# Patient Record
Sex: MALE | Race: WHITE | Employment: OTHER | URBAN - METROPOLITAN AREA
[De-identification: names, ages, dates, MRNs, and addresses within clinical notes are randomized per-mention and may not be internally consistent; named-entity substitution may affect disease eponyms.]

---

## 2017-01-30 RX ORDER — AMLODIPINE BESYLATE 5 MG/1
TABLET ORAL
Qty: 90 TAB | Refills: 3 | Status: SHIPPED | OUTPATIENT
Start: 2017-01-30 | End: 2017-04-25 | Stop reason: SDUPTHER

## 2017-03-01 ENCOUNTER — OFFICE VISIT (OUTPATIENT)
Dept: CARDIOLOGY CLINIC | Age: 82
End: 2017-03-01

## 2017-03-01 ENCOUNTER — HOSPITAL ENCOUNTER (OUTPATIENT)
Dept: GENERAL RADIOLOGY | Age: 82
Discharge: HOME | End: 2017-03-01
Payer: MEDICARE

## 2017-03-01 VITALS
RESPIRATION RATE: 18 BRPM | HEIGHT: 68 IN | BODY MASS INDEX: 26.58 KG/M2 | OXYGEN SATURATION: 97 % | WEIGHT: 175.4 LBS | HEART RATE: 69 BPM | SYSTOLIC BLOOD PRESSURE: 122 MMHG | DIASTOLIC BLOOD PRESSURE: 64 MMHG

## 2017-03-01 DIAGNOSIS — E78.2 MIXED HYPERLIPIDEMIA: Primary | ICD-10-CM

## 2017-03-01 DIAGNOSIS — E78.2 MIXED HYPERLIPIDEMIA: ICD-10-CM

## 2017-03-01 PROCEDURE — 71020 XR CHEST PA LAT: CPT
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
## 2017-03-01 NOTE — MR AVS SNAPSHOT
Visit Information Date & Time Provider Department Dept. Phone Encounter #  
 3/1/2017  2:15 PM Shanti Henao, Cor[facility] [id number] Your Appointments 3/9/2017 10:00 AM  
ECHO CARDIOGRAMS 2D with ECHO, [facility]) Appt Note: Per Donald $0CP REM 2D ECHO LIMTED ADULT W OR WO CONTR [03044 CPT(R)]  
 [address]  
[phone] [address]  
  
    
 4/4/2017 10:30 AM  
6 MONTH with [address] [facility]) Appt Note: 6mo f/u -lj 11-22  
 [address]  
[phone] [address]  
  
    
 6/1/2017  9:30 AM  
PACEMAKER with PACEMAKER, [facility] Appt Note: BSC DCPM 6mo check no remote [address]  
[phone] [address] Upcoming Health Maintenance Date Due DTaP/Tdap/Td series (1 - Tdap) 4/23/1942 ZOSTER VACCINE AGE 60> 4/23/1981 GLAUCOMA SCREENING Q2Y 4/23/1986 Pneumococcal 65+ Low/Medium Risk (1 of 2 - PCV13) 4/23/1986 MEDICARE YEARLY EXAM 4/23/1986 INFLUENZA AGE 9 TO ADULT 8/1/2016 Allergies as of 3/1/2017  Review Complete On: 3/1/2017 By: Melita Hobbs LPN Severity Noted Reaction Type Reactions Shellfish Derived  12/21/2012    Other (comments) Crab meat Current Immunizations  Never Reviewed No immunizations on file. Not reviewed this visit You Were Diagnosed With   
  
 Codes Comments Mixed hyperlipidemia    -  Primary ICD-10-CM: R07.9 ICD-9-CM: 272.2 Vitals BP  
  
  
  
  
  
 122/64 (BP 1 Location: Right arm, BP Patient Position: Sitting) Vitals History BMI and BSA Data Body Mass Index Body Surface Area  
 26.67 kg/m 2 1.95 m 2 Preferred Pharmacy Pharmacy Name Phone [facility]Y [facility][id number] [facility] [phone] Your Updated Medication List  
  
   
This list is accurate as of: 3/1/17  3:02 PM.  Always use your most recent med list. amLODIPine 5 mg tablet Commonly known as:  Gee Esteban TAKE 1 TABLET BY MOUTH EVERY DAY  
  
 ASPIR-81 81 mg tablet Generic drug:  aspirin delayed-release Take 81 mg by mouth daily. AZOPT 1 % ophthalmic suspension Generic drug:  brinzolamide Administer 1 Drop to both eyes two (2) times a day. cyanocobalamin 1,000 mcg tablet Take 1,000 mcg by mouth daily. OCUVITE 501-63-7-150 mg-unit-mg-mg Cap Generic drug:  Vit C-Vit E-Lutein-Min-OM-3 Take  by mouth daily. omeprazole 40 mg capsule Commonly known as:  PRILOSEC Take 40 mg by mouth as needed. OPTI-MULTI PO Take 1 Tab by mouth daily. red yeast rice extract 600 mg Cap Take 600 mg by mouth daily. timolol 0.5 % ophthalmic solution Commonly known as:  TIMOPTIC Administer 1 Drop to both eyes two (2) times a day. XALATAN 0.005 % ophthalmic solution Generic drug:  latanoprost  
Administer 1 Drop to both eyes nightly. We Performed the Following AMB POC EKG ROUTINE W/ 12 LEADS, INTER & REP [67308 CPT(R)] To-Do List   
 03/02/2017 ECHO:  2D ECHO LIMTED ADULT W OR WO CONTR   
  
 03/08/2017 Imaging:  XR CHEST PA LAT Introducing [facility] & HEALTH SERVICES! Laurel Zee introduces [employer] patient portal. Now you can access parts of your medical record, email your doctor's office, and request medication refills online. 1. In your internet browser, go to [email or web address] 2. Click on the First Time User? Click Here link in the Sign In box. You will see the New Member Sign Up page. 3. Enter your [employer] Access Code exactly as it appears below. You will not need to use this code after youve completed the sign-up process. If you do not sign up before the expiration date, you must request a new code. · [employer] Access Code: [facility] Expires: 5/30/2017  2:10 PM 
 
4. Enter the last four digits of your Social Security Number (xxxx) and Date of Birth (mm/dd/yyyy) as indicated and click Submit. You will be taken to the next sign-up page. 5. Create a [employer] ID. This will be your [employer] Mobile login ID and cannot be changed, so think of one that is secure and easy to remember. 6. Create a [employer] password. You can change your password at any time. 7. Enter your Password Reset Question and Answer. This can be used at a later time if you forget your password. 8. Enter your e-mail address. You will receive e-mail notification when new information is available in [address] 9. Click Sign Up. You can now view and download portions of your medical record. 10. Click the Download Summary menu link to download a portable copy of your medical information. If you have questions, please visit the Frequently Asked Questions section of the [employer] website. Remember, [employer] is NOT to be used for urgent needs. For medical emergencies, dial 911. Now available from your iPhone and Android! Please provide this summary of care documentation to your next provider. Your primary care clinician is listed as ARCELIA Dhillon. If you have any questions after today's visit, please call [phone].

## 2017-03-01 NOTE — PATIENT INSTRUCTIONS
Fatigue: Care Instructions  Your Care Instructions  Fatigue is a feeling of tiredness, exhaustion, or lack of energy. You may feel fatigue because of too much or not enough activity. It can also come from stress, lack of sleep, boredom, and poor diet. Many medical problems, such as viral infections, can cause fatigue. Emotional problems, especially depression, are often the cause of fatigue. Fatigue is most often a symptom of another problem. Treatment for fatigue depends on the cause. For example, if you have fatigue because you have a certain health problem, treating this problem also treats your fatigue. If depression or anxiety is the cause, treatment may help. Follow-up care is a key part of your treatment and safety. Be sure to make and go to all appointments, and call your doctor if you are having problems. It's also a good idea to know your test results and keep a list of the medicines you take. How can you care for yourself at home? · Get regular exercise. But don't overdo it. Go back and forth between rest and exercise. · Get plenty of rest.  · Eat a healthy diet. Do not skip meals, especially breakfast.  · Reduce your use of caffeine, tobacco, and alcohol. Caffeine is most often found in coffee, tea, cola drinks, and chocolate. · Limit medicines that can cause fatigue. This includes tranquilizers and cold and allergy medicines. When should you call for help? Watch closely for changes in your health, and be sure to contact your doctor if:  · You have new symptoms such as fever or a rash. · Your fatigue gets worse. · You have been feeling down, depressed, or hopeless. Or you may have lost interest in things that you usually enjoy. · You are not getting better as expected. Where can you learn more? Go to http://vivek-talisha.info/. Enter L664 in the search box to learn more about \"Fatigue: Care Instructions. \"  Current as of: May 27, 2016  Content Version: 11.1  © 1003-8091 Healthwise, Incorporated. Care instructions adapted under license by [employer] (which disclaims liability or warranty for this information). If you have questions about a medical condition or this instruction, always ask your healthcare professional. [place] [address] any warranty or liability for your use of this information.

## 2017-03-02 ENCOUNTER — TELEPHONE (OUTPATIENT)
Dept: CARDIOLOGY CLINIC | Age: 82
End: 2017-03-02

## 2017-03-02 NOTE — TELEPHONE ENCOUNTER
----- Message from Janina Nuñez NP sent at 3/1/2017  5:05 PM EST -----  Scheryl Show: Please call patient chest x-ray is normal.

## 2017-03-09 ENCOUNTER — CLINICAL SUPPORT (OUTPATIENT)
Dept: CARDIOLOGY CLINIC | Age: 82
End: 2017-03-09

## 2017-03-09 DIAGNOSIS — E78.2 MIXED HYPERLIPIDEMIA: ICD-10-CM

## 2017-03-09 DIAGNOSIS — I34.0 MITRAL VALVE INSUFFICIENCY, UNSPECIFIED ETIOLOGY: Primary | ICD-10-CM

## 2017-03-13 ENCOUNTER — TELEPHONE (OUTPATIENT)
Dept: CARDIOLOGY CLINIC | Age: 82
End: 2017-03-13

## 2017-03-13 NOTE — TELEPHONE ENCOUNTER
----- Message from Leydi Baez NP sent at 3/13/2017  1:14 PM EDT -----  Please call patient mitral valve unchanged from previous ECHO, follow up with Neema Watson

## 2017-03-14 NOTE — TELEPHONE ENCOUNTER
----- Message from Chata Felix NP sent at 3/13/2017  1:14 PM EDT -----  Please call patient mitral valve unchanged from previous ECHO, follow up with Joshua Kumar

## 2017-03-15 NOTE — TELEPHONE ENCOUNTER
----- Message from Eloy Salas NP sent at 3/13/2017  1:14 PM EDT -----  Please call patient mitral valve unchanged from previous ECHO, follow up with Solo Guerrero

## 2017-03-16 ENCOUNTER — TELEPHONE (OUTPATIENT)
Dept: CARDIOLOGY CLINIC | Age: 82
End: 2017-03-16

## 2017-03-16 NOTE — TELEPHONE ENCOUNTER
Verified patient with two identifiers. Spoke with patient regarding STRESS test results. Pt will follow up with Dr. Debra Spear on 4/25 will call if any pain or SOB.

## 2017-04-25 ENCOUNTER — OFFICE VISIT (OUTPATIENT)
Dept: CARDIOLOGY CLINIC | Age: 82
End: 2017-04-25

## 2017-04-25 VITALS
HEART RATE: 61 BPM | RESPIRATION RATE: 16 BRPM | HEIGHT: 68 IN | BODY MASS INDEX: 25.85 KG/M2 | DIASTOLIC BLOOD PRESSURE: 78 MMHG | SYSTOLIC BLOOD PRESSURE: 108 MMHG | WEIGHT: 170.6 LBS | OXYGEN SATURATION: 96 %

## 2017-04-25 DIAGNOSIS — Z95.0 CARDIAC PACEMAKER IN SITU: ICD-10-CM

## 2017-04-25 DIAGNOSIS — I10 ESSENTIAL HYPERTENSION, BENIGN: ICD-10-CM

## 2017-04-25 DIAGNOSIS — E78.2 MIXED HYPERLIPIDEMIA: Primary | ICD-10-CM

## 2017-04-25 DIAGNOSIS — Z95.1 POSTSURGICAL AORTOCORONARY BYPASS STATUS: ICD-10-CM

## 2017-04-25 DIAGNOSIS — I25.10 ATHEROSCLEROSIS OF NATIVE CORONARY ARTERY OF NATIVE HEART, ANGINA PRESENCE UNSPECIFIED: ICD-10-CM

## 2017-04-25 RX ORDER — AMLODIPINE BESYLATE 5 MG/1
2.5 TABLET ORAL DAILY
Qty: 90 TAB | Refills: 3
Start: 2017-04-25 | End: 2017-08-29 | Stop reason: ALTCHOICE

## 2017-04-25 NOTE — MR AVS SNAPSHOT
Visit Information Date & Time Provider Department Dept. Phone Encounter #  
 4/25/2017 10:45 AM Dayana García MD [facility] [phone] [id number] Your Appointments 6/1/2017  9:30 AM  
PACEMAKER with PACEMAKER, [facility]) Appt Note: BS DCPM 6mo check no remote [address]  
[phone] [address] Upcoming Health Maintenance Date Due DTaP/Tdap/Td series (1 - Tdap) 4/23/1942 ZOSTER VACCINE AGE 60> 4/23/1981 GLAUCOMA SCREENING Q2Y 4/23/1986 Pneumococcal 65+ Low/Medium Risk (1 of 2 - PCV13) 4/23/1986 MEDICARE YEARLY EXAM 4/23/1986 INFLUENZA AGE 9 TO ADULT 8/1/2016 Allergies as of 4/25/2017  Review Complete On: 4/25/2017 By: Shwetha Woodward Severity Noted Reaction Type Reactions Shellfish Derived  12/21/2012    Other (comments) Crab meat Current Immunizations  Never Reviewed No immunizations on file. Not reviewed this visit You Were Diagnosed With   
  
 Codes Comments Mixed hyperlipidemia    -  Primary ICD-10-CM: N77.7 ICD-9-CM: 272.2 Essential hypertension, benign     ICD-10-CM: I10 
ICD-9-CM: 401.1 Postsurgical aortocoronary bypass status     ICD-10-CM: Z95.1 ICD-9-CM: V45.81 Atherosclerosis of native coronary artery of native heart, angina presence unspecified     ICD-10-CM: I25.10 ICD-9-CM: 414.01 Cardiac pacemaker in situ     ICD-10-CM: Z95.0 ICD-9-CM: V45.01 Vitals BP Pulse Resp Height(growth percentile) Weight(growth percentile) SpO2  
 108/78 (BP 1 Location: Left arm, BP Patient Position: Sitting) 61 16 5' 8\" (1.727 m) 170 lb 9.6 oz (77.4 kg) 96% BMI Smoking Status 25.94 kg/m2 Never Smoker Vitals History BMI and BSA Data  Body Mass Index Body Surface Area  
 25.94 kg/m 2 1.93 m 2  
  
  
 Preferred Pharmacy Pharmacy Name Phone [facility]ACY #[phone] [facility] [phone] Your Updated Medication List  
  
   
This list is accurate as of: 4/25/17 11:55 AM.  Always use your most recent med list. amLODIPine 5 mg tablet Commonly known as:  Ann Fast Take 0.5 Tabs by mouth daily. Lowered 04/25/17 ASPIR-81 81 mg tablet Generic drug:  aspirin delayed-release Take 81 mg by mouth daily. AZOPT 1 % ophthalmic suspension Generic drug:  brinzolamide Administer 1 Drop to both eyes two (2) times a day. cyanocobalamin 1,000 mcg tablet Take 1,000 mcg by mouth daily. OCUVITE 398-95-8-150 mg-unit-mg-mg Cap Generic drug:  Vit C-Vit E-Lutein-Min-OM-3 Take  by mouth daily. omeprazole 40 mg capsule Commonly known as:  PRILOSEC Take 40 mg by mouth as needed. OPTI-MULTI PO Take 1 Tab by mouth daily. red yeast rice extract 600 mg Cap Take 600 mg by mouth daily. timolol 0.5 % ophthalmic solution Commonly known as:  TIMOPTIC Administer 1 Drop to both eyes two (2) times a day. XALATAN 0.005 % ophthalmic solution Generic drug:  latanoprost  
Administer 1 Drop to both eyes nightly. We Performed the Following AMB POC EKG ROUTINE W/ 12 LEADS, INTER & REP [54752 CPT(R)] Introducing [facility] SERVICES! [employer] introduces [employer] patient portal. Now you can access parts of your medical record, email your doctor's office, and request medication refills online. 1. In your internet browser, go to [email or web address] 2. Click on the First Time User? Click Here link in the Sign In box. You will see the New Member Sign Up page. 3. Enter your [employer] Access Code exactly as it appears below. You will not need to use this code after youve completed the sign-up process.  If you do not sign up before the expiration date, you must request a new code. · [employer] Access Code: [facility] Expires: 5/30/2017  3:10 PM 
 
4. Enter the last four digits of your Social Security Number (xxxx) and Date of Birth (mm/dd/yyyy) as indicated and click Submit. You will be taken to the next sign-up page. 5. Create a [employer] ID. This will be your [employer] login ID and cannot be changed, so think of one that is secure and easy to remember. 6. Create a [employer] password. You can change your password at any time. 7. Enter your Password Reset Question and Answer. This can be used at a later time if you forget your password. 8. Enter your e-mail address. You will receive e-mail notification when new information is available in [address] 9. Click Sign Up. You can now view and download portions of your medical record. 10. Click the Download Summary menu link to download a portable copy of your medical information. If you have questions, please visit the Frequently Asked Questions section of the [employer] website. Remember, [employer] is NOT to be used for urgent needs. For medical emergencies, dial 911. Now available from your iPhone and Android! Please provide this summary of care documentation to your next provider. Your primary care clinician is listed as ARCELIA Roth. If you have any questions after today's visit, please call [phone].

## 2017-04-25 NOTE — PROGRESS NOTES
NAME:  Angela Cha   :   1921   MRN:   [id number]   PCP:  Kiley Alfaro MD           Subjective: The patient is a 80y.o. year old male  who returns for a routine follow-up on ASHD, PM. Since the last visit, patient reports no change in exercise tolerance, chest pain, edema, medication intolerance, palpitations,  PND/orthopnea wheezing, sputum, syncope, dizziness or light headedness. Mr Anna Kyle reports fatigue and dyspnea with exertion. He can get up and dress, rest, then eat, rest... Past Medical History:   Diagnosis Date    Atrioventricular block, complete ([address]     CAD (coronary artery disease)     Dizziness and giddiness     GERD (gastroesophageal reflux disease)     Hypertension     Mixed hyperlipidemia     Other acute and subacute form of ischemic heart disease     Syncope and collapse        Social History   Substance Use Topics    Smoking status: Never Smoker    Smokeless tobacco: Never Used    Alcohol use 3.5 oz/week     7 Glasses of wine per week      Family History   Problem Relation Age of Onset    Stroke Father         Review of Systems  Constitutional: Negative for fever, chills, and diaphoresis. Respiratory: Negative for cough, hemoptysis, sputum production, reports shortness of breath  Cardiovascular: Negative for chest pain, palpitations, orthopnea, claudication, leg swelling and PND. Gastrointestinal: Negative for heartburn, nausea, vomiting, blood in stool and melena. Genitourinary: Negative for dysuria and flank pain. Musculoskeletal: Negative for joint pain and back pain. Skin: Negative for rash. Neurological: Negative for focal weakness, seizures, loss of consciousness, weakness and headaches. Endo/Heme/Allergies: Does not bruise/bleed easily. Psychiatric/Behavioral: Negative for memory loss. The patient does not have insomnia.         Objective:       Vitals:    17 1031 17 1040   BP: 110/80 108/78   Pulse: 61    Resp: 16    SpO2: 96%    Weight: 170 lb 9.6 oz (77.4 kg)    Height: 5' 8\" (1.727 m)     Body mass index is 25.94 kg/(m^2). General PE    Gen: NAD     Mental Status - Alert. General Appearance - Not in acute distress. Neck - no JVD     Chest and Lung Exam     Inspection: Accessory muscles - No use of accessory muscles in breathing. Auscultation:   Breath sounds: - Normal.     Cardiovascular   Inspection: Jugular vein - Bilateral - Inspection Normal.   Palpation/Percussion:   Apical Impulse: - Normal.   Auscultation: Rhythm - Regular. Heart Sounds - S1 WNL and S2 WNL. No S3 or S4. Murmurs & Other Heart Sounds: Auscultation of the heart reveals - No Murmurs. Peripheral Vascular   Upper Extremity: Inspection - Bilateral - No Cyanotic nailbeds or Digital clubbing. Lower Extremity:   Palpation: Edema - Bilateral - No edema. Abdomen: Soft, non-tender, bowel sounds are active. Neuro: A&O times 3, CN and motor grossly WNL      Data Review:     EKG -  V paced. Echo - 3/17  Left ventricle: Systolic function was normal. Ejection fraction was  estimated in the range of 60 % to 65 %. There were no regional wall motion  abnormalities. Wall thickness was moderately increased. Left atrium: The atrium was moderately dilated. Mitral valve: There was moderate regurgitation. Recommend AMBERLY for better  evaluation if clinically indicated. Tricuspid valve: There was mild regurgitation. There was mild pulmonary  hypertension. Allergies reviewed  Allergies   Allergen Reactions    Shellfish Derived Other (comments)     Crab meat       Medications reviewed  Current Outpatient Prescriptions   Medication Sig    amLODIPine (NORVASC) 5 mg tablet Take 0.5 Tabs by mouth daily. Lowered 04/25/17    Vit C-Vit E-Lutein-Min-OM-3 (OCUVITE) 105-41-2-150 mg-unit-mg-mg cap Take  by mouth daily.  cyanocobalamin 1,000 mcg tablet Take 1,000 mcg by mouth daily.  MULTIVITAMIN WITH MINERALS (OPTI-MULTI PO) Take 1 Tab by mouth daily.  brinzolamide (AZOPT) 1 % ophthalmic suspension Administer 1 Drop to both eyes two (2) times a day.  omeprazole (PRILOSEC) 40 mg capsule Take 40 mg by mouth as needed.  timolol (TIMOPTIC) 0.5 % ophthalmic solution Administer 1 Drop to both eyes two (2) times a day.  aspirin delayed-release (ASPIR-81) 81 mg tablet Take 81 mg by mouth daily.  latanoprost (XALATAN) 0.005 % ophthalmic solution Administer 1 Drop to both eyes nightly.  red yeast rice extract 600 mg cap Take 600 mg by mouth daily. No current facility-administered medications for this visit. Assessment:       ICD-10-CM ICD-9-CM    1. Mixed hyperlipidemia E78.2 272.2 AMB POC EKG ROUTINE W/ 12 LEADS, INTER & REP   2. Essential hypertension, benign I10 401.1    3. 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 Atherosclerosis of native coronary artery of native heart, angina presence unspecified I25.10 414.01    5. Cardiac pacemaker in situ Z95.0 V45.01         Orders Placed This Encounter    AMB POC EKG ROUTINE W/ 12 LEADS, INTER & REP     Order Specific Question:   Reason for Exam:     Answer:   Routine    amLODIPine (NORVASC) 5 mg tablet     Sig: Take 0.5 Tabs by mouth daily. 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 Recent echo with normal EF mod MR. Stress test 10/16 without ischemia. Will lower his CCB for low bp and if no improvement consider repeat stress/cardiac cath with his CAD hx. Brendon Thomas, [facility] [address] [facility] [address] [facility]    4/26/2017         Agree with note as outlined by  NP.  I confirm findings in history and physical exam. No additional findings noted. Agree with plan as outlined above.      Elma Valles MD

## 2017-04-25 NOTE — PROGRESS NOTES
Chief Complaint   Patient presents with    Cholesterol Problem     6 mo f/u    Hypertension     \"    Shortness of Breath     on exertion

## 2017-06-01 ENCOUNTER — CLINICAL SUPPORT (OUTPATIENT)
Dept: CARDIOLOGY CLINIC | Age: 82
End: 2017-06-01

## 2017-06-01 DIAGNOSIS — Z95.0 CARDIAC PACEMAKER IN SITU: Primary | ICD-10-CM

## 2017-06-01 DIAGNOSIS — I49.5 SINOATRIAL NODE DYSFUNCTION (HCC): ICD-10-CM

## 2017-08-29 ENCOUNTER — OFFICE VISIT (OUTPATIENT)
Dept: CARDIOLOGY CLINIC | Age: 82
End: 2017-08-29

## 2017-08-29 VITALS
DIASTOLIC BLOOD PRESSURE: 80 MMHG | HEIGHT: 68 IN | WEIGHT: 164 LBS | BODY MASS INDEX: 24.86 KG/M2 | SYSTOLIC BLOOD PRESSURE: 146 MMHG | HEART RATE: 65 BPM | RESPIRATION RATE: 20 BRPM | OXYGEN SATURATION: 97 %

## 2017-08-29 DIAGNOSIS — R19.5 MUCUS IN STOOL: ICD-10-CM

## 2017-08-29 DIAGNOSIS — R10.30 LOWER ABDOMINAL PAIN: ICD-10-CM

## 2017-08-29 DIAGNOSIS — I25.10 ATHEROSCLEROSIS OF NATIVE CORONARY ARTERY OF NATIVE HEART, ANGINA PRESENCE UNSPECIFIED: Primary | ICD-10-CM

## 2017-08-29 DIAGNOSIS — I10 ESSENTIAL HYPERTENSION, BENIGN: ICD-10-CM

## 2017-08-29 DIAGNOSIS — R63.4 WEIGHT LOSS, UNINTENTIONAL: ICD-10-CM

## 2017-08-29 DIAGNOSIS — Z95.1 POSTSURGICAL AORTOCORONARY BYPASS STATUS: ICD-10-CM

## 2017-08-29 DIAGNOSIS — R19.4 BOWEL HABIT CHANGES: ICD-10-CM

## 2017-08-29 RX ORDER — LISINOPRIL 5 MG/1
5 TABLET ORAL DAILY
Qty: 30 TAB | Refills: 6 | Status: SHIPPED | OUTPATIENT
Start: 2017-08-29 | End: 2017-09-28 | Stop reason: SDUPTHER
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
## 2017-08-31 ENCOUNTER — HOSPITAL ENCOUNTER (OUTPATIENT)
Dept: CT IMAGING | Age: 82
Discharge: HOME | End: 2017-08-31
Attending: NURSE PRACTITIONER
Payer: MEDICARE

## 2017-08-31 ENCOUNTER — HOSPITAL ENCOUNTER (EMERGENCY)
Age: 82
Discharge: HOME | End: 2017-08-31
Attending: EMERGENCY MEDICINE
Payer: MEDICARE

## 2017-08-31 VITALS
BODY MASS INDEX: 25.13 KG/M2 | WEIGHT: 165.79 LBS | HEART RATE: 65 BPM | TEMPERATURE: 97.9 F | SYSTOLIC BLOOD PRESSURE: 167 MMHG | DIASTOLIC BLOOD PRESSURE: 75 MMHG | HEIGHT: 68 IN | RESPIRATION RATE: 16 BRPM | OXYGEN SATURATION: 98 %

## 2017-08-31 DIAGNOSIS — R19.4 BOWEL HABIT CHANGES: ICD-10-CM

## 2017-08-31 DIAGNOSIS — R19.5 MUCUS IN STOOL: ICD-10-CM

## 2017-08-31 DIAGNOSIS — R63.4 WEIGHT LOSS, UNINTENTIONAL: ICD-10-CM

## 2017-08-31 DIAGNOSIS — K57.32 DIVERTICULITIS OF LARGE INTESTINE WITHOUT PERFORATION OR ABSCESS WITHOUT BLEEDING: Primary | ICD-10-CM

## 2017-08-31 DIAGNOSIS — R10.30 LOWER ABDOMINAL PAIN: ICD-10-CM

## 2017-08-31 LAB
ALBUMIN SERPL-MCNC: 3.4 G/DL (ref 3.5–5)
ALBUMIN/GLOB SERPL: 0.9 {RATIO} (ref 1.1–2.2)
ALP SERPL-CCNC: 127 U/L (ref 45–117)
ALT SERPL-CCNC: 25 U/L (ref 12–78)
ANION GAP SERPL CALC-SCNC: 7 MMOL/L (ref 5–15)
AST SERPL-CCNC: 33 U/L (ref 15–37)
BASOPHILS # BLD: 0 K/UL (ref 0–0.1)
BASOPHILS NFR BLD: 0 % (ref 0–1)
BILIRUB SERPL-MCNC: 0.4 MG/DL (ref 0.2–1)
BUN SERPL-MCNC: 32 MG/DL (ref 6–20)
BUN/CREAT SERPL: 16 (ref 12–20)
CALCIUM SERPL-MCNC: 8.6 MG/DL (ref 8.5–10.1)
CHLORIDE SERPL-SCNC: 110 MMOL/L (ref 97–108)
CO2 SERPL-SCNC: 24 MMOL/L (ref 21–32)
CREAT BLD-MCNC: 2 MG/DL (ref 0.6–1.3)
CREAT SERPL-MCNC: 1.96 MG/DL (ref 0.7–1.3)
EOSINOPHIL # BLD: 0.4 K/UL (ref 0–0.4)
EOSINOPHIL NFR BLD: 6 % (ref 0–7)
ERYTHROCYTE [DISTWIDTH] IN BLOOD BY AUTOMATED COUNT: 13.3 % (ref 11.5–14.5)
GLOBULIN SER CALC-MCNC: 4 G/DL (ref 2–4)
GLUCOSE SERPL-MCNC: 115 MG/DL (ref 65–100)
HCT VFR BLD AUTO: 41.7 % (ref 36.6–50.3)
HGB BLD-MCNC: 14.3 G/DL (ref 12.1–17)
LACTATE SERPL-SCNC: 0.8 MMOL/L (ref 0.4–2)
LYMPHOCYTES # BLD: 2.1 K/UL (ref 0.8–3.5)
LYMPHOCYTES NFR BLD: 26 % (ref 12–49)
MCH RBC QN AUTO: 33.1 PG (ref 26–34)
MCHC RBC AUTO-ENTMCNC: 34.3 G/DL (ref 30–36.5)
MCV RBC AUTO: 96.5 FL (ref 80–99)
MONOCYTES # BLD: 0.8 K/UL (ref 0–1)
MONOCYTES NFR BLD: 10 % (ref 5–13)
NEUTS SEG # BLD: 4.7 K/UL (ref 1.8–8)
NEUTS SEG NFR BLD: 58 % (ref 32–75)
PLATELET # BLD AUTO: 213 K/UL (ref 150–400)
POTASSIUM SERPL-SCNC: 4.3 MMOL/L (ref 3.5–5.1)
PROT SERPL-MCNC: 7.4 G/DL (ref 6.4–8.2)
RBC # BLD AUTO: 4.32 M/UL (ref 4.1–5.7)
SODIUM SERPL-SCNC: 141 MMOL/L (ref 136–145)
WBC # BLD AUTO: 8 K/UL (ref 4.1–11.1)

## 2017-08-31 PROCEDURE — 74011000255 HC RX REV CODE- 255: Performed by: NURSE PRACTITIONER

## 2017-08-31 PROCEDURE — 74011250636 HC RX REV CODE- 250/636: Performed by: EMERGENCY MEDICINE

## 2017-08-31 PROCEDURE — 80053 COMPREHEN METABOLIC PANEL: CPT | Performed by: PHYSICIAN ASSISTANT

## 2017-08-31 PROCEDURE — 74176 CT ABD & PELVIS W/O CONTRAST: CPT

## 2017-08-31 PROCEDURE — 96360 HYDRATION IV INFUSION INIT: CPT

## 2017-08-31 PROCEDURE — 36415 COLL VENOUS BLD VENIPUNCTURE: CPT | Performed by: PHYSICIAN ASSISTANT

## 2017-08-31 PROCEDURE — 99285 EMERGENCY DEPT VISIT HI MDM: CPT

## 2017-08-31 PROCEDURE — 74011250637 HC RX REV CODE- 250/637: Performed by: PHYSICIAN ASSISTANT

## 2017-08-31 PROCEDURE — 83605 ASSAY OF LACTIC ACID: CPT | Performed by: PHYSICIAN ASSISTANT

## 2017-08-31 PROCEDURE — 85025 COMPLETE CBC W/AUTO DIFF WBC: CPT | Performed by: PHYSICIAN ASSISTANT

## 2017-08-31 PROCEDURE — 82565 ASSAY OF CREATININE: CPT

## 2017-08-31 RX ORDER — METRONIDAZOLE 250 MG/1
500 TABLET ORAL
Status: COMPLETED | OUTPATIENT
Start: 2017-08-31 | End: 2017-08-31

## 2017-08-31 RX ORDER — METRONIDAZOLE 500 MG/1
500 TABLET ORAL 2 TIMES DAILY
Qty: 14 TAB | Refills: 0 | Status: SHIPPED | OUTPATIENT
Start: 2017-08-31 | End: 2017-09-07

## 2017-08-31 RX ORDER — CIPROFLOXACIN 500 MG/1
500 TABLET ORAL 2 TIMES DAILY
Qty: 14 TAB | Refills: 0 | Status: SHIPPED | OUTPATIENT
Start: 2017-08-31 | End: 2017-09-05 | Stop reason: ALTCHOICE

## 2017-08-31 RX ORDER — SODIUM CHLORIDE 0.9 % (FLUSH) 0.9 %
10 SYRINGE (ML) INJECTION
Status: DISCONTINUED | OUTPATIENT
Start: 2017-08-31 | End: 2017-08-31

## 2017-08-31 RX ORDER — BARIUM SULFATE 20 MG/ML
900 SUSPENSION ORAL
Status: COMPLETED | OUTPATIENT
Start: 2017-08-31 | End: 2017-08-31

## 2017-08-31 RX ORDER — CIPROFLOXACIN 500 MG/1
500 TABLET ORAL
Status: COMPLETED | OUTPATIENT
Start: 2017-08-31 | End: 2017-08-31

## 2017-08-31 RX ORDER — SODIUM CHLORIDE 9 MG/ML
50 INJECTION, SOLUTION INTRAVENOUS
Status: DISCONTINUED | OUTPATIENT
Start: 2017-08-31 | End: 2017-08-31

## 2017-08-31 RX ADMIN — SODIUM CHLORIDE 1000 ML: 900 INJECTION, SOLUTION INTRAVENOUS at 18:22

## 2017-08-31 RX ADMIN — BARIUM SULFATE 900 ML: 21 SUSPENSION ORAL at 13:05

## 2017-08-31 RX ADMIN — METRONIDAZOLE 500 MG: 250 TABLET ORAL at 20:32

## 2017-08-31 RX ADMIN — CIPROFLOXACIN HYDROCHLORIDE 500 MG: 500 TABLET, FILM COATED ORAL at 20:32

## 2017-08-31 NOTE — ED TRIAGE NOTES
Patient arrives ambulatory to ED with daughter with complaint of diarrhea 10 times after CT scan this afternoon (Dr. Maritza Garcia), who informed him Jany Christine has a bowel infection and needs IV antibiotic\"; pt denies N/V. Patient's daughter stated that patient was unable to get the \"full CT due to his kidney function. \" Patient's daughter stated that this started after taking the barium. Patient has had chronic diarrhea for awhile now per patient.
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
## 2017-08-31 NOTE — DISCHARGE INSTRUCTIONS
Diverticulitis: Care Instructions  Your Care Instructions    Diverticulitis occurs when pouches form in the wall of the colon and become inflamed or infected. It can be very painful. Doctors aren't sure what causes diverticulitis. There is no proof that foods such as nuts, seeds, or berries cause it or make it worse. A low-fiber diet may cause the colon to work harder to push stool forward. Pouches may form because of this extra work. It may be hard to think about healthy eating while you're in pain. But as you recover, you might think about how you can use healthy eating for overall better health. Healthy eating may help you avoid future attacks. Follow-up care is a key part of your treatment and safety. Be sure to make and go to all appointments, and call your doctor if you are having problems. It's also a good idea to know your test results and keep a list of the medicines you take. How can you care for yourself at home? · Drink plenty of fluids, enough so that your urine is light yellow or clear like water. If you have kidney, heart, or liver disease and have to limit fluids, talk with your doctor before you increase the amount of fluids you drink. · Stick to liquids or a bland diet (plain rice, bananas, dry toast or crackers, applesauce) until you are feeling better. Then you can return to regular foods and gradually increase the amount of fiber in your diet. · Use a heating pad set on low on your belly to relieve mild cramps and pain. · Get extra rest until you are feeling better. · Be safe with medicines. Read and follow all instructions on the label. ¨ If the doctor gave you a prescription medicine for pain, take it as prescribed. ¨ If you are not taking a prescription pain medicine, ask your doctor if you can take an over-the-counter medicine. · If your doctor prescribed antibiotics, take them as directed. Do not stop taking them just because you feel better.  You need to take the full course of antibiotics. To prevent future attacks of diverticulitis  · Avoid constipation:  ¨ Include fruits, vegetables, beans, and whole grains in your diet each day. These foods are high in fiber. ¨ Drink plenty of fluids, enough so that your urine is light yellow or clear like water. If you have kidney, heart, or liver disease and have to limit fluids, talk with your doctor before you increase the amount of fluids you drink. ¨ Get some exercise every day. Build up slowly to 30 to 60 minutes a day on 5 or more days of the week. ¨ Take a fiber supplement, such as Citrucel or Metamucil, every day if needed. Read and follow all instructions on the label. ¨ Schedule time each day for a bowel movement. Having a daily routine may help. Take your time and do not strain when having a bowel movement. When should you call for help? Call 911 anytime you think you may need emergency care. For example, call if:  · You passed out (lost consciousness). · You vomit blood or what looks like coffee grounds. · You pass maroon or very bloody stools. Call your doctor now or seek immediate medical care if:  · You have severe pain or swelling in your belly. · You have a new or higher fever. · You cannot keep down fluids or medicines. · You have new pain that gets worse when you move or cough. Watch closely for changes in your health, and be sure to contact your doctor if:  · The symptoms you had when you first started feeling sick come back. · You do not get better as expected. Where can you learn more? Go to http://vivek-talisha.info/. Enter H901 in the search box to learn more about \"Diverticulitis: Care Instructions. \"  Current as of: August 9, 2016  Content Version: 11.3  © 4252-3204 [employer]. Care instructions adapted under license by [employer] (which disclaims liability or warranty for this information).  If you have questions about a medical condition or this instruction, always ask your healthcare professional. [place] [address] any warranty or liability for your use of this information. [place]: Anatomy Sketch    Current as of: January 5, 2017  Content Version: 11.3  © 9275-0406 [employer]. Care instructions adapted under license by [employer] (which disclaims liability or warranty for this information). If you have questions about a medical condition or this instruction, always ask your healthcare professional. [place] [address] any warranty or liability for your use of this information. Learning About Diverticulosis and Diverticulitis  What are diverticulosis and diverticulitis? In diverticulosis and diverticulitis, pouches called diverticula form in the wall of the large intestine, or colon. · In diverticulosis, the pouches do not cause any pain or other symptoms. · In diverticulitis, the pouches get inflamed or infected and cause symptoms. Doctors aren't sure what causes these pouches in the colon. But they think that a low-fiber diet may play a role. Without fiber to add bulk to the stool, the colon has to work harder than normal to push the stool forward. The pressure from this may cause pouches to form in weak spots along the colon. Some people with diverticulosis get diverticulitis. But experts don't know why this happens. What are the symptoms? · In diverticulosis, most people don't have symptoms. But pouches sometimes bleed. · In diverticulitis, symptoms may last from a few hours to a week or more. They include:  ¨ Belly pain. This is usually in the lower left side. It is sometimes worse when you move. This is the most common symptom. ¨ Fever and chills. ¨ Bloating and gas. ¨ Diarrhea or constipation. ¨ Nausea and sometimes vomiting. ¨ Not feeling like eating. How can you prevent these problems?   You may be able to lower your chance of getting diverticulitis. You can do this by taking steps to prevent constipation. · Eat fruits, vegetables, beans, and whole grains every day. These foods are high in fiber. · Drink plenty of fluids (enough so that your urine is light yellow or clear like water). If you have kidney, heart, or liver disease and have to limit fluids, talk with your doctor before you increase the amount of fluids you drink. · Get at least 30 minutes of exercise on most days of the week. Walking is a good choice. You also may want to do other activities, such as running, swimming, cycling, or playing tennis or team sports. · Take a fiber supplement, such as Citrucel or Metamucil, every day if needed. Read and follow all instructions on the label. · Schedule time each day for a bowel movement. Having a daily routine may help. Take your time and do not strain when having a bowel movement. Some people avoid nuts, seeds, berries, and popcorn. They believe that these foods might get trapped in the diverticula and cause pain. But there is no proof that these foods cause diverticulitis or make it worse. How are these problems treated? · The best way to treat diverticulosis is to avoid constipation. (See the tips above.)  · Treatment for diverticulitis includes antibiotics and often a change in your diet. You may need only liquids at first. Your doctor may suggest pain medicines for pain or belly cramps. In some cases, surgery may be needed. Follow-up care is a key part of your treatment and safety. Be sure to make and go to all appointments, and call your doctor if you are having problems. It's also a good idea to know your test results and keep a list of the medicines you take. Where can you learn more? Go to http://vivek-talisha.info/. Enter M405 in the search box to learn more about \"Learning About Diverticulosis and Diverticulitis. \"  Current as of: August 9, 2016  Content Version: 11.3  © 1046-1125 [employer]. Care instructions adapted under license by [employer] (which disclaims liability or warranty for this information). If you have questions about a medical condition or this instruction, always ask your healthcare professional. [address] any warranty or liability for your use of this information.

## 2017-08-31 NOTE — PROGRESS NOTES
4:24 PM  Spoke with patient re: Ct results showing acute sig. Diverticulitis. Continues to experience abdominal pain, states \"19\" bowel movements today, low appetite, poor energy level. Discussed infective process, increased creatine / dehydration and risk of sepsis / bowel injury after he initially wanted to follow up next week with PCP. He agrees to be seen at [facility] ER for admission. Called and spoke with ER attending Dr Hussein Houston.

## 2017-09-01 NOTE — ED NOTES
Dr. Madan Perez and RITA Zapata at bedside to provide discharge paperwork. Vital signs stable. Pt in no apparent distress at this time. Mental status at baseline. Ambulatory to waiting room with steady gate, discharge paperwork in hand. Accompanied by daughter.

## 2017-09-05 ENCOUNTER — OFFICE VISIT (OUTPATIENT)
Dept: INTERNAL MEDICINE CLINIC | Age: 82
End: 2017-09-05

## 2017-09-05 VITALS
HEIGHT: 68 IN | BODY MASS INDEX: 25.01 KG/M2 | DIASTOLIC BLOOD PRESSURE: 92 MMHG | WEIGHT: 165 LBS | SYSTOLIC BLOOD PRESSURE: 150 MMHG

## 2017-09-05 DIAGNOSIS — K57.32 SIGMOID DIVERTICULITIS: Primary | ICD-10-CM

## 2017-09-05 DIAGNOSIS — I10 ESSENTIAL HYPERTENSION: Primary | ICD-10-CM

## 2017-09-05 RX ORDER — AMLODIPINE BESYLATE 5 MG/1
TABLET ORAL
Refills: 3 | COMMUNITY
Start: 2017-07-27 | End: 2018-06-03
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
## 2017-09-05 NOTE — MR AVS SNAPSHOT
Visit Information Date & Time Provider Department Dept. Phone Encounter #  
 9/5/2017 11:30 AM ARCELIA Ladd MD [place] [address] [phone] [id number] Your Appointments 12/12/2017 10:30 AM  
PACEMAKER with PACEMAKER, [facility] [address] [place] [address]) Appt Note: annual with device check; r/s from 12/5,[address]  
[phone] [address] [place] [address]  
  
    
 12/12/2017 10:30 AM  
ANNUAL with Krzysztof Mcclelland MD  
[facility] [address] [place] [address]) Appt Note: r/s from 12/5,[address]  
[phone] [address] [place] [address] Upcoming Health Maintenance Date Due DTaP/Tdap/Td series (1 - Tdap) 4/23/1942 ZOSTER VACCINE AGE 60> 2/23/1981 GLAUCOMA SCREENING Q2Y 4/23/1986 Pneumococcal 65+ Low/Medium Risk (1 of 2 - PCV13) 4/23/1986 MEDICARE YEARLY EXAM 4/23/1986 INFLUENZA AGE 9 TO ADULT 8/1/2017 Allergies as of 9/5/2017  Review Complete On: 9/5/2017 By: Shira Hughes MD  
  
 Severity Noted Reaction Type Reactions Shellfish Derived  12/21/2012    Other (comments) Crab meat Current Immunizations  Never Reviewed No immunizations on file. Not reviewed this visit Vitals Smoking Status Never Smoker Preferred Pharmacy Pharmacy Name Phone [id number] [facility] [phone] Your Updated Medication List  
  
   
This list is accurate as of: 9/5/17 12:39 PM.  Always use your most recent med list. amLODIPine 5 mg tablet Commonly known as:  Laure Elizabeth TAKE 1 TABLET BY MOUTH EVERY DAY  
  
 ASPIR-81 81 mg tablet Generic drug:  aspirin delayed-release Take 81 mg by mouth daily. AZOPT 1 % ophthalmic suspension Generic drug:  brinzolamide Administer 1 Drop to both eyes two (2) times a day. lisinopril 5 mg tablet Commonly known as:  [place] Rafy Take 1 Tab by mouth daily. Stop amlodipine  
  
 metroNIDAZOLE 500 mg tablet Commonly known as:  FLAGYL Take 1 Tab by mouth two (2) times a day for 7 days. OCUVITE 766-83-9-150 mg-unit-mg-mg Cap Generic drug:  Vit C-Vit E-Lutein-Min-OM-3 Take  by mouth daily. omeprazole 40 mg capsule Commonly known as:  PRILOSEC Take 40 mg by mouth as needed. OPTI-MULTI PO Take 1 Tab by mouth daily. timolol 0.5 % ophthalmic solution Commonly known as:  TIMOPTIC Administer 1 Drop to both eyes two (2) times a day. XALATAN 0.005 % ophthalmic solution Generic drug:  latanoprost  
Administer 1 Drop to both eyes nightly. Introducing [facility] & HEALTH SERVICES! [employer] introduces [employer] patient portal. Now you can access parts of your medical record, email your doctor's office, and request medication refills online. 1. In your internet browser, go to [email or web address]. [email or web address]/"[email or web address] 2. Click on the First Time User? Click Here link in the Sign In box. You will see the New Member Sign Up page. 3. Enter your Pie[employer] Access Code exactly as it appears below. You will not need to use this code after youve completed the sign-up process. If you do not sign up before the expiration date, you must request a new code. · Pie[employer] Access Code: [email or web address] Expires: 11/28/2017  2:59 PM 
 
4. Enter the last four digits of your Social Security Number (xxxx) and Date of Birth (mm/dd/yyyy) as indicated and click Submit. You will be taken to the next sign-up page. 5. Create a ShopTextt ID. This will be your Pie[employer] login ID and cannot be changed, so think of one that is secure and easy to remember. 6. Create a Shop[employer]t password. You can change your password at any time. 7. Enter your Password Reset Question and Answer. This can be used at a later time if you forget your password. 8. Enter your e-mail address. You will receive e-mail notification when new information is available in [address] 9. Click Sign Up. You can now view and download portions of your medical record. 10. Click the Download Summary menu link to download a portable copy of your medical information. If you have questions, please visit the Frequently Asked Questions section of the [employer] website. Remember, [employer] is NOT to be used for urgent needs. For medical emergencies, dial 911. Now available from your iPhone and Android! Please provide this summary of care documentation to your next provider. Your primary care clinician is listed as ARCELIA Alvarado. If you have any questions after today's visit, please call [phone].

## 2017-09-05 NOTE — PROGRESS NOTES
Deonna Bharat Salamanca was seen today at a different time listed and so this encounter is a duplicate.     Jackson Multani MD

## 2017-09-05 NOTE — PROGRESS NOTES
Gale Villa is a 80 y.o. male presenting for Abdominal Pain (RM 1 - had CT scan - been on soft diet)  . 1. Have you been to the ER, urgent care clinic since your last visit? Hospitalized since your last visit? Yes When: last week Where: [address] [facility] [facility] Reason for visit: ABD CT    2. Have you seen or consulted any other health care providers outside of the [facility] since your last visit? Include any pap smears or colon screening.  No

## 2017-09-07 ENCOUNTER — OFFICE VISIT (OUTPATIENT)
Dept: INTERNAL MEDICINE CLINIC | Age: 82
End: 2017-09-07

## 2017-09-07 VITALS
OXYGEN SATURATION: 99 % | HEIGHT: 68 IN | BODY MASS INDEX: 25.01 KG/M2 | HEART RATE: 64 BPM | TEMPERATURE: 97.8 F | WEIGHT: 165 LBS | DIASTOLIC BLOOD PRESSURE: 80 MMHG | SYSTOLIC BLOOD PRESSURE: 138 MMHG | RESPIRATION RATE: 18 BRPM

## 2017-09-07 DIAGNOSIS — E11.9 TYPE 2 DIABETES MELLITUS WITHOUT COMPLICATION, WITHOUT LONG-TERM CURRENT USE OF INSULIN (HCC): ICD-10-CM

## 2017-09-07 DIAGNOSIS — I25.10 ATHEROSCLEROSIS OF NATIVE CORONARY ARTERY OF NATIVE HEART, ANGINA PRESENCE UNSPECIFIED: ICD-10-CM

## 2017-09-07 DIAGNOSIS — N18.30 CKD (CHRONIC KIDNEY DISEASE) STAGE 3, GFR 30-59 ML/MIN (HCC): ICD-10-CM

## 2017-09-07 DIAGNOSIS — K21.9 GASTROESOPHAGEAL REFLUX DISEASE WITHOUT ESOPHAGITIS: ICD-10-CM

## 2017-09-07 DIAGNOSIS — I10 ESSENTIAL HYPERTENSION, BENIGN: Primary | ICD-10-CM

## 2017-09-07 DIAGNOSIS — K57.32 DIVERTICULITIS OF LARGE INTESTINE WITHOUT PERFORATION OR ABSCESS WITHOUT BLEEDING: ICD-10-CM

## 2017-09-07 LAB
ALBUMIN SERPL-MCNC: 4.1 G/DL (ref 3.9–5.4)
ALKALINE PHOS POC: 91 U/L (ref 38–126)
ALT SERPL-CCNC: 29 U/L (ref 9–52)
AST SERPL-CCNC: 35 U/L (ref 14–36)
BACTERIA UA POCT, BACTPOCT: ABNORMAL
BILIRUB UR QL STRIP: NEGATIVE
BUN BLD-MCNC: 26 MG/DL (ref 9–20)
CALCIUM BLD-MCNC: 10 MG/DL (ref 8.4–10.2)
CASTS UA POCT: ABNORMAL
CHLORIDE BLD-SCNC: 111 MMOL/L (ref 98–107)
CHOLEST SERPL-MCNC: 161 MG/DL (ref 0–200)
CLUE CELLS, CLUEPOCT: NEGATIVE
CO2 POC: 20 MMOL/L (ref 22–32)
CREAT BLD-MCNC: 1.9 MG/DL (ref 0.8–1.5)
CRYSTALS UA POCT, CRYSPOCT: NEGATIVE
EGFR (POC): 29.1
EPITHELIAL CELLS POCT, EPITHPOCT: ABNORMAL
GLUCOSE POC: 114 MG/DL (ref 75–110)
GLUCOSE UR-MCNC: NEGATIVE MG/DL
GRAN# POC: 5.7 K/UL (ref 2–7.8)
GRAN% POC: 74.4 % (ref 37–92)
HBA1C MFR BLD HPLC: 7.4 % (ref 4.5–5.7)
HCT VFR BLD CALC: 47.1 % (ref 37–51)
HDLC SERPL-MCNC: 41 MG/DL (ref 35–130)
HGB BLD-MCNC: 15.6 G/DL (ref 12–18)
KETONES P FAST UR STRIP-MCNC: NEGATIVE MG/DL
LDL CHOLESTEROL POC: 101.8 MG/DL (ref 0–130)
LY# POC: 1.5 K/UL (ref 0.6–4.1)
LY% POC: 20.9 % (ref 10–58.5)
MCH RBC QN: 33.2 PG (ref 26–32)
MCHC RBC-ENTMCNC: 33 G/DL (ref 30–36)
MCV RBC: 100 FL (ref 80–97)
MICROALBUMIN UR TEST STR-MCNC: 50 MG/L (ref 0–20)
MID #, POC: 0.3 K/UL (ref 0–1.8)
MID% POC: 4.7 % (ref 0.1–24)
MUCUS UA POCT, MUCPOCT: ABNORMAL
PH UR STRIP: 5 [PH] (ref 5–7)
PLATELET # BLD: 242 K/UL (ref 140–440)
POTASSIUM SERPL-SCNC: 5.6 MMOL/L (ref 3.6–5)
PROT SERPL-MCNC: 6.9 G/DL (ref 6.3–8.2)
PROTEIN,URINE POC: ABNORMAL MG/DL
RBC # BLD: 4.69 M/UL (ref 4.2–6.3)
RBC UA POCT, RBCPOCT: ABNORMAL
SODIUM SERPL-SCNC: 148 MMOL/L (ref 137–145)
SP GR UR STRIP: 1.02 (ref 1.01–1.02)
TCHOL/HDL RATIO (POC): 3.9 (ref 0–4)
TOTAL BILIRUBIN POC: 0.4 MG/DL (ref 0.2–1.3)
TRICH UA POCT, TRICHPOC: NEGATIVE
TRIGL SERPL-MCNC: 91 MG/DL (ref 0–200)
UA UROBILINOGEN AMB POC: NORMAL (ref 0.2–1)
URINALYSIS CLARITY POC: CLEAR
URINALYSIS COLOR POC: ABNORMAL
URINE BLOOD POC: NEGATIVE
URINE LEUKOCYTES POC: ABNORMAL
URINE NITRITES POC: NEGATIVE
VLDLC SERPL CALC-MCNC: 18.2 MG/DL
WBC # BLD: 7.5 K/UL (ref 4.1–10.9)
WBC UA POCT, WBCPOCT: ABNORMAL
YEAST UA POCT, YEASTPOC: NEGATIVE

## 2017-09-07 RX ORDER — CIPROFLOXACIN 500 MG/1
1 TABLET ORAL 2 TIMES DAILY
Refills: 0 | COMMUNITY
Start: 2017-08-31 | End: 2017-12-12 | Stop reason: ALTCHOICE

## 2017-09-07 NOTE — PROGRESS NOTES
Presents for 6 mo follow up for hypertension,     1. Have you been to the ER, urgent care clinic since your last visit? Hospitalized since your last visit? No    2. Have you seen or consulted any other health care providers outside of the [address] [place] [address] since your last visit? Include any pap smears or colon screening.  No

## 2017-09-07 NOTE — MR AVS SNAPSHOT
Visit Information Date & Time Provider Department Dept. Phone Encounter #  
 9/7/2017  9:40 AM ARCELIA Day MD [address] [place] [address] [phone] [id number] Follow-up Instructions Return in about 6 months (around 3/7/2018) for follow up. Your Appointments 12/12/2017 10:30 AM  
PACEMAKER with PACEMAKER, [facility]) Appt Note: annual with device check; r/s from 12/5,[address]  
[phone] [address] [place] [address]  
  
    
 12/12/2017 10:30 AM  
ANNUAL with Sohail Li MD  
[facility]) Appt Note: r/s from 12/5,[address]  
[phone] [address] [place] [address] Upcoming Health Maintenance Date Due DTaP/Tdap/Td series (1 - Tdap) 4/23/1942 ZOSTER VACCINE AGE 60> 2/23/1981 GLAUCOMA SCREENING Q2Y 4/23/1986 Pneumococcal 65+ Low/Medium Risk (1 of 2 - PCV13) 4/23/1986 MEDICARE YEARLY EXAM 4/23/1986 INFLUENZA AGE 9 TO ADULT 8/1/2017 Allergies as of 9/7/2017  Review Complete On: 9/7/2017 By: Shaniqua Griffin MD  
  
 Severity Noted Reaction Type Reactions Shellfish Derived  12/21/2012    Other (comments) Crab meat Current Immunizations  Never Reviewed No immunizations on file. Not reviewed this visit You Were Diagnosed With   
  
 Codes Comments Essential hypertension, benign    -  Primary ICD-10-CM: I10 
ICD-9-CM: 401.1 Type 2 diabetes mellitus without complication, without long-term current use of insulin (HCC)     ICD-10-CM: E11.9 ICD-9-CM: 250.00 Atherosclerosis of native coronary artery of native heart, angina presence unspecified     ICD-10-CM: I25.10 ICD-9-CM: 414.01   
 CKD (chronic kidney disease) stage 3, GFR 30-59 ml/min     ICD-10-CM: N18.3 ICD-9-CM: 220. 3 Gastroesophageal reflux disease without esophagitis     ICD-10-CM: K21.9 ICD-9-CM: 530.81 Diverticulitis of large intestine without perforation or abscess without bleeding     ICD-10-CM: K57.32 
ICD-9-CM: 562.11 Vitals BP Pulse Temp Resp Height(growth percentile) Weight(growth percentile) 138/80 (BP 1 Location: Right arm, BP Patient Position: Sitting) 64 97.8 °F (36.6 °C) 18 5' 8\" (1.727 m) 165 lb (74.8 kg) SpO2 BMI Smoking Status 99% 25.09 kg/m2 Never Smoker BMI and BSA Data Body Mass Index Body Surface Area 25.09 kg/m 2 1.89 m 2 Preferred Pharmacy Pharmacy Name Phone [id number] [facility] [phone] Your Updated Medication List  
  
   
This list is accurate as of: 9/7/17 10:45 AM.  Always use your most recent med list. amLODIPine 5 mg tablet Commonly known as:  Remonia Grates TAKE 1 TABLET BY MOUTH EVERY DAY  
  
 ASPIR-81 81 mg tablet Generic drug:  aspirin delayed-release Take 81 mg by mouth daily. AZOPT 1 % ophthalmic suspension Generic drug:  brinzolamide Administer 1 Drop to both eyes two (2) times a day. ciprofloxacin HCl 500 mg tablet Commonly known as:  CIPRO Take 1 Tab by mouth two (2) times a day. lisinopril 5 mg tablet Commonly known as:  Donald Littler Take 1 Tab by mouth daily. Stop amlodipine  
  
 metroNIDAZOLE 500 mg tablet Commonly known as:  FLAGYL Take 1 Tab by mouth two (2) times a day for 7 days. OCUVITE 632-73-6-150 mg-unit-mg-mg Cap Generic drug:  Vit C-Vit E-Lutein-Min-OM-3 Take  by mouth daily. omeprazole 40 mg capsule Commonly known as:  PRILOSEC Take 40 mg by mouth as needed. timolol 0.5 % ophthalmic solution Commonly known as:  TIMOPTIC Administer 1 Drop to both eyes two (2) times a day. XALATAN 0.005 % ophthalmic solution Generic drug:  latanoprost  
Administer 1 Drop to both eyes nightly. We Performed the Following AMB POC COMPLETE CBC,AUTOMATED ENTER [id number] CPT(R)] AMB POC COMPREHENSIVE METABOLIC PANEL [82114 CPT(R)] AMB POC HEMOGLOBIN A1C [55162 CPT(R)] AMB POC LIPID PROFILE [16382 CPT(R)] AMB POC URINALYSIS DIP STICK AUTO W/ MICRO  [41661 CPT(R)] AMB POC URINE, MICROALBUMIN, SEMIQUANT (1 RESULT) [53849 CPT(R)] Follow-up Instructions Return in about 6 months (around 3/7/2018) for follow up. Introducing \[facility]\"" & HEALTH SERVICES! [employer] introduces [employer] patient portal. Now you can access parts of your medical record, email your doctor's office, and request medication refills online. 1. In your internet browser, go to [email or web address] 2. Click on the First Time User? Click Here link in the Sign In box. You will see the New Member Sign Up page. 3. Enter your [employer] Access Code exactly as it appears below. You will not need to use this code after youve completed the sign-up process. If you do not sign up before the expiration date, you must request a new code. · [employer] Access Code: [email or web address] Expires: 11/28/2017  2:59 PM 
 
4. Enter the last four digits of your Social Security Number (xxxx) and Date of Birth (mm/dd/yyyy) as indicated and click Submit. You will be taken to the next sign-up page. 5. Create a [email or web address] ID. This will be your [employer] login ID and cannot be changed, so think of one that is secure and easy to remember. 6. Create a [employer] password. You can change your password at any time. 7. Enter your Password Reset Question and Answer. This can be used at a later time if you forget your password. 8. Enter your e-mail address. You will receive e-mail notification when new information is available in [address] 9. Click Sign Up. You can now view and download portions of your medical record. 10. Click the Download Summary menu link to download a portable copy of your medical information. If you have questions, please visit the Frequently Asked Questions section of the [employer] website. Remember, [employer] is NOT to be used for urgent needs. For medical emergencies, dial 911. Now available from your iPhone and Android! Please provide this summary of care documentation to your next provider. Your primary care clinician is listed as ARCELIA Odell. If you have any questions after today's visit, please call [phone].
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
## 2017-09-28 RX ORDER — LISINOPRIL 5 MG/1
5 TABLET ORAL DAILY
Qty: 90 TAB | Refills: 3 | Status: SHIPPED | OUTPATIENT
Start: 2017-09-28 | End: 2018-06-03

## 2017-12-06 PROBLEM — D51.0 PERNICIOUS ANEMIA: Status: ACTIVE | Noted: 2017-12-06

## 2017-12-06 PROBLEM — R31.9 HEMATURIA: Status: ACTIVE | Noted: 2017-12-06

## 2017-12-06 PROBLEM — B37.0 THRUSH OF MOUTH AND ESOPHAGUS (HCC): Status: ACTIVE | Noted: 2017-12-06

## 2017-12-06 PROBLEM — E11.9 DIABETES MELLITUS (HCC): Status: ACTIVE | Noted: 2017-12-06

## 2017-12-06 PROBLEM — N18.4 CHRONIC KIDNEY DISEASE, STAGE IV (SEVERE) (HCC): Status: ACTIVE | Noted: 2017-12-06

## 2017-12-06 PROBLEM — Z95.0 PACEMAKER: Status: ACTIVE | Noted: 2017-12-06

## 2017-12-06 PROBLEM — G62.9 NEUROPATHY: Status: ACTIVE | Noted: 2017-12-06

## 2017-12-06 PROBLEM — B37.81 THRUSH OF MOUTH AND ESOPHAGUS (HCC): Status: ACTIVE | Noted: 2017-12-06

## 2017-12-06 PROBLEM — R53.83 FATIGUE: Status: ACTIVE | Noted: 2017-12-06

## 2017-12-06 PROBLEM — R10.9 ABDOMINAL PAIN: Status: ACTIVE | Noted: 2017-12-06

## 2017-12-06 PROBLEM — B37.0 THRUSH: Status: ACTIVE | Noted: 2017-12-06

## 2017-12-06 PROBLEM — E78.5 HYPERLIPIDEMIA: Status: ACTIVE | Noted: 2017-12-06

## 2017-12-06 PROBLEM — H40.9 GLAUCOMA: Status: ACTIVE | Noted: 2017-12-06

## 2017-12-06 PROBLEM — I25.10 ARTERIOSCLEROTIC CORONARY ARTERY DISEASE: Status: ACTIVE | Noted: 2017-12-06

## 2017-12-06 RX ORDER — LANOLIN ALCOHOL/MO/W.PET/CERES
1000 CREAM (GRAM) TOPICAL DAILY
COMMUNITY
End: 2018-04-06 | Stop reason: ALTCHOICE

## 2017-12-08 ENCOUNTER — OFFICE VISIT (OUTPATIENT)
Dept: INTERNAL MEDICINE CLINIC | Age: 82
End: 2017-12-08

## 2017-12-08 VITALS
HEIGHT: 68 IN | WEIGHT: 164.4 LBS | BODY MASS INDEX: 24.92 KG/M2 | TEMPERATURE: 98.4 F | OXYGEN SATURATION: 97 % | DIASTOLIC BLOOD PRESSURE: 76 MMHG | RESPIRATION RATE: 20 BRPM | HEART RATE: 60 BPM | SYSTOLIC BLOOD PRESSURE: 167 MMHG

## 2017-12-08 DIAGNOSIS — N18.4 CHRONIC KIDNEY DISEASE, STAGE IV (SEVERE) (HCC): ICD-10-CM

## 2017-12-08 DIAGNOSIS — D50.9 IRON DEFICIENCY ANEMIA, UNSPECIFIED IRON DEFICIENCY ANEMIA TYPE: ICD-10-CM

## 2017-12-08 DIAGNOSIS — I10 ESSENTIAL HYPERTENSION, BENIGN: ICD-10-CM

## 2017-12-08 DIAGNOSIS — R53.83 FATIGUE, UNSPECIFIED TYPE: ICD-10-CM

## 2017-12-08 DIAGNOSIS — D51.0 PERNICIOUS ANEMIA: ICD-10-CM

## 2017-12-08 DIAGNOSIS — E11.9 TYPE 2 DIABETES MELLITUS WITHOUT COMPLICATION, WITHOUT LONG-TERM CURRENT USE OF INSULIN (HCC): Primary | ICD-10-CM

## 2017-12-08 LAB
ALBUMIN SERPL-MCNC: 4.4 G/DL (ref 3.9–5.4)
ALKALINE PHOS POC: 79 U/L (ref 38–126)
ALT SERPL-CCNC: 18 U/L (ref 9–52)
AST SERPL-CCNC: 30 U/L (ref 14–36)
BUN BLD-MCNC: 24 MG/DL (ref 9–20)
CALCIUM BLD-MCNC: 9.8 MG/DL (ref 8.4–10.2)
CHLORIDE BLD-SCNC: 100 MMOL/L (ref 98–107)
CO2 POC: 27 MMOL/L (ref 22–32)
CREAT BLD-MCNC: 1.2 MG/DL (ref 0.8–1.5)
EGFR (POC): 38.7
GLUCOSE POC: 93 MG/DL (ref 75–110)
GRAN# POC: 4.8 K/UL (ref 2–7.8)
GRAN% POC: 70.3 % (ref 37–92)
HBA1C MFR BLD HPLC: 6 % (ref 4.5–5.7)
HCT VFR BLD CALC: 36.5 % (ref 37–51)
HGB BLD-MCNC: 12.3 G/DL (ref 12–18)
IRON POC: 58 UG/DL (ref 49–181)
IRON SATURATION POC: 21 % (ref 15–55)
LY# POC: 1.5 K/UL (ref 0.6–4.1)
LY% POC: 23.3 % (ref 10–58.5)
MCH RBC QN: 31.9 PG (ref 26–32)
MCHC RBC-ENTMCNC: 33.7 G/DL (ref 30–36)
MCV RBC: 95 FL (ref 80–97)
MID #, POC: 0.4 K/UL (ref 0–1.8)
MID% POC: 6.4 % (ref 0.1–24)
PLATELET # BLD: 183 K/UL (ref 140–440)
POTASSIUM SERPL-SCNC: 5.1 MMOL/L (ref 3.6–5)
PROT SERPL-MCNC: 8 G/DL (ref 6.3–8.2)
RBC # BLD: 3.84 M/UL (ref 4.2–6.3)
SODIUM SERPL-SCNC: 140 MMOL/L (ref 137–145)
TIBC POC: 273 UG/DL (ref 260–462)
TOTAL BILIRUBIN POC: 1 MG/DL (ref 0.2–1.3)
TSH BLD-ACNC: 1.91 UIU/ML (ref 0.4–4.2)
WBC # BLD: 6.7 K/UL (ref 4.1–10.9)

## 2017-12-08 NOTE — MR AVS SNAPSHOT
Visit Information Date & Time Provider Department Dept. Phone Encounter #  
 12/8/2017  2:20 PM ARCELIA Bland MD [facility] [id number] Your Appointments 12/12/2017 10:30 AM  
PACEMAKER with PACEMAKER, [facility] [address] [place] [address]) Appt Note: annual with device check; r/s from 12/5,jaa  
 [address]  
[phone] [address]  
  
    
 12/12/2017 10:30 AM  
ANNUAL with Gilberto Nix MD  
[facility] [address] [place] [address]) Appt Note: r/s from 12/5,jaa  
 [address]  
[phone] [address]  
  
    
 3/21/2018  9:30 AM  
Follow Up with ARCELIA Bland MD  
[facility] ([facility]) Appt Note: [address] [phone] [address] [facility] [phone] Upcoming Health Maintenance Date Due  
 FOOT EXAM Q1 4/23/1931 EYE EXAM RETINAL OR DILATED Q1 4/23/1931 DTaP/Tdap/Td series (1 - Tdap) 4/23/1942 ZOSTER VACCINE AGE 60> 2/23/1981 GLAUCOMA SCREENING Q2Y 4/23/1986 Pneumococcal 65+ Low/Medium Risk (1 of 2 - PCV13) 4/23/1986 MEDICARE YEARLY EXAM 4/23/1986 Influenza Age 5 to Adult 8/1/2017 HEMOGLOBIN A1C Q6M 3/7/2018 MICROALBUMIN Q1 9/7/2018 LIPID PANEL Q1 9/7/2018 Allergies as of 12/8/2017  Review Complete On: 12/8/2017 By: Lucrecia Pineda MD  
  
 Severity Noted Reaction Type Reactions Shellfish Derived  12/21/2012    Other (comments) Crab meat Current Immunizations  Never Reviewed No immunizations on file. Not reviewed this visit You Were Diagnosed With   
  
 Codes Comments  Type 2 diabetes mellitus without complication, without long-term current use of insulin ([facility]    -  Primary ICD-10-CM: E11.9 ICD-9-CM: 250.00 Chronic kidney disease, stage IV (severe) (HCC)     ICD-10-CM: N18.4 ICD-9-CM: 585.4 Essential hypertension, benign     ICD-10-CM: I10 
ICD-9-CM: 401.1 Fatigue, unspecified type     ICD-10-CM: R53.83 ICD-9-CM: 780.79 Iron deficiency anemia, unspecified iron deficiency anemia type     ICD-10-CM: D50.9 ICD-9-CM: 280.9 Pernicious anemia     ICD-10-CM: D51.0 ICD-9-CM: 281.0 Vitals BP Pulse Temp Resp Height(growth percentile) Weight(growth percentile) 167/76 (BP 1 Location: Right arm, BP Patient Position: Sitting) 60 98.4 °F (36.9 °C) (Oral) 20 5' 8\" (1.727 m) 164 lb 6.4 oz (74.6 kg) SpO2 BMI Smoking Status 97% 25 kg/m2 Never Smoker BMI and BSA Data Body Mass Index Body Surface Area  
 25 kg/m 2 1.89 m 2 Preferred Pharmacy Pharmacy Name Phone [id number] [facility] [phone] Your Updated Medication List  
  
   
This list is accurate as of: 12/8/17  3:53 PM.  Always use your most recent med list. amLODIPine 5 mg tablet Commonly known as:  [place] TAKE 1 TABLET BY MOUTH EVERY DAY  
  
 ASPIR-81 81 mg tablet Generic drug:  aspirin delayed-release Take 81 mg by mouth daily. AZOPT 1 % ophthalmic suspension Generic drug:  brinzolamide Administer 1 Drop to both eyes two (2) times a day. ciprofloxacin HCl 500 mg tablet Commonly known as:  CIPRO Take 1 Tab by mouth two (2) times a day. lisinopril 5 mg tablet Commonly known as:  Miranda Lights Take 1 Tab by mouth daily. OCUVITE [id number] mg-unit-mg-mg Cap Generic drug:  Vit C-Vit E-Lutein-Min-OM-3 Take  by mouth daily. omeprazole 40 mg capsule Commonly known as:  PRILOSEC Take 40 mg by mouth as needed. timolol 0.5 % ophthalmic solution Commonly known as:  TIMOPTIC  
 Administer 1 Drop to both eyes two (2) times a day. VITAMIN B-12 1,000 mcg tablet Generic drug:  cyanocobalamin Take 1,000 mcg by mouth daily. XALATAN 0.005 % ophthalmic solution Generic drug:  latanoprost  
Administer 1 Drop to both eyes nightly. We Performed the Following AMB POC COMPLETE CBC,AUTOMATED ENTER C3547403 CPT(R)] AMB POC COMPREHENSIVE METABOLIC PANEL [68575 CPT(R)] AMB POC HEMOGLOBIN A1C [41684 CPT(R)] AMB POC IRON BINDING CAPACITY (FE/TIBC) [[id number] Custom] AMB POC TSH [81101 CPT(R)] VITAMIN B12 B3625898 CPT(R)] Introducing [facility]! Boom Torres introduces [employer] patient portal. Now you can access parts of your medical record, email your doctor's office, and request medication refills online. 1. In your internet browser, go to [email or web address] 2. Click on the First Time User? Click Here link in the Sign In box. You will see the New Member Sign Up page. 3. Enter your [employer] Access Code exactly as it appears below. You will not need to use this code after youve completed the sign-up process. If you do not sign up before the expiration date, you must request a new code. · [employer] Access Code: [email or web address] Expires: 3/8/2018  2:08 PM 
 
4. Enter the last four digits of your Social Security Number (xxxx) and Date of Birth (mm/dd/yyyy) as indicated and click Submit. You will be taken to the next sign-up page. 5. Create a [employer]iat ID. This will be your [employer] login ID and cannot be changed, so think of one that is secure and easy to remember. 6. Create a [employer] password. You can change your password at any time. 7. Enter your Password Reset Question and Answer. This can be used at a later time if you forget your password. 8. Enter your e-mail address. You will receive e-mail notification when new information is available in [address] 9. Click Sign Up. You can now view and download portions of your medical record. 10. Click the Download Summary menu link to download a portable copy of your medical information. If you have questions, please visit the Frequently Asked Questions section of the [employer] website. Remember, [employer] is NOT to be used for urgent needs. For medical emergencies, dial 911. Now available from your iPhone and Android! Please provide this summary of care documentation to your next provider. Your primary care clinician is listed as ARCELIA Murphy. If you have any questions after today's visit, please call [phone].

## 2017-12-09 LAB — VIT B12 SERPL-MCNC: 843 PG/ML (ref 232–1245)

## 2017-12-12 ENCOUNTER — OFFICE VISIT (OUTPATIENT)
Dept: CARDIOLOGY CLINIC | Age: 82
End: 2017-12-12

## 2017-12-12 ENCOUNTER — CLINICAL SUPPORT (OUTPATIENT)
Dept: CARDIOLOGY CLINIC | Age: 82
End: 2017-12-12

## 2017-12-12 VITALS
OXYGEN SATURATION: 98 % | RESPIRATION RATE: 16 BRPM | BODY MASS INDEX: 24.92 KG/M2 | HEART RATE: 60 BPM | SYSTOLIC BLOOD PRESSURE: 160 MMHG | DIASTOLIC BLOOD PRESSURE: 86 MMHG | HEIGHT: 68 IN | WEIGHT: 164.4 LBS

## 2017-12-12 DIAGNOSIS — I44.2 CHB (COMPLETE HEART BLOCK) (HCC): ICD-10-CM

## 2017-12-12 DIAGNOSIS — Z95.0 PACEMAKER: Primary | ICD-10-CM

## 2017-12-12 DIAGNOSIS — I10 ESSENTIAL HYPERTENSION, BENIGN: Primary | ICD-10-CM

## 2017-12-12 DIAGNOSIS — I49.5 SSS (SICK SINUS SYNDROME) (HCC): ICD-10-CM

## 2017-12-12 DIAGNOSIS — I49.5 SINOATRIAL NODE DYSFUNCTION (HCC): ICD-10-CM

## 2017-12-12 NOTE — PROGRESS NOTES
1. Have you been to the ER, urgent care clinic since your last visit? Hospitalized since your last visit? No    2. Have you seen or consulted any other health care providers outside of the [address] [place] [address] since your last visit? Include any pap smears or colon screening. No    Chief Complaint   Patient presents with    Hypertension     annual f/u    Pacemaker Check     \"    Fatigue     Pt reports appetite is poor in the past year.
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
## 2017-12-26 ENCOUNTER — TELEPHONE (OUTPATIENT)
Dept: CARDIOLOGY CLINIC | Age: 82
End: 2017-12-26

## 2017-12-27 ENCOUNTER — DOCUMENTATION ONLY (OUTPATIENT)
Dept: INTERNAL MEDICINE CLINIC | Age: 82
End: 2017-12-27

## 2018-01-23 ENCOUNTER — OFFICE VISIT (OUTPATIENT)
Dept: INTERNAL MEDICINE CLINIC | Age: 83
End: 2018-01-23

## 2018-01-23 VITALS
WEIGHT: 160.8 LBS | OXYGEN SATURATION: 95 % | RESPIRATION RATE: 18 BRPM | HEART RATE: 76 BPM | SYSTOLIC BLOOD PRESSURE: 138 MMHG | TEMPERATURE: 98 F | HEIGHT: 68 IN | BODY MASS INDEX: 24.37 KG/M2 | DIASTOLIC BLOOD PRESSURE: 78 MMHG

## 2018-01-23 DIAGNOSIS — K59.1 FUNCTIONAL DIARRHEA: Primary | ICD-10-CM

## 2018-01-23 PROBLEM — E11.21 TYPE 2 DIABETES MELLITUS WITH NEPHROPATHY (HCC): Status: ACTIVE | Noted: 2018-01-23

## 2018-01-23 NOTE — MR AVS SNAPSHOT
[address] [facility] [address] [id number] [phone] Patient: Rodrigo Srivastava 
MRN: [id number] Visit Information Date & Time Provider Department Dept. Phone Encounter #  
 1/23/2018 10:40 AM ARCELIA Becker MD [address] [facility] [phone] [id number] Follow-up Instructions Return for as scheduled. Your Appointments 3/13/2018  9:45 AM  
PACEMAKER with PACEMAKER, [facility]) Appt Note: battery check bsc pm  
 [address]  
[phone] [address]  
  
    
 3/21/2018  9:30 AM  
Follow Up with MD Jayla Rolon [facility] ([facility]) Appt Note: [address] [id number] [address] [facility] [phone] Upcoming Health Maintenance Date Due  
 FOOT EXAM Q1 4/23/1931 EYE EXAM RETINAL OR DILATED Q1 4/23/1931 DTaP/Tdap/Td series (1 - Tdap) 4/23/1942 ZOSTER VACCINE AGE 60> 2/23/1981 GLAUCOMA SCREENING Q2Y 4/23/1986 Pneumococcal 65+ Low/Medium Risk (1 of 2 - PCV13) 4/23/1986 MEDICARE YEARLY EXAM 4/23/1986 Influenza Age 5 to Adult 8/1/2017 HEMOGLOBIN A1C Q6M 6/8/2018 MICROALBUMIN Q1 9/7/2018 LIPID PANEL Q1 9/7/2018 Allergies as of 1/23/2018  Review Complete On: 1/23/2018 By: Maritza White MD  
  
 Severity Noted Reaction Type Reactions Shellfish Derived  12/21/2012    Other (comments) Crab meat Current Immunizations  Never Reviewed No immunizations on file. Not reviewed this visit You Were Diagnosed With   
  
 Codes Comments Functional diarrhea    -  Primary ICD-10-CM: K59.1 ICD-9-CM: 564.5 Vitals BP Pulse Temp Resp Height(growth percentile) Weight(growth percentile) 138/78 (BP 1 Location: Left arm, BP Patient Position: Sitting) 76 98 °F (36.7 °C) (Oral) 18 5' 8\" (1.727 m) 160 lb 12.8 oz (72.9 kg) SpO2 BMI Smoking Status 95% 24.45 kg/m2 Never Smoker Vitals History BMI and BSA Data Body Mass Index Body Surface Area  
 24.45 kg/m 2 1.87 m 2 Preferred Pharmacy Pharmacy Name Phone [facility] #[id number] [phone] Your Updated Medication List  
  
   
This list is accurate as of: 1/23/18 12:32 PM.  Always use your most recent med list. amLODIPine 5 mg tablet Commonly known as:  Leanna Felix TAKE 1 TABLET BY MOUTH EVERY DAY  
  
 ASPIR-81 81 mg tablet Generic drug:  aspirin delayed-release Take 81 mg by mouth daily. AZOPT 1 % ophthalmic suspension Generic drug:  brinzolamide Administer 1 Drop to both eyes two (2) times a day. lisinopril 5 mg tablet Commonly known as:  Nory Clementsman Take 1 Tab by mouth daily. OCUVITE [id number] mg-unit-mg-mg Cap Generic drug:  Vit C-Vit E-Lutein-Min-OM-3 Take  by mouth daily. omeprazole 40 mg capsule Commonly known as:  PRILOSEC Take 40 mg by mouth as needed. timolol 0.5 % ophthalmic solution Commonly known as:  TIMOPTIC Administer 1 Drop to both eyes two (2) times a day. VITAMIN B-12 1,000 mcg tablet Generic drug:  cyanocobalamin Take 1,000 mcg by mouth daily. XALATAN 0.005 % ophthalmic solution Generic drug:  latanoprost  
Administer 1 Drop to both eyes nightly. Follow-up Instructions Return for as scheduled. Introducing [address]! [facility] introduces freshbag patient portal. Now you can access parts of your medical record, email your doctor's office, and request medication refills online. 1. In your internet browser, go to [email or web address] Me [email or web address]. [employer]/Get Me Listed 2. Click on the First Time User? Click Here link in the Sign In box. You will see the New Member Sign Up page. 3. Enter your Weft Access Code exactly as it appears below. You will not need to use this code after youve completed the sign-up process. If you do not sign up before the expiration date, you must request a new code. · Weft Access Code: [email or web address] Expires: 3/8/2018  2:08 PM 
 
4. Enter the last four digits of your Social Security Number (xxxx) and Date of Birth (mm/dd/yyyy) as indicated and click Submit. You will be taken to the next sign-up page. 5. Create a Weft ID. This will be your Weft login ID and cannot be changed, so think of one that is secure and easy to remember. 6. Create a Weft password. You can change your password at any time. 7. Enter your Password Reset Question and Answer. This can be used at a later time if you forget your password. 8. Enter your e-mail address. You will receive e-mail notification when new information is available in [address] 9. Click Sign Up. You can now view and download portions of your medical record. 10. Click the Download Summary menu link to download a portable copy of your medical information. If you have questions, please visit the Frequently Asked Questions section of the Weft website. Remember, Weft is NOT to be used for urgent needs. For medical emergencies, dial 911. Now available from your iPhone and Android! Please provide this summary of care documentation to your next provider. Your primary care clinician is listed as ARCELIA Hancock. If you have any questions after today's visit, please call [phone].

## 2018-01-23 NOTE — PROGRESS NOTES
Chief Complaint   Patient presents with    Diarrhea     Room 5     1. Have you been to the ER, urgent care clinic since your last visit? Hospitalized since your last visit? No    2. Have you seen or consulted any other health care providers outside of the [address] [place] [address] since your last visit? Include any pap smears or colon screening.  No
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
## 2018-03-13 ENCOUNTER — CLINICAL SUPPORT (OUTPATIENT)
Dept: CARDIOLOGY CLINIC | Age: 83
End: 2018-03-13

## 2018-03-13 DIAGNOSIS — I49.5 SINOATRIAL NODE DYSFUNCTION (HCC): ICD-10-CM

## 2018-03-13 DIAGNOSIS — Z95.0 PACEMAKER: Primary | ICD-10-CM

## 2018-03-23 ENCOUNTER — OFFICE VISIT (OUTPATIENT)
Dept: INTERNAL MEDICINE CLINIC | Age: 83
End: 2018-03-23

## 2018-03-23 VITALS
TEMPERATURE: 97.5 F | HEART RATE: 63 BPM | WEIGHT: 164 LBS | OXYGEN SATURATION: 98 % | DIASTOLIC BLOOD PRESSURE: 57 MMHG | SYSTOLIC BLOOD PRESSURE: 140 MMHG | HEIGHT: 68 IN | BODY MASS INDEX: 24.86 KG/M2 | RESPIRATION RATE: 20 BRPM

## 2018-03-23 DIAGNOSIS — E11.21 TYPE 2 DIABETES MELLITUS WITH NEPHROPATHY (HCC): Primary | ICD-10-CM

## 2018-03-23 DIAGNOSIS — N18.4 CHRONIC KIDNEY DISEASE, STAGE IV (SEVERE) (HCC): ICD-10-CM

## 2018-03-23 DIAGNOSIS — E78.49 OTHER HYPERLIPIDEMIA: ICD-10-CM

## 2018-03-23 DIAGNOSIS — I25.10 ATHEROSCLEROSIS OF NATIVE CORONARY ARTERY OF NATIVE HEART, ANGINA PRESENCE UNSPECIFIED: ICD-10-CM
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
## 2018-03-23 NOTE — PROGRESS NOTES
Veena Lobo  Identified pt with two pt identifiers(name and ). Chief Complaint   Patient presents with    Hypertension     6 month follow up    Breathing Problem     for over a year    Other     weakness, clean mucus, neuropathy       1. Have you been to the ER, urgent care clinic since your last visit? Hospitalized since your last visit? NO    2. Have you seen or consulted any other health care providers outside of the [address] [place] [address] since your last visit? Include any pap smears or colon screening. NO      Dr Bridgett Cooley notified of reason for visit, vitals and flowsheets obtained on patients.      Patient received paperwork for advance directive during previous visit but has not completed at this time     Reviewed record In preparation for visit, huddled with provider and have obtained necessary documentation      Health Maintenance Due   Topic    FOOT EXAM Q1     EYE EXAM RETINAL OR DILATED Q1     DTaP/Tdap/Td series (1 - Tdap)    ZOSTER VACCINE AGE 60>     GLAUCOMA SCREENING Q2Y     Pneumococcal 65+ Low/Medium Risk (1 of 2 - PCV13)    Influenza Age 5 to Adult     MEDICARE YEARLY EXAM        Wt Readings from Last 3 Encounters:   18 164 lb (74.4 kg)   18 160 lb 12.8 oz (72.9 kg)   17 164 lb 6.4 oz (74.6 kg)     Temp Readings from Last 3 Encounters:   18 97.5 °F (36.4 °C) (Oral)   18 98 °F (36.7 °C) (Oral)   17 98.4 °F (36.9 °C) (Oral)     BP Readings from Last 3 Encounters:   18 140/57   18 138/78   17 160/86     Pulse Readings from Last 3 Encounters:   18 63   18 76   17 60     Vitals:    18 1357   BP: 140/57   Pulse: 63   Resp: 20   Temp: 97.5 °F (36.4 °C)   TempSrc: Oral   SpO2: 98%   Weight: 164 lb (74.4 kg)   Height: 5' 8\" (1.727 m)   PainSc:   0 - No pain         Learning Assessment:  :     Learning Assessment 2017   PRIMARY LEARNER Patient Patient   HIGHEST LEVEL OF EDUCATION - PRIMARY LEARNER - GRADUATED HIGH SCHOOL OR GED   BARRIERS PRIMARY LEARNER - HEARING   CO-LEARNER CAREGIVER - Yes   [address] HIGHEST LEVEL OF EDUCATION - GRADUATED HIGH SCHOOL OR GED   BARRIERS CO-LEARNER - HEARING   PRIMARY LANGUAGE ENGLISH ENGLISH   PRIMARY LANGUAGE CO-LEARNER - ENGLISH    NEED - No   LEARNER PREFERENCE PRIMARY READING OTHER (COMMENT)   LEARNER PREFERENCE CO-LEARNER - OTHER (COMMENT)   LEARNING SPECIAL TOPICS - DNR papers   ANSWERED BY self patient   RELATIONSHIP SELF SELF       Depression Screening:  :     PHQ over the last two weeks 12/8/2017   Little interest or pleasure in doing things Not at all   Feeling down, depressed or hopeless Not at all   Total Score PHQ 2 0       Fall Risk Assessment:  :     Fall Risk Assessment, last 12 mths 12/8/2017   Able to walk? Yes   Fall in past 12 months? No       Abuse Screening:  :     Abuse Screening Questionnaire 9/7/2017   Do you ever feel afraid of your partner? N   Are you in a relationship with someone who physically or mentally threatens you? N   Is it safe for you to go home? Y       ADL Screening:  :     No flowsheet data found. Patient is accompanied by  I have received verbal consent from Emy Aburto to discuss any/all medical information while they are present in the room. Medication reconciliation up to date and corrected with patient at this time.

## 2018-03-23 NOTE — MR AVS SNAPSHOT
[address] [address] [phone] Patient: Rose Mary Sandra 
MRN: [id number]:6[birth year][id number] Visit Information Date & Time Provider Department Dept. Phone Encounter #  
 3/23/2018  1:50 PM ARCELIA Bee MD [facility] [address] [facility] [phone] [id number] Your Appointments 5/24/2018  9:45 AM  
PROCEDURE with PACEMAKER, [facility] [address]) Appt Note: bsc battery check [address]  
[phone] [address] Upcoming Health Maintenance Date Due  
 FOOT EXAM Q1 4/23/1931 EYE EXAM RETINAL OR DILATED Q1 4/23/1931 DTaP/Tdap/Td series (1 - Tdap) 4/23/1942 ZOSTER VACCINE AGE 60> 2/23/1981 GLAUCOMA SCREENING Q2Y 4/23/1986 Pneumococcal 65+ Low/Medium Risk (1 of 2 - PCV13) 4/23/1986 Influenza Age 5 to Adult 8/1/2017 MEDICARE YEARLY EXAM 3/14/2018 HEMOGLOBIN A1C Q6M 6/8/2018 MICROALBUMIN Q1 9/7/2018 LIPID PANEL Q1 9/7/2018 Allergies as of 3/23/2018  Review Complete On: 3/23/2018 By: Deepak Jalloh LPN Severity Noted Reaction Type Reactions Shellfish Derived  12/21/2012    Other (comments) Crab meat Current Immunizations  Never Reviewed No immunizations on file. Not reviewed this visit Vitals BP Pulse Temp Resp Height(growth percentile) Weight(growth percentile) 140/57 (BP 1 Location: Left arm, BP Patient Position: Sitting) 63 97.5 °F (36.4 °C) (Oral) 20 5' 8\" (1.727 m) 164 lb (74.4 kg) SpO2 BMI Smoking Status 98% 24.94 kg/m2 Never Smoker BMI and BSA Data Body Mass Index Body Surface Area 24.94 kg/m 2 1.89 m 2 Preferred Pharmacy Pharmacy Name Phone [facility]/PHARMACY #[id number] [facility] [phone] Your Updated Medication List  
  
   
This list is accurate as of 3/23/18  3:07 PM.  Always use your most recent med list. amLODIPine 5 mg tablet Commonly known as:  Pau Prow TAKE 1 TABLET BY MOUTH EVERY DAY  
  
 ASPIR-81 81 mg tablet Generic drug:  aspirin delayed-release Take 81 mg by mouth daily. AZOPT 1 % ophthalmic suspension Generic drug:  brinzolamide Administer 1 Drop to both eyes two (2) times a day. lisinopril 5 mg tablet Commonly known as:  Talisha Cage Take 1 Tab by mouth daily. OCUVITE 316-52-1-150 mg-unit-mg-mg Cap Generic drug:  Vit C-Vit E-Lutein-Min-OM-3 Take  by mouth daily. omeprazole 40 mg capsule Commonly known as:  PRILOSEC Take 40 mg by mouth as needed. timolol 0.5 % ophthalmic solution Commonly known as:  TIMOPTIC Administer 1 Drop to both eyes two (2) times a day. VITAMIN B-12 1,000 mcg tablet Generic drug:  cyanocobalamin Take 1,000 mcg by mouth daily. XALATAN 0.005 % ophthalmic solution Generic drug:  latanoprost  
Administer 1 Drop to both eyes nightly. Introducing [facility]! [facility] introduces [employer]ky patient portal. Now you can access parts of your medical record, email your doctor's office, and request medication refills online. 1. In your internet browser, go to [email or web address] 2. Click on the First Time User? Click Here link in the Sign In box. You will see the New Member Sign Up page. 3. Enter your Jotky Access Code exactly as it appears below. You will not need to use this code after youve completed the sign-up process. If you do not sign up before the expiration date, you must request a new code. · Jotky Access Code: [email or web address] Expires: 6/11/2018 10:08 AM 
 
4. Enter the last four digits of your Social Security Number (xxxx) and Date of Birth (mm/dd/yyyy) as indicated and click Submit.  You will be taken to the next sign-up page. 5. Create a [employer] ID. This will be your [employer] login ID and cannot be changed, so think of one that is secure and easy to remember. 6. Create a [employer] password. You can change your password at any time. 7. Enter your Password Reset Question and Answer. This can be used at a later time if you forget your password. 8. Enter your e-mail address. You will receive e-mail notification when new information is available in [address] 9. Click Sign Up. You can now view and download portions of your medical record. 10. Click the Download Summary menu link to download a portable copy of your medical information. If you have questions, please visit the Frequently Asked Questions section of the [employer] website. Remember, [employer] is NOT to be used for urgent needs. For medical emergencies, dial 911. Now available from your iPhone and Android! Please provide this summary of care documentation to your next provider. Your primary care clinician is listed as ARCELIA Haas. If you have any questions after today's visit, please call [phone].

## 2018-04-06 ENCOUNTER — OFFICE VISIT (OUTPATIENT)
Dept: INTERNAL MEDICINE CLINIC | Age: 83
End: 2018-04-06

## 2018-04-06 VITALS
TEMPERATURE: 98.3 F | SYSTOLIC BLOOD PRESSURE: 138 MMHG | BODY MASS INDEX: 24.77 KG/M2 | DIASTOLIC BLOOD PRESSURE: 82 MMHG | RESPIRATION RATE: 20 BRPM | OXYGEN SATURATION: 97 % | HEART RATE: 63 BPM | WEIGHT: 163.4 LBS | HEIGHT: 68 IN

## 2018-04-06 DIAGNOSIS — J01.00 ACUTE NON-RECURRENT MAXILLARY SINUSITIS: Primary | ICD-10-CM

## 2018-04-06 RX ORDER — AZITHROMYCIN 250 MG/1
TABLET, FILM COATED ORAL
Qty: 6 TAB | Refills: 0 | Status: SHIPPED | OUTPATIENT
Start: 2018-04-06 | End: 2018-04-11

## 2018-04-06 NOTE — PATIENT INSTRUCTIONS
Sinusitis: Care Instructions  Your Care Instructions    Sinusitis is an infection of the lining of the sinus cavities in your head. Sinusitis often follows a cold. It causes pain and pressure in your head and face. In most cases, sinusitis gets better on its own in 1 to 2 weeks. But some mild symptoms may last for several weeks. Sometimes antibiotics are needed. Follow-up care is a key part of your treatment and safety. Be sure to make and go to all appointments, and call your doctor if you are having problems. It's also a good idea to know your test results and keep a list of the medicines you take. How can you care for yourself at home? · Take an over-the-counter pain medicine, such as acetaminophen (Tylenol), ibuprofen (Advil, Motrin), or naproxen (Aleve). Read and follow all instructions on the label. · If the doctor prescribed antibiotics, take them as directed. Do not stop taking them just because you feel better. You need to take the full course of antibiotics. · Be careful when taking over-the-counter cold or flu medicines and Tylenol at the same time. Many of these medicines have acetaminophen, which is Tylenol. Read the labels to make sure that you are not taking more than the recommended dose. Too much acetaminophen (Tylenol) can be harmful. · Breathe warm, moist air from a steamy shower, a hot bath, or a sink filled with hot water. Avoid cold, dry air. Using a humidifier in your home may help. Follow the directions for cleaning the machine. · Use saline (saltwater) nasal washes to help keep your nasal passages open and wash out mucus and bacteria. You can buy saline nose drops at a grocery store or drugstore. Or you can make your own at home by adding 1 teaspoon of salt and 1 teaspoon of baking soda to 2 cups of distilled water. If you make your own, fill a bulb syringe with the solution, insert the tip into your nostril, and squeeze gently. Jayant Olivasaac your nose.   · Put a hot, wet towel or a warm gel pack on your face 3 or 4 times a day for 5 to 10 minutes each time. · Try a decongestant nasal spray like oxymetazoline (Afrin). Do not use it for more than 3 days in a row. Using it for more than 3 days can make your congestion worse. When should you call for help? Call your doctor now or seek immediate medical care if:  ? · You have new or worse swelling or redness in your face or around your eyes. ? · You have a new or higher fever. ? Watch closely for changes in your health, and be sure to contact your doctor if:  ? · You have new or worse facial pain. ? · The mucus from your nose becomes thicker (like pus) or has new blood in it. ? · You are not getting better as expected. Where can you learn more? Go to http://vivek-talisha.info/. Enter Y437 in the search box to learn more about \"Sinusitis: Care Instructions. \"  Current as of: May 12, 2017  Content Version: 11.4  © 0407-8537 [employer]. Care instructions adapted under license by [employer] (which disclaims liability or warranty for this information). If you have questions about a medical condition or this instruction, always ask your healthcare professional. [place] [address] any warranty or liability for your use of this information. Sinusitis: Care Instructions  Your Care Instructions    Sinusitis is an infection of the lining of the sinus cavities in your head. Sinusitis often follows a cold. It causes pain and pressure in your head and face. In most cases, sinusitis gets better on its own in 1 to 2 weeks. But some mild symptoms may last for several weeks. Sometimes antibiotics are needed. Follow-up care is a key part of your treatment and safety. Be sure to make and go to all appointments, and call your doctor if you are having problems. It's also a good idea to know your test results and keep a list of the medicines you take.   How can you care for yourself at home?  · Take an over-the-counter pain medicine, such as acetaminophen (Tylenol), ibuprofen (Advil, Motrin), or naproxen (Aleve). Read and follow all instructions on the label. · If the doctor prescribed antibiotics, take them as directed. Do not stop taking them just because you feel better. You need to take the full course of antibiotics. · Be careful when taking over-the-counter cold or flu medicines and Tylenol at the same time. Many of these medicines have acetaminophen, which is Tylenol. Read the labels to make sure that you are not taking more than the recommended dose. Too much acetaminophen (Tylenol) can be harmful. · Breathe warm, moist air from a steamy shower, a hot bath, or a sink filled with hot water. Avoid cold, dry air. Using a humidifier in your home may help. Follow the directions for cleaning the machine. · Use saline (saltwater) nasal washes to help keep your nasal passages open and wash out mucus and bacteria. You can buy saline nose drops at a grocery store or drugstore. Or you can make your own at home by adding 1 teaspoon of salt and 1 teaspoon of baking soda to 2 cups of distilled water. If you make your own, fill a bulb syringe with the solution, insert the tip into your nostril, and squeeze gently. Doron Courts your nose. · Put a hot, wet towel or a warm gel pack on your face 3 or 4 times a day for 5 to 10 minutes each time. · Try a decongestant nasal spray like oxymetazoline (Afrin). Do not use it for more than 3 days in a row. Using it for more than 3 days can make your congestion worse. When should you call for help? Call your doctor now or seek immediate medical care if:  ? · You have new or worse swelling or redness in your face or around your eyes. ? · You have a new or higher fever. ? Watch closely for changes in your health, and be sure to contact your doctor if:  ? · You have new or worse facial pain.    ? · The mucus from your nose becomes thicker (like pus) or has new blood in it.   ? · You are not getting better as expected. Where can you learn more? Go to http://vivek-talisha.info/. Enter B902 in the search box to learn more about \"Sinusitis: Care Instructions. \"  Current as of: May 12, 2017  Content Version: 11.4  © 3479-3778 [employer]. Care instructions adapted under license by [employer] (which disclaims liability or warranty for this information). If you have questions about a medical condition or this instruction, always ask your healthcare professional. [address] any warranty or liability for your use of this information.

## 2018-04-06 NOTE — PROGRESS NOTES
Subjective: Karey Baron is a 80 y.o. male      Chief Complaint   Patient presents with    Cold Symptoms     Congestion, cough, sore throat, no fever        History of present illness: He presents today complaining a lot of sinus congestion is been present for several days and some postnasal drainage and now has a sore throat. He has a cough but does not really any sputum up except early in the morning. He is noted no fevers or chills and no shortness of breath. There are no other complaints.     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    Mixed hyperlipidemia E78.2    Coronary atherosclerosis of native coronary artery I25.10    Cardiac pacemaker in situ Z95.0    Sinoatrial node dysfunction (HCC) I49.5    Type 2 diabetes mellitus without complication, without long-term current use of insulin (HCC) E11.9    CKD (chronic kidney disease) stage 3, GFR 30-59 ml/min N18.3    GERD (gastroesophageal reflux disease) K21.9    Fatigue R53.83    Chronic kidney disease, stage IV (severe) ([facility]) N18.4    Arteriosclerotic coronary artery disease I25.10    Diabetes mellitus [facility] E11.9    Hyperlipidemia E78.5    Thrush of mouth and esophagus (HCC) B37.81, B37.0    Glaucoma H40.9    Neuropathy G62.9    Pernicious anemia D51.0    Thrush B37.0    Hematuria R31.9    Abdominal pain R10.9    Pacemaker Z95.0    Type 2 diabetes mellitus with nephropathy (HCC) E11.21    Acute non-recurrent maxillary sinusitis J01.00      Past Medical History:   Diagnosis Date    Abdominal pain 12/6/2017    Arteriosclerotic coronary artery disease 12/6/2017    Atrioventricular block, complete (HCC)     CAD (coronary artery disease)     Chronic kidney disease, stage IV (severe) ([facility]) 12/6/2017    CKD (chronic kidney disease) stage 3, GFR 30-59 ml/min 9/7/2017    Diabetes mellitus ([facility] 12/6/2017    Dizziness and giddiness     Fatigue 12/6/2017    GERD (gastroesophageal reflux disease)     GERD (gastroesophageal reflux disease) 9/7/2017    Glaucoma 12/6/2017    Hematuria 12/6/2017    Hyperlipidemia 12/6/2017    Hypertension     Mixed hyperlipidemia     Neuropathy 12/6/2017    Other acute and subacute form of ischemic heart disease     Pernicious anemia 12/6/2017    Sinoatrial node dysfunction (HCC)     Syncope and collapse     Thrush 12/6/2017    Thrush of mouth and esophagus ([facility] 12/6/2017    Type 2 diabetes mellitus without complication, without long-term current use of insulin ([facility] 9/7/2017      Allergies   Allergen Reactions    Shellfish Derived Other (comments)     Crab meat      Family History   Problem Relation Age of Onset    Stroke Father       Social History     Social History    Marital status: UNKNOWN     Spouse name: N/A    Number of children: N/A    Years of education: N/A     Occupational History    Not on file. Social History Main Topics    Smoking status: Never Smoker    Smokeless tobacco: Never Used    Alcohol use 0.6 oz/week     1 Glasses of wine per week    Drug use: No    Sexual activity: Not Currently     Other Topics Concern    Not on file     Social History Narrative     Prior to Admission medications    Medication Sig Start Date End Date Taking? Authorizing Provider   azithromycin (ZITHROMAX) 250 mg tablet 2 tablets today and then 1 daily for the next 4 days 4/6/18 4/11/18 Yes Rosalind Irving MD   lisinopril (PRINIVIL, ZESTRIL) 5 mg tablet Take 1 Tab by mouth daily. 9/28/17  Yes Merlyn Goltz, NP   amLODIPine (NORVASC) 5 mg tablet TAKE 1 TABLET BY MOUTH EVERY DAY 7/27/17  Yes Historical Provider   Vit C-Vit E-Lutein-Min-OM-3 (OCUVITE) 827-17-3-534 mg-unit-mg-mg cap Take  by mouth daily. Yes Historical Provider   brinzolamide (AZOPT) 1 % ophthalmic suspension Administer 1 Drop to both eyes two (2) times a day.    Yes Historical Provider   omeprazole (PRILOSEC) 40 mg capsule Take 40 mg by mouth as needed. Yes Historical Provider   timolol (TIMOPTIC) 0.5 % ophthalmic solution Administer 1 Drop to both eyes two (2) times a day. Yes Historical Provider   aspirin delayed-release (ASPIR-81) 81 mg tablet Take 81 mg by mouth daily. Yes Historical Provider   latanoprost (XALATAN) 0.005 % ophthalmic solution Administer 1 Drop to both eyes nightly. Yes Historical Provider        Review of Systems              Constitutional:  He denies fever, weight loss, sweats or fatigue. EYES: No blurred or double vision,               ENT: Positive for sore throat along with head and nasal congestion, no headache or dizziness. No difficulty with               swallowing, taste, speech or smell. Respiratory: Positive for cough without wheezing or shortness of breath. No sputum production. Cardiac:  Denies chest pain, palpitations, unexplained indigestion, syncope, edema, PND or orthopnea. GI:  No changes in bowel movements, no abdominal pain, no bloating, anorexia, nausea, vomiting or heartburn. :  No frequency or dysuria. Denies incontinence or sexual dysfunction. Extremities:  No joint pain, stiffness or swelling  Back:.no pain or soreness  Skin:  No recent rashes or mole changes. Neurological:  No numbness, tingling, burning paresthesias or loss of motor strength. No syncope, dizziness, frequent headaches or memory loss. Hematologic:  No easy bruising  Lymphatic: No lymph node enlargement    Objective:     Vitals:    04/06/18 0942   BP: 138/82   Pulse: 63   Resp: 20   Temp: 98.3 °F (36.8 °C)   TempSrc: Oral   SpO2: 97%   Weight: 163 lb 6.4 oz (74.1 kg)   Height: 5' 8\" (1.727 m)   PainSc:   4   PainLoc: Throat       Body mass index is 24.84 kg/(m^2). Physical Examination:              General Appearance:  Well-developed, well-nourished, no acute distress. HEENT:      Ears:  The TMs and ear canals were clear.   Eyes:  The pupillary responses were normal. Extraocular muscle function intact. Lids and conjunctiva not injected. Funduscopic exam revealed sharp disc margins. Nares: Moderately inflamed and edematous  Pharynx:  Clear with teeth in good repair. No masses were noted. Neck:  Supple without thyromegaly or adenopathy. No JVD noted. No carotid                bruits. Lungs:  Clear to auscultation and percussion. Cardiac:  Regular rate and rhythm without murmur. PMI not displaced. No gallop, rub or click. Abdominal: Soft, non-tender, no hepata-spleenomegally or masses  Extremities:  No clubbing, cyanosis or edema. Skin:  No rash or unusual mole changes noted. Lymph Nodes:  None felt in the cervical, supraclavicular, axillary or inguinal region. Neurological: . DTRs 2+ and symmetric. Station and gait normal.   Hematologic:   No purpura or petechiae        Assessment/Plan:         1. Acute non-recurrent maxillary sinusitis        Impressions/Plan:  Impression acute sinusitis with URI I will treat her with Zithromax and recheck with  pain if not resolved or otherwise as per his previous schedule. Orders Placed This Encounter    azithromycin (ZITHROMAX) 250 mg tablet       Follow-up Disposition:  Return if symptoms worsen or fail to improve. No results found for any visits on 04/06/18. Tucker Nguyen MD    The patient was given after the visit summary the patient verbalized an understanding of the plans and problems as explained.

## 2018-04-06 NOTE — MR AVS SNAPSHOT
[facility] [address] [address] [phone] Patient: Aye Cervantes 
MRN: [id number]:[id number] Visit Information Date & Time Provider Department Dept. Phone Encounter #  
 4/6/2018  9:20 AM Adrianna Mcneal MD [address] [facility] [phone] [id number] Your Appointments 5/24/2018  9:45 AM  
PROCEDURE with PACEMAKER, [facility] [address] [facility]) Appt Note: bsc battery check [address]  
[phone] [address]  
  
    
 9/27/2018 10:30 AM  
Follow Up with ACRELIA Casanova MD  
[address] ([address] [facility]) Appt Note: [address] [address] [address] [facility] [address] Upcoming Health Maintenance Date Due  
 FOOT EXAM Q1 4[birth year]1931 EYE EXAM RETINAL OR DILATED Q1 4/23/1931 DTaP/Tdap/Td series (1 - Tdap) 4/23/1942 ZOSTER VACCINE AGE 60> 2/23/1981 GLAUCOMA SCREENING Q2Y 4/23/1986 Pneumococcal 65+ Low/Medium Risk (1 of 2 - PCV13) 4/23/1986 Influenza Age 5 to Adult 8/1/2017 MEDICARE YEARLY EXAM 3/14/2018 HEMOGLOBIN A1C Q6M 6/8/2018 MICROALBUMIN Q1 9/7/2018 LIPID PANEL Q1 9/7/2018 Allergies as of 4/6/2018  Review Complete On: 4/6/2018 By: Adrianna Mcneal MD  
  
 Severity Noted Reaction Type Reactions Shellfish Derived  12/21/2012    Other (comments) Crab meat Current Immunizations  Never Reviewed No immunizations on file. Not reviewed this visit You Were Diagnosed With   
  
 Codes Comments Acute non-recurrent maxillary sinusitis    -  Primary ICD-10-CM: J01.00 ICD-9-CM: 461.0 Vitals BP Pulse Temp Resp Height(growth percentile) Weight(growth percentile) 138/82 (BP 1 Location: Left arm, BP Patient Position: Sitting) 63 98.3 °F (36.8 °C) (Oral) 20 5' 8\" (1.727 m) 163 lb 6.4 oz (74.1 kg) SpO2 BMI Smoking Status 97% 24.84 kg/m2 Never Smoker Vitals History BMI and BSA Data Body Mass Index Body Surface Area  
 24.84 kg/m 2 1.89 m 2 Preferred Pharmacy Pharmacy Name Phone [facility] #[phone] [facility] [phone] Your Updated Medication List  
  
   
This list is accurate as of 4/6/18 10:27 AM.  Always use your most recent med list. amLODIPine 5 mg tablet Commonly known as:  Erenest Cuba TAKE 1 TABLET BY MOUTH EVERY DAY  
  
 ASPIR-81 81 mg tablet Generic drug:  aspirin delayed-release Take 81 mg by mouth daily. AZOPT 1 % ophthalmic suspension Generic drug:  brinzolamide Administer 1 Drop to both eyes two (2) times a day. lisinopril 5 mg tablet Commonly known as:  Isaiah Young Take 1 Tab by mouth daily. OCUVITE 629-25-0-150 mg-unit-mg-mg Cap Generic drug:  Vit C-Vit E-Lutein-Min-OM-3 Take  by mouth daily. omeprazole 40 mg capsule Commonly known as:  PRILOSEC Take 40 mg by mouth as needed. timolol 0.5 % ophthalmic solution Commonly known as:  TIMOPTIC Administer 1 Drop to both eyes two (2) times a day. XALATAN 0.005 % ophthalmic solution Generic drug:  latanoprost  
Administer 1 Drop to both eyes nightly. Introducing [facility]! Fernandez Loera introduces [employer] patient portal. Now you can access parts of your medical record, email your doctor's office, and request medication refills online. 1. In your internet browser, go to [email or web address] 2. Click on the First Time User? Click Here link in the Sign In box. You will see the New Member Sign Up page. 3. Enter your [employer] Access Code exactly as it appears below.  You will not need to use this code after youve completed the sign-up process. If you do not sign up before the expiration date, you must request a new code. · [employer]itionOne Access Code: [email or web address] Expires: 6/11/2018 10:08 AM 
 
4. Enter the last four digits of your Social Security Number (xxxx) and Date of Birth (mm/dd/yyyy) as indicated and click Submit. You will be taken to the next sign-up page. 5. Create a IgnitionOne ID. This will be your IgnitionOne login ID and cannot be changed, so think of one that is secure and easy to remember. 6. Create a [employer]One password. You can change your password at any time. 7. Enter your Password Reset Question and Answer. This can be used at a later time if you forget your password. 8. Enter your e-mail address. You will receive e-mail notification when new information is available in [address] 9. Click Sign Up. You can now view and download portions of your medical record. 10. Click the Download Summary menu link to download a portable copy of your medical information. If you have questions, please visit the Frequently Asked Questions section of the [employer]One website. Remember, [employer]One is NOT to be used for urgent needs. For medical emergencies, dial 911. Now available from your iPhone and Android! Please provide this summary of care documentation to your next provider. Your primary care clinician is listed as ARCELIA Tomas. If you have any questions after today's visit, please call [phone].

## 2018-04-06 NOTE — PROGRESS NOTES
1. Have you been to the ER, urgent care clinic since your last visit? Hospitalized since your last visit? No    2. Have you seen or consulted any other health care providers outside of the [facility] since your last visit? Include any pap smears or colon screening.  No       Chief Complaint   Patient presents with    Cold Symptoms     Congestion, cough, sore throat, no fever

## 2018-04-09 ENCOUNTER — OFFICE VISIT (OUTPATIENT)
Dept: URGENT CARE | Age: 83
End: 2018-04-09

## 2018-04-09 VITALS
DIASTOLIC BLOOD PRESSURE: 67 MMHG | TEMPERATURE: 98.2 F | HEIGHT: 68 IN | WEIGHT: 163 LBS | HEART RATE: 62 BPM | SYSTOLIC BLOOD PRESSURE: 158 MMHG | RESPIRATION RATE: 22 BRPM | OXYGEN SATURATION: 96 % | BODY MASS INDEX: 24.71 KG/M2

## 2018-04-09 DIAGNOSIS — R05.9 COUGH: Primary | ICD-10-CM

## 2018-04-09 DIAGNOSIS — J01.90 ACUTE SINUSITIS, RECURRENCE NOT SPECIFIED, UNSPECIFIED LOCATION: ICD-10-CM

## 2018-04-09 DIAGNOSIS — R09.82 POST-NASAL DRIP: ICD-10-CM

## 2018-04-09 LAB
ANION GAP BLD CALC-SCNC: 17 MMOL/L
BUN BLD-MCNC: 29 MG/DL
CHLORIDE BLD-SCNC: 106 MMOL/L
CO2 BLD-SCNC: 23 MMOL/L
CREAT BLD-MCNC: 2 MG/DL (ref 0.6–1.3)
GLUCOSE BLD STRIP.AUTO-MCNC: 115 MG/DL
HCT VFR BLD CALC: 37 %
HGB BLD-MCNC: ABNORMAL G/DL
IONIZED CALCIUM ISTA,ICAI: 1.25
POTASSIUM BLD-SCNC: 4.6 MMOL/L
S PYO AG THROAT QL: NEGATIVE
SODIUM BLD-SCNC: 140 MMOL/L
VALID INTERNAL CONTROL?: YES

## 2018-04-09 RX ORDER — AMOXICILLIN AND CLAVULANATE POTASSIUM 500; 125 MG/1; MG/1
1 TABLET, FILM COATED ORAL EVERY 12 HOURS
Qty: 14 TAB | Refills: 0 | Status: SHIPPED | OUTPATIENT
Start: 2018-04-09 | End: 2018-04-16

## 2018-04-09 RX ORDER — GUAIFENESIN 600 MG/1
600 TABLET, EXTENDED RELEASE ORAL 2 TIMES DAILY
Qty: 14 TAB | Refills: 0 | Status: SHIPPED | OUTPATIENT
Start: 2018-04-09 | End: 2018-04-16

## 2018-04-09 RX ORDER — DEXTROMETHORPHAN POLISTIREX 30 MG/5ML
60 SUSPENSION ORAL 2 TIMES DAILY
COMMUNITY
End: 2018-06-03

## 2018-04-09 NOTE — MR AVS SNAPSHOT
[address] [facility] [address] 
[phone] Patient: Carlton Duarte 
MRN: [id number] ANR:2/30[birth year]71 Visit Information Date & Time Provider Department Dept. Phone Encounter #  
 4/9/2018  2:00 PM [address] [facility] [phone] [id number] Your Appointments 4/11/2018  3:10 PM  
ACUTE CARE with MD Jayla Stern 26 ([facility]) Appt Note: FU cough [place] [address] [address] [address]  
  
    
 5/24/2018  9:45 AM  
PROCEDURE with PACEMAKER, [facility]) Appt Note: bsc battery check [address]  
[phone] [address]  
  
    
 9/27/2018 10:30 AM  
Follow Up with MD Jayla Stern 26 ([address]) Appt Note: [address] [address] [address] [facility] [address] Upcoming Health Maintenance Date Due  
 FOOT EXAM Q1 4/23/1931 EYE EXAM RETINAL OR DILATED Q1 4/23/1931 DTaP/Tdap/Td series (1 - Tdap) 4/23/1942 ZOSTER VACCINE AGE 60> 2/23/1981 GLAUCOMA SCREENING Q2Y 4/23/1986 Pneumococcal 65+ Low/Medium Risk (1 of 2 - PCV13) 4/23/1986 Influenza Age 5 to Adult 8/1/2017 MEDICARE YEARLY EXAM 3/14/2018 HEMOGLOBIN A1C Q6M 6/8/2018 MICROALBUMIN Q1 9/7/2018 LIPID PANEL Q1 9/7/2018 Allergies as of 4/9/2018  Review Complete On: 4/9/2018 By: Harry Walter RN Severity Noted Reaction Type Reactions Shellfish Derived  12/21/2012    Other (comments) Crab meat Current Immunizations  Never Reviewed No immunizations on file. Not reviewed this visit You Were Diagnosed With   
  
 Codes Comments Cough    -  Primary ICD-10-CM: I98 ICD-9-CM: 786.2 Acute sinusitis, recurrence not specified, unspecified location     ICD-10-CM: J01.90 ICD-9-CM: 461.9 Post-nasal drip     ICD-10-CM: R09.82 ICD-9-CM: 784.91 Vitals BP Pulse Temp Resp Height(growth percentile) Weight(growth percentile) 158/67 62 98.2 °F (36.8 °C) 22 5' 8\" (1.727 m) 163 lb (73.9 kg) SpO2 BMI Smoking Status 96% 24.78 kg/m2 Never Smoker BMI and BSA Data Body Mass Index Body Surface Area 24.78 kg/m 2 1.88 m 2 Preferred Pharmacy Pharmacy Name Phone [id number] [phone] Your Updated Medication List  
  
   
This list is accurate as of 4/9/18  3:47 PM.  Always use your most recent med list. amLODIPine 5 mg tablet Commonly known as:  Prema Shield TAKE 1 TABLET BY MOUTH EVERY DAY  
  
 amoxicillin-clavulanate 500-125 mg per tablet Commonly known as:  AUGMENTIN Take 1 Tab by mouth every twelve (12) hours for 7 days. ASPIR-81 81 mg tablet Generic drug:  aspirin delayed-release Take 81 mg by mouth daily. azithromycin 250 mg tablet Commonly known as:  Pavithra Croak 2 tablets today and then 1 daily for the next 4 days AZOPT 1 % ophthalmic suspension Generic drug:  brinzolamide Administer 1 Drop to both eyes two (2) times a day. Delsym 30 mg/5 mL liquid Generic drug:  dextromethorphan Take 60 mg by mouth two (2) times a day. guaiFENesin  mg ER tablet Commonly known as:  [place] [address] Take 1 Tab by mouth two (2) times a day for 7 days. lisinopril 5 mg tablet Commonly known as:  Teresa Bills Take 1 Tab by mouth daily. OCUVITE 050-99-8-150 mg-unit-mg-mg Cap Generic drug:  Vit C-Vit E-Lutein-Min-OM-3 Take  by mouth daily. omeprazole 40 mg capsule Commonly known as:  PRILOSEC Take 40 mg by mouth as needed. timolol 0.5 % ophthalmic solution Commonly known as:  TIMOPTIC Administer 1 Drop to both eyes two (2) times a day. XALATAN 0.005 % ophthalmic solution Generic drug:  latanoprost  
Administer 1 Drop to both eyes nightly. Prescriptions Sent to Pharmacy Refills  
 guaiFENesin ER (MUCINEX) 600 mg ER tablet 0 Sig: Take 1 Tab by mouth two (2) times a day for 7 days. Class: Normal  
 Pharmacy: [address] Ph #: [phone] Route: Oral  
 amoxicillin-clavulanate (AUGMENTIN) 500-125 mg per tablet 0 Sig: Take 1 Tab by mouth every twelve (12) hours for 7 days. Class: Normal  
 Pharmacy: [address] Ph #: [phone] Route: Oral  
  
We Performed the Following AMB POC ISTAT CHEM 8+ [YOS994 Custom] AMB POC RAPID STREP A [47762 CPT(R)] CULTURE, STREP THROAT [id number] CPT(R)] To-Do List   
 04/09/2018 Imaging:  XR CHEST PA LAT Patient Instructions Finish azithromycin Start new Rx Augmentin as ordered. Discontinue over the counter cold/cough medications Start guaifenesin (this keeps mucus thin) : I have sent to your pharmacy. You need to see your PCP This week for re-evaluation (within 2-4 days) ED immediately for new or worsening. Creatinine was 2.0 showing decreased function (it has been at this level before) but you need to notify your Primary Care. Sinusitis: Care Instructions Your Care Instructions Sinusitis is an infection of the lining of the sinus cavities in your head. Sinusitis often follows a cold. It causes pain and pressure in your head and face. In most cases, sinusitis gets better on its own in 1 to 2 weeks. But some mild symptoms may last for several weeks. Sometimes antibiotics are needed. Follow-up care is a key part of your treatment and safety. Be sure to make and go to all appointments, and call your doctor if you are having problems. It's also a good idea to know your test results and keep a list of the medicines you take. How can you care for yourself at home? · Take an over-the-counter pain medicine, such as acetaminophen (Tylenol), ibuprofen (Advil, Motrin), or naproxen (Aleve). Read and follow all instructions on the label. · If the doctor prescribed antibiotics, take them as directed. Do not stop taking them just because you feel better. You need to take the full course of antibiotics. · Be careful when taking over-the-counter cold or flu medicines and Tylenol at the same time. Many of these medicines have acetaminophen, which is Tylenol. Read the labels to make sure that you are not taking more than the recommended dose. Too much acetaminophen (Tylenol) can be harmful. · Breathe warm, moist air from a steamy shower, a hot bath, or a sink filled with hot water. Avoid cold, dry air. Using a humidifier in your home may help. Follow the directions for cleaning the machine. · Use saline (saltwater) nasal washes to help keep your nasal passages open and wash out mucus and bacteria. You can buy saline nose drops at a grocery store or drugstore. Or you can make your own at home by adding 1 teaspoon of salt and 1 teaspoon of baking soda to 2 cups of distilled water. If you make your own, fill a bulb syringe with the solution, insert the tip into your nostril, and squeeze gently. Franceen Melissa your nose. · Put a hot, wet towel or a warm gel pack on your face 3 or 4 times a day for 5 to 10 minutes each time. · Try a decongestant nasal spray like oxymetazoline (Afrin). Do not use it for more than 3 days in a row. Using it for more than 3 days can make your congestion worse. When should you call for help? Call your doctor now or seek immediate medical care if: ? · You have new or worse swelling or redness in your face or around your eyes. ? · You have a new or higher fever. ? Watch closely for changes in your health, and be sure to contact your doctor if: 
? · You have new or worse facial pain. ? · The mucus from your nose becomes thicker (like pus) or has new blood in it. ? · You are not getting better as expected. Where can you learn more? Go to http://vivek-talisha.info/. Enter O696 in the search box to learn more about \"Sinusitis: Care Instructions. \" Current as of: May 12, 2017 Content Version: 11.4 © 5918-5657 [employer]. Care instructions adapted under license by [employer] (which disclaims liability or warranty for this information). If you have questions about a medical condition or this instruction, always ask your healthcare professional. [place] [address] any warranty or liability for your use of this information. Cough: Care Instructions Your Care Instructions A cough is your body's response to something that bothers your throat or airways. Many things can cause a cough. You might cough because of a cold or the flu, bronchitis, or asthma. Smoking, postnasal drip, allergies, and stomach acid that backs up into your throat also can cause coughs. A cough is a symptom, not a disease. Most coughs stop when the cause, such as a cold, goes away. You can take a few steps at home to cough less and feel better. Follow-up care is a key part of your treatment and safety. Be sure to make and go to all appointments, and call your doctor if you are having problems. It's also a good idea to know your test results and keep a list of the medicines you take. How can you care for yourself at home? · Drink lots of water and other fluids. This helps thin the mucus and soothes a dry or sore throat. Honey or lemon juice in hot water or tea may ease a dry cough. · Take cough medicine as directed by your doctor. · Prop up your head on pillows to help you breathe and ease a dry cough. · Try cough drops to soothe a dry or sore throat. Cough drops don't stop a cough. Medicine-flavored cough drops are no better than candy-flavored drops or hard candy. · Do not smoke. Avoid secondhand smoke. If you need help quitting, talk to your doctor about stop-smoking programs and medicines. These can increase your chances of quitting for good. When should you call for help? Call 911 anytime you think you may need emergency care. For example, call if: 
? · You have severe trouble breathing. ?Call your doctor now or seek immediate medical care if: 
? · You cough up blood. ? · You have new or worse trouble breathing. ? · You have a new or higher fever. ? · You have a new rash. ? Watch closely for changes in your health, and be sure to contact your doctor if: 
? · You cough more deeply or more often, especially if you notice more mucus or a change in the color of your mucus. ? · You have new symptoms, such as a sore throat, an earache, or sinus pain. ? · You do not get better as expected. Where can you learn more? Go to http://vivek-talisha.info/. Enter D279 in the search box to learn more about \"Cough: Care Instructions. \" Current as of: May 12, 2017 Content Version: 11.4 © 8772-7739 [employer]. Care instructions adapted under license by ibabybox (which disclaims liability or warranty for this information). If you have questions about a medical condition or this instruction, always ask your healthcare professional. [address] any warranty or liability for your use of this information. Introducing [facility]! Judith Gomez introduces Xcedex patient portal. Now you can access parts of your medical record, email your doctor's office, and request medication refills online. 1. In your internet browser, go to [email or web address] 2. Click on the First Time User? Click Here link in the Sign In box. You will see the New Member Sign Up page. 3. Enter your PrepChamps Access Code exactly as it appears below. You will not need to use this code after youve completed the sign-up process. If you do not sign up before the expiration date, you must request a new code. · PrepChamps Access Code: [email or web address] Expires: 6/11/2018 10:08 AM 
 
4. Enter the last four digits of your Social Security Number (xxxx) and Date of Birth (mm/dd/yyyy) as indicated and click Submit. You will be taken to the next sign-up page. 5. Create a PrepChamps ID. This will be your PrepChamps login ID and cannot be changed, so think of one that is secure and easy to remember. 6. Create a PrepChamps password. You can change your password at any time. 7. Enter your Password Reset Question and Answer. This can be used at a later time if you forget your password. 8. Enter your e-mail address. You will receive e-mail notification when new information is available in [address] 9. Click Sign Up. You can now view and download portions of your medical record. 10. Click the Download Summary menu link to download a portable copy of your medical information. If you have questions, please visit the Frequently Asked Questions section of the PrepChamps website. Remember, PrepChamps is NOT to be used for urgent needs. For medical emergencies, dial 911. Now available from your iPhone and Android! Please provide this summary of care documentation to your next provider. Your primary care clinician is listed as ARCELIA Sam. If you have any questions after today's visit, please call [phone].

## 2018-04-09 NOTE — PATIENT INSTRUCTIONS
Finish azithromycin  Start new Rx Augmentin as ordered. Discontinue over the counter cold/cough medications  Start guaifenesin (this keeps mucus thin) : I have sent to your pharmacy. You need to see your PCP This week for re-evaluation (within 2-4 days)  ED immediately for new or worsening. Creatinine was 2.0 showing decreased function (it has been at this level before) but you need to notify your Primary Care. Sinusitis: Care Instructions  Your Care Instructions    Sinusitis is an infection of the lining of the sinus cavities in your head. Sinusitis often follows a cold. It causes pain and pressure in your head and face. In most cases, sinusitis gets better on its own in 1 to 2 weeks. But some mild symptoms may last for several weeks. Sometimes antibiotics are needed. Follow-up care is a key part of your treatment and safety. Be sure to make and go to all appointments, and call your doctor if you are having problems. It's also a good idea to know your test results and keep a list of the medicines you take. How can you care for yourself at home? · Take an over-the-counter pain medicine, such as acetaminophen (Tylenol), ibuprofen (Advil, Motrin), or naproxen (Aleve). Read and follow all instructions on the label. · If the doctor prescribed antibiotics, take them as directed. Do not stop taking them just because you feel better. You need to take the full course of antibiotics. · Be careful when taking over-the-counter cold or flu medicines and Tylenol at the same time. Many of these medicines have acetaminophen, which is Tylenol. Read the labels to make sure that you are not taking more than the recommended dose. Too much acetaminophen (Tylenol) can be harmful. · Breathe warm, moist air from a steamy shower, a hot bath, or a sink filled with hot water. Avoid cold, dry air. Using a humidifier in your home may help. Follow the directions for cleaning the machine.   · Use saline (saltwater) nasal washes to help keep your nasal passages open and wash out mucus and bacteria. You can buy saline nose drops at a grocery store or drugstore. Or you can make your own at home by adding 1 teaspoon of salt and 1 teaspoon of baking soda to 2 cups of distilled water. If you make your own, fill a bulb syringe with the solution, insert the tip into your nostril, and squeeze gently. Warner Restrepo your nose. · Put a hot, wet towel or a warm gel pack on your face 3 or 4 times a day for 5 to 10 minutes each time. · Try a decongestant nasal spray like oxymetazoline (Afrin). Do not use it for more than 3 days in a row. Using it for more than 3 days can make your congestion worse. When should you call for help? Call your doctor now or seek immediate medical care if:  ? · You have new or worse swelling or redness in your face or around your eyes. ? · You have a new or higher fever. ? Watch closely for changes in your health, and be sure to contact your doctor if:  ? · You have new or worse facial pain. ? · The mucus from your nose becomes thicker (like pus) or has new blood in it. ? · You are not getting better as expected. Where can you learn more? Go to http://vivek-talisha.info/. Enter I150 in the search box to learn more about \"Sinusitis: Care Instructions. \"  Current as of: May 12, 2017  Content Version: 11.4  © 3914-3227 Gazemetrix. Care instructions adapted under license by Tier 3 (which disclaims liability or warranty for this information). If you have questions about a medical condition or this instruction, always ask your healthcare professional. [place] [address] any warranty or liability for your use of this information. Cough: Care Instructions  Your Care Instructions    A cough is your body's response to something that bothers your throat or airways. Many things can cause a cough. You might cough because of a cold or the flu, bronchitis, or asthma. Smoking, postnasal drip, allergies, and stomach acid that backs up into your throat also can cause coughs. A cough is a symptom, not a disease. Most coughs stop when the cause, such as a cold, goes away. You can take a few steps at home to cough less and feel better. Follow-up care is a key part of your treatment and safety. Be sure to make and go to all appointments, and call your doctor if you are having problems. It's also a good idea to know your test results and keep a list of the medicines you take. How can you care for yourself at home? · Drink lots of water and other fluids. This helps thin the mucus and soothes a dry or sore throat. Honey or lemon juice in hot water or tea may ease a dry cough. · Take cough medicine as directed by your doctor. · Prop up your head on pillows to help you breathe and ease a dry cough. · Try cough drops to soothe a dry or sore throat. Cough drops don't stop a cough. Medicine-flavored cough drops are no better than candy-flavored drops or hard candy. · Do not smoke. Avoid secondhand smoke. If you need help quitting, talk to your doctor about stop-smoking programs and medicines. These can increase your chances of quitting for good. When should you call for help? Call 911 anytime you think you may need emergency care. For example, call if:  ? · You have severe trouble breathing. ?Call your doctor now or seek immediate medical care if:  ? · You cough up blood. ? · You have new or worse trouble breathing. ? · You have a new or higher fever. ? · You have a new rash. ? Watch closely for changes in your health, and be sure to contact your doctor if:  ? · You cough more deeply or more often, especially if you notice more mucus or a change in the color of your mucus. ? · You have new symptoms, such as a sore throat, an earache, or sinus pain. ? · You do not get better as expected. Where can you learn more?   Go to http://vivek-talisha.info/. Enter D279 in the search box to learn more about \"Cough: Care Instructions. \"  Current as of: May 12, 2017  Content Version: 11.4  © 3945-9127 [employer]wise[employer]. Care instructions adapted under license by MetaPack (which disclaims liability or warranty for this information). If you have questions about a medical condition or this instruction, always ask your healthcare professional. [place] [address] any warranty or liability for your use of this information.
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
## 2018-04-11 ENCOUNTER — OFFICE VISIT (OUTPATIENT)
Dept: INTERNAL MEDICINE CLINIC | Age: 83
End: 2018-04-11

## 2018-04-11 VITALS
BODY MASS INDEX: 24.28 KG/M2 | OXYGEN SATURATION: 96 % | DIASTOLIC BLOOD PRESSURE: 64 MMHG | SYSTOLIC BLOOD PRESSURE: 158 MMHG | HEART RATE: 61 BPM | TEMPERATURE: 98.5 F | RESPIRATION RATE: 20 BRPM | WEIGHT: 160.2 LBS | HEIGHT: 68 IN

## 2018-04-11 DIAGNOSIS — J06.9 UPPER RESPIRATORY TRACT INFECTION, UNSPECIFIED TYPE: Primary | ICD-10-CM

## 2018-04-11 NOTE — PROGRESS NOTES
Kusum Weinstein is a 80 y.o. male  Chief Complaint   Patient presents with    Cough     (room 5)  chest congestion, sore throat, runny nose  since 3/30/18    Chronic Kidney Disease     \"kidney function is lower than normal according to the [facility] urgent care center\"     Visit Vitals    /64 (BP 1 Location: Left arm, BP Patient Position: Sitting)    Pulse 61    Temp 98.5 °F (36.9 °C) (Oral)    Resp 20    Ht 5' 8\" (1.727 m)    Wt 160 lb 3.2 oz (72.7 kg)    SpO2 96%    BMI 24.36 kg/m2     1. Have you been to the ER, urgent care clinic since your last visit? Hospitalized since your last visit? Yes  [facility]    2. Have you seen or consulted any other health care providers outside of the [address] [place] [address] since your last visit? Include any pap smears or colon screening.  no
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
## 2018-04-11 NOTE — MR AVS SNAPSHOT
[facility] 
 
 
 [address] [address] [phone] Patient: Jagjit Abel 
MRN: [id number] [facility]:[birth year] Visit Information Date & Time Provider Department Dept. Phone Encounter #  
 4/11/2018  3:10 PM ARCELIA Villareal MD [facility] [id number] Your Appointments 4/25/2018 10:40 AM  
FOLLOW UP 10 with MD ROXANE Marino [facility] ([facility]) Appt Note: 2wks [address] [address] [address] [address]  
  
    
 5/24/2018  9:45 AM  
PROCEDURE with PACEMAKER, [facility]) Appt Note: [facility] [address] [facility]  
[phone] [address] [facility]  
  
    
 9/27/2018 10:30 AM  
Follow Up with ACRELIA Villareal MD  
[place] [address] ([facility]) Appt Note: [address] [phone] [address] [address] Upcoming Health Maintenance Date Due  
 FOOT EXAM Q1 4/23/1931 EYE EXAM RETINAL OR DILATED Q1 4/23/1931 DTaP/Tdap/Td series (1 - Tdap) 4/23/1942 ZOSTER VACCINE AGE 60> 2/23/1981 GLAUCOMA SCREENING Q2Y 4/23/1986 Pneumococcal 65+ Low/Medium Risk (1 of 2 - PCV13) 4/23/1986 Influenza Age 5 to Adult 8/1/2017 MEDICARE YEARLY EXAM 3/14/2018 HEMOGLOBIN A1C Q6M 6/8/2018 MICROALBUMIN Q1 9/7/2018 LIPID PANEL Q1 9/7/2018 Allergies as of 4/11/2018  Review Complete On: 4/11/2018 By: Cici Silva LPN Severity Noted Reaction Type Reactions Shellfish Derived  12/21/2012    Other (comments) Crab meat Current Immunizations  Never Reviewed No immunizations on file. Not reviewed this visit Vitals BP Pulse Temp Resp Height(growth percentile) Weight(growth percentile) 158/64 (BP 1 Location: Left arm, BP Patient Position: Sitting) 61 98.5 °F (36.9 °C) (Oral) 20 5' 8\" (1.727 m) 160 lb 3.2 oz (72.7 kg) SpO2 BMI Smoking Status 96% 24.36 kg/m2 Never Smoker BMI and BSA Data Body Mass Index Body Surface Area  
 24.36 kg/m 2 1.87 m 2 Preferred Pharmacy Pharmacy Name Phone [id number] [facility] [phone] Your Updated Medication List  
  
   
This list is accurate as of 4/11/18  4:44 PM.  Always use your most recent med list. amLODIPine 5 mg tablet Commonly known as:  [place] TAKE 1 TABLET BY MOUTH EVERY DAY  
  
 amoxicillin-clavulanate 500-125 mg per tablet Commonly known as:  AUGMENTIN Take 1 Tab by mouth every twelve (12) hours for 7 days. ASPIR-81 81 mg tablet Generic drug:  aspirin delayed-release Take 81 mg by mouth daily. azithromycin 250 mg tablet Commonly known as:  Sadie Ream 2 tablets today and then 1 daily for the next 4 days AZOPT 1 % ophthalmic suspension Generic drug:  brinzolamide Administer 1 Drop to both eyes two (2) times a day. Delsym 30 mg/5 mL liquid Generic drug:  dextromethorphan Take 60 mg by mouth two (2) times a day. guaiFENesin  mg ER tablet Commonly known as:  Emile & Emile Take 1 Tab by mouth two (2) times a day for 7 days. lisinopril 5 mg tablet Commonly known as:  Velora Deion Take 1 Tab by mouth daily. OCUVITE 796-29-1-150 mg-unit-mg-mg Cap Generic drug:  Vit C-Vit E-Lutein-Min-OM-3 Take  by mouth daily. omeprazole 40 mg capsule Commonly known as:  PRILOSEC Take 40 mg by mouth as needed. timolol 0.5 % ophthalmic solution Commonly known as:  TIMOPTIC Administer 1 Drop to both eyes two (2) times a day. XALATAN 0.005 % ophthalmic solution Generic drug:  latanoprost  
Administer 1 Drop to both eyes nightly. Introducing [facility] [place] [facility]! [facility] introduces [employer] patient portal. Now you can access parts of your medical record, email your doctor's office, and request medication refills online. 1. In your internet browser, go to [email or web address] 2. Click on the First Time User? Click Here link in the Sign In box. You will see the New Member Sign Up page. 3. Enter your [employer] Access Code exactly as it appears below. You will not need to use this code after youve completed the sign-up process. If you do not sign up before the expiration date, you must request a new code. · [employer] Access Code: [email or web address] Expires: 6/11/2018 10:08 AM 
 
4. Enter the last four digits of your Social Security Number (xxxx) and Date of Birth (mm/dd/yyyy) as indicated and click Submit. You will be taken to the next sign-up page. 5. Create a [employer] ID. This will be your [employer] login ID and cannot be changed, so think of one that is secure and easy to remember. 6. Create a [employer] password. You can change your password at any time. 7. Enter your Password Reset Question and Answer. This can be used at a later time if you forget your password. 8. Enter your e-mail address. You will receive e-mail notification when new information is available in [address] 9. Click Sign Up. You can now view and download portions of your medical record. 10. Click the Download Summary menu link to download a portable copy of your medical information. If you have questions, please visit the Frequently Asked Questions section of the [employer] website. Remember, [employer] is NOT to be used for urgent needs. For medical emergencies, dial 911. Now available from your iPhone and Android! Please provide this summary of care documentation to your next provider. Your primary care clinician is listed as ARCELIA Yanez. If you have any questions after today's visit, please call [phone].

## 2018-04-12 LAB — S PYO THROAT QL CULT: NEGATIVE

## 2018-04-25 ENCOUNTER — OFFICE VISIT (OUTPATIENT)
Dept: INTERNAL MEDICINE CLINIC | Age: 83
End: 2018-04-25

## 2018-04-25 VITALS
BODY MASS INDEX: 24.25 KG/M2 | RESPIRATION RATE: 20 BRPM | HEIGHT: 68 IN | HEART RATE: 58 BPM | SYSTOLIC BLOOD PRESSURE: 118 MMHG | OXYGEN SATURATION: 99 % | TEMPERATURE: 97.6 F | DIASTOLIC BLOOD PRESSURE: 68 MMHG | WEIGHT: 160 LBS

## 2018-04-25 DIAGNOSIS — R53.83 FATIGUE, UNSPECIFIED TYPE: ICD-10-CM

## 2018-04-25 DIAGNOSIS — N18.4 CHRONIC KIDNEY DISEASE, STAGE IV (SEVERE) (HCC): ICD-10-CM

## 2018-04-25 DIAGNOSIS — Z95.0 PACEMAKER: ICD-10-CM

## 2018-04-25 DIAGNOSIS — E11.21 TYPE 2 DIABETES MELLITUS WITH NEPHROPATHY (HCC): Primary | ICD-10-CM

## 2018-04-25 DIAGNOSIS — I10 ESSENTIAL HYPERTENSION, BENIGN: ICD-10-CM

## 2018-04-25 NOTE — PROGRESS NOTES
Arvind Latham is a 80 y.o. male  Chief Complaint   Patient presents with    Medication Evaluation     (room 7)  follow up     Visit Vitals    /82 (BP 1 Location: Right arm, BP Patient Position: Sitting)    Pulse (!) 51    Temp 97.6 °F (36.4 °C) (Oral)    Resp 20    Ht 5' 8\" (1.727 m)    Wt 160 lb (72.6 kg)    SpO2 99%    BMI 24.33 kg/m2     1. Have you been to the ER, urgent care clinic since your last visit? Hospitalized since your last visit?  no    2. Have you seen or consulted any other health care providers outside of the [address] [place] [address] since your last visit? Include any pap smears or colon screening.  no

## 2018-04-25 NOTE — MR AVS SNAPSHOT
[address] [facility] [address] [id number] [phone] Patient: Cheli Bañuelos 
MRN: [id number] IND:4/68/[id number] Visit Information Date & Time Provider Department Dept. Phone Encounter #  
 4/25/2018 10:40 AM ARCELIA Daley MD [address] [facility] [phone] [id number] Follow-up Instructions Return in about 6 months (around 10/25/2018) for follow up. Your Appointments 5/24/2018  9:45 AM  
PROCEDURE with PACEMAKER, [facility] [address]) Appt Note: [facility] battery check [id number] [facility]  
[phone] [facility]  
  
    
 9/27/2018 10:30 AM  
Follow Up with ARCELIA Daley MD  
[facility] ([address] [facility]) Appt Note: [address] [id number] [address] [facility] [id number] Upcoming Health Maintenance Date Due  
 FOOT EXAM Q1 4/23/1931 EYE EXAM RETINAL OR DILATED Q1 4/23/1931 DTaP/Tdap/Td series (1 - Tdap) 4/23/1942 ZOSTER VACCINE AGE 60> 2/23/1981 GLAUCOMA SCREENING Q2Y 4/23/1986 Pneumococcal 65+ Low/Medium Risk (1 of 2 - PCV13) 4/23/1986 Influenza Age 5 to Adult 8/1/2017 MEDICARE YEARLY EXAM 3/14/2018 HEMOGLOBIN A1C Q6M 6/8/2018 MICROALBUMIN Q1 9/7/2018 LIPID PANEL Q1 9/7/2018 Allergies as of 4/25/2018  Review Complete On: 4/25/2018 By: Enrique Berman LPN Severity Noted Reaction Type Reactions Shellfish Derived  12/21/2012    Other (comments) Crab meat Current Immunizations  Never Reviewed No immunizations on file. Not reviewed this visit You Were Diagnosed With   
  
 Codes Comments  Type 2 diabetes mellitus with nephropathy ([facility]    -  Primary ICD-10-CM: E11.21 
ICD-9-CM: 250.40, 583.81   
 Chronic kidney disease, stage IV (severe) (HCC)     ICD-10-CM: N18.4 ICD-9-CM: 585.4 Pacemaker     ICD-10-CM: Z95.0 ICD-9-CM: V45.01 Essential hypertension, benign     ICD-10-CM: I10 
ICD-9-CM: 401.1 Fatigue, unspecified type     ICD-10-CM: R53.83 ICD-9-CM: 780.79 Vitals BP Pulse Temp Resp Height(growth percentile) Weight(growth percentile)  
 118/68 (BP 1 Location: Right arm, BP Patient Position: Sitting) (!) 58 97.6 °F (36.4 °C) (Oral) 20 5' 8\" (1.727 m) 160 lb (72.6 kg) SpO2 BMI Smoking Status 99% 24.33 kg/m2 Never Smoker Vitals History BMI and BSA Data Body Mass Index Body Surface Area  
 24.33 kg/m 2 1.87 m 2 Preferred Pharmacy Pharmacy Name Phone [id number] [facility] [phone] Your Updated Medication List  
  
   
This list is accurate as of 4/25/18 11:34 AM.  Always use your most recent med list. amLODIPine 5 mg tablet Commonly known as:  Shama Matar TAKE 1 TABLET BY MOUTH EVERY DAY  
  
 ASPIR-81 81 mg tablet Generic drug:  aspirin delayed-release Take 81 mg by mouth daily. AZOPT 1 % ophthalmic suspension Generic drug:  brinzolamide Administer 1 Drop to both eyes two (2) times a day. Delsym 30 mg/5 mL liquid Generic drug:  dextromethorphan Take 60 mg by mouth two (2) times a day. lisinopril 5 mg tablet Commonly known as:  Rc Boehringer Take 1 Tab by mouth daily. OCUVITE 687-53-1-150 mg-unit-mg-mg Cap Generic drug:  Vit C-Vit E-Lutein-Min-OM-3 Take  by mouth daily. omeprazole 40 mg capsule Commonly known as:  PRILOSEC Take 40 mg by mouth as needed. timolol 0.5 % ophthalmic solution Commonly known as:  TIMOPTIC Administer 1 Drop to both eyes two (2) times a day. XALATAN 0.005 % ophthalmic solution Generic drug:  latanoprost  
Administer 1 Drop to both eyes nightly. We Performed the Following AMB POC COMPLETE CBC,AUTOMATED ENTER [id number] CPT(R)] AMB POC COMPREHENSIVE METABOLIC PANEL [07463 CPT(R)] AMB POC HEMOGLOBIN A1C [60115 CPT(R)] Follow-up Instructions Return in about 6 months (around 10/25/2018) for follow up. Introducing [facility]! [facility] introduces [facility] patient portal. Now you can access parts of your medical record, email your doctor's office, and request medication refills online. 1. In your internet browser, go to [email or web address] 2. Click on the First Time User? Click Here link in the Sign In box. You will see the New Member Sign Up page. 3. Enter your [facility] Access Code exactly as it appears below. You will not need to use this code after youve completed the sign-up process. If you do not sign up before the expiration date, you must request a new code. · [facility] Access Code: [email or web address] Expires: 6/11/2018 10:08 AM 
 
4. Enter the last four digits of your Social Security Number (xxxx) and Date of Birth (mm/dd/yyyy) as indicated and click Submit. You will be taken to the next sign-up page. 5. Create a [facility] ID. This will be your [facility] login ID and cannot be changed, so think of one that is secure and easy to remember. 6. Create a [facility] password. You can change your password at any time. 7. Enter your Password Reset Question and Answer. This can be used at a later time if you forget your password. 8. Enter your e-mail address. You will receive e-mail notification when new information is available in [address] 9. Click Sign Up. You can now view and download portions of your medical record. 10. Click the Download Summary menu link to download a portable copy of your medical information. If you have questions, please visit the Frequently Asked Questions section of the [facility] website.  Remember, [facility] is NOT to be used for urgent needs. For medical emergencies, dial 911. Now available from your iPhone and Android! Please provide this summary of care documentation to your next provider. Your primary care clinician is listed as ARCELIA Gomez. If you have any questions after today's visit, please call [phone].
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
## 2018-05-01 LAB
ALBUMIN SERPL-MCNC: 4 G/DL (ref 3.9–5.4)
ALKALINE PHOS POC: 73 U/L (ref 38–126)
ALT SERPL-CCNC: 38 U/L (ref 9–52)
AST SERPL-CCNC: 60 U/L (ref 14–36)
BUN BLD-MCNC: 38 MG/DL (ref 9–20)
CALCIUM BLD-MCNC: 9.4 MG/DL (ref 8.4–10.2)
CHLORIDE BLD-SCNC: 111 MMOL/L (ref 98–107)
CO2 POC: 24 MMOL/L (ref 22–32)
CREAT BLD-MCNC: 1.8 MG/DL (ref 0.8–1.5)
EGFR (POC): 30.9
GLUCOSE POC: 86 MG/DL (ref 75–110)
GRAN# POC: 3.9 K/UL (ref 2–7.8)
GRAN% POC: 57.4 % (ref 37–92)
HBA1C MFR BLD HPLC: 6.2 % (ref 4.5–5.7)
HCT VFR BLD CALC: 34.8 % (ref 37–51)
HGB BLD-MCNC: 12 G/DL (ref 12–18)
LY# POC: 2.4 K/UL (ref 0.6–4.1)
LY% POC: 36.5 % (ref 10–58.5)
MCH RBC QN: 36.7 PG (ref 26–32)
MCHC RBC-ENTMCNC: 34.6 G/DL (ref 30–36)
MCV RBC: 106 FL (ref 80–97)
MID #, POC: 0.4 K/UL (ref 0–1.8)
MID% POC: 6.1 % (ref 0.1–24)
PLATELET # BLD: 317 K/UL (ref 140–440)
POTASSIUM SERPL-SCNC: 5.8 MMOL/L (ref 3.6–5)
PROT SERPL-MCNC: 7.2 G/DL (ref 6.3–8.2)
RBC # BLD: 3.28 M/UL (ref 4.2–6.3)
SODIUM SERPL-SCNC: 146 MMOL/L (ref 137–145)
TOTAL BILIRUBIN POC: 0.8 MG/DL (ref 0.2–1.3)
WBC # BLD: 6.7 K/UL (ref 4.1–10.9)

## 2018-05-08 ENCOUNTER — DOCUMENTATION ONLY (OUTPATIENT)
Dept: INTERNAL MEDICINE CLINIC | Age: 83
End: 2018-05-08

## 2018-05-24 ENCOUNTER — OFFICE VISIT (OUTPATIENT)
Dept: CARDIOLOGY CLINIC | Age: 83
End: 2018-05-24

## 2018-05-24 DIAGNOSIS — I49.5 SINOATRIAL NODE DYSFUNCTION (HCC): ICD-10-CM

## 2018-05-24 DIAGNOSIS — Z95.0 PACEMAKER: Primary | ICD-10-CM

## 2018-05-24 NOTE — PROGRESS NOTES
See device report - [facility] DCPM battery check, no charge, next full check and battery check in July 2018.

## 2018-05-29 ENCOUNTER — OFFICE VISIT (OUTPATIENT)
Dept: INTERNAL MEDICINE CLINIC | Age: 83
End: 2018-05-29

## 2018-05-29 VITALS
OXYGEN SATURATION: 98 % | DIASTOLIC BLOOD PRESSURE: 80 MMHG | RESPIRATION RATE: 16 BRPM | TEMPERATURE: 97.9 F | HEIGHT: 68 IN | SYSTOLIC BLOOD PRESSURE: 140 MMHG | BODY MASS INDEX: 24.95 KG/M2 | WEIGHT: 164.6 LBS | HEART RATE: 64 BPM

## 2018-05-29 DIAGNOSIS — I25.10 ATHEROSCLEROSIS OF NATIVE CORONARY ARTERY OF NATIVE HEART, ANGINA PRESENCE UNSPECIFIED: ICD-10-CM

## 2018-05-29 DIAGNOSIS — N18.4 CHRONIC KIDNEY DISEASE, STAGE IV (SEVERE) (HCC): ICD-10-CM

## 2018-05-29 DIAGNOSIS — E11.21 TYPE 2 DIABETES MELLITUS WITH NEPHROPATHY (HCC): Primary | ICD-10-CM

## 2018-05-29 DIAGNOSIS — D51.0 PERNICIOUS ANEMIA: ICD-10-CM

## 2018-05-29 DIAGNOSIS — Z95.0 CARDIAC PACEMAKER IN SITU: ICD-10-CM

## 2018-05-29 LAB
BUN BLD-MCNC: 31 MG/DL (ref 9–20)
CALCIUM BLD-MCNC: 9.3 MG/DL (ref 8.4–10.2)
CHLORIDE BLD-SCNC: 112 MMOL/L (ref 98–107)
CO2 POC: 24 MMOL/L (ref 22–32)
CREAT BLD-MCNC: 2 MG/DL (ref 0.8–1.5)
EGFR (POC): 27.2
GLUCOSE POC: 111 MG/DL (ref 75–110)
GRAN# POC: 4.3 K/UL (ref 2–7.8)
GRAN% POC: 59.5 % (ref 37–92)
HCT VFR BLD CALC: 35.2 % (ref 37–51)
HGB BLD-MCNC: 12 G/DL (ref 12–18)
LY# POC: 2.2 K/UL (ref 0.6–4.1)
LY% POC: 32.4 % (ref 10–58.5)
MCH RBC QN: 37.9 PG (ref 26–32)
MCHC RBC-ENTMCNC: 34.2 G/DL (ref 30–36)
MCV RBC: 111 FL (ref 80–97)
MID #, POC: 0.5 K/UL (ref 0–1.8)
MID% POC: 8.1 % (ref 0.1–24)
PLATELET # BLD: 242 K/UL (ref 140–440)
POTASSIUM SERPL-SCNC: 5.3 MMOL/L (ref 3.6–5)
RBC # BLD: 3.18 M/UL (ref 4.2–6.3)
SODIUM SERPL-SCNC: 147 MMOL/L (ref 137–145)
WBC # BLD: 7 K/UL (ref 4.1–10.9)

## 2018-05-29 NOTE — PROGRESS NOTES
Chief Complaint   Patient presents with    Hypertension     room 1     1. Have you been to the ER, urgent care clinic since your last visit? Hospitalized since your last visit? NO  2. Have you seen or consulted any other health care providers outside of the [address] [place] [address] since your last visit? Include any pap smears or colon screening. NO      PT IS HERE FOR BP F/U. PT RECENTLY STOPPED BP MEDS, AMLODIPINE AND LISINOPRIL. PT STATES HE HAD AN \"IRREGULAR HEARTBEAT\" A FEW DAYS AGO.  PT HAD PACEMAKER CHECK LAST WEEK, RESULTS ARE STILL PENDING PER PT.
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
## 2018-05-30 LAB — VIT B12 SERPL-MCNC: 839 PG/ML (ref 232–1245)

## 2018-06-03 ENCOUNTER — APPOINTMENT (OUTPATIENT)
Dept: GENERAL RADIOLOGY | Age: 83
DRG: 247 | End: 2018-06-03
Attending: PHYSICIAN ASSISTANT
Payer: MEDICARE

## 2018-06-03 ENCOUNTER — HOSPITAL ENCOUNTER (INPATIENT)
Age: 83
LOS: 2 days | Discharge: HOME HEALTH | DRG: 247 | End: 2018-06-05
Attending: EMERGENCY MEDICINE | Admitting: INTERNAL MEDICINE
Payer: MEDICARE

## 2018-06-03 DIAGNOSIS — I25.110 CORONARY ARTERY DISEASE INVOLVING NATIVE CORONARY ARTERY OF NATIVE HEART WITH UNSTABLE ANGINA PECTORIS (HCC): ICD-10-CM

## 2018-06-03 DIAGNOSIS — R53.1 WEAKNESS: ICD-10-CM

## 2018-06-03 DIAGNOSIS — Z98.890 S/P CARDIAC CATH: ICD-10-CM

## 2018-06-03 DIAGNOSIS — R09.02 HYPOXIA: ICD-10-CM

## 2018-06-03 DIAGNOSIS — Z95.0 CARDIAC PACEMAKER IN SITU: ICD-10-CM

## 2018-06-03 DIAGNOSIS — R06.09 DOE (DYSPNEA ON EXERTION): Primary | ICD-10-CM

## 2018-06-03 DIAGNOSIS — K21.9 GASTROESOPHAGEAL REFLUX DISEASE WITHOUT ESOPHAGITIS: ICD-10-CM

## 2018-06-03 DIAGNOSIS — R53.83 FATIGUE, UNSPECIFIED TYPE: ICD-10-CM

## 2018-06-03 DIAGNOSIS — I10 ESSENTIAL HYPERTENSION, BENIGN: ICD-10-CM

## 2018-06-03 DIAGNOSIS — R07.9 CHEST PAIN, UNSPECIFIED TYPE: ICD-10-CM

## 2018-06-03 DIAGNOSIS — N18.4 CHRONIC KIDNEY DISEASE, STAGE IV (SEVERE) (HCC): ICD-10-CM

## 2018-06-03 LAB
ALBUMIN SERPL-MCNC: 3.8 G/DL (ref 3.5–5)
ALBUMIN/GLOB SERPL: 1.1 {RATIO} (ref 1.1–2.2)
ALP SERPL-CCNC: 108 U/L (ref 45–117)
ALT SERPL-CCNC: 49 U/L (ref 12–78)
ANION GAP SERPL CALC-SCNC: 8 MMOL/L (ref 5–15)
APPEARANCE UR: CLEAR
AST SERPL-CCNC: 49 U/L (ref 15–37)
BACTERIA URNS QL MICRO: NEGATIVE /HPF
BASOPHILS # BLD: 0.1 K/UL (ref 0–0.1)
BASOPHILS NFR BLD: 1 % (ref 0–1)
BILIRUB SERPL-MCNC: 0.5 MG/DL (ref 0.2–1)
BILIRUB UR QL: NEGATIVE
BNP SERPL-MCNC: 2713 PG/ML (ref 0–450)
BUN SERPL-MCNC: 32 MG/DL (ref 6–20)
BUN/CREAT SERPL: 16 (ref 12–20)
CALCIUM SERPL-MCNC: 9.4 MG/DL (ref 8.5–10.1)
CHLORIDE SERPL-SCNC: 112 MMOL/L (ref 97–108)
CK MB CFR SERPL CALC: 3.8 % (ref 0–2.5)
CK MB SERPL-MCNC: 2.3 NG/ML (ref 5–25)
CK SERPL-CCNC: 61 U/L (ref 39–308)
CO2 SERPL-SCNC: 24 MMOL/L (ref 21–32)
COLOR UR: ABNORMAL
CREAT SERPL-MCNC: 2.06 MG/DL (ref 0.7–1.3)
DIFFERENTIAL METHOD BLD: ABNORMAL
EOSINOPHIL # BLD: 0.5 K/UL (ref 0–0.4)
EOSINOPHIL NFR BLD: 7 % (ref 0–7)
EPITH CASTS URNS QL MICRO: ABNORMAL /LPF
ERYTHROCYTE [DISTWIDTH] IN BLOOD BY AUTOMATED COUNT: 16 % (ref 11.5–14.5)
GLOBULIN SER CALC-MCNC: 3.5 G/DL (ref 2–4)
GLUCOSE SERPL-MCNC: 87 MG/DL (ref 65–100)
GLUCOSE UR STRIP.AUTO-MCNC: NEGATIVE MG/DL
HCT VFR BLD AUTO: 36.4 % (ref 36.6–50.3)
HGB BLD-MCNC: 12 G/DL (ref 12.1–17)
HGB UR QL STRIP: NEGATIVE
HYALINE CASTS URNS QL MICRO: ABNORMAL /LPF (ref 0–5)
IMM GRANULOCYTES # BLD: 0 K/UL (ref 0–0.04)
IMM GRANULOCYTES NFR BLD AUTO: 0 % (ref 0–0.5)
INR PPP: 1.1 (ref 0.9–1.1)
KETONES UR QL STRIP.AUTO: NEGATIVE MG/DL
LEUKOCYTE ESTERASE UR QL STRIP.AUTO: NEGATIVE
LYMPHOCYTES # BLD: 2.6 K/UL (ref 0.8–3.5)
LYMPHOCYTES NFR BLD: 34 % (ref 12–49)
MCH RBC QN AUTO: 37.7 PG (ref 26–34)
MCHC RBC AUTO-ENTMCNC: 33 G/DL (ref 30–36.5)
MCV RBC AUTO: 114.5 FL (ref 80–99)
MONOCYTES # BLD: 0.8 K/UL (ref 0–1)
MONOCYTES NFR BLD: 10 % (ref 5–13)
NEUTS SEG # BLD: 3.5 K/UL (ref 1.8–8)
NEUTS SEG NFR BLD: 48 % (ref 32–75)
NITRITE UR QL STRIP.AUTO: NEGATIVE
NRBC # BLD: 0 K/UL (ref 0–0.01)
NRBC BLD-RTO: 0 PER 100 WBC
PH UR STRIP: 7 [PH] (ref 5–8)
PLATELET # BLD AUTO: 259 K/UL (ref 150–400)
PMV BLD AUTO: 11.5 FL (ref 8.9–12.9)
POTASSIUM SERPL-SCNC: 4.5 MMOL/L (ref 3.5–5.1)
PROT SERPL-MCNC: 7.3 G/DL (ref 6.4–8.2)
PROT UR STRIP-MCNC: 100 MG/DL
PROTHROMBIN TIME: 10.6 SEC (ref 9–11.1)
RBC # BLD AUTO: 3.18 M/UL (ref 4.1–5.7)
RBC #/AREA URNS HPF: ABNORMAL /HPF (ref 0–5)
RBC MORPH BLD: ABNORMAL
RBC MORPH BLD: ABNORMAL
SODIUM SERPL-SCNC: 144 MMOL/L (ref 136–145)
SP GR UR REFRACTOMETRY: 1.02 (ref 1–1.03)
TROPONIN I SERPL-MCNC: <0.04 NG/ML
UA: UC IF INDICATED,UAUC: ABNORMAL
UROBILINOGEN UR QL STRIP.AUTO: 0.2 EU/DL (ref 0.2–1)
WBC # BLD AUTO: 7.5 K/UL (ref 4.1–11.1)
WBC URNS QL MICRO: ABNORMAL /HPF (ref 0–4)

## 2018-06-03 PROCEDURE — 85025 COMPLETE CBC W/AUTO DIFF WBC: CPT | Performed by: PHYSICIAN ASSISTANT

## 2018-06-03 PROCEDURE — 99284 EMERGENCY DEPT VISIT MOD MDM: CPT

## 2018-06-03 PROCEDURE — 65660000000 HC RM CCU STEPDOWN

## 2018-06-03 PROCEDURE — 84484 ASSAY OF TROPONIN QUANT: CPT | Performed by: PHYSICIAN ASSISTANT

## 2018-06-03 PROCEDURE — 74011250637 HC RX REV CODE- 250/637: Performed by: INTERNAL MEDICINE

## 2018-06-03 PROCEDURE — 80053 COMPREHEN METABOLIC PANEL: CPT | Performed by: PHYSICIAN ASSISTANT

## 2018-06-03 PROCEDURE — 81001 URINALYSIS AUTO W/SCOPE: CPT | Performed by: EMERGENCY MEDICINE

## 2018-06-03 PROCEDURE — 71045 X-RAY EXAM CHEST 1 VIEW: CPT

## 2018-06-03 PROCEDURE — 93005 ELECTROCARDIOGRAM TRACING: CPT

## 2018-06-03 PROCEDURE — 82550 ASSAY OF CK (CPK): CPT | Performed by: PHYSICIAN ASSISTANT

## 2018-06-03 PROCEDURE — 83880 ASSAY OF NATRIURETIC PEPTIDE: CPT | Performed by: EMERGENCY MEDICINE

## 2018-06-03 PROCEDURE — 36415 COLL VENOUS BLD VENIPUNCTURE: CPT | Performed by: PHYSICIAN ASSISTANT

## 2018-06-03 PROCEDURE — 85610 PROTHROMBIN TIME: CPT | Performed by: EMERGENCY MEDICINE

## 2018-06-03 RX ORDER — BRINZOLAMIDE 10 MG/ML
1 SUSPENSION/ DROPS OPHTHALMIC 3 TIMES DAILY
Status: ON HOLD | COMMUNITY
End: 2019-01-18 | Stop reason: SDUPTHER

## 2018-06-03 RX ORDER — LATANOPROST 50 UG/ML
1 SOLUTION/ DROPS OPHTHALMIC
COMMUNITY

## 2018-06-03 RX ORDER — TIMOLOL MALEATE 5 MG/ML
1 SOLUTION/ DROPS OPHTHALMIC 2 TIMES DAILY
COMMUNITY
End: 2018-11-15

## 2018-06-03 RX ORDER — OMEPRAZOLE 40 MG/1
40 CAPSULE, DELAYED RELEASE ORAL DAILY
Status: ON HOLD | COMMUNITY
End: 2018-07-06

## 2018-06-03 RX ORDER — GUAIFENESIN 100 MG/5ML
81 LIQUID (ML) ORAL DAILY
COMMUNITY
End: 2019-01-18

## 2018-06-03 RX ORDER — ASPIRIN 325 MG
325 TABLET ORAL ONCE
Status: DISPENSED | OUTPATIENT
Start: 2018-06-03 | End: 2018-06-04

## 2018-06-03 RX ADMIN — NITROGLYCERIN 0.5 INCH: 20 OINTMENT TOPICAL at 23:50

## 2018-06-03 NOTE — IP AVS SNAPSHOT
[address] 
[phone] Patient: Bren Paulson 
MRN: [id number] A check reginaldo indicates which time of day the medication should be taken. My Medications START taking these medications Instructions Each Dose to Equal  
 Morning Noon Evening Bedtime  
 carvedilol 3.125 mg tablet Commonly known as:  Alvaro Wharton Your last dose was: Your next dose is: Take 1 Tab by mouth two (2) times daily (with meals). 3.125 mg  
    
   
   
   
  
 pravastatin 20 mg tablet Commonly known as:  PRAVACHOL Your last dose was: Your next dose is: Take 1 Tab by mouth nightly. 20 mg  
    
   
   
   
  
 ticagrelor 90 mg tablet Commonly known as:  Trinh-Jacqueline [employer] Your last dose was: Your next dose is: Take 1 Tab by mouth every twelve (12) hours every twelve (12) hours. 90 mg CONTINUE taking these medications Instructions Each Dose to Equal  
 Morning Noon Evening Bedtime  
 aspirin 81 mg chewable tablet Your last dose was: Your next dose is: Take 81 mg by mouth daily. 81 mg  
    
   
   
   
  
 AZOPT 1 % ophthalmic suspension Generic drug:  brinzolamide Your last dose was: Your next dose is:    
   
   
 Administer 1 Drop to both eyes three (3) times daily. 1 Drop  
    
   
   
   
  
 latanoprost 0.005 % ophthalmic solution Commonly known as:  Bonsukhjinder Billow Your last dose was: Your next dose is:    
   
   
 Administer 1 Drop to both eyes nightly. 1 Drop PriLOSEC 40 mg capsule Generic drug:  omeprazole Your last dose was: Your next dose is: Take 40 mg by mouth daily. 40 mg  
    
   
   
   
  
 timolol 0.5 % ophthalmic solution Commonly known as:  TIMOPTIC Your last dose was: Your next dose is:    
   
   
 1 Drop two (2) times a day. 1 Drop Where to Get Your Medications These medications were sent to [facility]/pharmacy #[id number]- [address], [facility] [address], [address] [place] [address] Hours:  24-hours Phone:  [phone]  
  carvedilol 3.125 mg tablet  
 pravastatin 20 mg tablet  
 ticagrelor 90 mg tablet

## 2018-06-03 NOTE — IP AVS SNAPSHOT
[address] [facility] 
[phone] Patient: Delta Body 
MRN: [id number] About your hospitalization You were admitted on:  Constance 3, 2018 You last received care in the:  [facility] 2 [facility] CARDIO You were discharged on:  June 5, 2018 Why you were hospitalized Your primary diagnosis was:  Not on File Your diagnoses also included:  Chest Pain, Essential Hypertension, Benign, Cad (Coronary Artery Disease), Sinoatrial Node Dysfunction (Hcc), Gerd (Gastroesophageal Reflux Disease), S/P Cardiac Cath, Ckd (Chronic Kidney Disease) Stage 3, Gfr 30-59 Ml/Min, Diabetes Mellitus (Hcc), Mixed Hyperlipidemia Follow-up Information Follow up With Details Comments Contact Info Judy Tobias MD Go on 6/8/2018 Hospital follow-up scheduled at 11:20am (If you have questions or need to reschedule please call [facility] MEDICAL [address] 
[phone] Astrid Overton MD On 6/15/2018 at 4:00PM [address] 
[phone] Your Scheduled Appointments Friday June 08, 2018 11:20 AM EDT Office Visit with Judy Tobias MD  
[place] [address] ([facility]) [facility] [address] [address] [phone] Friday Constance 15, 2018  4:00 PM EDT  
ESTABLISHED PATIENT with Astrid Overton MD  
[facility] [address] [place] [address]) [address]  
[phone] Tuesday July 24, 2018  9:00 AM EDT PROCEDURE with PACEMAKER, [facility] [address] [place] [address]) [address]  
[phone] Discharge Orders None A check reginaldo indicates which time of day the medication should be taken. My Medications START taking these medications Instructions Each Dose to Equal  
 Morning Noon Evening Bedtime  
 carvedilol 3.125 mg tablet Commonly known as:  Francenia Elders Your last dose was: Your next dose is: Take 1 Tab by mouth two (2) times daily (with meals). 3.125 mg  
    
   
   
   
  
 pravastatin 20 mg tablet Commonly known as:  PRAVACHOL Your last dose was: Your next dose is: Take 1 Tab by mouth nightly. 20 mg  
    
   
   
   
  
 ticagrelor 90 mg tablet Commonly known as:  Trinh-Jacqueline [employer] Your last dose was: Your next dose is: Take 1 Tab by mouth every twelve (12) hours every twelve (12) hours. 90 mg CONTINUE taking these medications Instructions Each Dose to Equal  
 Morning Noon Evening Bedtime  
 aspirin 81 mg chewable tablet Your last dose was: Your next dose is: Take 81 mg by mouth daily. 81 mg  
    
   
   
   
  
 AZOPT 1 % ophthalmic suspension Generic drug:  brinzolamide Your last dose was: Your next dose is:    
   
   
 Administer 1 Drop to both eyes three (3) times daily. 1 Drop  
    
   
   
   
  
 latanoprost 0.005 % ophthalmic solution Commonly known as:  Cheryl Segoviae Your last dose was: Your next dose is:    
   
   
 Administer 1 Drop to both eyes nightly. 1 Drop PriLOSEC 40 mg capsule Generic drug:  omeprazole Your last dose was: Your next dose is: Take 40 mg by mouth daily. 40 mg  
    
   
   
   
  
 timolol 0.5 % ophthalmic solution Commonly known as:  TIMOPTIC Your last dose was: Your next dose is:    
   
   
 1 Drop two (2) times a day. 1 Drop Where to Get Your Medications These medications were sent to [facility]/pharmacy #[id number]- [id number], [place] - [address] [facility] [address]  [address] [place] [address],[address] Hours:  24-hours Phone:  [phone]  
  carvedilol 3.125 mg tablet  
 pravastatin 20 mg tablet  
 ticagrelor 90 mg tablet Discharge Instructions Azam Bhatt 
[address] [place], [place]. [address] 
[phone] Patient Discharge Instructions Jennifer Alford / [id number] : 1921 Admitted 6/3/2018 Discharged: 18 Take Home Medications · It is important that you take the medication exactly as they are prescribed. · Keep your medication in the bottles provided by the pharmacist and keep a list of the medication names, dosages, and times to be taken in your wallet. · Do not take other medications without consulting your doctor. What to do at [facility] Recommended diet: Cardiac Diet Recommended activity: Activity as tolerated, Follow-up with Dr. Holbrook Begun in 3 days. Call [phone] for your appointment. Information obtained by : 
I understand that if any problems occur once I am at home I am to contact my physician. I understand and acknowledge receipt of the instructions indicated above. Physician's or R.N.'s Signature                                                                  Date/Time Patient or Representative Signature                                                          Date/Time [email or web address] Announcement We are excited to announce that we are making your provider's discharge notes available to you in [email or web address].   You will see these notes when they are completed and signed by the physician that discharged you from your recent hospital stay. If you have any questions or concerns about any information you see in [employer], please call the Health Information Department where you were seen or reach out to your Primary Care Provider for more information about your plan of care. Introducing [facility]! Tea Pooja introduces [employer] patient portal. Now you can access parts of your medical record, email your doctor's office, and request medication refills online. 1. In your internet browser, go to [email or web address] 2. Click on the First Time User? Click Here link in the Sign In box. You will see the New Member Sign Up page. 3. Enter your [employer] Access Code exactly as it appears below. You will not need to use this code after youve completed the sign-up process. If you do not sign up before the expiration date, you must request a new code. · [employer] Access Code: [email or web address] Expires: 6/11/2018 10:08 AM 
 
4. Enter the last four digits of your Social Security Number (xxxx) and Date of Birth (mm/dd/yyyy) as indicated and click Submit. You will be taken to the next sign-up page. 5. Create a [employer] ID. This will be your [employer] login ID and cannot be changed, so think of one that is secure and easy to remember. 6. Create a [employer] password. You can change your password at any time. 7. Enter your Password Reset Question and Answer. This can be used at a later time if you forget your password. 8. Enter your e-mail address. You will receive e-mail notification when new information is available in [address] 9. Click Sign Up. You can now view and download portions of your medical record. 10. Click the Download Summary menu link to download a portable copy of your medical information.  
 
If you have questions, please visit the Frequently Asked Questions section of the [employer]. Remember, MyChart is NOT to be used for urgent needs. For medical emergencies, dial 911. Now available from your iPhone and Android! Introducing Jose R Rae As a [facility] patient, I wanted to make you aware of our electronic visit tool called Jose R Rae. [employer] allows you to connect within minutes with a medical provider 24 hours a day, seven days a week via a mobile device or tablet or logging into a secure website from your computer. You can access Jose R Rae from anywhere in the [place]. A virtual visit might be right for you when you have a simple condition and feel like you just dont want to get out of bed, or cant get away from work for an appointment, when your regular [facility] provider is not available (evenings, weekends or holidays), or when youre out of town and need minor care. Electronic visits cost only $49 and if the [employer] provider determines a prescription is needed to treat your condition, one can be electronically transmitted to a nearby pharmacy*. Please take a moment to enroll today if you have not already done so. The enrollment process is free and takes just a few minutes. To enroll, please download the [employer] karly to your tablet or phone, or visit [email or web address] to enroll on your computer. And, as an [address] [place] [address] patient with a [employer] account, the results of your visits will be scanned into your electronic medical record and your primary care provider will be able to view the scanned results. We urge you to continue to see your regular [facility] provider for your ongoing medical care. And while your primary care provider may not be the one available when you seek a Jose R Rae virtual visit, the peace of mind you get from getting a real diagnosis real time can be priceless. For more information on Jose R Rae, view our Frequently Asked Questions (FAQs) at [email or web address]. Sincerely, 
 
Yolanda Crespo MD 
Chief Medical Officer Jean Stewart *:  certain medications cannot be prescribed via Jose [employer] Rae Providers Seen During Your Hospitalization Provider Specialty Primary office phone Mortimer Gerlach, MD Emergency Medicine [phone] Hossein Cardona MD Internal Medicine [phone] Your Primary Care Physician (PCP) Primary Care Physician Office Phone Office Fax Melva Castrogustavo  You are allergic to the following Allergen Reactions Latex, Natural Rubber Other (comments) Shellfish Derived Other (comments) Crab meat Recent Documentation Weight BMI Smoking Status 78.2 kg 26.21 kg/m2 Never Smoker Emergency Contacts Name Discharge Info Relation Home Work Mobile Queenie-Kristyn Michaels DISCHARGE CAREGIVER [3] Child [2] [phone] XeniaLiu  Other Relative [6]   [phone] Latisha Jerome  Spouse [3] [phone] Patient Belongings The following personal items are in your possession at time of discharge: 
  Dental Appliances: None  Visual Aid: Glasses, With patient   Hearing Aids/Status: At bedside, Bilateral, Functioning, With patient  Home Medications: None   Jewelry: Watch  Clothing: Belt, Footwear, Pants, Shirt, Undergarments, Socks, At bedside Please provide this summary of care documentation to your next provider. Signatures-by signing, you are acknowledging that this After Visit Summary has been reviewed with you and you have received a copy. Patient Signature:  ____________________________________________________________ Date:  ____________________________________________________________  
  
John Golden  Provider Signature: ____________________________________________________________ Date:  ____________________________________________________________

## 2018-06-04 ENCOUNTER — APPOINTMENT (OUTPATIENT)
Dept: NUCLEAR MEDICINE | Age: 83
DRG: 247 | End: 2018-06-04
Attending: EMERGENCY MEDICINE
Payer: MEDICARE

## 2018-06-04 PROBLEM — Z98.890 S/P CARDIAC CATH: Status: ACTIVE | Noted: 2018-06-04

## 2018-06-04 LAB
ALBUMIN SERPL-MCNC: 3.2 G/DL (ref 3.5–5)
ALBUMIN/GLOB SERPL: 1.1 {RATIO} (ref 1.1–2.2)
ALP SERPL-CCNC: 91 U/L (ref 45–117)
ALT SERPL-CCNC: 41 U/L (ref 12–78)
ANION GAP SERPL CALC-SCNC: 11 MMOL/L (ref 5–15)
AST SERPL-CCNC: 44 U/L (ref 15–37)
ATRIAL RATE: 61 BPM
ATRIAL RATE: 64 BPM
BASOPHILS # BLD: 0 K/UL (ref 0–0.1)
BASOPHILS NFR BLD: 0 % (ref 0–1)
BILIRUB SERPL-MCNC: 0.5 MG/DL (ref 0.2–1)
BUN SERPL-MCNC: 32 MG/DL (ref 6–20)
BUN/CREAT SERPL: 17 (ref 12–20)
CALCIUM SERPL-MCNC: 8.7 MG/DL (ref 8.5–10.1)
CALCULATED P AXIS, ECG09: -120 DEGREES
CALCULATED P AXIS, ECG09: 55 DEGREES
CALCULATED R AXIS, ECG10: -72 DEGREES
CALCULATED R AXIS, ECG10: -75 DEGREES
CALCULATED T AXIS, ECG11: 97 DEGREES
CALCULATED T AXIS, ECG11: 99 DEGREES
CHLORIDE SERPL-SCNC: 114 MMOL/L (ref 97–108)
CO2 SERPL-SCNC: 17 MMOL/L (ref 21–32)
CREAT SERPL-MCNC: 1.89 MG/DL (ref 0.7–1.3)
DIAGNOSIS, 93000: NORMAL
DIAGNOSIS, 93000: NORMAL
DIFFERENTIAL METHOD BLD: ABNORMAL
EOSINOPHIL # BLD: 0.4 K/UL (ref 0–0.4)
EOSINOPHIL NFR BLD: 5 % (ref 0–7)
ERYTHROCYTE [DISTWIDTH] IN BLOOD BY AUTOMATED COUNT: 15.7 % (ref 11.5–14.5)
GLOBULIN SER CALC-MCNC: 3 G/DL (ref 2–4)
GLUCOSE SERPL-MCNC: 132 MG/DL (ref 65–100)
HCT VFR BLD AUTO: 32.1 % (ref 36.6–50.3)
HGB BLD-MCNC: 10.9 G/DL (ref 12.1–17)
IMM GRANULOCYTES # BLD: 0 K/UL (ref 0–0.04)
IMM GRANULOCYTES NFR BLD AUTO: 0 % (ref 0–0.5)
LYMPHOCYTES # BLD: 1.5 K/UL (ref 0.8–3.5)
LYMPHOCYTES NFR BLD: 19 % (ref 12–49)
MCH RBC QN AUTO: 37.8 PG (ref 26–34)
MCHC RBC AUTO-ENTMCNC: 34 G/DL (ref 30–36.5)
MCV RBC AUTO: 111.5 FL (ref 80–99)
MONOCYTES # BLD: 0.6 K/UL (ref 0–1)
MONOCYTES NFR BLD: 7 % (ref 5–13)
NEUTS SEG # BLD: 5.4 K/UL (ref 1.8–8)
NEUTS SEG NFR BLD: 69 % (ref 32–75)
NRBC # BLD: 0 K/UL (ref 0–0.01)
NRBC BLD-RTO: 0 PER 100 WBC
P-R INTERVAL, ECG05: 178 MS
P-R INTERVAL, ECG05: 240 MS
PLATELET # BLD AUTO: 185 K/UL (ref 150–400)
PMV BLD AUTO: 11.5 FL (ref 8.9–12.9)
POTASSIUM SERPL-SCNC: 4.6 MMOL/L (ref 3.5–5.1)
PROT SERPL-MCNC: 6.2 G/DL (ref 6.4–8.2)
Q-T INTERVAL, ECG07: 476 MS
Q-T INTERVAL, ECG07: 506 MS
QRS DURATION, ECG06: 196 MS
QRS DURATION, ECG06: 196 MS
QTC CALCULATION (BEZET), ECG08: 476 MS
QTC CALCULATION (BEZET), ECG08: 522 MS
RBC # BLD AUTO: 2.88 M/UL (ref 4.1–5.7)
RBC MORPH BLD: ABNORMAL
SODIUM SERPL-SCNC: 142 MMOL/L (ref 136–145)
TROPONIN I SERPL-MCNC: 0.04 NG/ML
VENTRICULAR RATE, ECG03: 60 BPM
VENTRICULAR RATE, ECG03: 64 BPM
WBC # BLD AUTO: 7.9 K/UL (ref 4.1–11.1)

## 2018-06-04 PROCEDURE — 77030029065 HC DRSG HEMO QCLOT ZMED -B

## 2018-06-04 PROCEDURE — 77030019697 HC SYR ANGI INFL MRTM -B

## 2018-06-04 PROCEDURE — 74011250636 HC RX REV CODE- 250/636: Performed by: INTERNAL MEDICINE

## 2018-06-04 PROCEDURE — 74011250636 HC RX REV CODE- 250/636

## 2018-06-04 PROCEDURE — 77030028837 HC SYR ANGI PWR INJ COEU -A

## 2018-06-04 PROCEDURE — 4A023N7 MEASUREMENT OF CARDIAC SAMPLING AND PRESSURE, LEFT HEART, PERCUTANEOUS APPROACH: ICD-10-PCS | Performed by: INTERNAL MEDICINE

## 2018-06-04 PROCEDURE — C1894 INTRO/SHEATH, NON-LASER: HCPCS

## 2018-06-04 PROCEDURE — 99153 MOD SED SAME PHYS/QHP EA: CPT

## 2018-06-04 PROCEDURE — 74011250637 HC RX REV CODE- 250/637: Performed by: INTERNAL MEDICINE

## 2018-06-04 PROCEDURE — 74011636320 HC RX REV CODE- 636/320

## 2018-06-04 PROCEDURE — 65660000000 HC RM CCU STEPDOWN

## 2018-06-04 PROCEDURE — 77030012468 HC VLV BLEEDBK CNTRL ABBT -B

## 2018-06-04 PROCEDURE — C1769 GUIDE WIRE: HCPCS

## 2018-06-04 PROCEDURE — 93005 ELECTROCARDIOGRAM TRACING: CPT

## 2018-06-04 PROCEDURE — C1887 CATHETER, GUIDING: HCPCS

## 2018-06-04 PROCEDURE — B2111ZZ FLUOROSCOPY OF MULTIPLE CORONARY ARTERIES USING LOW OSMOLAR CONTRAST: ICD-10-PCS | Performed by: INTERNAL MEDICINE

## 2018-06-04 PROCEDURE — 80053 COMPREHEN METABOLIC PANEL: CPT | Performed by: INTERNAL MEDICINE

## 2018-06-04 PROCEDURE — 74011000250 HC RX REV CODE- 250: Performed by: INTERNAL MEDICINE

## 2018-06-04 PROCEDURE — 74011000258 HC RX REV CODE- 258: Performed by: INTERNAL MEDICINE

## 2018-06-04 PROCEDURE — 027136Z DILATION OF CORONARY ARTERY, TWO ARTERIES WITH THREE DRUG-ELUTING INTRALUMINAL DEVICES, PERCUTANEOUS APPROACH: ICD-10-PCS | Performed by: INTERNAL MEDICINE

## 2018-06-04 PROCEDURE — 36415 COLL VENOUS BLD VENIPUNCTURE: CPT | Performed by: INTERNAL MEDICINE

## 2018-06-04 PROCEDURE — 77030016704 HC CATH ANGI DX PRF1 MRTM -B

## 2018-06-04 PROCEDURE — 74011000250 HC RX REV CODE- 250

## 2018-06-04 PROCEDURE — 74011250637 HC RX REV CODE- 250/637

## 2018-06-04 PROCEDURE — 85025 COMPLETE CBC W/AUTO DIFF WBC: CPT | Performed by: INTERNAL MEDICINE

## 2018-06-04 PROCEDURE — C1760 CLOSURE DEV, VASC: HCPCS

## 2018-06-04 PROCEDURE — 3E083PZ INTRODUCTION OF PLATELET INHIBITOR INTO HEART, PERCUTANEOUS APPROACH: ICD-10-PCS | Performed by: INTERNAL MEDICINE

## 2018-06-04 PROCEDURE — 74011636320 HC RX REV CODE- 636/320: Performed by: INTERNAL MEDICINE

## 2018-06-04 PROCEDURE — 74011250636 HC RX REV CODE- 250/636: Performed by: NURSE PRACTITIONER

## 2018-06-04 PROCEDURE — B2181ZZ FLUOROSCOPY OF LEFT INTERNAL MAMMARY BYPASS GRAFT USING LOW OSMOLAR CONTRAST: ICD-10-PCS | Performed by: INTERNAL MEDICINE

## 2018-06-04 PROCEDURE — B2121ZZ FLUOROSCOPY OF SINGLE CORONARY ARTERY BYPASS GRAFT USING LOW OSMOLAR CONTRAST: ICD-10-PCS | Performed by: INTERNAL MEDICINE

## 2018-06-04 PROCEDURE — A9540 TC99M MAA: HCPCS

## 2018-06-04 PROCEDURE — B2151ZZ FLUOROSCOPY OF LEFT HEART USING LOW OSMOLAR CONTRAST: ICD-10-PCS | Performed by: INTERNAL MEDICINE

## 2018-06-04 PROCEDURE — 84484 ASSAY OF TROPONIN QUANT: CPT | Performed by: INTERNAL MEDICINE

## 2018-06-04 PROCEDURE — C1725 CATH, TRANSLUMIN NON-LASER: HCPCS

## 2018-06-04 PROCEDURE — C1874 STENT, COATED/COV W/DEL SYS: HCPCS

## 2018-06-04 RX ORDER — HYDRALAZINE HYDROCHLORIDE 20 MG/ML
5 INJECTION INTRAMUSCULAR; INTRAVENOUS
Status: DISCONTINUED | OUTPATIENT
Start: 2018-06-04 | End: 2018-06-05 | Stop reason: HOSPADM

## 2018-06-04 RX ORDER — BIVALIRUDIN 250 MG/5ML
INJECTION, POWDER, LYOPHILIZED, FOR SOLUTION INTRAVENOUS
Status: DISCONTINUED
Start: 2018-06-04 | End: 2018-06-05 | Stop reason: HOSPADM

## 2018-06-04 RX ORDER — SODIUM CHLORIDE 900 MG/100ML
INJECTION INTRAVENOUS
Status: DISCONTINUED
Start: 2018-06-04 | End: 2018-06-05 | Stop reason: HOSPADM

## 2018-06-04 RX ORDER — CARVEDILOL 3.12 MG/1
3.12 TABLET ORAL 2 TIMES DAILY WITH MEALS
Status: DISCONTINUED | OUTPATIENT
Start: 2018-06-04 | End: 2018-06-05 | Stop reason: HOSPADM

## 2018-06-04 RX ORDER — PANTOPRAZOLE SODIUM 40 MG/1
40 TABLET, DELAYED RELEASE ORAL DAILY
Status: DISCONTINUED | OUTPATIENT
Start: 2018-06-04 | End: 2018-06-05 | Stop reason: HOSPADM

## 2018-06-04 RX ORDER — DIPHENHYDRAMINE HYDROCHLORIDE 50 MG/ML
INJECTION, SOLUTION INTRAMUSCULAR; INTRAVENOUS
Status: COMPLETED
Start: 2018-06-04 | End: 2018-06-04

## 2018-06-04 RX ORDER — LATANOPROST 50 UG/ML
1 SOLUTION/ DROPS OPHTHALMIC
Status: DISCONTINUED | OUTPATIENT
Start: 2018-06-04 | End: 2018-06-05 | Stop reason: HOSPADM

## 2018-06-04 RX ORDER — FENTANYL CITRATE 50 UG/ML
25-50 INJECTION, SOLUTION INTRAMUSCULAR; INTRAVENOUS
Status: DISCONTINUED | OUTPATIENT
Start: 2018-06-04 | End: 2018-06-04 | Stop reason: HOSPADM

## 2018-06-04 RX ORDER — ACETAMINOPHEN 325 MG/1
650 TABLET ORAL
Status: DISCONTINUED | OUTPATIENT
Start: 2018-06-04 | End: 2018-06-05 | Stop reason: HOSPADM

## 2018-06-04 RX ORDER — HEPARIN SODIUM 200 [USP'U]/100ML
500 INJECTION, SOLUTION INTRAVENOUS ONCE
Status: COMPLETED | OUTPATIENT
Start: 2018-06-04 | End: 2018-06-04

## 2018-06-04 RX ORDER — LIDOCAINE HYDROCHLORIDE 10 MG/ML
0-20 INJECTION INFILTRATION; PERINEURAL
Status: DISCONTINUED | OUTPATIENT
Start: 2018-06-04 | End: 2018-06-04 | Stop reason: HOSPADM

## 2018-06-04 RX ORDER — DORZOLAMIDE HCL 20 MG/ML
1 SOLUTION/ DROPS OPHTHALMIC 3 TIMES DAILY
Status: DISCONTINUED | OUTPATIENT
Start: 2018-06-04 | End: 2018-06-05 | Stop reason: HOSPADM

## 2018-06-04 RX ORDER — MIDAZOLAM HYDROCHLORIDE 1 MG/ML
.5-2 INJECTION, SOLUTION INTRAMUSCULAR; INTRAVENOUS
Status: DISCONTINUED | OUTPATIENT
Start: 2018-06-04 | End: 2018-06-04 | Stop reason: HOSPADM

## 2018-06-04 RX ORDER — SODIUM CHLORIDE 0.9 % (FLUSH) 0.9 %
5-10 SYRINGE (ML) INJECTION AS NEEDED
Status: DISCONTINUED | OUTPATIENT
Start: 2018-06-04 | End: 2018-06-05 | Stop reason: HOSPADM

## 2018-06-04 RX ORDER — HYDROCORTISONE SODIUM SUCCINATE 100 MG/2ML
INJECTION, POWDER, FOR SOLUTION INTRAMUSCULAR; INTRAVENOUS
Status: COMPLETED
Start: 2018-06-04 | End: 2018-06-04

## 2018-06-04 RX ORDER — SODIUM CHLORIDE 9 MG/ML
75 INJECTION, SOLUTION INTRAVENOUS CONTINUOUS
Status: DISCONTINUED | OUTPATIENT
Start: 2018-06-04 | End: 2018-06-04

## 2018-06-04 RX ORDER — NITROGLYCERIN 400 UG/1
2 SPRAY ORAL AS NEEDED
Status: DISCONTINUED | OUTPATIENT
Start: 2018-06-04 | End: 2018-06-04 | Stop reason: HOSPADM

## 2018-06-04 RX ORDER — SODIUM CHLORIDE 9 MG/ML
50 INJECTION, SOLUTION INTRAVENOUS CONTINUOUS
Status: DISCONTINUED | OUTPATIENT
Start: 2018-06-04 | End: 2018-06-05 | Stop reason: HOSPADM

## 2018-06-04 RX ORDER — GUAIFENESIN 100 MG/5ML
81 LIQUID (ML) ORAL DAILY
Status: DISCONTINUED | OUTPATIENT
Start: 2018-06-04 | End: 2018-06-04

## 2018-06-04 RX ORDER — LIDOCAINE HYDROCHLORIDE 10 MG/ML
INJECTION INFILTRATION; PERINEURAL
Status: COMPLETED
Start: 2018-06-04 | End: 2018-06-04

## 2018-06-04 RX ORDER — ONDANSETRON 2 MG/ML
4 INJECTION INTRAMUSCULAR; INTRAVENOUS
Status: DISCONTINUED | OUTPATIENT
Start: 2018-06-04 | End: 2018-06-05 | Stop reason: HOSPADM

## 2018-06-04 RX ORDER — SODIUM CHLORIDE 9 MG/ML
150 INJECTION, SOLUTION INTRAVENOUS CONTINUOUS
Status: DISCONTINUED | OUTPATIENT
Start: 2018-06-04 | End: 2018-06-04

## 2018-06-04 RX ORDER — LIDOCAINE HYDROCHLORIDE 20 MG/ML
INJECTION, SOLUTION INFILTRATION; PERINEURAL
Status: DISCONTINUED
Start: 2018-06-04 | End: 2018-06-05 | Stop reason: HOSPADM

## 2018-06-04 RX ORDER — NITROGLYCERIN 400 UG/1
SPRAY ORAL
Status: COMPLETED
Start: 2018-06-04 | End: 2018-06-04

## 2018-06-04 RX ORDER — HEPARIN SODIUM 5000 [USP'U]/ML
5000 INJECTION, SOLUTION INTRAVENOUS; SUBCUTANEOUS EVERY 8 HOURS
Status: DISCONTINUED | OUTPATIENT
Start: 2018-06-04 | End: 2018-06-05

## 2018-06-04 RX ORDER — HYDROCORTISONE SODIUM SUCCINATE 100 MG/2ML
100 INJECTION, POWDER, FOR SOLUTION INTRAMUSCULAR; INTRAVENOUS ONCE
Status: COMPLETED | OUTPATIENT
Start: 2018-06-04 | End: 2018-06-04

## 2018-06-04 RX ORDER — MIDAZOLAM HYDROCHLORIDE 1 MG/ML
INJECTION, SOLUTION INTRAMUSCULAR; INTRAVENOUS
Status: COMPLETED
Start: 2018-06-04 | End: 2018-06-04

## 2018-06-04 RX ORDER — FENTANYL CITRATE 50 UG/ML
INJECTION, SOLUTION INTRAMUSCULAR; INTRAVENOUS
Status: COMPLETED
Start: 2018-06-04 | End: 2018-06-04

## 2018-06-04 RX ORDER — DIPHENHYDRAMINE HYDROCHLORIDE 50 MG/ML
50 INJECTION, SOLUTION INTRAMUSCULAR; INTRAVENOUS ONCE
Status: COMPLETED | OUTPATIENT
Start: 2018-06-04 | End: 2018-06-04

## 2018-06-04 RX ORDER — TIMOLOL MALEATE 5 MG/ML
1 SOLUTION/ DROPS OPHTHALMIC 2 TIMES DAILY
Status: DISCONTINUED | OUTPATIENT
Start: 2018-06-04 | End: 2018-06-05 | Stop reason: HOSPADM

## 2018-06-04 RX ORDER — HEPARIN SODIUM 200 [USP'U]/100ML
INJECTION, SOLUTION INTRAVENOUS
Status: COMPLETED
Start: 2018-06-04 | End: 2018-06-04

## 2018-06-04 RX ORDER — ASPIRIN 81 MG/1
81 TABLET ORAL DAILY
Status: DISCONTINUED | OUTPATIENT
Start: 2018-06-04 | End: 2018-06-05 | Stop reason: HOSPADM

## 2018-06-04 RX ORDER — SODIUM CHLORIDE 0.9 % (FLUSH) 0.9 %
5-10 SYRINGE (ML) INJECTION EVERY 8 HOURS
Status: DISCONTINUED | OUTPATIENT
Start: 2018-06-04 | End: 2018-06-05 | Stop reason: HOSPADM

## 2018-06-04 RX ADMIN — IOPAMIDOL 90 ML: 755 INJECTION, SOLUTION INTRAVENOUS at 12:36

## 2018-06-04 RX ADMIN — DIPHENHYDRAMINE HYDROCHLORIDE 50 MG: 50 INJECTION, SOLUTION INTRAMUSCULAR; INTRAVENOUS at 11:53

## 2018-06-04 RX ADMIN — SODIUM CHLORIDE 75 ML/HR: 900 INJECTION, SOLUTION INTRAVENOUS at 10:17

## 2018-06-04 RX ADMIN — IOPAMIDOL 90 ML: 755 INJECTION, SOLUTION INTRAVENOUS at 13:12

## 2018-06-04 RX ADMIN — DORZOLAMIDE HYDROCHLORIDE 1 DROP: 20 SOLUTION/ DROPS OPHTHALMIC at 22:10

## 2018-06-04 RX ADMIN — FENTANYL CITRATE 25 MCG: 50 INJECTION, SOLUTION INTRAMUSCULAR; INTRAVENOUS at 12:42

## 2018-06-04 RX ADMIN — HEPARIN SODIUM 5000 UNITS: 5000 INJECTION, SOLUTION INTRAVENOUS; SUBCUTANEOUS at 01:46

## 2018-06-04 RX ADMIN — Medication 10 ML: at 01:00

## 2018-06-04 RX ADMIN — HEPARIN SODIUM 1000 UNITS: 200 INJECTION, SOLUTION INTRAVENOUS at 11:55

## 2018-06-04 RX ADMIN — LIDOCAINE HYDROCHLORIDE 18 ML: 10 INJECTION, SOLUTION INFILTRATION; PERINEURAL at 11:56

## 2018-06-04 RX ADMIN — HYDROCORTISONE SODIUM SUCCINATE 100 MG: 100 INJECTION, POWDER, FOR SOLUTION INTRAMUSCULAR; INTRAVENOUS at 11:53

## 2018-06-04 RX ADMIN — ACETAMINOPHEN 650 MG: 325 TABLET ORAL at 09:43

## 2018-06-04 RX ADMIN — DORZOLAMIDE HYDROCHLORIDE 1 DROP: 20 SOLUTION/ DROPS OPHTHALMIC at 14:01

## 2018-06-04 RX ADMIN — PANTOPRAZOLE SODIUM 40 MG: 40 TABLET, DELAYED RELEASE ORAL at 09:43

## 2018-06-04 RX ADMIN — IOPAMIDOL 18 ML: 755 INJECTION, SOLUTION INTRAVENOUS at 12:23

## 2018-06-04 RX ADMIN — HEPARIN SODIUM 5000 UNITS: 5000 INJECTION, SOLUTION INTRAVENOUS; SUBCUTANEOUS at 18:07

## 2018-06-04 RX ADMIN — MIDAZOLAM HYDROCHLORIDE 1 MG: 1 INJECTION, SOLUTION INTRAMUSCULAR; INTRAVENOUS at 11:42

## 2018-06-04 RX ADMIN — TICAGRELOR 180 MG: 90 TABLET ORAL at 13:14

## 2018-06-04 RX ADMIN — LATANOPROST 1 DROP: 50 SOLUTION OPHTHALMIC at 22:13

## 2018-06-04 RX ADMIN — NITROGLYCERIN 0.5 INCH: 20 OINTMENT TOPICAL at 00:30

## 2018-06-04 RX ADMIN — Medication 10 ML: at 22:11

## 2018-06-04 RX ADMIN — NITROGLYCERIN 0.5 INCH: 20 OINTMENT TOPICAL at 18:08

## 2018-06-04 RX ADMIN — FENTANYL CITRATE 25 MCG: 50 INJECTION, SOLUTION INTRAMUSCULAR; INTRAVENOUS at 12:17

## 2018-06-04 RX ADMIN — LIDOCAINE HYDROCHLORIDE 18 ML: 10 INJECTION INFILTRATION; PERINEURAL at 11:56

## 2018-06-04 RX ADMIN — Medication 10 ML: at 05:56

## 2018-06-04 RX ADMIN — CARVEDILOL 3.12 MG: 3.12 TABLET, FILM COATED ORAL at 18:07

## 2018-06-04 RX ADMIN — TIMOLOL MALEATE 1 DROP: 5 SOLUTION/ DROPS OPHTHALMIC at 19:44

## 2018-06-04 RX ADMIN — NITROGLYCERIN 2 SPRAY: 400 SPRAY ORAL at 12:22

## 2018-06-04 RX ADMIN — BIVALIRUDIN 1.75 MG/KG/HR: 250 INJECTION, POWDER, LYOPHILIZED, FOR SOLUTION INTRAVENOUS at 12:39

## 2018-06-04 RX ADMIN — ASPIRIN 81 MG: 81 TABLET, COATED ORAL at 09:43

## 2018-06-04 RX ADMIN — HYDRALAZINE HYDROCHLORIDE 5 MG: 20 INJECTION INTRAMUSCULAR; INTRAVENOUS at 01:39

## 2018-06-04 RX ADMIN — NITROGLYCERIN 0.5 INCH: 20 OINTMENT TOPICAL at 05:51

## 2018-06-04 RX ADMIN — TIMOLOL MALEATE 1 DROP: 5 SOLUTION/ DROPS OPHTHALMIC at 14:01

## 2018-06-04 RX ADMIN — CARVEDILOL 3.12 MG: 3.12 TABLET, FILM COATED ORAL at 09:43

## 2018-06-04 NOTE — PROGRESS NOTES
Reason for Admission:   Chest pain               RRAT Score:    37              Resources/supports as identified by patient/family:  Daughter, son in-law and [address] facing patient (as identified by patient/family and CM): Finances/Medication cost?   none               Transportation?  none              Support system or lack thereof? Living arrangements? Lives with wife, who at times is a little confused,  in a one story cottage at [facility] with no steps to enter the home. Self-care/ADLs/Cognition? Patient is independent with ADL/Iadl and drives short distances          Current Advanced Directive/Advance Care Plan:                            Plan for utilizing home health:   TBD                      Likelihood of readmission: Moderate                 Transition of Care Plan:  Patients daughter helped to verify pt info. Patient has been to [facility] in the past, but patient did not have a good experience and would rather not return in the future. If patient needs home PT &/or OT[facility]. PCP- Dr Olena Rouse at [facility] and [address] [place] [address] Management Interventions  PCP Verified by CM: Yes (Dr Shiloh Leslie)  Mode of Transport at Discharge: Other (see comment) (Daughter)  Transition of Care Consult (CM Consult): Discharge Planning  Discharge Durable Medical Equipment: No (no DME use)  Physical Therapy Consult: No  Occupational Therapy Consult: No  Speech Therapy Consult: No  Current Support Network: Lives with Spouse (Lives in a one story home with no steps to enter the home.   Lives in a cottage at [facility])  Confirm Follow Up Transport: Self (Family is available as needed)  Plan discussed with Pt/Family/Caregiver: Yes (Daughter helping to verify info)  Discharge Location  Discharge Placement: Home      Joanna Ley  Ext [phone]

## 2018-06-04 NOTE — PROGRESS NOTES
TRANSFER - OUT REPORT:    Verbal report given to Naresh Dillon RN(name) on Dearelsi WelchOsage  being transferred to IVCU(unit) for routine post - op       Report consisted of patients Situation, Background, Assessment and   Recommendations(SBAR). Information from the following report(s) SBAR, Procedure Summary, MAR and Recent Results was reviewed with the receiving nurse. Lines:   Peripheral IV 06/03/18 Right Antecubital (Active)   Site Assessment Clean, dry, & intact 6/4/2018  1:39 AM   Phlebitis Assessment 0 6/4/2018  1:39 AM   Infiltration Assessment 0 6/4/2018  1:39 AM   Dressing Status Clean, dry, & intact 6/4/2018  1:39 AM   Dressing Type Transparent 6/4/2018  1:39 AM   Hub Color/Line Status Pink;Flushed;Patent 6/4/2018  1:39 AM   Action Taken Blood drawn 6/3/2018  8:57 PM        Opportunity for questions and clarification was provided.       Patient transported with:   Registered Nurse  Tech

## 2018-06-04 NOTE — PROGRESS NOTES
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 Both lesions stented successfully. Continue IV hydration. Home tomorrow if renal function stable.
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
## 2018-06-04 NOTE — CONSULTS
[address] [facility] [address] [facility]     Date of  Admission: 6/3/2018  8:38 PM     Admission type:Emergency    Consult for: [place]  Consult by: hospitalist      Subjective: Kusum Weinstein is a 80 y.o. male with PMH HTN, CAD s/p stents and CABG, AVB s/p pacemaker, CKD, DM, HLD who was admitted for Chest pain. Per Notes,  presented to the hospital for exertional chest pain and SOB for a few days. Mr. Orlando Clayton states before bed the pain became much worse and he almost called the ambulance. The pain is in his upper chest and has some radiation to his arms and neck. He denies dizziness, n/v, leg swelling, palpitations. He endorses weakness that has been present for months. Mr. Orlando Clayton follows with Dr. Deanna Yepez and Dr. Robel Hernandez for cardiology/EP. Last ECHO 03/17 with EF 60-65%; NWMA; wall thickness moderately increased; LA dilated; mod MR; mild TR; mild PHTN. Last pacemaker check in office last month, but report not yet uploaded. Note by nurse indicates that patient is due for another check in July for his devices battery life. Stress 10/16 negative. Last cath/stent at OSH ~2010. Cardiac risk factors: dyslipidemia, diabetes mellitus, sedentary life style, male gender, hypertension.       Patient Active Problem List    Diagnosis Date Noted    Chest pain 06/03/2018    Acute non-recurrent maxillary sinusitis 04/06/2018    Type 2 diabetes mellitus with nephropathy ([address] 01/23/2018    Fatigue 12/06/2017    Chronic kidney disease, stage IV (severe) ([address] 12/06/2017    CAD (coronary artery disease) 12/06/2017    Diabetes mellitus ([address] 12/06/2017    Hyperlipidemia 12/06/2017    Thrush of mouth and esophagus ([address] 12/06/2017    Glaucoma 12/06/2017    Neuropathy 12/06/2017    Pernicious anemia 12/06/2017    Thrush 12/06/2017    Hematuria 12/06/2017    Abdominal pain 12/06/2017    Pacemaker 12/06/2017    Type 2 diabetes mellitus without complication, without long-term current use of insulin ([address] 09/07/2017    CKD (chronic kidney disease) stage 3, GFR 30-59 ml/min 09/07/2017    GERD (gastroesophageal reflux disease) 09/07/2017    Sinoatrial node dysfunction ([address] 04/23/2012    Essential hypertension, benign 04/16/2012    Postsurgical aortocoronary bypass status 04/16/2012    Mixed hyperlipidemia 04/16/2012    Coronary atherosclerosis of native coronary artery 04/16/2012    Cardiac pacemaker in situ 04/16/2012      ARCELIA May MD  Past Medical History:   Diagnosis Date    Abdominal pain 12/6/2017    Arteriosclerotic coronary artery disease 12/6/2017    Atrioventricular block, complete (HCC)     CAD (coronary artery disease)     Chronic kidney disease, stage IV (severe) ([address] 12/6/2017    CKD (chronic kidney disease) stage 3, GFR 30-59 ml/min 9/7/2017    Diabetes mellitus ([address] 12/6/2017    Dizziness and giddiness     Fatigue 12/6/2017    GERD (gastroesophageal reflux disease)     GERD (gastroesophageal reflux disease) 9/7/2017    Glaucoma 12/6/2017    Hematuria 12/6/2017    Hyperlipidemia 12/6/2017    Hypertension     Mixed hyperlipidemia     Neuropathy 12/6/2017    Other acute and subacute form of ischemic heart disease     Pernicious anemia 12/6/2017    Sinoatrial node dysfunction (HCC)     Syncope and collapse     Thrush 12/6/2017    Thrush of mouth and esophagus ([address] 12/6/2017    Type 2 diabetes mellitus without complication, without long-term current use of insulin ([address] 9/7/2017      Social History     Social History    Marital status: UNKNOWN     Spouse name: N/A    Number of children: N/A    Years of education: N/A     Social History Main Topics    Smoking status: Never Smoker    Smokeless tobacco: Never Used    Alcohol use 0.6 oz/week     1 Glasses of wine per week    Drug use: No    Sexual activity: Not Currently     Other Topics Concern    Not on file Social History Narrative     Allergies   Allergen Reactions    Shellfish Derived Other (comments)     Crab meat      Family History   Problem Relation Age of Onset    Stroke Father       Current Facility-Administered Medications   Medication Dose Route Frequency    hydrALAZINE (APRESOLINE) 20 mg/mL injection 5 mg  5 mg IntraVENous Q6H PRN    dorzolamide (TRUSOPT) 2 % ophthalmic solution 1 Drop  1 Drop Both Eyes TID    latanoprost (XALATAN) 0.005 % ophthalmic solution 1 Drop  1 Drop Both Eyes QHS    pantoprazole (PROTONIX) tablet 40 mg  40 mg Oral DAILY    timolol (TIMOPTIC) 0.5 % ophthalmic solution 1 Drop  1 Drop Both Eyes BID    sodium chloride (NS) flush 5-10 mL  5-10 mL IntraVENous Q8H    sodium chloride (NS) flush 5-10 mL  5-10 mL IntraVENous PRN    acetaminophen (TYLENOL) tablet 650 mg  650 mg Oral Q6H PRN    ondansetron (ZOFRAN) injection 4 mg  4 mg IntraVENous Q4H PRN    heparin (porcine) injection 5,000 Units  5,000 Units SubCUTAneous Q8H    carvedilol (COREG) tablet 3.125 mg  3.125 mg Oral BID WITH MEALS    aspirin delayed-release tablet 81 mg  81 mg Oral DAILY    0.9% sodium chloride infusion  75 mL/hr IntraVENous CONTINUOUS    nitroglycerin (NITROBID) 2 % ointment 0.5 Inch  0.5 Inch Topical Q6H    aspirin (ASPIRIN) tablet 325 mg  325 mg Oral ONCE        Review of Symptoms:   Constitutional: weakness  Eyes: negative   Ears, nose, mouth, throat, and face: negative  Respiratory: HAMILTON  Cardiovascular: exertional chest pain  Gastrointestinal: negative  Genitourinary:negative   Musculoskeletal:negative   Neurological: negative   Endocrine: negative          Objective:      Visit Vitals    /60 (BP 1 Location: Left arm, BP Patient Position: At rest)    Pulse 62    Temp 97.8 °F (36.6 °C)    Resp 17    Wt 78.2 kg (172 lb 6.4 oz)    SpO2 98%    BMI 26.21 kg/m2       Physical:   General: pleasant, elderly  male resting in bed in NAD  Heart: RRR, 3/6 systolic murmur, no carotid bruits   Lungs: clear   Abdomen: Soft, +BS, NTND   Extremities: LE noble +DP/PT, no edema   Neurologic: Grossly normal    Data Review:   Recent Labs      06/04/18 0441 06/03/18 2105   WBC  7.9  7.5   HGB  10.9*  12.0*   HCT  32.1*  36.4*   PLT  185  259     Recent Labs      06/04/18 0441 06/03/18 2105   NA  142  144   K  4.6  4.5   CL  114*  112*   CO2  17*  24   GLU  132*  87   BUN  32*  32*   CREA  1.89*  2.06*   CA  8.7  9.4   ALB  3.2*  3.8   TBILI  0.5  0.5   SGOT  44*  49*   ALT  41  49   INR   --   1.1       Recent Labs      06/04/18 0441 06/03/18 2105   TROIQ  0.04  <0.04   CPK   --   61   CKMB   --   2.3       No intake or output data in the 24 hours ending 06/04/18 1000     Cardiographics    Telemetry: v paced  ECG: AV paced  Echocardiogram: last as above  CXRAY: no acute process  VQ scan:  Ordered        Assessment:       Active Problems:    Essential hypertension, benign (4/16/2012)      Sinoatrial node dysfunction (HCC) (4/23/2012)      GERD (gastroesophageal reflux disease) (9/7/2017)      CAD (coronary artery disease) (12/6/2017)      Chest pain (6/3/2018)         Plan:     Arvind Latham is a 80 y.o. male who presented to the hospital with exertional chest pain and SOB. His pain became worse right before bedtime. Has prior CAD history with stents and CABG. Troponin negative. Pro-BNP 2713. CXR clear. Some decreased oxygen saturations on arrival.    · Patient with progressive exertional chest pain with significant prior CAD history. High likelihood that this is angina related to CAD. Plan for cardiac cath later today to further evaluate. Dr. Kyle Lang and I discussed the risks/benefits/alternatives of cardiac catheterization +/- PCI with the patient. Risks include (but are not limited to) bleeding, infection, cva/mi/tamponade/death. The patient understands and wishes to proceed.   Postpone cath if VQ scan shows high likelihood of PE  · IV hydration prior to cath for patient's CKD  · Patient has a history of HTN, but he also has a history of orthostatic hypotension and dizziness. Would allow a higher supine BP due to this. · Continue ASA. Assume not on BB at home due to orthostatic hypotension. Unclear why not on statin at home. BB already started here - watch closely. Check lipids. Thank you for consulting [facility]    Thalia Cardenas NP DNP, RN, AGACNP-[facility]    6/4/2018         Patient seen, examined by me personally. Plan discussed as detailed. Agree with note as outlined by  NP. I confirm findings in history and physical exam. No additional findings noted. Agree with plan as outlined above. Patient presents with progressive angina with rest symptoms suggestive of [place]. Has known CAD. EKG shows paced rhythm. Recommend cath/PCI. Will await V/Q results. Will hydrate for renal insufficiency. Discussed procedure/risks/alternatives with patient.     Chapin Reina MD
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
## 2018-06-04 NOTE — PROGRESS NOTES
Bedside report received from [facility] and care assumed. Report given with SBAR , recent labs,and MAR .     0730 Bedside shift change report given to [facility]  (oncoming nurse) by me (offgoing nurse). Report given with SBAR, MAR and Recent Results.

## 2018-06-04 NOTE — PROGRESS NOTES
Cardiopulmonary Care Interdisciplinary rounds were held today to discuss patient's plan of care and outcomes. The following members were present: NP/Physician, Pharmacy, Nursing and Case Management.     Expected Length of Stay:  1d 21h    Plan of Care: Continue current treatment plan

## 2018-06-04 NOTE — PROGRESS NOTES
PROGRESS NOTE    NAME:  Reza Clements   :   1921   MRN:   [id number]     Date/Time:  2018 7:32 AM  Subjective:   History:  Pt. Admitted with chest pains and shortness of breath with minimal exertion. Currently denies shortness of breath or CP at rest. No PND/orthopnea/edema. Medications reviewed:  Current Facility-Administered Medications   Medication Dose Route Frequency    hydrALAZINE (APRESOLINE) 20 mg/mL injection 5 mg  5 mg IntraVENous Q6H PRN    aspirin chewable tablet 81 mg  81 mg Oral DAILY    dorzolamide (TRUSOPT) 2 % ophthalmic solution 1 Drop  1 Drop Both Eyes TID    latanoprost (XALATAN) 0.005 % ophthalmic solution 1 Drop  1 Drop Both Eyes QHS    pantoprazole (PROTONIX) tablet 40 mg  40 mg Oral DAILY    timolol (TIMOPTIC) 0.5 % ophthalmic solution 1 Drop  1 Drop Both Eyes BID    sodium chloride (NS) flush 5-10 mL  5-10 mL IntraVENous Q8H    sodium chloride (NS) flush 5-10 mL  5-10 mL IntraVENous PRN    acetaminophen (TYLENOL) tablet 650 mg  650 mg Oral Q6H PRN    ondansetron (ZOFRAN) injection 4 mg  4 mg IntraVENous Q4H PRN    heparin (porcine) injection 5,000 Units  5,000 Units SubCUTAneous Q8H    nitroglycerin (NITROBID) 2 % ointment 0.5 Inch  0.5 Inch Topical Q6H    aspirin (ASPIRIN) tablet 325 mg  325 mg Oral ONCE        Objective:   Vitals:  Visit Vitals    /50 (BP 1 Location: Left arm, BP Patient Position: At rest;Lying right side)    Pulse 63    Temp 97.5 °F (36.4 °C)    Resp 17    Wt 172 lb 6.4 oz (78.2 kg)    SpO2 98%    BMI 26.21 kg/m2      O2 Device: Room air Temp (24hrs), Av.7 °F (36.5 °C), Min:97.2 °F (36.2 °C), Max:98 °F (36.7 °C)      Last 24hr Input/Output:  No intake or output data in the 24 hours ending 18 0732     PHYSICAL EXAM:  General:     Alert, cooperative, no distress, appears stated age. Head:    Normocephalic, without obvious abnormality, atraumatic. Eyes:    Conjunctivae/corneas clear.   PERRLA  Nose:   Nares normal. No drainage or sinus tenderness. Throat:     Lips, mucosa, and tongue normal.  No Thrush  Neck:   Supple, symmetrical,  no adenopathy, thyroid: non tender     no carotid bruit and no JVD. Back:     Symmetric,  No CVA tenderness. Lungs:    Clear to auscultation bilaterally. No Wheezing or Rhonchi. No rales. Heart:    Regular rate and rhythm,  no murmur, rub or gallop. Abdomen:    Soft, non-tender. Not distended. Bowel sounds normal. No masses  Extremities:  Extremities normal, atraumatic, No cyanosis. No edema. No clubbing  Lymph nodes:  Cervical, supraclavicular normal.  Neurologic:  Normal strength, Alert and oriented X 3.    Skin:                No rash      Lab Data Reviewed:    Recent Results (from the past 24 hour(s))   URINALYSIS W/ REFLEX CULTURE    Collection Time: 06/03/18  8:22 PM   Result Value Ref Range    Color YELLOW/STRAW      Appearance CLEAR CLEAR      Specific gravity 1.016 1.003 - 1.030      pH (UA) 7.0 5.0 - 8.0      Protein 100 (A) NEG mg/dL    Glucose NEGATIVE  NEG mg/dL    Ketone NEGATIVE  NEG mg/dL    Bilirubin NEGATIVE  NEG      Blood NEGATIVE  NEG      Urobilinogen 0.2 0.2 - 1.0 EU/dL    Nitrites NEGATIVE  NEG      Leukocyte Esterase NEGATIVE  NEG      WBC 0-4 0 - 4 /hpf    RBC 0-5 0 - 5 /hpf    Epithelial cells FEW FEW /lpf    Bacteria NEGATIVE  NEG /hpf    UA:UC IF INDICATED CULTURE NOT INDICATED BY UA RESULT CNI      Hyaline cast 0-2 0 - 5 /lpf   EKG, 12 LEAD, INITIAL    Collection Time: 06/03/18  8:47 PM   Result Value Ref Range    Ventricular Rate 60 BPM    Atrial Rate 61 BPM    P-R Interval 178 ms    QRS Duration 196 ms    Q-T Interval 476 ms    QTC Calculation (Bezet) 476 ms    Calculated P Axis -120 degrees    Calculated R Axis -75 degrees    Calculated T Axis 99 degrees    Diagnosis       AV dual-paced rhythm  Abnormal ECG  When compared with ECG of 12-FEB-2012 13:15,  Previous ECG has undetermined rhythm, needs review     CBC WITH AUTOMATED DIFF    Collection Time: 06/03/18 9:05 PM   Result Value Ref Range    WBC 7.5 4.1 - 11.1 K/uL    RBC 3.18 (L) 4.10 - 5.70 M/uL    HGB 12.0 (L) 12.1 - 17.0 g/dL    HCT 36.4 (L) 36.6 - 50.3 %    .5 (H) 80.0 - 99.0 FL    MCH 37.7 (H) 26.0 - 34.0 PG    MCHC 33.0 30.0 - 36.5 g/dL    RDW 16.0 (H) 11.5 - 14.5 %    PLATELET 910 860 - 101 K/uL    MPV 11.5 8.9 - 12.9 FL    NRBC 0.0 0  WBC    ABSOLUTE NRBC 0.00 0.00 - 0.01 K/uL    NEUTROPHILS 48 32 - 75 %    LYMPHOCYTES 34 12 - 49 %    MONOCYTES 10 5 - 13 %    EOSINOPHILS 7 0 - 7 %    BASOPHILS 1 0 - 1 %    IMMATURE GRANULOCYTES 0 0.0 - 0.5 %    ABS. NEUTROPHILS 3.5 1.8 - 8.0 K/UL    ABS. LYMPHOCYTES 2.6 0.8 - 3.5 K/UL    ABS. MONOCYTES 0.8 0.0 - 1.0 K/UL    ABS. EOSINOPHILS 0.5 (H) 0.0 - 0.4 K/UL    ABS. BASOPHILS 0.1 0.0 - 0.1 K/UL    ABS. IMM. GRANS. 0.0 0.00 - 0.04 K/UL    DF SMEAR SCANNED      RBC COMMENTS MACROCYTOSIS  2+        RBC COMMENTS ANISOCYTOSIS  1+       METABOLIC PANEL, COMPREHENSIVE    Collection Time: 06/03/18  9:05 PM   Result Value Ref Range    Sodium 144 136 - 145 mmol/L    Potassium 4.5 3.5 - 5.1 mmol/L    Chloride 112 (H) 97 - 108 mmol/L    CO2 24 21 - 32 mmol/L    Anion gap 8 5 - 15 mmol/L    Glucose 87 65 - 100 mg/dL    BUN 32 (H) 6 - 20 MG/DL    Creatinine 2.06 (H) 0.70 - 1.30 MG/DL    BUN/Creatinine ratio 16 12 - 20      GFR est AA 36 (L) >60 ml/min/1.73m2    GFR est non-AA 30 (L) >60 ml/min/1.73m2    Calcium 9.4 8.5 - 10.1 MG/DL    Bilirubin, total 0.5 0.2 - 1.0 MG/DL    ALT (SGPT) 49 12 - 78 U/L    AST (SGOT) 49 (H) 15 - 37 U/L    Alk.  phosphatase 108 45 - 117 U/L    Protein, total 7.3 6.4 - 8.2 g/dL    Albumin 3.8 3.5 - 5.0 g/dL    Globulin 3.5 2.0 - 4.0 g/dL    A-G Ratio 1.1 1.1 - 2.2     TROPONIN I    Collection Time: 06/03/18  9:05 PM   Result Value Ref Range    Troponin-I, Qt. <0.04 <0.05 ng/mL   CK W/ CKMB & INDEX    Collection Time: 06/03/18  9:05 PM   Result Value Ref Range    CK 61 39 - 308 U/L    CK - MB 2.3 <3.6 NG/ML    CK-MB Index 3.8 (H) 0 - 2.5 PROTHROMBIN TIME + INR    Collection Time: 06/03/18  9:05 PM   Result Value Ref Range    INR 1.1 0.9 - 1.1      Prothrombin time 10.6 9.0 - 11.1 sec   NT-PRO BNP    Collection Time: 06/03/18  9:05 PM   Result Value Ref Range    NT pro-BNP 2713 (H) 0 - 450 PG/ML   TROPONIN I    Collection Time: 06/04/18  4:41 AM   Result Value Ref Range    Troponin-I, Qt. 0.04 <5.41 ng/mL   METABOLIC PANEL, COMPREHENSIVE    Collection Time: 06/04/18  4:41 AM   Result Value Ref Range    Sodium 142 136 - 145 mmol/L    Potassium 4.6 3.5 - 5.1 mmol/L    Chloride 114 (H) 97 - 108 mmol/L    CO2 17 (L) 21 - 32 mmol/L    Anion gap 11 5 - 15 mmol/L    Glucose 132 (H) 65 - 100 mg/dL    BUN 32 (H) 6 - 20 MG/DL    Creatinine 1.89 (H) 0.70 - 1.30 MG/DL    BUN/Creatinine ratio 17 12 - 20      GFR est AA 40 (L) >60 ml/min/1.73m2    GFR est non-AA 33 (L) >60 ml/min/1.73m2    Calcium 8.7 8.5 - 10.1 MG/DL    Bilirubin, total 0.5 0.2 - 1.0 MG/DL    ALT (SGPT) 41 12 - 78 U/L    AST (SGOT) 44 (H) 15 - 37 U/L    Alk. phosphatase 91 45 - 117 U/L    Protein, total 6.2 (L) 6.4 - 8.2 g/dL    Albumin 3.2 (L) 3.5 - 5.0 g/dL    Globulin 3.0 2.0 - 4.0 g/dL    A-G Ratio 1.1 1.1 - 2.2     CBC WITH AUTOMATED DIFF    Collection Time: 06/04/18  4:41 AM   Result Value Ref Range    WBC 7.9 4.1 - 11.1 K/uL    RBC 2.88 (L) 4.10 - 5.70 M/uL    HGB 10.9 (L) 12.1 - 17.0 g/dL    HCT 32.1 (L) 36.6 - 50.3 %    .5 (H) 80.0 - 99.0 FL    MCH 37.8 (H) 26.0 - 34.0 PG    MCHC 34.0 30.0 - 36.5 g/dL    RDW 15.7 (H) 11.5 - 14.5 %    PLATELET 915 339 - 532 K/uL    MPV 11.5 8.9 - 12.9 FL    NRBC 0.0 0  WBC    ABSOLUTE NRBC 0.00 0.00 - 0.01 K/uL    NEUTROPHILS 69 32 - 75 %    LYMPHOCYTES 19 12 - 49 %    MONOCYTES 7 5 - 13 %    EOSINOPHILS 5 0 - 7 %    BASOPHILS 0 0 - 1 %    IMMATURE GRANULOCYTES 0 0.0 - 0.5 %    ABS. NEUTROPHILS 5.4 1.8 - 8.0 K/UL    ABS. LYMPHOCYTES 1.5 0.8 - 3.5 K/UL    ABS. MONOCYTES 0.6 0.0 - 1.0 K/UL    ABS. EOSINOPHILS 0.4 0.0 - 0.4 K/UL    ABS. BASOPHILS 0.0 0.0 - 0.1 K/UL    ABS. IMM. GRANS. 0.0 0.00 - 0.04 K/UL    DF SMEAR SCANNED      RBC COMMENTS MACROCYTOSIS  2+             Assessment/Plan:     Active Problems:    Essential hypertension, benign (4/16/2012)      Sinoatrial node dysfunction (HCC) (4/23/2012)      GERD (gastroesophageal reflux disease) (9/7/2017)      CAD (coronary artery disease) (12/6/2017)      Chest pain (6/3/2018)       ___________________________________________________  PLAN:    1.  V/Q pending, sats dropped with exertion, CXR clear, ? PFTs  2.  CP in patient with known CAD, add low dose beta blocker and asa, on NTP, cardiology consult, troponin negative, CXR clear, BNP elevated but in setting of renal insuff.,  ECHO 3/17 with normal EF and mod MR  3. Renal insufficiency, ACEi held in setting of elevated crt and K, improved now  4.  Mild anemia after admission, ?dilutional effect, will follow, recent B12 normal, may be associated with renal disease            ___________________________________________________    Attending Physician: Ambar Brooke MD

## 2018-06-05 VITALS
SYSTOLIC BLOOD PRESSURE: 146 MMHG | RESPIRATION RATE: 16 BRPM | BODY MASS INDEX: 26.21 KG/M2 | TEMPERATURE: 97.6 F | WEIGHT: 172.4 LBS | HEART RATE: 65 BPM | OXYGEN SATURATION: 97 % | DIASTOLIC BLOOD PRESSURE: 94 MMHG

## 2018-06-05 LAB
ANION GAP SERPL CALC-SCNC: 11 MMOL/L (ref 5–15)
BUN SERPL-MCNC: 36 MG/DL (ref 6–20)
BUN/CREAT SERPL: 18 (ref 12–20)
CALCIUM SERPL-MCNC: 8.1 MG/DL (ref 8.5–10.1)
CHLORIDE SERPL-SCNC: 111 MMOL/L (ref 97–108)
CHOLEST SERPL-MCNC: 175 MG/DL
CO2 SERPL-SCNC: 21 MMOL/L (ref 21–32)
CREAT SERPL-MCNC: 2.04 MG/DL (ref 0.7–1.3)
ERYTHROCYTE [DISTWIDTH] IN BLOOD BY AUTOMATED COUNT: 15.9 % (ref 11.5–14.5)
GLUCOSE SERPL-MCNC: 92 MG/DL (ref 65–100)
HCT VFR BLD AUTO: 34.8 % (ref 36.6–50.3)
HDLC SERPL-MCNC: 39 MG/DL
HDLC SERPL: 4.5 {RATIO} (ref 0–5)
HGB BLD-MCNC: 11.7 G/DL (ref 12.1–17)
LDLC SERPL CALC-MCNC: 121 MG/DL (ref 0–100)
LIPID PROFILE,FLP: ABNORMAL
MCH RBC QN AUTO: 38.4 PG (ref 26–34)
MCHC RBC AUTO-ENTMCNC: 33.6 G/DL (ref 30–36.5)
MCV RBC AUTO: 114.1 FL (ref 80–99)
NRBC # BLD: 0 K/UL (ref 0–0.01)
NRBC BLD-RTO: 0 PER 100 WBC
PLATELET # BLD AUTO: 229 K/UL (ref 150–400)
PMV BLD AUTO: 11.3 FL (ref 8.9–12.9)
POTASSIUM SERPL-SCNC: 4.1 MMOL/L (ref 3.5–5.1)
RBC # BLD AUTO: 3.05 M/UL (ref 4.1–5.7)
SODIUM SERPL-SCNC: 143 MMOL/L (ref 136–145)
TRIGL SERPL-MCNC: 75 MG/DL (ref ?–150)
VLDLC SERPL CALC-MCNC: 15 MG/DL
WBC # BLD AUTO: 7.4 K/UL (ref 4.1–11.1)

## 2018-06-05 PROCEDURE — G8980 MOBILITY D/C STATUS: HCPCS

## 2018-06-05 PROCEDURE — 85027 COMPLETE CBC AUTOMATED: CPT | Performed by: NURSE PRACTITIONER

## 2018-06-05 PROCEDURE — 74011250637 HC RX REV CODE- 250/637: Performed by: NURSE PRACTITIONER

## 2018-06-05 PROCEDURE — G8978 MOBILITY CURRENT STATUS: HCPCS

## 2018-06-05 PROCEDURE — G8979 MOBILITY GOAL STATUS: HCPCS

## 2018-06-05 PROCEDURE — 74011250637 HC RX REV CODE- 250/637: Performed by: INTERNAL MEDICINE

## 2018-06-05 PROCEDURE — 74011250637 HC RX REV CODE- 250/637

## 2018-06-05 PROCEDURE — 97116 GAIT TRAINING THERAPY: CPT

## 2018-06-05 PROCEDURE — 97161 PT EVAL LOW COMPLEX 20 MIN: CPT

## 2018-06-05 PROCEDURE — 80048 BASIC METABOLIC PNL TOTAL CA: CPT | Performed by: INTERNAL MEDICINE

## 2018-06-05 PROCEDURE — 36415 COLL VENOUS BLD VENIPUNCTURE: CPT | Performed by: INTERNAL MEDICINE

## 2018-06-05 PROCEDURE — 74011250636 HC RX REV CODE- 250/636: Performed by: INTERNAL MEDICINE

## 2018-06-05 PROCEDURE — 80061 LIPID PANEL: CPT | Performed by: INTERNAL MEDICINE

## 2018-06-05 RX ORDER — CARVEDILOL 3.12 MG/1
3.12 TABLET ORAL 2 TIMES DAILY WITH MEALS
Qty: 60 TAB | Refills: 3 | Status: SHIPPED | OUTPATIENT
Start: 2018-06-05 | End: 2018-09-22 | Stop reason: SDUPTHER

## 2018-06-05 RX ORDER — PRAVASTATIN SODIUM 20 MG/1
20 TABLET ORAL
Qty: 30 TAB | Refills: 3 | Status: SHIPPED | OUTPATIENT
Start: 2018-06-05 | End: 2018-08-16 | Stop reason: SDUPTHER

## 2018-06-05 RX ADMIN — PANTOPRAZOLE SODIUM 40 MG: 40 TABLET, DELAYED RELEASE ORAL at 08:44

## 2018-06-05 RX ADMIN — TICAGRELOR 90 MG: 90 TABLET ORAL at 00:37

## 2018-06-05 RX ADMIN — ASPIRIN 81 MG: 81 TABLET, COATED ORAL at 08:43

## 2018-06-05 RX ADMIN — CARVEDILOL 3.12 MG: 3.12 TABLET, FILM COATED ORAL at 08:44

## 2018-06-05 RX ADMIN — HEPARIN SODIUM 5000 UNITS: 5000 INJECTION, SOLUTION INTRAVENOUS; SUBCUTANEOUS at 00:36

## 2018-06-05 RX ADMIN — NITROGLYCERIN 0.5 INCH: 20 OINTMENT TOPICAL at 06:00

## 2018-06-05 RX ADMIN — TICAGRELOR 90 MG: 90 TABLET ORAL at 13:54

## 2018-06-05 RX ADMIN — Medication 10 ML: at 06:24

## 2018-06-05 RX ADMIN — NITROGLYCERIN 0.5 INCH: 20 OINTMENT TOPICAL at 00:37

## 2018-06-05 RX ADMIN — NITROGLYCERIN 0.5 INCH: 20 OINTMENT TOPICAL at 13:54

## 2018-06-05 RX ADMIN — TIMOLOL MALEATE 1 DROP: 5 SOLUTION/ DROPS OPHTHALMIC at 08:46

## 2018-06-05 RX ADMIN — DORZOLAMIDE HYDROCHLORIDE 1 DROP: 20 SOLUTION/ DROPS OPHTHALMIC at 08:46

## 2018-06-05 NOTE — PROGRESS NOTES
[address] [address]  [phone]      Cardiology Progress Note      6/5/2018 9:39 AM    Admit Date: 6/3/2018    Admit Diagnosis:   Chest pain    Subjective: Maryam Ugalde has no c/o CP, SOB s/p PCI/ERICA SVG to RCA and LAD. BP elevated yesterday, started on Coreg. Amlodipine and Lisinopril discontinued within last month due to c/o fatigue. Hx of CKD but no labs drawn since last August.  CR on admission 2.0. Post cath, stable.       Visit Vitals    /62    Pulse 65    Temp 98 °F (36.7 °C)    Resp 16    Wt 78.2 kg (172 lb 6.4 oz)    SpO2 97%    BMI 26.21 kg/m2       Current Facility-Administered Medications   Medication Dose Route Frequency    hydrALAZINE (APRESOLINE) 20 mg/mL injection 5 mg  5 mg IntraVENous Q6H PRN    dorzolamide (TRUSOPT) 2 % ophthalmic solution 1 Drop  1 Drop Both Eyes TID    latanoprost (XALATAN) 0.005 % ophthalmic solution 1 Drop  1 Drop Both Eyes QHS    pantoprazole (PROTONIX) tablet 40 mg  40 mg Oral DAILY    timolol (TIMOPTIC) 0.5 % ophthalmic solution 1 Drop  1 Drop Both Eyes BID    sodium chloride (NS) flush 5-10 mL  5-10 mL IntraVENous Q8H    sodium chloride (NS) flush 5-10 mL  5-10 mL IntraVENous PRN    acetaminophen (TYLENOL) tablet 650 mg  650 mg Oral Q6H PRN    ondansetron (ZOFRAN) injection 4 mg  4 mg IntraVENous Q4H PRN    carvedilol (COREG) tablet 3.125 mg  3.125 mg Oral BID WITH MEALS    aspirin delayed-release tablet 81 mg  81 mg Oral DAILY    lidocaine (XYLOCAINE) 20 mg/mL (2 %) injection        bivalirudin (ANGIOMAX) 250 mg injection        0.9% sodium chloride (MBP/ADV) infusion        nitroglycerin 1 mg/10mL injection        0.9% sodium chloride infusion  50 mL/hr IntraVENous CONTINUOUS    ticagrelor (BRILINTA) tablet 90 mg  90 mg Oral Q12H    bivalirudin (ANGIOMAX) 250 mg injection        0.9% sodium chloride (MBP/ADV) infusion        ADDaptor        nitroglycerin (NITROBID) 2 % ointment 0.5 Inch 0.5 Inch Topical Q6H       Objective:      Physical Exam:  General Appearance: elderly  male in no acute distress  Chest:   Clear  Cardiovascular:  paced no murmur.   Abdomen:   Soft, non-tender, bowel sounds are active.   Extremities: right groin site D/I, no hematoma; +DP/PT  Skin:  Warm and dry.     Data Review:   Recent Labs      06/05/18   0408  06/04/18   0441  06/03/18   2105   WBC  7.4  7.9  7.5   HGB  11.7*  10.9*  12.0*   HCT  34.8*  32.1*  36.4*   PLT  229  185  259     Recent Labs      06/05/18   0408  06/04/18   0441  06/03/18   2105   NA  143  142  144   K  4.1  4.6  4.5   CL  111*  114*  112*   CO2  21  17*  24   GLU  92  132*  87   BUN  36*  32*  32*   CREA  2.04*  1.89*  2.06*   CA  8.1*  8.7  9.4   ALB   --   3.2*  3.8   TBILI   --   0.5  0.5   SGOT   --   44*  49*   ALT   --   41  49   INR   --    --   1.1       Recent Labs      06/04/18   0441  06/03/18   2105   TROIQ  0.04  <0.04   CPK   --   61   CKMB   --   2.3         Intake/Output Summary (Last 24 hours) at 06/05/18 0939  Last data filed at 06/05/18 0931   Gross per 24 hour   Intake           997.08 ml   Output              900 ml   Net            97.08 ml        Telemetry: AV paced    Assessment:     Active Problems:    Essential hypertension, benign (4/16/2012)      Mixed hyperlipidemia (4/16/2012)      Sinoatrial node dysfunction (HCC) (4/23/2012)      CKD (chronic kidney disease) stage 3, GFR 30-59 ml/min (9/7/2017)      GERD (gastroesophageal reflux disease) (9/7/2017)      CAD (coronary artery disease) (12/6/2017)      Diabetes mellitus ([facility] [address] (12/6/2017)      Chest pain (6/3/2018)      S/P cardiac cath (6/4/2018)      Overview: 6/4/18:  PCI to LAD and RCA        Plan:     Unstable angina with hx of CAD/CABG:  S/p PCI/ERICA x 2.    Started on Brilinta 90mg BID x 1 year, Coreg 3.125mg BID, continue with ASA 81mg daily, start pravastatin    HTN:  Uncontrolled, on no antihtn meds PTA  ACEI and amlodipine were discontinued within last month  Starting Coreg 3.125mg BID for HTN and CAD  Would not recommend restarting ACE with CKD    CKD:  Stable    Ok for discharge to home with f/u Dr. Vi Willis 1-2 weeks. Jeremías Maldon[facility]  Cardiology      [facility]    6/5/2018         Patient seen, examined by me personally. Plan discussed as detailed. Agree with note as outlined by  NP. I confirm findings in history and physical exam. No additional findings noted. Agree with plan as outlined above. Stable post PCI. Slight increase in creatinine as expected. Hold ACEI. F/u renal function as out patient. Can go home from cardiac stand point.     [address] Myesha Santizo MD

## 2018-06-05 NOTE — CARDIO/PULMONARY
Cardiopulmonary Rehab Nursing Entry:    Chart reviewed and pt visited for post-procedure cardiac teaching. Pt 81 yo s/p PCI with in-stent restonosis, PMHx of CAD, HTN, GERD, SA node dysfunction, DM, never smoker. Cardiac Rehab: CAD education folder to bedside of Mariana Courser      Educated using teach back method. Reviewed purpose of cath and potential CAD diagnosis for understanding. Discussed risk factors for CAD to include: family history, hypercholesterolemia, hypertension, diabetes, stress, and smoking. Encouraged consideration of lifestyle changes. Offered Cardiac Rehab Program for support in making lifestyle changes. Pt politely declined. He resides at [facility] and participates in their exercise programs that are supervised by staff. Mariana Courser verbalized understanding with questions answered. Pt cleared for d/c today, reminded of s/sx to monitor at cath site and when to contact MD, explained temporarily restricted activities. Pt without additional concerns.

## 2018-06-05 NOTE — PROGRESS NOTES
Patient is being discharged home with home PT provided by [facility]. CM notified Hellenholly Yue, with [facility] that patient is being discharged home with home PT. Dayron Yue informed CM that she will set the patient up with PT at the facility. Patients daughter will transport pt home. CM will fax Dayron Turner an order for PT. PCP does not have a NN to contact at this time. Care Management Interventions  PCP Verified by CM: Yes (Dr Shiloh Leslie)  Mode of Transport at Discharge: Other (see comment) (Daughter)  Transition of Care Consult (CM Consult): [address]: No  Reason Outside [place]: Patient already serviced by other home care/hospice agency ([facility] therapy department)  Discharge Durable Medical Equipment: No (no DME use)  Physical Therapy Consult: No  Occupational Therapy Consult: No  Speech Therapy Consult: No  Current Support Network: Lives with Spouse (Lives in a one story home with no steps to enter the home.   Lives in a cottage at [facility])  Confirm Follow Up Transport: Self (Family is available as needed)  Plan discussed with Pt/Family/Caregiver: Yes (CM spoke with patients daughter via telephone)  Discharge Location  Discharge Placement: Home with home health      [facility]

## 2018-06-05 NOTE — PROGRESS NOTES
PROGRESS NOTE    NAME:  Christian Dangelo   :   1921   MRN:   [id number]     Date/Time:  2018 7:32 AM  Subjective:   History:  Pt. Admitted with chest pains and shortness of breath with minimal exertion. Currently denies shortness of breath or CP at rest. No PND/orthopnea/edema. S/p cath with PCI and stents X 2 yesterday.       Medications reviewed:  Current Facility-Administered Medications   Medication Dose Route Frequency    hydrALAZINE (APRESOLINE) 20 mg/mL injection 5 mg  5 mg IntraVENous Q6H PRN    dorzolamide (TRUSOPT) 2 % ophthalmic solution 1 Drop  1 Drop Both Eyes TID    latanoprost (XALATAN) 0.005 % ophthalmic solution 1 Drop  1 Drop Both Eyes QHS    pantoprazole (PROTONIX) tablet 40 mg  40 mg Oral DAILY    timolol (TIMOPTIC) 0.5 % ophthalmic solution 1 Drop  1 Drop Both Eyes BID    sodium chloride (NS) flush 5-10 mL  5-10 mL IntraVENous Q8H    sodium chloride (NS) flush 5-10 mL  5-10 mL IntraVENous PRN    acetaminophen (TYLENOL) tablet 650 mg  650 mg Oral Q6H PRN    ondansetron (ZOFRAN) injection 4 mg  4 mg IntraVENous Q4H PRN    heparin (porcine) injection 5,000 Units  5,000 Units SubCUTAneous Q8H    carvedilol (COREG) tablet 3.125 mg  3.125 mg Oral BID WITH MEALS    aspirin delayed-release tablet 81 mg  81 mg Oral DAILY    lidocaine (XYLOCAINE) 20 mg/mL (2 %) injection        bivalirudin (ANGIOMAX) 250 mg injection        0.9% sodium chloride (MBP/ADV) infusion        nitroglycerin 1 mg/10mL injection        0.9% sodium chloride infusion  50 mL/hr IntraVENous CONTINUOUS    ticagrelor (BRILINTA) tablet 90 mg  90 mg Oral Q12H    bivalirudin (ANGIOMAX) 250 mg injection        0.9% sodium chloride (MBP/ADV) infusion        ADDaptor        nitroglycerin (NITROBID) 2 % ointment 0.5 Inch  0.5 Inch Topical Q6H        Objective:   Vitals:  Visit Vitals    /51    Pulse 70    Temp 97.5 °F (36.4 °C)    Resp 16    Wt 172 lb 6.4 oz (78.2 kg)    SpO2 97%    BMI 26.21 kg/m2 O2 Device: Room air Temp (24hrs), Av.5 °F (36.4 °C), Min:97.3 °F (36.3 °C), Max:97.8 °F (36.6 °C)      Last 24hr Input/Output:    Intake/Output Summary (Last 24 hours) at 18 0759  Last data filed at 18 0553   Gross per 24 hour   Intake           757.08 ml   Output              900 ml   Net          -142.92 ml        PHYSICAL EXAM:  General:     Alert, cooperative, no distress, appears stated age. Head:    Normocephalic, without obvious abnormality, atraumatic. Eyes:    Conjunctivae/corneas clear. PERRLA  Nose:   Nares normal. No drainage or sinus tenderness. Throat:     Lips, mucosa, and tongue normal.  No Thrush  Neck:   Supple, symmetrical,  no adenopathy, thyroid: non tender     no carotid bruit and no JVD. Back:     Symmetric,  No CVA tenderness. Lungs:    Clear to auscultation bilaterally. No Wheezing or Rhonchi. No rales. Heart:    Regular rate and rhythm,  no murmur, rub or gallop. Abdomen:    Soft, non-tender. Not distended. Bowel sounds normal. No masses  Extremities:  Extremities normal, atraumatic, No cyanosis. No edema. No clubbing  Lymph nodes:  Cervical, supraclavicular normal.  Neurologic:  Normal strength, Alert and oriented X 3. Skin:                No rash      Lab Data Reviewed:    Recent Results (from the past 24 hour(s))   EKG, 12 LEAD, INITIAL    Collection Time: 18  1:31 PM   Result Value Ref Range    Ventricular Rate 64 BPM    Atrial Rate 64 BPM    P-R Interval 240 ms    QRS Duration 196 ms    Q-T Interval 506 ms    QTC Calculation (Bezet) 522 ms    Calculated P Axis 55 degrees    Calculated R Axis -72 degrees    Calculated T Axis 97 degrees    Diagnosis       Electronic ventricular pacemaker  Underlying atrial fibrillation  No obvious pacer malfxn   When compared with ECG of 2018 20:47,  Vent.  rate has increased BY   4 BPM  Confirmed by Trenton Urias ([id number]) on 2018 3:12:20 PM     METABOLIC PANEL, BASIC    Collection Time: 18  4:08 AM   Result Value Ref Range    Sodium 143 136 - 145 mmol/L    Potassium 4.1 3.5 - 5.1 mmol/L    Chloride 111 (H) 97 - 108 mmol/L    CO2 21 21 - 32 mmol/L    Anion gap 11 5 - 15 mmol/L    Glucose 92 65 - 100 mg/dL    BUN 36 (H) 6 - 20 MG/DL    Creatinine 2.04 (H) 0.70 - 1.30 MG/DL    BUN/Creatinine ratio 18 12 - 20      GFR est AA 37 (L) >60 ml/min/1.73m2    GFR est non-AA 30 (L) >60 ml/min/1.73m2    Calcium 8.1 (L) 8.5 - 10.1 MG/DL   CBC W/O DIFF    Collection Time: 06/05/18  4:08 AM   Result Value Ref Range    WBC 7.4 4.1 - 11.1 K/uL    RBC 3.05 (L) 4.10 - 5.70 M/uL    HGB 11.7 (L) 12.1 - 17.0 g/dL    HCT 34.8 (L) 36.6 - 50.3 %    .1 (H) 80.0 - 99.0 FL    MCH 38.4 (H) 26.0 - 34.0 PG    MCHC 33.6 30.0 - 36.5 g/dL    RDW 15.9 (H) 11.5 - 14.5 %    PLATELET 807 063 - 933 K/uL    MPV 11.3 8.9 - 12.9 FL    NRBC 0.0 0  WBC    ABSOLUTE NRBC 0.00 0.00 - 0.01 K/uL   LIPID PANEL    Collection Time: 06/05/18  4:08 AM   Result Value Ref Range    LIPID PROFILE          Cholesterol, total 175 <200 MG/DL    Triglyceride 75 <150 MG/DL    HDL Cholesterol 39 MG/DL    LDL, calculated 121 (H) 0 - 100 MG/DL    VLDL, calculated 15 MG/DL    CHOL/HDL Ratio 4.5 0 - 5.0           Assessment/Plan:     Active Problems:    Essential hypertension, benign (4/16/2012)      Sinoatrial node dysfunction (HCC) (4/23/2012)      GERD (gastroesophageal reflux disease) (9/7/2017)      CAD (coronary artery disease) (12/6/2017)      Chest pain (6/3/2018)      S/P cardiac cath (6/4/2018)      Overview: 6/4/18:  PCI to LAD and RCA       ___________________________________________________  PLAN:    1.  S/p cath with PCI/stents, on Brilinta, continue beta blocker and asa  2. crt bumped up to 2.04, post cath, can follow up in office  3. With renal insufficiency hold ACEi  4.  Home today if ok with cardiology, on ASA, Brilinta, low dose beta blocker and statin            ___________________________________________________    Attending Physician: ARCELIA Rose Cruz MD

## 2018-06-05 NOTE — DISCHARGE INSTRUCTIONS
[facility]chel [phone]  [phone]      Patient Discharge Instructions    Jennifer Alford / [id number] : 1921    Admitted 6/3/2018 Discharged: 18     Take Home Medications            · It is important that you take the medication exactly as they are prescribed. · Keep your medication in the bottles provided by the pharmacist and keep a list of the medication names, dosages, and times to be taken in your wallet. · Do not take other medications without consulting your doctor. What to do at Home    Recommended diet: Cardiac Diet    Recommended activity: Activity as tolerated,       Follow-up with Dr. Holbrook Begun in 3 days. Call [phone] for your appointment. Information obtained by :  I understand that if any problems occur once I am at home I am to contact my physician. I understand and acknowledge receipt of the instructions indicated above.                                                                                                                                            Physician's or R.N.'s Signature                                                                  Date/Time                                                                                                                                              Patient or Representative Signature                                                          Date/Time

## 2018-06-05 NOTE — DISCHARGE SUMMARY
Physician Discharge Summary     Patient ID:  Manuel Cail  [id number]  56 y.o.  4/23/1921    Admit date: 6/3/2018  Discharge date and time:  6/5/18 7:50 AM    Discharge Diagnoses:   Problem        CAD (coronary artery disease)       Chest pain       Essential hypertension, benign       GERD (gastroesophageal reflux disease)       S/P cardiac cath       Sinoatrial node dysfunction [facility])        Hospital Course: Mr. Mi Hdz was admitted with increasing chest pain and profound weakness. Patient also noted increased shortness of breath with minimal exertion. His weakness had been present for several months however the chest pain developed within the past several days. He was admitted and initial serial troponins were negative. His EKG demonstrated a paced rhythm. Patient had a negative VQ scan. He previously been in the office with orthostatic hypotension and his antihypertensives have been held. He was evaluated by cardiology and it was felt prudent that he be taken to the catheterization lab where he was found to have occlusion of a saphenous vein graft to the RCA and in-stent restenosis of an LAD lesion. These were successfully stented and the patient continued IV hydration overnight. His creatinine bumped up slightly 1.89-2.04, his creatinine on admission was 2.06 and his creatinine in the office was 2.0. As his renal function appears to be stable he will be discharged on carvedilol 3.125 mg twice daily, 81 mg enteric-coated aspirin daily, Brilinta 90 mg twice daily, and pravastatin 40 mg once daily. He will have follow-up arranged with cardiology and will follow-up in the office for monitoring of his response to medical therapy.     PCP: Demi Hein MD  Consults: Cardiology    Significant Diagnostic Studies: VQ scan, cardiac catheterization with percutaneous intervention and stent ×2    [unfilled]    [unfilled]      Discharge Exam:  Visit Vitals    /51    Pulse 70    Temp 97.5 °F (36.4 °C)    Resp 16    Wt 172 lb 6.4 oz (78.2 kg)    SpO2 97%    BMI 26.21 kg/m2     General:  Alert, cooperative, no distress, appears stated age. Head:  Normocephalic, without obvious abnormality, atraumatic. Eyes:  Conjunctivae/corneas clear. PERRL, EOMs intact. Fundi benign   Ears:  Normal TMs and external ear canals both ears. Nose: Nares normal. Septum midline. Mucosa normal. No drainage or sinus tenderness. Throat: Lips, mucosa, and tongue normal. Teeth and gums normal.   Neck: Supple, symmetrical, trachea midline, no adenopathy, thyroid: no enlargement/tenderness/nodules, no carotid bruit and no JVD. Back:   Symmetric, no curvature. ROM normal. No CVA tenderness. Lungs:   Clear to auscultation bilaterally. Chest wall:  No tenderness or deformity. Heart:  Regular rate and rhythm, S1, S2 normal, 1/6 left sternal border murmur, no click, rub or gallop. Abdomen:   Soft, non-tender. Bowel sounds normal. No masses,  No organomegaly. Genitalia:     Rectal:     Extremities: Extremities normal, atraumatic, no cyanosis or edema. Pulses: 2+ and symmetric all extremities. Skin: Skin color, texture, turgor normal. No rashes or lesions   Lymph nodes: Cervical, supraclavicular, and axillary nodes normal.   Neurologic: CNII-XII intact. Normal strength, sensation and reflexes throughout. Disposition: home    Patient Instructions:   Current Discharge Medication List      START taking these medications    Details   carvedilol (COREG) 3.125 mg tablet Take 1 Tab by mouth two (2) times daily (with meals). Qty: 60 Tab, Refills: 3      ticagrelor (BRILINTA) 90 mg tablet Take 1 Tab by mouth every twelve (12) hours every twelve (12) hours. Qty: 60 Tab, Refills: 6      pravastatin (PRAVACHOL) 20 mg tablet Take 1 Tab by mouth nightly. Qty: 30 Tab, Refills: 3         CONTINUE these medications which have NOT CHANGED    Details   omeprazole (PRILOSEC) 40 mg capsule Take 40 mg by mouth daily.       timolol (TIMOPTIC) 0.5 % ophthalmic solution 1 Drop two (2) times a day. brinzolamide (AZOPT) 1 % ophthalmic suspension Administer 1 Drop to both eyes three (3) times daily. aspirin 81 mg chewable tablet Take 81 mg by mouth daily. latanoprost (XALATAN) 0.005 % ophthalmic solution Administer 1 Drop to both eyes nightly.                Signed:  Flavio Branch MD  6/5/2018  7:50 AM
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
## 2018-06-08 ENCOUNTER — OFFICE VISIT (OUTPATIENT)
Dept: INTERNAL MEDICINE CLINIC | Age: 83
End: 2018-06-08

## 2018-06-08 VITALS
OXYGEN SATURATION: 97 % | WEIGHT: 164.4 LBS | DIASTOLIC BLOOD PRESSURE: 78 MMHG | HEART RATE: 60 BPM | SYSTOLIC BLOOD PRESSURE: 132 MMHG | RESPIRATION RATE: 16 BRPM | HEIGHT: 68 IN | BODY MASS INDEX: 24.92 KG/M2 | TEMPERATURE: 98.5 F

## 2018-06-08 DIAGNOSIS — I10 ESSENTIAL HYPERTENSION, BENIGN: ICD-10-CM

## 2018-06-08 DIAGNOSIS — Z98.890 S/P CARDIAC CATH: ICD-10-CM

## 2018-06-08 DIAGNOSIS — I25.10 ATHEROSCLEROSIS OF NATIVE CORONARY ARTERY OF NATIVE HEART, ANGINA PRESENCE UNSPECIFIED: ICD-10-CM

## 2018-06-08 DIAGNOSIS — E11.9 TYPE 2 DIABETES MELLITUS WITHOUT COMPLICATION, WITHOUT LONG-TERM CURRENT USE OF INSULIN (HCC): ICD-10-CM

## 2018-06-08 NOTE — PROGRESS NOTES
Chief Complaint   Patient presents with   [facility] Follow Up     room 3     1. Have you been to the ER, urgent care clinic since your last visit? Hospitalized since your last visit? YES, ED [facility] 6/2018    2. Have you seen or consulted any other health care providers outside of the [address] [place] [address] since your last visit? Include any pap smears or colon screening. NO      PT IS HERE FOR HOSP F/U. PT HAD 2 STENTS PLACED IN HEART.

## 2018-06-08 NOTE — MR AVS SNAPSHOT
[address] [id number] [phone] Patient: Lul Stringer 
MRN: [id number] QMA:6/84/8833 Visit Information Date & Time Provider Department Dept. Phone Encounter #  
 6/8/2018  9:30 AM ARCELIA Garrido MD [facility] [phone] [id number] Your Appointments 6/15/2018  4:00 PM  
ESTABLISHED PATIENT with [facility] Appt Note: hospital f/u per [facility] [address]  
[phone] [address]  
  
    
 7/24/2018  9:00 AM  
PROCEDURE with PACEMAKER, [facility] Appt Note: [facility] battery check and full check [address]  
[phone] [address]  
  
    
 9/27/2018 10:30 AM  
Follow Up with ARCELIA Garrido MD  
[address] ([facility]) Appt Note: [address] [phone] [address] [facility] [id number] Upcoming Health Maintenance Date Due  
 FOOT EXAM Q1 4/23/1931 EYE EXAM RETINAL OR DILATED Q1 4/23/1931 DTaP/Tdap/Td series (1 - Tdap) 4/23/1942 ZOSTER VACCINE AGE 60> 2/23/1981 GLAUCOMA SCREENING Q2Y 4/23/1986 Pneumococcal 65+ Low/Medium Risk (1 of 2 - PCV13) 4/23/1986 MEDICARE YEARLY EXAM 3/14/2018 Influenza Age 5 to Adult 8/1/2018 MICROALBUMIN Q1 9/7/2018 HEMOGLOBIN A1C Q6M 10/25/2018 LIPID PANEL Q1 6/5/2019 Allergies as of 6/8/2018  Review Complete On: 6/8/2018 By: Domenic Luevano MD  
  
 Severity Noted Reaction Type Reactions Latex, Natural Rubber  06/04/2018    Other (comments) Shellfish Derived  12/21/2012    Other (comments) Crab meat Current Immunizations  Never Reviewed No immunizations on file. Not reviewed this visit You Were Diagnosed With   
  
 Codes Comments Type 2 diabetes mellitus without complication, without long-term current use of insulin (HCC)    -  Primary ICD-10-CM: E11.9 ICD-9-CM: 250.00 Essential hypertension, benign     ICD-10-CM: I10 
ICD-9-CM: 401.1 Atherosclerosis of native coronary artery of native heart, angina presence unspecified     ICD-10-CM: I25.10 ICD-9-CM: 414.01   
 S/P cardiac cath     ICD-10-CM: Z98.890 ICD-9-CM: V45.89 Vitals BP Pulse Temp Resp Height(growth percentile) Weight(growth percentile) 150/78 (BP 1 Location: Left arm, BP Patient Position: Sitting) 60 98.5 °F (36.9 °C) (Oral) 16 5' 8\" (1.727 m) 164 lb 6.4 oz (74.6 kg) SpO2 BMI Smoking Status 97% 25 kg/m2 Never Smoker Vitals History BMI and BSA Data Body Mass Index Body Surface Area  
 25 kg/m 2 1.89 m 2 Preferred Pharmacy Pharmacy Name Phone [id number] [phone] [facility] [phone] Your Updated Medication List  
  
   
This list is accurate as of 6/8/18 10:13 AM.  Always use your most recent med list.  
  
  
  
  
 aspirin 81 mg chewable tablet Take 81 mg by mouth daily. AZOPT 1 % ophthalmic suspension Generic drug:  brinzolamide Administer 1 Drop to both eyes three (3) times daily. carvedilol 3.125 mg tablet Commonly known as:  Jane Sky Take 1 Tab by mouth two (2) times daily (with meals). latanoprost 0.005 % ophthalmic solution Commonly known as:  Kenrick Goodpastrajinder Administer 1 Drop to both eyes nightly. pravastatin 20 mg tablet Commonly known as:  PRAVACHOL Take 1 Tab by mouth nightly. PriLOSEC 40 mg capsule Generic drug:  omeprazole Take 40 mg by mouth daily. ticagrelor 90 mg tablet Commonly known as:  [facility] [employer] [employer] Take 1 Tab by mouth every twelve (12) hours every twelve (12) hours. timolol 0.5 % ophthalmic solution Commonly known as:  TIMOPTIC  
1 Drop two (2) times a day. Introducing [facility] SERVICES! [employer] introduces [employer] patient portal. Now you can access parts of your medical record, email your doctor's office, and request medication refills online. 1. In your internet browser, go to [email or web address] 2. Click on the First Time User? Click Here link in the Sign In box. You will see the New Member Sign Up page. 3. Enter your [employer] Access Code exactly as it appears below. You will not need to use this code after youve completed the sign-up process. If you do not sign up before the expiration date, you must request a new code. · [employer] Access Code: [email or web address] Expires: 6/11/2018 10:08 AM 
 
4. Enter the last four digits of your Social Security Number (xxxx) and Date of Birth (mm/dd/yyyy) as indicated and click Submit. You will be taken to the next sign-up page. 5. Create a St[employer]Pool ID. This will be your [employer] login ID and cannot be changed, so think of one that is secure and easy to remember. 6. Create a [employer] password. You can change your password at any time. 7. Enter your Password Reset Question and Answer. This can be used at a later time if you forget your password. 8. Enter your e-mail address. You will receive e-mail notification when new information is available in [address] 9. Click Sign Up. You can now view and download portions of your medical record. 10. Click the Download Summary menu link to download a portable copy of your medical information. If you have questions, please visit the Frequently Asked Questions section of the [employer] website. Remember, [employer] is NOT to be used for urgent needs. For medical emergencies, dial 911. Now available from your iPhone and Android! Please provide this summary of care documentation to your next provider. Your primary care clinician is listed as ARCELIA Lorenz. If you have any questions after today's visit, please call [phone].
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
## 2018-06-15 ENCOUNTER — OFFICE VISIT (OUTPATIENT)
Dept: CARDIOLOGY CLINIC | Age: 83
End: 2018-06-15

## 2018-06-15 VITALS
OXYGEN SATURATION: 97 % | BODY MASS INDEX: 25.29 KG/M2 | HEIGHT: 68 IN | HEART RATE: 54 BPM | WEIGHT: 166.9 LBS | DIASTOLIC BLOOD PRESSURE: 82 MMHG | RESPIRATION RATE: 16 BRPM | SYSTOLIC BLOOD PRESSURE: 140 MMHG

## 2018-06-15 DIAGNOSIS — Z95.1 POSTSURGICAL AORTOCORONARY BYPASS STATUS: ICD-10-CM

## 2018-06-15 DIAGNOSIS — E78.5 HYPERLIPIDEMIA, UNSPECIFIED HYPERLIPIDEMIA TYPE: ICD-10-CM

## 2018-06-15 DIAGNOSIS — I25.10 ATHEROSCLEROSIS OF NATIVE CORONARY ARTERY OF NATIVE HEART WITHOUT ANGINA PECTORIS: ICD-10-CM

## 2018-06-15 DIAGNOSIS — I25.10 ASHD (ARTERIOSCLEROTIC HEART DISEASE): Primary | ICD-10-CM

## 2018-06-15 DIAGNOSIS — I10 ESSENTIAL HYPERTENSION, BENIGN: ICD-10-CM

## 2018-06-15 DIAGNOSIS — N18.30 CKD (CHRONIC KIDNEY DISEASE) STAGE 3, GFR 30-59 ML/MIN (HCC): ICD-10-CM

## 2018-06-15 DIAGNOSIS — E11.9 TYPE 2 DIABETES MELLITUS WITHOUT COMPLICATION, WITHOUT LONG-TERM CURRENT USE OF INSULIN (HCC): ICD-10-CM

## 2018-06-15 NOTE — MR AVS SNAPSHOT
Rashard Farrell [address] [facility] 
[phone] Patient: Sylvia Mccoy 
MRN: [id number] UOF:[id number] Visit Information Date & Time Provider Department Dept. Phone Encounter #  
 6/15/2018  4:00 PM [address] Santizo, [address] [facility] [phone] [id number] Your Appointments 7/24/2018  9:00 AM  
PROCEDURE with PACEMAKER, [facility] [address] [facility]) Appt Note: [facility] DCPM battery check and full check [address]  
[phone] [address]  
  
    
 9/7/2018 10:40 AM  
FOLLOW UP 10 with ARCELIA Ladd MD  
[facility] ([address]) Appt Note: [address] [phone] [address] [facility] [id number] 9/18/2018 11:15 AM  
ESTABLISHED PATIENT with Mannie Santizo MD  
[facility] [address] [facility]) Appt Note: P/Dr ALLRED schdl 3 month fu bg  
 [address]  
[phone] [address]  
  
    
 9/27/2018 10:30 AM  
Follow Up with ARCELIA Ladd MD  
[place] [address] ([address] [facility]) Appt Note: [address] [address] [address] [facility] [address] Upcoming Health Maintenance Date Due  
 FOOT EXAM Q1 4/23/1931 EYE EXAM RETINAL OR DILATED Q1 4/23/1931 DTaP/Tdap/Td series (1 - Tdap) 4/23/1942 ZOSTER VACCINE AGE 60> 2/23/1981 GLAUCOMA SCREENING Q2Y 4/23/1986 Pneumococcal 65+ Low/Medium Risk (1 of 2 - PCV13) 4/23/1986 MEDICARE YEARLY EXAM 3/14/2018 Influenza Age 5 to Adult 8/1/2018 MICROALBUMIN Q1 9/7/2018 HEMOGLOBIN A1C Q6M 10/25/2018 LIPID PANEL Q1 6/5/2019 Allergies as of 6/15/2018  Review Complete On: 6/15/2018 By: Moise Brennan NP Severity Noted Reaction Type Reactions Latex, Natural Rubber  06/04/2018    Other (comments) Shellfish Derived  12/21/2012    Other (comments) Crab meat Current Immunizations  Never Reviewed No immunizations on file. Not reviewed this visit You Were Diagnosed With   
  
 Codes Comments ASHD (arteriosclerotic heart disease)    -  Primary ICD-10-CM: I25.10 ICD-9-CM: 414.00 Hyperlipidemia, unspecified hyperlipidemia type     ICD-10-CM: E78.5 ICD-9-CM: 272.4 Essential hypertension, benign     ICD-10-CM: I10 
ICD-9-CM: 401.1 Atherosclerosis of native coronary artery of native heart without angina pectoris     ICD-10-CM: I25.10 ICD-9-CM: 414.01 Postsurgical aortocoronary bypass status     ICD-10-CM: Z95.1 ICD-9-CM: V45.81 Type 2 diabetes mellitus without complication, without long-term current use of insulin (HCC)     ICD-10-CM: E11.9 ICD-9-CM: 250.00 CKD (chronic kidney disease) stage 3, GFR 30-59 ml/min     ICD-10-CM: N18.3 ICD-9-CM: 572. 3 Vitals BP Pulse Resp Height(growth percentile) Weight(growth percentile) SpO2  
 140/82 (BP 1 Location: Right arm, BP Patient Position: Sitting) (!) 54 16 5' 8\" (1.727 m) 166 lb 14.4 oz (75.7 kg) 97% BMI Smoking Status 25.38 kg/m2 Never Smoker Vitals History BMI and BSA Data Body Mass Index Body Surface Area  
 25.38 kg/m 2 1.91 m 2 Preferred Pharmacy Pharmacy Name Phone [id number] [facility] [phone] Your Updated Medication List  
  
   
This list is accurate as of 6/15/18  4:44 PM.  Always use your most recent med list.  
  
  
  
  
 aspirin 81 mg chewable tablet Take 81 mg by mouth daily. AZOPT 1 % ophthalmic suspension Generic drug:  brinzolamide Administer 1 Drop to both eyes three (3) times daily. carvedilol 3.125 mg tablet Commonly known as:  Maybelle Pallas Take 1 Tab by mouth two (2) times daily (with meals). latanoprost 0.005 % ophthalmic solution Commonly known as:  Aamir Collar Administer 1 Drop to both eyes nightly. pravastatin 20 mg tablet Commonly known as:  PRAVACHOL Take 1 Tab by mouth nightly. PriLOSEC 40 mg capsule Generic drug:  omeprazole Take 40 mg by mouth daily. ticagrelor 90 mg tablet Commonly known as:  [employer] Take 1 Tab by mouth every twelve (12) hours every twelve (12) hours. timolol 0.5 % ophthalmic solution Commonly known as:  TIMOPTIC  
1 Drop two (2) times a day. We Performed the Following AMB POC EKG ROUTINE W/ 12 LEADS, INTER & REP [06581 CPT(R)] Introducing [facility]! Beny Ricardo introduces Telesocial patient portal. Now you can access parts of your medical record, email your doctor's office, and request medication refills online. 1. In your internet browser, go to [email or web address] 2. Click on the First Time User? Click Here link in the Sign In box. You will see the New Member Sign Up page. 3. Enter your Telesocial Access Code exactly as it appears below. You will not need to use this code after youve completed the sign-up process. If you do not sign up before the expiration date, you must request a new code. · Telesocial Access Code: [email or web address] Expires: 9/13/2018  4:44 PM 
 
4. Enter the last four digits of your Social Security Number (xxxx) and Date of Birth (mm/dd/yyyy) as indicated and click Submit. You will be taken to the next sign-up page. 5. Create a Adaptive TCRt ID. This will be your Telesocial login ID and cannot be changed, so think of one that is secure and easy to remember. 6. Create a Telesocial password. You can change your password at any time. 7. Enter your Password Reset Question and Answer.  This can be used at a later time if you forget your password. 8. Enter your e-mail address. You will receive e-mail notification when new information is available in [address] 9. Click Sign Up. You can now view and download portions of your medical record. 10. Click the Download Summary menu link to download a portable copy of your medical information. If you have questions, please visit the Frequently Asked Questions section of the Tigermed website. Remember, Tigermed is NOT to be used for urgent needs. For medical emergencies, dial 911. Now available from your iPhone and Android! Please provide this summary of care documentation to your next provider. Your primary care clinician is listed as ARCELIA Sam. If you have any questions after today's visit, please call [phone].

## 2018-06-15 NOTE — PROGRESS NOTES
Chief Complaint   Patient presents with   [facility] Follow Up     sob with minimal exertion      Have you seen or consulted any other health care providers outside of the [address] [place] [address] since your last visit? Include any pap smears or colon screening.  No
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
## 2018-06-18 NOTE — TELEPHONE ENCOUNTER
Requested Prescriptions     Pending Prescriptions Disp Refills    ticagrelor (BRILINTA) 90 mg tablet 180 Tab 3     Sig: Take 1 Tab by mouth every twelve (12) hours every twelve (12) hours.        Last Refill: 06/05/18  Next Appointment:09/27/18

## 2018-07-03 ENCOUNTER — OFFICE VISIT (OUTPATIENT)
Dept: INTERNAL MEDICINE CLINIC | Age: 83
End: 2018-07-03

## 2018-07-03 ENCOUNTER — HOSPITAL ENCOUNTER (INPATIENT)
Age: 83
LOS: 3 days | Discharge: NURSING, REHAB OR LONG-TERM CARE | DRG: 378 | End: 2018-07-06
Attending: INTERNAL MEDICINE | Admitting: INTERNAL MEDICINE
Payer: MEDICARE

## 2018-07-03 VITALS
WEIGHT: 164.4 LBS | HEART RATE: 60 BPM | BODY MASS INDEX: 24.92 KG/M2 | HEIGHT: 68 IN | OXYGEN SATURATION: 98 % | DIASTOLIC BLOOD PRESSURE: 70 MMHG | SYSTOLIC BLOOD PRESSURE: 110 MMHG

## 2018-07-03 DIAGNOSIS — K21.9 GASTROESOPHAGEAL REFLUX DISEASE WITHOUT ESOPHAGITIS: ICD-10-CM

## 2018-07-03 DIAGNOSIS — K62.5 RECTAL BLEEDING: Primary | ICD-10-CM

## 2018-07-03 DIAGNOSIS — N18.30 CKD (CHRONIC KIDNEY DISEASE) STAGE 3, GFR 30-59 ML/MIN (HCC): ICD-10-CM

## 2018-07-03 DIAGNOSIS — E11.21 TYPE 2 DIABETES MELLITUS WITH NEPHROPATHY (HCC): ICD-10-CM

## 2018-07-03 DIAGNOSIS — D50.0 ANEMIA, BLOOD LOSS: ICD-10-CM

## 2018-07-03 DIAGNOSIS — K29.00 ACUTE GASTRITIS WITHOUT HEMORRHAGE, UNSPECIFIED GASTRITIS TYPE: ICD-10-CM

## 2018-07-03 DIAGNOSIS — K92.2 GASTROINTESTINAL HEMORRHAGE, UNSPECIFIED GASTROINTESTINAL HEMORRHAGE TYPE: ICD-10-CM

## 2018-07-03 DIAGNOSIS — I25.10 ATHEROSCLEROSIS OF NATIVE CORONARY ARTERY OF NATIVE HEART WITHOUT ANGINA PECTORIS: ICD-10-CM

## 2018-07-03 DIAGNOSIS — K25.0 ACUTE GASTRIC ULCER WITH HEMORRHAGE: ICD-10-CM

## 2018-07-03 DIAGNOSIS — N18.4 CHRONIC KIDNEY DISEASE, STAGE IV (SEVERE) (HCC): ICD-10-CM

## 2018-07-03 DIAGNOSIS — I25.10 ASCVD (ARTERIOSCLEROTIC CARDIOVASCULAR DISEASE): ICD-10-CM

## 2018-07-03 LAB
ALBUMIN SERPL-MCNC: 3.4 G/DL (ref 3.5–5)
ALBUMIN/GLOB SERPL: 1.1 {RATIO} (ref 1.1–2.2)
ALP SERPL-CCNC: 77 U/L (ref 45–117)
ALT SERPL-CCNC: 22 U/L (ref 12–78)
ANION GAP SERPL CALC-SCNC: 11 MMOL/L (ref 5–15)
APPEARANCE UR: CLEAR
AST SERPL-CCNC: 46 U/L (ref 15–37)
BACTERIA URNS QL MICRO: NEGATIVE /HPF
BASOPHILS # BLD: 0.1 K/UL (ref 0–0.1)
BASOPHILS NFR BLD: 1 % (ref 0–1)
BILIRUB SERPL-MCNC: 0.8 MG/DL (ref 0.2–1)
BILIRUB UR QL: NEGATIVE
BUN SERPL-MCNC: 40 MG/DL (ref 6–20)
BUN/CREAT SERPL: 18 (ref 12–20)
CALCIUM SERPL-MCNC: 8.9 MG/DL (ref 8.5–10.1)
CHLORIDE SERPL-SCNC: 115 MMOL/L (ref 97–108)
CO2 SERPL-SCNC: 19 MMOL/L (ref 21–32)
COLOR UR: ABNORMAL
CREAT SERPL-MCNC: 2.2 MG/DL (ref 0.7–1.3)
DIFFERENTIAL METHOD BLD: ABNORMAL
EOSINOPHIL # BLD: 0.7 K/UL (ref 0–0.4)
EOSINOPHIL NFR BLD: 10 % (ref 0–7)
EPITH CASTS URNS QL MICRO: ABNORMAL /LPF
ERYTHROCYTE [DISTWIDTH] IN BLOOD BY AUTOMATED COUNT: 15.9 % (ref 11.5–14.5)
GLOBULIN SER CALC-MCNC: 3.2 G/DL (ref 2–4)
GLUCOSE SERPL-MCNC: 104 MG/DL (ref 65–100)
GLUCOSE UR STRIP.AUTO-MCNC: NEGATIVE MG/DL
GRAN# POC: 4.9 K/UL (ref 2–7.8)
GRAN% POC: 68.2 % (ref 37–92)
HCT VFR BLD AUTO: 27.3 % (ref 36.6–50.3)
HCT VFR BLD CALC: 24.5 % (ref 37–51)
HGB BLD-MCNC: 8.3 G/DL (ref 12–18)
HGB BLD-MCNC: 8.8 G/DL (ref 12.1–17)
HGB UR QL STRIP: NEGATIVE
HYALINE CASTS URNS QL MICRO: ABNORMAL /LPF (ref 0–5)
IMM GRANULOCYTES # BLD: 0 K/UL (ref 0–0.04)
IMM GRANULOCYTES NFR BLD AUTO: 0 % (ref 0–0.5)
KETONES UR QL STRIP.AUTO: NEGATIVE MG/DL
LEUKOCYTE ESTERASE UR QL STRIP.AUTO: NEGATIVE
LY# POC: 1.7 K/UL (ref 0.6–4.1)
LY% POC: 25.2 % (ref 10–58.5)
LYMPHOCYTES # BLD: 1.7 K/UL (ref 0.8–3.5)
LYMPHOCYTES NFR BLD: 23 % (ref 12–49)
MCH RBC QN AUTO: 38.4 PG (ref 26–34)
MCH RBC QN: 38 PG (ref 26–32)
MCHC RBC AUTO-ENTMCNC: 32.2 G/DL (ref 30–36.5)
MCHC RBC-ENTMCNC: 34.1 G/DL (ref 30–36)
MCV RBC AUTO: 119.2 FL (ref 80–99)
MCV RBC: 112 FL (ref 80–97)
MID #, POC: 0.4 K/UL (ref 0–1.8)
MID% POC: 6.6 % (ref 0.1–24)
MONOCYTES # BLD: 0.7 K/UL (ref 0–1)
MONOCYTES NFR BLD: 9 % (ref 5–13)
NEUTS SEG # BLD: 4.1 K/UL (ref 1.8–8)
NEUTS SEG NFR BLD: 57 % (ref 32–75)
NITRITE UR QL STRIP.AUTO: NEGATIVE
NRBC # BLD: 0 K/UL (ref 0–0.01)
NRBC BLD-RTO: 0 PER 100 WBC
PH UR STRIP: 6 [PH] (ref 5–8)
PLATELET # BLD AUTO: 209 K/UL (ref 150–400)
PLATELET # BLD: 240 K/UL (ref 140–440)
POTASSIUM SERPL-SCNC: 5.4 MMOL/L (ref 3.5–5.1)
PROT SERPL-MCNC: 6.6 G/DL (ref 6.4–8.2)
PROT UR STRIP-MCNC: ABNORMAL MG/DL
RBC # BLD AUTO: 2.29 M/UL (ref 4.1–5.7)
RBC # BLD: 2.19 M/UL (ref 4.2–6.3)
RBC #/AREA URNS HPF: ABNORMAL /HPF (ref 0–5)
RBC MORPH BLD: ABNORMAL
RBC MORPH BLD: ABNORMAL
SODIUM SERPL-SCNC: 145 MMOL/L (ref 136–145)
SP GR UR REFRACTOMETRY: 1.02 (ref 1–1.03)
UROBILINOGEN UR QL STRIP.AUTO: 0.2 EU/DL (ref 0.2–1)
WBC # BLD AUTO: 7.3 K/UL (ref 4.1–11.1)
WBC # BLD: 7 K/UL (ref 4.1–10.9)
WBC URNS QL MICRO: ABNORMAL /HPF (ref 0–4)

## 2018-07-03 PROCEDURE — 86900 BLOOD TYPING SEROLOGIC ABO: CPT | Performed by: INTERNAL MEDICINE

## 2018-07-03 PROCEDURE — 81001 URINALYSIS AUTO W/SCOPE: CPT | Performed by: INTERNAL MEDICINE

## 2018-07-03 PROCEDURE — 80053 COMPREHEN METABOLIC PANEL: CPT | Performed by: INTERNAL MEDICINE

## 2018-07-03 PROCEDURE — 77030032490 HC SLV COMPR SCD KNE COVD -B

## 2018-07-03 PROCEDURE — 74011000250 HC RX REV CODE- 250: Performed by: INTERNAL MEDICINE

## 2018-07-03 PROCEDURE — 93005 ELECTROCARDIOGRAM TRACING: CPT

## 2018-07-03 PROCEDURE — 86920 COMPATIBILITY TEST SPIN: CPT | Performed by: INTERNAL MEDICINE

## 2018-07-03 PROCEDURE — C9113 INJ PANTOPRAZOLE SODIUM, VIA: HCPCS | Performed by: INTERNAL MEDICINE

## 2018-07-03 PROCEDURE — 74011250637 HC RX REV CODE- 250/637: Performed by: INTERNAL MEDICINE

## 2018-07-03 PROCEDURE — 86921 COMPATIBILITY TEST INCUBATE: CPT | Performed by: INTERNAL MEDICINE

## 2018-07-03 PROCEDURE — 65660000000 HC RM CCU STEPDOWN

## 2018-07-03 PROCEDURE — 86922 COMPATIBILITY TEST ANTIGLOB: CPT | Performed by: INTERNAL MEDICINE

## 2018-07-03 PROCEDURE — 86870 RBC ANTIBODY IDENTIFICATION: CPT | Performed by: INTERNAL MEDICINE

## 2018-07-03 PROCEDURE — 86905 BLOOD TYPING RBC ANTIGENS: CPT | Performed by: INTERNAL MEDICINE

## 2018-07-03 PROCEDURE — 36415 COLL VENOUS BLD VENIPUNCTURE: CPT | Performed by: INTERNAL MEDICINE

## 2018-07-03 PROCEDURE — 85025 COMPLETE CBC W/AUTO DIFF WBC: CPT | Performed by: INTERNAL MEDICINE

## 2018-07-03 PROCEDURE — 74011250636 HC RX REV CODE- 250/636: Performed by: INTERNAL MEDICINE

## 2018-07-03 RX ORDER — ACETAMINOPHEN 325 MG/1
650 TABLET ORAL
Status: DISCONTINUED | OUTPATIENT
Start: 2018-07-03 | End: 2018-07-06 | Stop reason: HOSPADM

## 2018-07-03 RX ORDER — SODIUM CHLORIDE 0.9 % (FLUSH) 0.9 %
5-10 SYRINGE (ML) INJECTION AS NEEDED
Status: DISCONTINUED | OUTPATIENT
Start: 2018-07-03 | End: 2018-07-05 | Stop reason: SDUPTHER

## 2018-07-03 RX ORDER — ONDANSETRON 2 MG/ML
4 INJECTION INTRAMUSCULAR; INTRAVENOUS
Status: DISCONTINUED | OUTPATIENT
Start: 2018-07-03 | End: 2018-07-06 | Stop reason: HOSPADM

## 2018-07-03 RX ORDER — DORZOLAMIDE HCL 20 MG/ML
1 SOLUTION/ DROPS OPHTHALMIC 3 TIMES DAILY
Status: DISCONTINUED | OUTPATIENT
Start: 2018-07-03 | End: 2018-07-06 | Stop reason: HOSPADM

## 2018-07-03 RX ORDER — TIMOLOL MALEATE 5 MG/ML
1 SOLUTION/ DROPS OPHTHALMIC 2 TIMES DAILY
Status: DISCONTINUED | OUTPATIENT
Start: 2018-07-03 | End: 2018-07-06 | Stop reason: HOSPADM

## 2018-07-03 RX ORDER — LATANOPROST 50 UG/ML
1 SOLUTION/ DROPS OPHTHALMIC
Status: DISCONTINUED | OUTPATIENT
Start: 2018-07-03 | End: 2018-07-06 | Stop reason: HOSPADM

## 2018-07-03 RX ORDER — CHOLECALCIFEROL (VITAMIN D3) 125 MCG
3000 CAPSULE ORAL
Status: DISCONTINUED | OUTPATIENT
Start: 2018-07-03 | End: 2018-07-06 | Stop reason: HOSPADM

## 2018-07-03 RX ORDER — CHOLECALCIFEROL (VITAMIN D3) 125 MCG
CAPSULE ORAL
COMMUNITY
End: 2018-07-09

## 2018-07-03 RX ORDER — CARVEDILOL 3.12 MG/1
3.12 TABLET ORAL 2 TIMES DAILY WITH MEALS
Status: DISCONTINUED | OUTPATIENT
Start: 2018-07-03 | End: 2018-07-06 | Stop reason: HOSPADM

## 2018-07-03 RX ORDER — SODIUM CHLORIDE 9 MG/ML
250 INJECTION, SOLUTION INTRAVENOUS AS NEEDED
Status: DISCONTINUED | OUTPATIENT
Start: 2018-07-03 | End: 2018-07-06 | Stop reason: HOSPADM

## 2018-07-03 RX ORDER — PRAVASTATIN SODIUM 10 MG/1
20 TABLET ORAL
Status: DISCONTINUED | OUTPATIENT
Start: 2018-07-03 | End: 2018-07-06 | Stop reason: HOSPADM

## 2018-07-03 RX ORDER — SODIUM CHLORIDE 0.9 % (FLUSH) 0.9 %
5-10 SYRINGE (ML) INJECTION EVERY 8 HOURS
Status: DISCONTINUED | OUTPATIENT
Start: 2018-07-03 | End: 2018-07-05 | Stop reason: SDUPTHER

## 2018-07-03 RX ADMIN — CARVEDILOL 3.12 MG: 3.12 TABLET, FILM COATED ORAL at 17:49

## 2018-07-03 RX ADMIN — Medication 10 ML: at 17:00

## 2018-07-03 RX ADMIN — LACTASE TAB 3000 UNIT 3000 UNITS: 3000 TAB at 18:13

## 2018-07-03 RX ADMIN — DORZOLAMIDE HYDROCHLORIDE 1 DROP: 20 SOLUTION/ DROPS OPHTHALMIC at 21:39

## 2018-07-03 RX ADMIN — DORZOLAMIDE HYDROCHLORIDE 1 DROP: 20 SOLUTION/ DROPS OPHTHALMIC at 17:49

## 2018-07-03 RX ADMIN — TIMOLOL MALEATE 1 DROP: 5 SOLUTION/ DROPS OPHTHALMIC at 17:49

## 2018-07-03 RX ADMIN — SODIUM CHLORIDE 40 MG: 9 INJECTION INTRAMUSCULAR; INTRAVENOUS; SUBCUTANEOUS at 21:41

## 2018-07-03 RX ADMIN — PRAVASTATIN SODIUM 20 MG: 10 TABLET ORAL at 21:40

## 2018-07-03 RX ADMIN — Medication 5 ML: at 21:42

## 2018-07-03 RX ADMIN — LATANOPROST 1 DROP: 50 SOLUTION OPHTHALMIC at 21:48

## 2018-07-03 NOTE — PROGRESS NOTES
15:55- Pt arrived on unit    15:59- Spoke to Dr. Kevin Alvarez- notified MD that pt had arrived to hospital. Received order for GI consult. MD to put in all additional orders shortly. 16:03- GI consult called to Dr. Courtney Medellin office. Spoke to [facility]. 16:47- Cardiology consult called to Dr. Yazan Longo office. Spoke to [employer] stated she would give consult to Dr. Daniel Rouse. 17:20- Spoke to Dr. Kevin Alvarez. Received order for clear liquid diet. 16:54- Labs sent    17:45- Notified by lab that pink top had hemolyzed and they needed additional tube. 17:58- Lab redrawn and sent    18:24- Notified by Cecily Christianson in the lab that K had resulted at 5.4 and specimen looked hemolyzed. Dr. Kevin Alvarez on unit. Notified MD. Asked MD if he wanted me to redraw and notified MD that pt had been stuck multiple times today.  MD stated to redraw labs at 4 am.

## 2018-07-03 NOTE — PROGRESS NOTES
Bedside and Verbal shift change report given to Cristiane Davey  (oncoming nurse) by Ashly Loza (offgoing nurse). Report included the following information Kardex, MAR and Recent Results.
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
## 2018-07-03 NOTE — PROGRESS NOTES
Problem: Falls - Risk of  Goal: *Absence of Falls  Document Peg Fall Risk and appropriate interventions in the flowsheet.    Outcome: Progressing Towards Goal  Fall Risk Interventions:  Mobility Interventions: Bed/chair exit alarm, Patient to call before getting OOB         Medication Interventions: Patient to call before getting OOB, Bed/chair exit alarm
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
## 2018-07-03 NOTE — PROGRESS NOTES
Emmet Course NN notified of patient's readmission to [id number] [facility] today/ Anne Santiago of Care Management

## 2018-07-03 NOTE — PROGRESS NOTES
[facility] presents with   Chief Complaint   Patient presents with    Stool Color Change   Patient of Dr Ayo Stevenson here with complaint of stool color change noted yesterday. 1. Have you been to the ER, urgent care clinic since your last visit? Hospitalized since your last visit? No    2. Have you seen or consulted any other health care providers outside of the [address] [place] [address] since your last visit? Include any pap smears or colon screening.  No

## 2018-07-03 NOTE — PROGRESS NOTES
Bedside shift change report given to [address] [place] [address] (oncoming nurse) by [place] (offgoing nurse). Report included the following information SBAR, Kardex, Intake/Output, MAR, Recent Results and Cardiac Rhythm . Pt admitted today. IV started, EKG obtained, consents for blood and EGD obtained, GI and cardiology consults. Pt resting quietly at this time. Bed alarm set.

## 2018-07-03 NOTE — MR AVS SNAPSHOT
[address] [facility] [address] [id number] [phone] Patient: Katelyn Castellanos 
MRN: [id number] CPC:[id number]/37/0953 Visit Information Date & Time Provider Department Dept. Phone Encounter #  
 7/3/2018  1:30 PM JOVANNY Singer  [phone] [id number] Your Appointments 7/24/2018  9:00 AM  
PROCEDURE with PACEMAKER, [facility] [address]) Appt Note: [facility] DCPM battery check and full check [id number] [facility]  
[phone] [address] [facility]  
  
    
 9/7/2018 10:40 AM  
FOLLOW UP 10 with ARCELIA Joiner MD  
[facility] ([address]) Appt Note: [address] [phone] [address] [facility] [address] [address] 9/18/2018 11:15 AM  
ESTABLISHED PATIENT with Opal Hansen MD  
[facility] [address]) Appt Note: P/Dr CHALINO muñoz 3 month fu bg  
 [id number] [facility]  
[phone] [address] [facility]  
  
    
 9/27/2018 10:30 AM  
Follow Up with MD Jayla Christina 26 ([address]) Appt Note: [address] [phone] [address] [facility] [address] [address] Upcoming Health Maintenance Date Due  
 FOOT EXAM Q1 4/23/1931 EYE EXAM RETINAL OR DILATED Q1 4/23/1931 DTaP/Tdap/Td series (1 - Tdap) 4/23/1942 ZOSTER VACCINE AGE 60> 2/23/1981 GLAUCOMA SCREENING Q2Y 4/23/1986 Pneumococcal 65+ Low/Medium Risk (1 of 2 - PCV13) 4/23/1986 MEDICARE YEARLY EXAM 3/14/2018 Influenza Age 5 to Adult 8/1/2018 MICROALBUMIN Q1 9/7/2018 HEMOGLOBIN A1C Q6M 10/25/2018 LIPID PANEL Q1 6/5/2019 Allergies as of 7/3/2018  Review Complete On: 7/3/2018 By: Robel Pineda NP Severity Noted Reaction Type Reactions Latex, Natural Rubber  06/04/2018    Other (comments) Shellfish Derived  12/21/2012    Other (comments) Crab meat Current Immunizations  Never Reviewed No immunizations on file. Not reviewed this visit You Were Diagnosed With   
  
 Codes Comments Rectal bleeding    -  Primary ICD-10-CM: K62.5 ICD-9-CM: 569.3 Gastrointestinal hemorrhage, unspecified gastrointestinal hemorrhage type     ICD-10-CM: K92.2 ICD-9-CM: 578.9 Vitals BP Pulse Height(growth percentile) Weight(growth percentile) SpO2 BMI  
 110/70 60 5' 8\" (1.727 m) 164 lb 6.4 oz (74.6 kg) 98% 25 kg/m2 Smoking Status Never Smoker Vitals History BMI and BSA Data Body Mass Index Body Surface Area  
 25 kg/m 2 1.89 m 2 Preferred Pharmacy Pharmacy Name Phone Hiro Pradhan, [facility] [phone] Your Updated Medication List  
  
   
This list is accurate as of 7/3/18  3:33 PM.  Always use your most recent med list.  
  
  
  
  
 ADULT MULTIVITAMIN (W-LUTEIN) PO Take  by mouth. aspirin 81 mg chewable tablet Take 81 mg by mouth daily. AZOPT 1 % ophthalmic suspension Generic drug:  brinzolamide Administer 1 Drop to both eyes three (3) times daily. carvedilol 3.125 mg tablet Commonly known as:  Jinx Re Take 1 Tab by mouth two (2) times daily (with meals). lactase 3,000 unit tablet Commonly known as:  Reuel Ni Take  by mouth three (3) times daily (with meals). latanoprost 0.005 % ophthalmic solution Commonly known as:  Martha Radford Administer 1 Drop to both eyes nightly. pravastatin 20 mg tablet Commonly known as:  PRAVACHOL Take 1 Tab by mouth nightly. PriLOSEC 40 mg capsule Generic drug:  omeprazole Take 40 mg by mouth daily. ticagrelor 90 mg tablet Commonly known as:  [employer] Take 1 Tab by mouth every twelve (12) hours every twelve (12) hours. timolol 0.5 % ophthalmic solution Commonly known as:  TIMOPTIC  
1 Drop two (2) times a day. We Performed the Following AMB POC COMPLETE CBC,AUTOMATED ENTER [id number] CPT(R)] Patient Instructions Gastrointestinal Bleeding: Care Instructions Your Care Instructions The digestive or gastrointestinal tract goes from the mouth to the anus. It is often called the GI tract. Bleeding can happen anywhere in the GI tract. It may be caused by an ulcer, an infection, or cancer. It may also be caused by medicines such as aspirin or ibuprofen. Light bleeding may not cause any symptoms at first. But if you continue to bleed for a while, you may feel very weak or tired. Sudden, heavy bleeding means you need to see a doctor right away. This kind of bleeding can be very dangerous. But it can usually be cured or controlled. The doctor may do some tests to find the cause of your bleeding. Follow-up care is a key part of your treatment and safety. Be sure to make and go to all appointments, and call your doctor if you are having problems. It's also a good idea to know your test results and keep a list of the medicines you take. How can you care for yourself at home? · Be safe with medicines. Take your medicines exactly as prescribed. Call your doctor if you think you are having a problem with your medicine. You will get more details on the specific medicines your doctor prescribes. · Do not take aspirin or other anti-inflammatory medicines, such as naproxen (Aleve) or ibuprofen (Advil, Motrin), without talking to your doctor first. Ask your doctor if it is okay to use acetaminophen (Tylenol). · Do not drink alcohol. · The bleeding may make you lose iron. So it's important to eat foods that have a lot of iron. These include red meat, shellfish, poultry, and eggs. They also include beans, raisins, whole-grain breads, and leafy green vegetables. If you want help planning meals, you can make an appointment with a dietitian. When should you call for help? Call 911 anytime you think you may need emergency care. For example, call if: 
? · You have sudden, severe belly pain. ? · You vomit blood or what looks like coffee grounds. ? · You passed out (lost consciousness). ? · Your stools are maroon or very bloody. ?Call your doctor now or seek immediate medical care if: 
? · You are dizzy or lightheaded, or you feel like you may faint. ? · Your stools are black and look like tar, or they have streaks of blood. ? · You have belly pain. ? · You vomit or have nausea. ? · You have trouble swallowing, or it hurts when you swallow. ? Watch closely for changes in your health, and be sure to contact your doctor if: 
? · You do not get better as expected. Where can you learn more? Go to http://vivek-talisha.info/. Enter G188 in the search box to learn more about \"Gastrointestinal Bleeding: Care Instructions. \" Current as of: March 20, 2017 Content Version: 11.4 © 4582-0422 [employer]. Care instructions adapted under license by [employer] (which disclaims liability or warranty for this information). If you have questions about a medical condition or this instruction, always ask your healthcare professional. [place] [address] any warranty or liability for your use of this information. Introducing [facility]! Remi Grant introduces Zingaya patient portal. Now you can access parts of your medical record, email your doctor's office, and request medication refills online. 1. In your internet browser, go to [email or web address]. [email or web address] 2. Click on the First Time User? Click Here link in the Sign In box. You will see the New Member Sign Up page. 3. Enter your Yotpo Access Code exactly as it appears below. You will not need to use this code after youve completed the sign-up process. If you do not sign up before the expiration date, you must request a new code. · Yotpo Access Code: [email or web address] Expires: 9/13/2018  4:44 PM 
 
4. Enter the last four digits of your Social Security Number (xxxx) and Date of Birth (mm/dd/yyyy) as indicated and click Submit. You will be taken to the next sign-up page. 5. Create a Yotpo ID. This will be your Yotpo login ID and cannot be changed, so think of one that is secure and easy to remember. 6. Create a Yotpo password. You can change your password at any time. 7. Enter your Password Reset Question and Answer. This can be used at a later time if you forget your password. 8. Enter your e-mail address. You will receive e-mail notification when new information is available in [address] 9. Click Sign Up. You can now view and download portions of your medical record. 10. Click the Download Summary menu link to download a portable copy of your medical information. If you have questions, please visit the Frequently Asked Questions section of the Yotpo website. Remember, Yotpo is NOT to be used for urgent needs. For medical emergencies, dial 911. Now available from your iPhone and Android! Please provide this summary of care documentation to your next provider. Your primary care clinician is listed as ARCELIA Mackey. If you have any questions after today's visit, please call [phone].
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
## 2018-07-03 NOTE — PATIENT INSTRUCTIONS
Gastrointestinal Bleeding: Care Instructions  Your Care Instructions    The digestive or gastrointestinal tract goes from the mouth to the anus. It is often called the GI tract. Bleeding can happen anywhere in the GI tract. It may be caused by an ulcer, an infection, or cancer. It may also be caused by medicines such as aspirin or ibuprofen. Light bleeding may not cause any symptoms at first. But if you continue to bleed for a while, you may feel very weak or tired. Sudden, heavy bleeding means you need to see a doctor right away. This kind of bleeding can be very dangerous. But it can usually be cured or controlled. The doctor may do some tests to find the cause of your bleeding. Follow-up care is a key part of your treatment and safety. Be sure to make and go to all appointments, and call your doctor if you are having problems. It's also a good idea to know your test results and keep a list of the medicines you take. How can you care for yourself at home? · Be safe with medicines. Take your medicines exactly as prescribed. Call your doctor if you think you are having a problem with your medicine. You will get more details on the specific medicines your doctor prescribes. · Do not take aspirin or other anti-inflammatory medicines, such as naproxen (Aleve) or ibuprofen (Advil, Motrin), without talking to your doctor first. Ask your doctor if it is okay to use acetaminophen (Tylenol). · Do not drink alcohol. · The bleeding may make you lose iron. So it's important to eat foods that have a lot of iron. These include red meat, shellfish, poultry, and eggs. They also include beans, raisins, whole-grain breads, and leafy green vegetables. If you want help planning meals, you can make an appointment with a dietitian. When should you call for help? Call 911 anytime you think you may need emergency care. For example, call if:  ? · You have sudden, severe belly pain.    ? · You vomit blood or what looks like coffee grounds. ? · You passed out (lost consciousness). ? · Your stools are maroon or very bloody. ?Call your doctor now or seek immediate medical care if:  ? · You are dizzy or lightheaded, or you feel like you may faint. ? · Your stools are black and look like tar, or they have streaks of blood. ? · You have belly pain. ? · You vomit or have nausea. ? · You have trouble swallowing, or it hurts when you swallow. ? Watch closely for changes in your health, and be sure to contact your doctor if:  ? · You do not get better as expected. Where can you learn more? Go to http://vivek-talisha.info/. Enter L788 in the search box to learn more about \"Gastrointestinal Bleeding: Care Instructions. \"  Current as of: March 20, 2017  Content Version: 11.4  © 3609-7398 [employer]. Care instructions adapted under license by [employer] (which disclaims liability or warranty for this information). If you have questions about a medical condition or this instruction, always ask your healthcare professional. [place] [address] any warranty or liability for your use of this information.

## 2018-07-03 NOTE — IP AVS SNAPSHOT
[address] [facility] 
[phone] Patient: Kacy Ramey 
MRN: [id number] About your hospitalization You were admitted on:  July 3, 2018 You last received care in the:  [address] You were discharged on:  July 6, 2018 Why you were hospitalized Your primary diagnosis was:  Gi Bleed Your diagnoses also included:  Anemia, Blood Loss, Ascvd (Arteriosclerotic Cardiovascular Disease), Type 2 Diabetes Mellitus With Nephropathy (Hcc), Chronic Kidney Disease, Stage Iv (Severe) (Hcc), Acute Gastric Ulcer With Hemorrhage, Acute Gastritis Without Hemorrhage Follow-up Information Follow up With Details Comments Contact Info MD Henry Wiseman [address] [facility] 
[phone] Marty Quick MD In 5 days  Henry Lara [facility] 
[phone] Your Scheduled Appointments Tuesday July 24, 2018  9:00 AM EDT PROCEDURE with PACEMAKER, [facility]) [address] [facility]  
[phone] Discharge Orders None A check reginaldo indicates which time of day the medication should be taken. My Medications CHANGE how you take these medications Instructions Each Dose to Equal  
 Morning Noon Evening Bedtime  
 omeprazole 40 mg capsule Commonly known as:  PriLOSEC What changed:  when to take this Your last dose was: Your next dose is: Take 1 Cap by mouth two (2) times a day. 40 mg CONTINUE taking these medications Instructions Each Dose to Equal  
 Morning Noon Evening Bedtime ADULT MULTIVITAMIN (W-LUTEIN) PO Your last dose was: Your next dose is: Take  by mouth. aspirin 81 mg chewable tablet Your last dose was: Your next dose is: Take 81 mg by mouth daily. 81 mg  
    
   
   
   
  
 AZOPT 1 % ophthalmic suspension Generic drug:  brinzolamide Your last dose was: Your next dose is:    
   
   
 Administer 1 Drop to both eyes three (3) times daily. 1 Drop  
    
   
   
   
  
 carvedilol 3.125 mg tablet Commonly known as:  Amna Balzarine Your last dose was: Your next dose is: Take 1 Tab by mouth two (2) times daily (with meals). 3.125 mg  
    
   
   
   
  
 lactase 3,000 unit tablet Commonly known as:  Marlyse Likes Your last dose was: Your next dose is: Take  by mouth three (3) times daily (with meals). latanoprost 0.005 % ophthalmic solution Commonly known as:  Lynnette Hodge Your last dose was: Your next dose is:    
   
   
 Administer 1 Drop to both eyes nightly. 1 Drop  
    
   
   
   
  
 pravastatin 20 mg tablet Commonly known as:  PRAVACHOL Your last dose was: Your next dose is: Take 1 Tab by mouth nightly. 20 mg  
    
   
   
   
  
 ticagrelor 90 mg tablet Commonly known as:  [employer] Your last dose was: Your next dose is: Take 1 Tab by mouth every twelve (12) hours every twelve (12) hours. 90 mg  
    
   
   
   
  
 timolol 0.5 % ophthalmic solution Commonly known as:  TIMOPTIC Your last dose was: Your next dose is:    
   
   
 1 Drop two (2) times a day. 1 Drop Where to Get Your Medications These medications were sent to [address], [address] [place] [address] Phone:  [phone]  
  omeprazole 40 mg capsule Discharge Instructions Azam Bhatt 
 [address] [facility]. [address] 
[phone] Patient Discharge Instructions Debi Grullon / [id number] : 1921 Admitted 7/3/2018 Discharged: 2018 Principal Problem: 
  GI bleed (7/3/2018) Active Problems: 
  Chronic kidney disease, stage IV (severe) ([facility] (2017) ASCVD (arteriosclerotic cardiovascular disease) (2017) Type 2 diabetes mellitus with nephropathy ([facility] [address] [facility] (2018) Anemia, blood loss (7/3/2018) Acute gastric ulcer with hemorrhage (2018) Acute gastritis without hemorrhage (2018) Allergies Allergen Reactions  Latex, Natural Rubber Other (comments)  Shellfish Derived Other (comments) Crab meat · It is important that you take the medication exactly as they are prescribed. · Do not take other medications without consulting your doctor. What to do at Next Level of Care Disposition:  [address] Recommended diet: Usual 
 
Recommended activity: Usual with assistance, Admit to health care unit [facility] Information obtained by : 
I understand that if any problems occur once I am at home I am to contact my physician. I understand and acknowledge receipt of the instructions indicated above. Physician's or R.N.'s Signature                                                                  Date/Time Patient or Representative Signature                                                          Date/Time Introducing [facility]!    
 [facility] introduces [employer] patient portal. Now you can access parts of your medical record, email your doctor's office, and request medication refills online. 1. In your internet browser, go to [email or web address] 2. Click on the First Time User? Click Here link in the Sign In box. You will see the New Member Sign Up page. 3. Enter your [employer] Access Code exactly as it appears below. You will not need to use this code after youve completed the sign-up process. If you do not sign up before the expiration date, you must request a new code. · [employer] Access Code: [email or web address] Expires: 9/13/2018  4:44 PM 
 
4. Enter the last four digits of your Social Security Number (xxxx) and Date of Birth (mm/dd/yyyy) as indicated and click Submit. You will be taken to the next sign-up page. 5. Create a [employer]das[employer] ID. This will be your [employer] login ID and cannot be changed, so think of one that is secure and easy to remember. 6. Create a [employer] password. You can change your password at any time. 7. Enter your Password Reset Question and Answer. This can be used at a later time if you forget your password. 8. Enter your e-mail address. You will receive e-mail notification when new information is available in [address] 9. Click Sign Up. You can now view and download portions of your medical record. 10. Click the Download Summary menu link to download a portable copy of your medical information. If you have questions, please visit the Frequently Asked Questions section of the [employer] website. Remember, [employer] is NOT to be used for urgent needs. For medical emergencies, dial 911. Now available from your iPhone and Android! Introducing Jose R Rae As a [facility] patient, I wanted to make you aware of our electronic visit tool called Jose R Rae. [facility] 24/7 allows you to connect within minutes with a medical provider 24 hours a day, seven days a week via a mobile device or tablet or logging into a secure website from your computer.   You can access [facility] 24/7 from anywhere in the [place]. A virtual visit might be right for you when you have a simple condition and feel like you just dont want to get out of bed, or cant get away from work for an appointment, when your regular [facility] provider is not available (evenings, weekends or holidays), or when youre out of town and need minor care. Electronic visits cost only $49 and if the [facility] 24/7 provider determines a prescription is needed to treat your condition, one can be electronically transmitted to a nearby pharmacy*. Please take a moment to enroll today if you have not already done so. The enrollment process is free and takes just a few minutes. To enroll, please download the [facility] 24/7 karly to your tablet or phone, or visit [email or web address] to enroll on your computer. And, as an [address] [place] [address] patient with a [employer] account, the results of your visits will be scanned into your electronic medical record and your primary care provider will be able to view the scanned results. We urge you to continue to see your regular Francesca Snow provider for your ongoing medical care. And while your primary care provider may not be the one available when you seek a Jose R Rae virtual visit, the peace of mind you get from getting a real diagnosis real time can be priceless. For more information on Jose R Rae, view our Frequently Asked Questions (FAQs) at [email or web address]. Sincerely, 
 
Jesús Dougherty MD 
Chief Medical Officer [facility] Hilda Stewart *:  certain medications cannot be prescribed via Jose R Nabsyscrescencio Providers Seen During Your Hospitalization Provider Specialty Primary office phone Anum Hall MD Internal Medicine [phone] Your Primary Care Physician (PCP) Primary Care Physician Office Phone Office Fax Aristeo Guerrero  You are allergic to the following Allergen Reactions Latex, Natural Rubber Other (comments) Shellfish Derived Other (comments) Crab meat Recent Documentation Smoking Status Never Smoker Emergency Contacts Name Discharge Info Relation Home Work Mobile Vial-Kristyn Michaels DISCHARGE CAREGIVER [3] Child [2] [phone] Liu Michaels DISCHARGE CAREGIVER [3] Other Relative [6] [phone] Latisha Jerome DISCHARGE CAREGIVER [3] Spouse [3] [phone] Patient Belongings The following personal items are in your possession at time of discharge: 
  Dental Appliances: None  Visual Aid: Glasses      Home Medications: None   Jewelry: None  Clothing: Shirt, Pants    Other Valuables: Eyeglasses Please provide this summary of care documentation to your next provider. Signatures-by signing, you are acknowledging that this After Visit Summary has been reviewed with you and you have received a copy. Patient Signature:  ____________________________________________________________ Date:  ____________________________________________________________  
  
Smith Cruz Provider Signature:  ____________________________________________________________ Date:  ____________________________________________________________

## 2018-07-04 LAB
ANION GAP SERPL CALC-SCNC: 9 MMOL/L (ref 5–15)
ATRIAL RATE: 60 BPM
BUN SERPL-MCNC: 38 MG/DL (ref 6–20)
BUN/CREAT SERPL: 17 (ref 12–20)
CALCIUM SERPL-MCNC: 8.5 MG/DL (ref 8.5–10.1)
CALCULATED R AXIS, ECG10: -75 DEGREES
CALCULATED T AXIS, ECG11: 104 DEGREES
CHLORIDE SERPL-SCNC: 116 MMOL/L (ref 97–108)
CO2 SERPL-SCNC: 20 MMOL/L (ref 21–32)
CREAT SERPL-MCNC: 2.18 MG/DL (ref 0.7–1.3)
DIAGNOSIS, 93000: NORMAL
ERYTHROCYTE [DISTWIDTH] IN BLOOD BY AUTOMATED COUNT: 18.5 % (ref 11.5–14.5)
GLUCOSE SERPL-MCNC: 112 MG/DL (ref 65–100)
HCT VFR BLD AUTO: 30.5 % (ref 36.6–50.3)
HCT VFR BLD AUTO: 31.2 % (ref 36.6–50.3)
HGB BLD-MCNC: 10 G/DL (ref 12.1–17)
HGB BLD-MCNC: 10 G/DL (ref 12.1–17)
MCH RBC QN AUTO: 36.8 PG (ref 26–34)
MCHC RBC AUTO-ENTMCNC: 32.1 G/DL (ref 30–36.5)
MCV RBC AUTO: 114.7 FL (ref 80–99)
NRBC # BLD: 0 K/UL (ref 0–0.01)
NRBC BLD-RTO: 0 PER 100 WBC
PLATELET # BLD AUTO: 211 K/UL (ref 150–400)
PMV BLD AUTO: 11.2 FL (ref 8.9–12.9)
POTASSIUM SERPL-SCNC: 4.6 MMOL/L (ref 3.5–5.1)
Q-T INTERVAL, ECG07: 468 MS
QRS DURATION, ECG06: 166 MS
QTC CALCULATION (BEZET), ECG08: 468 MS
RBC # BLD AUTO: 2.72 M/UL (ref 4.1–5.7)
SODIUM SERPL-SCNC: 145 MMOL/L (ref 136–145)
VENTRICULAR RATE, ECG03: 60 BPM
WBC # BLD AUTO: 6.9 K/UL (ref 4.1–11.1)

## 2018-07-04 PROCEDURE — P9016 RBC LEUKOCYTES REDUCED: HCPCS | Performed by: INTERNAL MEDICINE

## 2018-07-04 PROCEDURE — 85027 COMPLETE CBC AUTOMATED: CPT | Performed by: INTERNAL MEDICINE

## 2018-07-04 PROCEDURE — 36430 TRANSFUSION BLD/BLD COMPNT: CPT

## 2018-07-04 PROCEDURE — 30233N1 TRANSFUSION OF NONAUTOLOGOUS RED BLOOD CELLS INTO PERIPHERAL VEIN, PERCUTANEOUS APPROACH: ICD-10-PCS | Performed by: INTERNAL MEDICINE

## 2018-07-04 PROCEDURE — 74011250637 HC RX REV CODE- 250/637: Performed by: INTERNAL MEDICINE

## 2018-07-04 PROCEDURE — C9113 INJ PANTOPRAZOLE SODIUM, VIA: HCPCS | Performed by: INTERNAL MEDICINE

## 2018-07-04 PROCEDURE — 85018 HEMOGLOBIN: CPT | Performed by: INTERNAL MEDICINE

## 2018-07-04 PROCEDURE — 36415 COLL VENOUS BLD VENIPUNCTURE: CPT | Performed by: INTERNAL MEDICINE

## 2018-07-04 PROCEDURE — 65660000000 HC RM CCU STEPDOWN

## 2018-07-04 PROCEDURE — 74011000250 HC RX REV CODE- 250: Performed by: INTERNAL MEDICINE

## 2018-07-04 PROCEDURE — 80048 BASIC METABOLIC PNL TOTAL CA: CPT | Performed by: INTERNAL MEDICINE

## 2018-07-04 PROCEDURE — 74011250636 HC RX REV CODE- 250/636: Performed by: INTERNAL MEDICINE

## 2018-07-04 RX ADMIN — SODIUM CHLORIDE 40 MG: 9 INJECTION INTRAMUSCULAR; INTRAVENOUS; SUBCUTANEOUS at 09:02

## 2018-07-04 RX ADMIN — LATANOPROST 1 DROP: 50 SOLUTION OPHTHALMIC at 21:10

## 2018-07-04 RX ADMIN — DORZOLAMIDE HYDROCHLORIDE 1 DROP: 20 SOLUTION/ DROPS OPHTHALMIC at 21:07

## 2018-07-04 RX ADMIN — Medication 5 ML: at 05:11

## 2018-07-04 RX ADMIN — SODIUM CHLORIDE 40 MG: 9 INJECTION INTRAMUSCULAR; INTRAVENOUS; SUBCUTANEOUS at 21:07

## 2018-07-04 RX ADMIN — TIMOLOL MALEATE 1 DROP: 5 SOLUTION/ DROPS OPHTHALMIC at 09:01

## 2018-07-04 RX ADMIN — PRAVASTATIN SODIUM 20 MG: 10 TABLET ORAL at 21:07

## 2018-07-04 RX ADMIN — Medication 5 ML: at 21:10

## 2018-07-04 RX ADMIN — DORZOLAMIDE HYDROCHLORIDE 1 DROP: 20 SOLUTION/ DROPS OPHTHALMIC at 16:19

## 2018-07-04 RX ADMIN — DORZOLAMIDE HYDROCHLORIDE 1 DROP: 20 SOLUTION/ DROPS OPHTHALMIC at 09:09

## 2018-07-04 RX ADMIN — TIMOLOL MALEATE 1 DROP: 5 SOLUTION/ DROPS OPHTHALMIC at 17:05

## 2018-07-04 RX ADMIN — CARVEDILOL 3.12 MG: 3.12 TABLET, FILM COATED ORAL at 16:20

## 2018-07-04 NOTE — PROGRESS NOTES
7/4/2018 8:10 AM    Admit Date: 7/3/2018    Admit Diagnosis: GI Bleed;GI bleed    Subjective: Carson Alt   denies chest pain, chest pressure/discomfort, dyspnea, palpitations, irregular heart beats. Visit Vitals    /76 (BP 1 Location: Right arm, BP Patient Position: At rest)    Pulse 60    Temp 97.7 °F (36.5 °C)    Resp 16    SpO2 97%     Current Facility-Administered Medications   Medication Dose Route Frequency    dorzolamide (TRUSOPT) 2 % ophthalmic solution 1 Drop  1 Drop Both Eyes TID    carvedilol (COREG) tablet 3.125 mg  3.125 mg Oral BID WITH MEALS    multivitamin, tx-iron-ca-min (THERA-M w/ IRON) tablet 1 Tab  1 Tab Oral DAILY    lactase (LACTAID) tablet 3,000 Units  3,000 Units Oral TID WITH MEALS    latanoprost (XALATAN) 0.005 % ophthalmic solution 1 Drop  1 Drop Both Eyes QHS    timolol (TIMOPTIC) 0.5 % ophthalmic solution 1 Drop  1 Drop Both Eyes BID    sodium chloride (NS) flush 5-10 mL  5-10 mL IntraVENous Q8H    sodium chloride (NS) flush 5-10 mL  5-10 mL IntraVENous PRN    acetaminophen (TYLENOL) tablet 650 mg  650 mg Oral Q6H PRN    ondansetron (ZOFRAN) injection 4 mg  4 mg IntraVENous Q4H PRN    pantoprazole (PROTONIX) 40 mg in sodium chloride 0.9% 10 mL injection  40 mg IntraVENous Q12H    0.9% sodium chloride infusion 250 mL  250 mL IntraVENous PRN    pravastatin (PRAVACHOL) tablet 20 mg  20 mg Oral QHS         Objective:      Visit Vitals    /76 (BP 1 Location: Right arm, BP Patient Position: At rest)    Pulse 60    Temp 97.7 °F (36.5 °C)    Resp 16    SpO2 97%       Physical Exam:  Abdomen: soft, non-tender.  Bowel sounds normal.   Extremities: no cyanosis or edema  Heart: regular rate and rhythm, S1, S2 normal, no murmur, click, rub or gallop  Lungs: clear to auscultation bilaterally  Neurologic: Grossly normal    Data Review:   Labs:    Recent Results (from the past 24 hour(s))   AMB POC COMPLETE CBC,AUTOMATED ENTER    Collection Time: 07/03/18  1:35 PM   Result Value Ref Range    WBC (POC) 7.0 4.1 - 10.9 K/uL    RBC (POC) 2.19 (A) 4.20 - 6.30 M/uL    HGB (POC) 8.3 (A) 12.0 - 18.0 g/dL    HCT (POC) 24.5 (A) 37.0 - 51.0 %    MCV (POC) 112.0 (A) 80.0 - 97.0 fL    MCH (POC) 38.0 (A) 26.0 - 32.0 pg    MCHC (POC) 34.1 30.0 - 36.0 g/dL    PLATELET (POC) 900.8 140.0 - 440.0 K/uL    ABS. LYMPHS (POC) 1.7 0.6 - 4.1 K/uL    LYMPHOCYTES (POC) 25.2 10.0 - 58.5 %    Mid # (POC) 0.4 0.0 - 1.8 K/uL    MID% POC 6.6 0.1 - 24.0 %    ABS. GRANS (POC) 4.9 2.0 - 7.8 K/uL    GRANULOCYTES (POC) 68.2 37.0 - 83.2 %   METABOLIC PANEL, COMPREHENSIVE    Collection Time: 07/03/18  4:54 PM   Result Value Ref Range    Sodium 145 136 - 145 mmol/L    Potassium 5.4 (H) 3.5 - 5.1 mmol/L    Chloride 115 (H) 97 - 108 mmol/L    CO2 19 (L) 21 - 32 mmol/L    Anion gap 11 5 - 15 mmol/L    Glucose 104 (H) 65 - 100 mg/dL    BUN 40 (H) 6 - 20 MG/DL    Creatinine 2.20 (H) 0.70 - 1.30 MG/DL    BUN/Creatinine ratio 18 12 - 20      GFR est AA 34 (L) >60 ml/min/1.73m2    GFR est non-AA 28 (L) >60 ml/min/1.73m2    Calcium 8.9 8.5 - 10.1 MG/DL    Bilirubin, total 0.8 0.2 - 1.0 MG/DL    ALT (SGPT) 22 12 - 78 U/L    AST (SGOT) 46 (H) 15 - 37 U/L    Alk.  phosphatase 77 45 - 117 U/L    Protein, total 6.6 6.4 - 8.2 g/dL    Albumin 3.4 (L) 3.5 - 5.0 g/dL    Globulin 3.2 2.0 - 4.0 g/dL    A-G Ratio 1.1 1.1 - 2.2     CBC WITH AUTOMATED DIFF    Collection Time: 07/03/18  4:54 PM   Result Value Ref Range    WBC 7.3 4.1 - 11.1 K/uL    RBC 2.29 (L) 4.10 - 5.70 M/uL    HGB 8.8 (L) 12.1 - 17.0 g/dL    HCT 27.3 (L) 36.6 - 50.3 %    .2 (H) 80.0 - 99.0 FL    MCH 38.4 (H) 26.0 - 34.0 PG    MCHC 32.2 30.0 - 36.5 g/dL    RDW 15.9 (H) 11.5 - 14.5 %    PLATELET 064 081 - 620 K/uL    NRBC 0.0 0  WBC    ABSOLUTE NRBC 0.00 0.00 - 0.01 K/uL    NEUTROPHILS 57 32 - 75 %    LYMPHOCYTES 23 12 - 49 %    MONOCYTES 9 5 - 13 %    EOSINOPHILS 10 (H) 0 - 7 %    BASOPHILS 1 0 - 1 %    IMMATURE GRANULOCYTES 0 0.0 - 0.5 %    ABS. NEUTROPHILS 4.1 1.8 - 8.0 K/UL    ABS. LYMPHOCYTES 1.7 0.8 - 3.5 K/UL    ABS. MONOCYTES 0.7 0.0 - 1.0 K/UL    ABS. EOSINOPHILS 0.7 (H) 0.0 - 0.4 K/UL    ABS. BASOPHILS 0.1 0.0 - 0.1 K/UL    ABS. IMM.  GRANS. 0.0 0.00 - 0.04 K/UL    DF SMEAR SCANNED      RBC COMMENTS MACROCYTOSIS  2+        RBC COMMENTS ANISOCYTOSIS  2+       TYPE + CROSSMATCH    Collection Time: 07/03/18  6:00 PM   Result Value Ref Range    Crossmatch Expiration 07/06/2018     ABO/Rh(D) O NEGATIVE     Antibody screen POS     Antibody ID [id number]     ANTIGEN TYPING E NEGATIVE,  c POSITIVE,  C NEGATIVE,       Unit number [id number]     Blood component type [facility]     Unit division 00     Status of unit ALLOCATED     Crossmatch result Compatible     Unit number [id number]     Blood component type [facility]     Unit division 00     Status of unit ISSUED     Crossmatch result Compatible    URINALYSIS W/MICROSCOPIC    Collection Time: 07/03/18  7:17 PM   Result Value Ref Range    Color YELLOW/STRAW      Appearance CLEAR CLEAR      Specific gravity 1.017 1.003 - 1.030      pH (UA) 6.0 5.0 - 8.0      Protein TRACE (A) NEG mg/dL    Glucose NEGATIVE  NEG mg/dL    Ketone NEGATIVE  NEG mg/dL    Bilirubin NEGATIVE  NEG      Blood NEGATIVE  NEG      Urobilinogen 0.2 0.2 - 1.0 EU/dL    Nitrites NEGATIVE  NEG      Leukocyte Esterase NEGATIVE  NEG      WBC 0-4 0 - 4 /hpf    RBC 0-5 0 - 5 /hpf    Epithelial cells FEW FEW /lpf    Bacteria NEGATIVE  NEG /hpf    Hyaline cast 0-2 0 - 5 /lpf   METABOLIC PANEL, BASIC    Collection Time: 07/04/18  5:38 AM   Result Value Ref Range    Sodium 145 136 - 145 mmol/L    Potassium 4.6 3.5 - 5.1 mmol/L    Chloride 116 (H) 97 - 108 mmol/L    CO2 20 (L) 21 - 32 mmol/L    Anion gap 9 5 - 15 mmol/L    Glucose 112 (H) 65 - 100 mg/dL    BUN 38 (H) 6 - 20 MG/DL    Creatinine 2.18 (H) 0.70 - 1.30 MG/DL    BUN/Creatinine ratio 17 12 - 20      GFR est AA 34 (L) >60 ml/min/1.73m2    GFR est non-AA 28 (L) >60 ml/min/1.73m2 Calcium 8.5 8.5 - 10.1 MG/DL   CBC W/O DIFF    Collection Time: 07/04/18  5:38 AM   Result Value Ref Range    WBC 6.9 4.1 - 11.1 K/uL    RBC 2.72 (L) 4.10 - 5.70 M/uL    HGB 10.0 (L) 12.1 - 17.0 g/dL    HCT 31.2 (L) 36.6 - 50.3 %    .7 (H) 80.0 - 99.0 FL    MCH 36.8 (H) 26.0 - 34.0 PG    MCHC 32.1 30.0 - 36.5 g/dL    RDW 18.5 (H) 11.5 - 14.5 %    PLATELET 001 320 - 203 K/uL    MPV 11.2 8.9 - 12.9 FL    NRBC 0.0 0  WBC    ABSOLUTE NRBC 0.00 0.00 - 0.01 K/uL       Telemetry: paced      Assessment:     Principal Problem:    GI bleed (7/3/2018)    Active Problems:    Chronic kidney disease, stage IV (severe) (HCC) (12/6/2017)      ASCVD (arteriosclerotic cardiovascular disease) (12/6/2017)      Type 2 diabetes mellitus with nephropathy ([facility] [address] (1/23/2018)      Anemia, blood loss (7/3/2018)        Plan:     Continue to hold DAPT. Await GI findings. Plans noted.

## 2018-07-04 NOTE — PROGRESS NOTES
[address] [place] [address]  [phone]      Medical Progress Note      NAME: Steven Dinh   :  1921  MRM:  [id number]    Date/Time: 2018  7:27 AM         Subjective:     Admitted with probable UGI bleed with blood loss anemia due to antiplatelet therapy for CAD with recent stents. Has received 1 unit or PRBC's with hgb 10.0 this AM. Denies abdominal pain or active bleeding. No chest pain or SOB.     Past Medical History:   Diagnosis Date    Abdominal pain 2017    Arteriosclerotic coronary artery disease 2017    Atrioventricular block, complete (HCC)     CAD (coronary artery disease)     Chronic kidney disease, stage IV (severe) (HCC) 2017    CKD (chronic kidney disease) stage 3, GFR 30-59 ml/min 2017    Diabetes mellitus ([address] 2017    Dizziness and giddiness     Fatigue 2017    GERD (gastroesophageal reflux disease)     GERD (gastroesophageal reflux disease) 2017    Glaucoma 2017    Hematuria 2017    Hyperlipidemia 2017    Hypertension     Mixed hyperlipidemia     Neuropathy 2017    Other acute and subacute form of ischemic heart disease     Pernicious anemia 2017    Sinoatrial node dysfunction (HCC)     Syncope and collapse     Thrush 2017    Thrush of mouth and esophagus ([address] 2017    Type 2 diabetes mellitus without complication, without long-term current use of insulin ([address] 2017       ROS:  General: negative for fever, chills, sweats, weakness  Respiratory:  negative for cough, sputum production, SOB, wheezing, [place], pleuritic pain  Cardiology:  negative for chest pain, palpitations, orthopnea, PND, edema, syncope   Gastrointestinal: positive for melena without abdominal pain  Muskuloskeletal : negative for arthralgia, myalgia  Dermatological: negative for rash, ulceration, mole change, new lesion  Neurological: negative for headache, dizziness, confusion, focal weakness, paresthesia, memory loss, gait disturbance  Psychological: negative for anxiety, depression, agitation  Review of systems otherwise negative         Objective:       Vitals:        Last 24hrs VS reviewed since prior progress note. Most recent are:    Visit Vitals    /76 (BP 1 Location: Right arm, BP Patient Position: At rest)    Pulse 60    Temp 97.7 °F (36.5 °C)    Resp 16    SpO2 97%     SpO2 Readings from Last 6 Encounters:   07/04/18 97%   07/03/18 98%   06/15/18 97%   06/08/18 97%   06/05/18 97%   05/29/18 98%          Intake/Output Summary (Last 24 hours) at 07/04/18 0779  Last data filed at 07/03/18 1915   Gross per 24 hour   Intake                0 ml   Output              100 ml   Net             -100 ml        Telemetry reviewed:   normal sinus rhythm  Tubes:   N/A  X-Ray:  N/A   Procedures:   N/A    Exam:     General   well developed, well nourished, appears stated age, in no acute distress. [place]  Respiratory   Clear To Auscultation bilaterally - no wheezes, rales, rhonchi, or crackles  Cardiology  Regular Rate and Rythmn  - no murmurs, rubs or gallops  Abdominal  Soft, non-tender, non-distended, positive bowel sounds, no hepatosplenomegaly  Extremities  No clubbing, cyanosis, or edema. Pulses intact. Skin  Normal skin turgor. No rashes or skin ulcers noted  Neurological  No focal neurological deficits noted  Psychological  Oriented x 3. Normal affect.   Exam otherwise negative     Lab Data Reviewed: (see below)    Medications Reviewed: (see below)    ______________________________________________________________________    Medications:     Current Facility-Administered Medications   Medication Dose Route Frequency    dorzolamide (TRUSOPT) 2 % ophthalmic solution 1 Drop  1 Drop Both Eyes TID    carvedilol (COREG) tablet 3.125 mg  3.125 mg Oral BID WITH MEALS    multivitamin, tx-iron-ca-min (THERA-M w/ IRON) tablet 1 Tab  1 Tab Oral DAILY    lactase (LACTAID) tablet 3,000 Units  3,000 Units Oral TID WITH MEALS    latanoprost (XALATAN) 0.005 % ophthalmic solution 1 Drop  1 Drop Both Eyes QHS    timolol (TIMOPTIC) 0.5 % ophthalmic solution 1 Drop  1 Drop Both Eyes BID    sodium chloride (NS) flush 5-10 mL  5-10 mL IntraVENous Q8H    sodium chloride (NS) flush 5-10 mL  5-10 mL IntraVENous PRN    acetaminophen (TYLENOL) tablet 650 mg  650 mg Oral Q6H PRN    ondansetron (ZOFRAN) injection 4 mg  4 mg IntraVENous Q4H PRN    pantoprazole (PROTONIX) 40 mg in sodium chloride 0.9% 10 mL injection  40 mg IntraVENous Q12H    0.9% sodium chloride infusion 250 mL  250 mL IntraVENous PRN    pravastatin (PRAVACHOL) tablet 20 mg  20 mg Oral QHS            Lab Review:     Recent Labs      07/04/18   0538  07/03/18   1654   WBC  6.9  7.3   HGB  10.0*  8.8*   HCT  31.2*  27.3*   PLT  211  209     Recent Labs      07/04/18   0538  07/03/18   1654   NA  145  145   K  4.6  5.4*   CL  116*  115*   CO2  20*  19*   GLU  112*  104*   BUN  38*  40*   CREA  2.18*  2.20*   CA  8.5  8.9   ALB   --   3.4*   TBILI   --   0.8   SGOT   --   46*   ALT   --   22     Lab Results   Component Value Date/Time    Glucose (POC) 154 (H) 11/05/2009 07:32 AM    Glucose (POC) 150 (H) 10/29/2009 08:05 AM    Glucose,  04/09/2018 03:31 PM     No results for input(s): PH, PCO2, PO2, HCO3, FIO2 in the last 72 hours. No results for input(s): INR in the last 72 hours. No lab exists for component: INREXT         Assessment:     Principal Problem:    GI bleed (7/3/2018)    Active Problems:    Chronic kidney disease, stage IV (severe) (HCC) (12/6/2017)      ASCVD (arteriosclerotic cardiovascular disease) (12/6/2017)      Type 2 diabetes mellitus with nephropathy ([facility] [address] (1/23/2018)      Anemia, blood loss (7/3/2018)           Plan:     Risk of deterioration: medium             1. Continue IV PPI BID  2. Holding ASA/Brilinta  3. NPO  4. Serial H/H  5. Transfuse to keep hgb > 8 due to cardiac disease  6.  GI following  7. Eventual EGD  8. Follow renal function    9.  Cardiology consult pending                     Care Plan discussed with: Patient    Discussed:  Care Plan    Prophylaxis:  SCD's and H2B/PPI    Disposition:  Home w/Family           ___________________________________________________    Attending Physician: Fatoumata Fajardo MD

## 2018-07-04 NOTE — PROGRESS NOTES
Pt admitted from MD office with Hg 8/3 s/p 2 PTCA stents 6/5/18. Pt lives with spouse in independent living in [place], is independent with adls, drives, no DME, has had [facility] and [facility] in past(no return there), PCP Dr Soledad Arroyo, full code, and has [facility] Medicare. If in agreement, please order PT consult to assess mobility needs and address//document regarding code status. Chart ecin'd to Tom Rooney. Family would transport pt home when stable. For EGD tomorrow. Care Management Interventions  PCP Verified by CM:  Yes  Transition of Care Consult (CM Consult): Discharge Planning  MyChart Signup: No  Discharge Durable Medical Equipment: No  Health Maintenance Reviewed: Yes  Physical Therapy Consult: No  Occupational Therapy Consult: No  Speech Therapy Consult: No  Current Support Network: Lives with Spouse  Confirm Follow Up Transport: Family  Plan discussed with Pt/Family/Caregiver: No  Discharge Location  Discharge Placement: Home

## 2018-07-04 NOTE — PROGRESS NOTES
Problem: Falls - Risk of  Goal: *Absence of Falls  Document Peg Fall Risk and appropriate interventions in the flowsheet.    Outcome: Progressing Towards Goal  Fall Risk Interventions:  Mobility Interventions: Bed/chair exit alarm, Communicate number of staff needed for ambulation/transfer, OT consult for ADLs, Patient to call before getting OOB, PT Consult for mobility concerns, PT Consult for assist device competence, Utilize walker, cane, or other assitive device         Medication Interventions: Evaluate medications/consider consulting pharmacy, Patient to call before getting OOB, Bed/chair exit alarm, Teach patient to arise slowly

## 2018-07-04 NOTE — PROGRESS NOTES
Gastroenterology Progress Note    7/4/2018    Admit Date: 7/3/2018    Subjective: Follow up for: melena, GI bleed, anemia, on DAPT      Feels great . Wants to eat. Hgb is better. Ref. Range 6/5/2018 04:08 7/3/2018 16:54 7/4/2018 05:38   HGB Latest Ref Range: 12.1 - 17.0 g/dL 11.7 (L) 8.8 (L) 10.0 (L)   HCT Latest Ref Range: 36.6 - 50.3 % 34.8 (L) 27.3 (L) 31.2 (L)     Patient was seen in rounds by me today. At this time, the patient is resting comfortably. The patient has no new complaints today. There is no abdominal pain. There is no bleeding. Current Facility-Administered Medications   Medication Dose Route Frequency    dorzolamide (TRUSOPT) 2 % ophthalmic solution 1 Drop  1 Drop Both Eyes TID    carvedilol (COREG) tablet 3.125 mg  3.125 mg Oral BID WITH MEALS    multivitamin, tx-iron-ca-min (THERA-M w/ IRON) tablet 1 Tab  1 Tab Oral DAILY    lactase (LACTAID) tablet 3,000 Units  3,000 Units Oral TID WITH MEALS    latanoprost (XALATAN) 0.005 % ophthalmic solution 1 Drop  1 Drop Both Eyes QHS    timolol (TIMOPTIC) 0.5 % ophthalmic solution 1 Drop  1 Drop Both Eyes BID    sodium chloride (NS) flush 5-10 mL  5-10 mL IntraVENous Q8H    sodium chloride (NS) flush 5-10 mL  5-10 mL IntraVENous PRN    acetaminophen (TYLENOL) tablet 650 mg  650 mg Oral Q6H PRN    ondansetron (ZOFRAN) injection 4 mg  4 mg IntraVENous Q4H PRN    pantoprazole (PROTONIX) 40 mg in sodium chloride 0.9% 10 mL injection  40 mg IntraVENous Q12H    0.9% sodium chloride infusion 250 mL  250 mL IntraVENous PRN    pravastatin (PRAVACHOL) tablet 20 mg  20 mg Oral QHS        Objective:     Blood pressure 168/73, pulse 64, temperature 97.6 °F (36.4 °C), resp. rate 18, SpO2 97 %.     07/04 0701 - 07/04 1900  In: 0   Out: 200 [Urine:200]    07/02 1901 - 07/04 0700  In: -   Out: 100 [Urine:100]        Physical Examination:       General:AAO x 3,   HEENT:  EOMI,   Chest:  CTA, Heart: S1, S2, RRR  GI: Soft, NT, ND + bowel sounds  Extremities: No cyanosis      Data Review    Recent Results (from the past 24 hour(s))   AMB POC COMPLETE CBC,AUTOMATED ENTER    Collection Time: 07/03/18  1:35 PM   Result Value Ref Range    WBC (POC) 7.0 4.1 - 10.9 K/uL    RBC (POC) 2.19 (A) 4.20 - 6.30 M/uL    HGB (POC) 8.3 (A) 12.0 - 18.0 g/dL    HCT (POC) 24.5 (A) 37.0 - 51.0 %    MCV (POC) 112.0 (A) 80.0 - 97.0 fL    MCH (POC) 38.0 (A) 26.0 - 32.0 pg    MCHC (POC) 34.1 30.0 - 36.0 g/dL    PLATELET (POC) 280.7 140.0 - 440.0 K/uL    ABS. LYMPHS (POC) 1.7 0.6 - 4.1 K/uL    LYMPHOCYTES (POC) 25.2 10.0 - 58.5 %    Mid # (POC) 0.4 0.0 - 1.8 K/uL    MID% POC 6.6 0.1 - 24.0 %    ABS. GRANS (POC) 4.9 2.0 - 7.8 K/uL    GRANULOCYTES (POC) 68.2 37.0 - 33.7 %   METABOLIC PANEL, COMPREHENSIVE    Collection Time: 07/03/18  4:54 PM   Result Value Ref Range    Sodium 145 136 - 145 mmol/L    Potassium 5.4 (H) 3.5 - 5.1 mmol/L    Chloride 115 (H) 97 - 108 mmol/L    CO2 19 (L) 21 - 32 mmol/L    Anion gap 11 5 - 15 mmol/L    Glucose 104 (H) 65 - 100 mg/dL    BUN 40 (H) 6 - 20 MG/DL    Creatinine 2.20 (H) 0.70 - 1.30 MG/DL    BUN/Creatinine ratio 18 12 - 20      GFR est AA 34 (L) >60 ml/min/1.73m2    GFR est non-AA 28 (L) >60 ml/min/1.73m2    Calcium 8.9 8.5 - 10.1 MG/DL    Bilirubin, total 0.8 0.2 - 1.0 MG/DL    ALT (SGPT) 22 12 - 78 U/L    AST (SGOT) 46 (H) 15 - 37 U/L    Alk.  phosphatase 77 45 - 117 U/L    Protein, total 6.6 6.4 - 8.2 g/dL    Albumin 3.4 (L) 3.5 - 5.0 g/dL    Globulin 3.2 2.0 - 4.0 g/dL    A-G Ratio 1.1 1.1 - 2.2     CBC WITH AUTOMATED DIFF    Collection Time: 07/03/18  4:54 PM   Result Value Ref Range    WBC 7.3 4.1 - 11.1 K/uL    RBC 2.29 (L) 4.10 - 5.70 M/uL    HGB 8.8 (L) 12.1 - 17.0 g/dL    HCT 27.3 (L) 36.6 - 50.3 %    .2 (H) 80.0 - 99.0 FL    MCH 38.4 (H) 26.0 - 34.0 PG    MCHC 32.2 30.0 - 36.5 g/dL    RDW 15.9 (H) 11.5 - 14.5 %    PLATELET 881 720 - 538 K/uL    NRBC 0.0 0  WBC    ABSOLUTE NRBC 0.00 0.00 - 0.01 K/uL NEUTROPHILS 57 32 - 75 %    LYMPHOCYTES 23 12 - 49 %    MONOCYTES 9 5 - 13 %    EOSINOPHILS 10 (H) 0 - 7 %    BASOPHILS 1 0 - 1 %    IMMATURE GRANULOCYTES 0 0.0 - 0.5 %    ABS. NEUTROPHILS 4.1 1.8 - 8.0 K/UL    ABS. LYMPHOCYTES 1.7 0.8 - 3.5 K/UL    ABS. MONOCYTES 0.7 0.0 - 1.0 K/UL    ABS. EOSINOPHILS 0.7 (H) 0.0 - 0.4 K/UL    ABS. BASOPHILS 0.1 0.0 - 0.1 K/UL    ABS. IMM.  GRANS. 0.0 0.00 - 0.04 K/UL    DF SMEAR SCANNED      RBC COMMENTS MACROCYTOSIS  2+        RBC COMMENTS ANISOCYTOSIS  2+       TYPE + CROSSMATCH    Collection Time: 07/03/18  6:00 PM   Result Value Ref Range    Crossmatch Expiration 07/06/2018     ABO/Rh(D) O NEGATIVE     Antibody screen POS     Antibody ID [id number]     ANTIGEN TYPING E NEGATIVE,  c POSITIVE,  C NEGATIVE,       Unit number [id number]     Blood component type [facility]     Unit division 00     Status of unit ALLOCATED     Crossmatch result Compatible     Unit number [id number]     Blood component type [facility]     Unit division 00     Status of unit ISSUED     Crossmatch result Compatible    URINALYSIS W/MICROSCOPIC    Collection Time: 07/03/18  7:17 PM   Result Value Ref Range    Color YELLOW/STRAW      Appearance CLEAR CLEAR      Specific gravity 1.017 1.003 - 1.030      pH (UA) 6.0 5.0 - 8.0      Protein TRACE (A) NEG mg/dL    Glucose NEGATIVE  NEG mg/dL    Ketone NEGATIVE  NEG mg/dL    Bilirubin NEGATIVE  NEG      Blood NEGATIVE  NEG      Urobilinogen 0.2 0.2 - 1.0 EU/dL    Nitrites NEGATIVE  NEG      Leukocyte Esterase NEGATIVE  NEG      WBC 0-4 0 - 4 /hpf    RBC 0-5 0 - 5 /hpf    Epithelial cells FEW FEW /lpf    Bacteria NEGATIVE  NEG /hpf    Hyaline cast 0-2 0 - 5 /lpf   METABOLIC PANEL, BASIC    Collection Time: 07/04/18  5:38 AM   Result Value Ref Range    Sodium 145 136 - 145 mmol/L    Potassium 4.6 3.5 - 5.1 mmol/L    Chloride 116 (H) 97 - 108 mmol/L    CO2 20 (L) 21 - 32 mmol/L    Anion gap 9 5 - 15 mmol/L    Glucose 112 (H) 65 - 100 mg/dL    BUN 38 (H) 6 - 20 MG/DL Creatinine 2.18 (H) 0.70 - 1.30 MG/DL    BUN/Creatinine ratio 17 12 - 20      GFR est AA 34 (L) >60 ml/min/1.73m2    GFR est non-AA 28 (L) >60 ml/min/1.73m2    Calcium 8.5 8.5 - 10.1 MG/DL   CBC W/O DIFF    Collection Time: 07/04/18  5:38 AM   Result Value Ref Range    WBC 6.9 4.1 - 11.1 K/uL    RBC 2.72 (L) 4.10 - 5.70 M/uL    HGB 10.0 (L) 12.1 - 17.0 g/dL    HCT 31.2 (L) 36.6 - 50.3 %    .7 (H) 80.0 - 99.0 FL    MCH 36.8 (H) 26.0 - 34.0 PG    MCHC 32.1 30.0 - 36.5 g/dL    RDW 18.5 (H) 11.5 - 14.5 %    PLATELET 800 243 - 437 K/uL    MPV 11.2 8.9 - 12.9 FL    NRBC 0.0 0  WBC    ABSOLUTE NRBC 0.00 0.00 - 0.01 K/uL     Recent Labs      07/04/18   0538  07/03/18   1654   WBC  6.9  7.3   HGB  10.0*  8.8*   HCT  31.2*  27.3*   PLT  211  209     Recent Labs      07/04/18   0538  07/03/18   1654   NA  145  145   K  4.6  5.4*   CL  116*  115*   CO2  20*  19*   BUN  38*  40*   CREA  2.18*  2.20*   GLU  112*  104*   CA  8.5  8.9     Recent Labs      07/03/18   1654   SGOT  46*   AP  77   TP  6.6   ALB  3.4*   GLOB  3.2     No results for input(s): INR, PTP, APTT in the last 72 hours. No lab exists for component: INREXT   No results for input(s): FE, TIBC, PSAT, FERR in the last 72 hours. No results found for: FOL, RBCF   No results for input(s): PH, PCO2, PO2 in the last 72 hours. No results for input(s): CPK, CKNDX, TROIQ in the last 72 hours.     No lab exists for component: CPKMB  Lab Results   Component Value Date/Time    Cholesterol, total 175 06/05/2018 04:08 AM    HDL Cholesterol 39 06/05/2018 04:08 AM    LDL, calculated 121 (H) 06/05/2018 04:08 AM    Triglyceride 75 06/05/2018 04:08 AM    CHOL/HDL Ratio 4.5 06/05/2018 04:08 AM     No components found for: [place]  Lab Results   Component Value Date/Time    Color YELLOW/STRAW 07/03/2018 07:17 PM    Appearance CLEAR 07/03/2018 07:17 PM    Specific gravity 1.017 07/03/2018 07:17 PM    pH (UA) 6.0 07/03/2018 07:17 PM    Protein TRACE (A) 07/03/2018 07:17 PM    Glucose NEGATIVE  07/03/2018 07:17 PM    Ketone NEGATIVE  07/03/2018 07:17 PM    Bilirubin NEGATIVE  07/03/2018 07:17 PM    Urobilinogen 0.2 07/03/2018 07:17 PM    Nitrites NEGATIVE  07/03/2018 07:17 PM    Leukocyte Esterase NEGATIVE  07/03/2018 07:17 PM    Epithelial cells FEW 07/03/2018 07:17 PM    Bacteria NEGATIVE  07/03/2018 07:17 PM    WBC 0-4 07/03/2018 07:17 PM    RBC 0-5 07/03/2018 07:17 PM        ROS: -CP, SOB, Dysuria, palpitations, cough. Assessment:    Principal Problem:    GI bleed (7/3/2018)    Active Problems:    Chronic kidney disease, stage IV (severe) (HCC) (12/6/2017)      ASCVD (arteriosclerotic cardiovascular disease) (12/6/2017)      Type 2 diabetes mellitus with nephropathy ([facility] [address] (1/23/2018)      Anemia, blood loss (7/3/2018)             Plan/Discussion:     · He is hemodynamically stable. Stools are getting lighter. · hgb is better. · FLD today, NPO after MN for EGD with Dr Anyi Adler tomorrow. · D/w his RN in detail. Signed By: Triny Olson.  Savana Love MD    7/4/2018  11:46 AM

## 2018-07-04 NOTE — PROGRESS NOTES
Bedside and Verbal shift change report given to [address] [place] [address] Ne (oncoming nurse) by Ezra Fuentes (offgoing nurse). Report included the following information SBAR, Kardex, Intake/Output, MAR, Accordion, Recent Results and Cardiac Rhythm Paced.

## 2018-07-04 NOTE — PROGRESS NOTES
Bedside and Verbal shift change report given to Charlotte Marie (oncoming nurse) by Betsey Ratliff (offgoing nurse). Report included the following information Kardex, MAR and Recent Results.

## 2018-07-05 ENCOUNTER — ANESTHESIA EVENT (OUTPATIENT)
Dept: ENDOSCOPY | Age: 83
DRG: 378 | End: 2018-07-05
Payer: MEDICARE

## 2018-07-05 ENCOUNTER — ANESTHESIA (OUTPATIENT)
Dept: ENDOSCOPY | Age: 83
DRG: 378 | End: 2018-07-05
Payer: MEDICARE

## 2018-07-05 PROBLEM — K25.0 ACUTE GASTRIC ULCER WITH HEMORRHAGE: Status: ACTIVE | Noted: 2018-07-05

## 2018-07-05 PROBLEM — K29.00 ACUTE GASTRITIS WITHOUT HEMORRHAGE: Status: ACTIVE | Noted: 2018-07-05

## 2018-07-05 LAB
ANION GAP SERPL CALC-SCNC: 10 MMOL/L (ref 5–15)
BUN SERPL-MCNC: 36 MG/DL (ref 6–20)
BUN/CREAT SERPL: 16 (ref 12–20)
CALCIUM SERPL-MCNC: 8.8 MG/DL (ref 8.5–10.1)
CHLORIDE SERPL-SCNC: 114 MMOL/L (ref 97–108)
CO2 SERPL-SCNC: 20 MMOL/L (ref 21–32)
CREAT SERPL-MCNC: 2.28 MG/DL (ref 0.7–1.3)
ERYTHROCYTE [DISTWIDTH] IN BLOOD BY AUTOMATED COUNT: 18.7 % (ref 11.5–14.5)
GLUCOSE SERPL-MCNC: 101 MG/DL (ref 65–100)
H PYLORI FROM TISSUE: NEGATIVE
HCT VFR BLD AUTO: 31.9 % (ref 36.6–50.3)
HGB BLD-MCNC: 10.4 G/DL (ref 12.1–17)
KIT LOT NO., HCLOLOT: NORMAL
MCH RBC QN AUTO: 37.5 PG (ref 26–34)
MCHC RBC AUTO-ENTMCNC: 32.6 G/DL (ref 30–36.5)
MCV RBC AUTO: 115.2 FL (ref 80–99)
NEGATIVE CONTROL: NEGATIVE
NRBC # BLD: 0 K/UL (ref 0–0.01)
NRBC BLD-RTO: 0 PER 100 WBC
PLATELET # BLD AUTO: 191 K/UL (ref 150–400)
PMV BLD AUTO: 11.3 FL (ref 8.9–12.9)
POSITIVE CONTROL: POSITIVE
POTASSIUM SERPL-SCNC: 4.8 MMOL/L (ref 3.5–5.1)
RBC # BLD AUTO: 2.77 M/UL (ref 4.1–5.7)
SODIUM SERPL-SCNC: 144 MMOL/L (ref 136–145)
WBC # BLD AUTO: 7.1 K/UL (ref 4.1–11.1)

## 2018-07-05 PROCEDURE — 85027 COMPLETE CBC AUTOMATED: CPT | Performed by: INTERNAL MEDICINE

## 2018-07-05 PROCEDURE — 77030010936 HC CLP LIG BSC -C: Performed by: INTERNAL MEDICINE

## 2018-07-05 PROCEDURE — 77030019988 HC FCPS ENDOSC DISP BSC -B: Performed by: INTERNAL MEDICINE

## 2018-07-05 PROCEDURE — 97161 PT EVAL LOW COMPLEX 20 MIN: CPT

## 2018-07-05 PROCEDURE — 74011250637 HC RX REV CODE- 250/637: Performed by: INTERNAL MEDICINE

## 2018-07-05 PROCEDURE — 97116 GAIT TRAINING THERAPY: CPT

## 2018-07-05 PROCEDURE — G8979 MOBILITY GOAL STATUS: HCPCS

## 2018-07-05 PROCEDURE — 74011250636 HC RX REV CODE- 250/636

## 2018-07-05 PROCEDURE — G8978 MOBILITY CURRENT STATUS: HCPCS

## 2018-07-05 PROCEDURE — 0DB78ZX EXCISION OF STOMACH, PYLORUS, VIA NATURAL OR ARTIFICIAL OPENING ENDOSCOPIC, DIAGNOSTIC: ICD-10-PCS | Performed by: INTERNAL MEDICINE

## 2018-07-05 PROCEDURE — 74011250636 HC RX REV CODE- 250/636: Performed by: INTERNAL MEDICINE

## 2018-07-05 PROCEDURE — 65660000000 HC RM CCU STEPDOWN

## 2018-07-05 PROCEDURE — 80048 BASIC METABOLIC PNL TOTAL CA: CPT | Performed by: INTERNAL MEDICINE

## 2018-07-05 PROCEDURE — 74011000250 HC RX REV CODE- 250: Performed by: INTERNAL MEDICINE

## 2018-07-05 PROCEDURE — C9113 INJ PANTOPRAZOLE SODIUM, VIA: HCPCS | Performed by: INTERNAL MEDICINE

## 2018-07-05 PROCEDURE — 76040000007: Performed by: INTERNAL MEDICINE

## 2018-07-05 PROCEDURE — 36415 COLL VENOUS BLD VENIPUNCTURE: CPT | Performed by: INTERNAL MEDICINE

## 2018-07-05 PROCEDURE — 74011000250 HC RX REV CODE- 250

## 2018-07-05 PROCEDURE — 87077 CULTURE AEROBIC IDENTIFY: CPT | Performed by: INTERNAL MEDICINE

## 2018-07-05 PROCEDURE — 88305 TISSUE EXAM BY PATHOLOGIST: CPT | Performed by: INTERNAL MEDICINE

## 2018-07-05 PROCEDURE — 0W3P8ZZ CONTROL BLEEDING IN GASTROINTESTINAL TRACT, VIA NATURAL OR ARTIFICIAL OPENING ENDOSCOPIC: ICD-10-PCS | Performed by: INTERNAL MEDICINE

## 2018-07-05 PROCEDURE — 76060000032 HC ANESTHESIA 0.5 TO 1 HR: Performed by: INTERNAL MEDICINE

## 2018-07-05 RX ORDER — NALOXONE HYDROCHLORIDE 0.4 MG/ML
0.4 INJECTION, SOLUTION INTRAMUSCULAR; INTRAVENOUS; SUBCUTANEOUS
Status: DISCONTINUED | OUTPATIENT
Start: 2018-07-05 | End: 2018-07-05 | Stop reason: SDUPTHER

## 2018-07-05 RX ORDER — FENTANYL CITRATE 50 UG/ML
25 INJECTION, SOLUTION INTRAMUSCULAR; INTRAVENOUS
Status: DISCONTINUED | OUTPATIENT
Start: 2018-07-05 | End: 2018-07-05 | Stop reason: SDUPTHER

## 2018-07-05 RX ORDER — FLUMAZENIL 0.1 MG/ML
0.2 INJECTION INTRAVENOUS
Status: DISCONTINUED | OUTPATIENT
Start: 2018-07-05 | End: 2018-07-05 | Stop reason: HOSPADM

## 2018-07-05 RX ORDER — MIDAZOLAM HYDROCHLORIDE 1 MG/ML
.25-5 INJECTION, SOLUTION INTRAMUSCULAR; INTRAVENOUS
Status: DISCONTINUED | OUTPATIENT
Start: 2018-07-05 | End: 2018-07-05 | Stop reason: SDUPTHER

## 2018-07-05 RX ORDER — SODIUM CHLORIDE 9 MG/ML
75 INJECTION, SOLUTION INTRAVENOUS CONTINUOUS
Status: DISCONTINUED | OUTPATIENT
Start: 2018-07-05 | End: 2018-07-05 | Stop reason: SDUPTHER

## 2018-07-05 RX ORDER — LIDOCAINE HYDROCHLORIDE 20 MG/ML
INJECTION, SOLUTION EPIDURAL; INFILTRATION; INTRACAUDAL; PERINEURAL AS NEEDED
Status: DISCONTINUED | OUTPATIENT
Start: 2018-07-05 | End: 2018-07-05 | Stop reason: HOSPADM

## 2018-07-05 RX ORDER — PANTOPRAZOLE SODIUM 40 MG/1
40 TABLET, DELAYED RELEASE ORAL
Status: DISCONTINUED | OUTPATIENT
Start: 2018-07-05 | End: 2018-07-06 | Stop reason: HOSPADM

## 2018-07-05 RX ORDER — ATROPINE SULFATE 0.1 MG/ML
0.5 INJECTION INTRAVENOUS
Status: DISCONTINUED | OUTPATIENT
Start: 2018-07-05 | End: 2018-07-05 | Stop reason: SDUPTHER

## 2018-07-05 RX ORDER — FENTANYL CITRATE 50 UG/ML
25 INJECTION, SOLUTION INTRAMUSCULAR; INTRAVENOUS
Status: DISCONTINUED | OUTPATIENT
Start: 2018-07-05 | End: 2018-07-05 | Stop reason: HOSPADM

## 2018-07-05 RX ORDER — FLUMAZENIL 0.1 MG/ML
0.2 INJECTION INTRAVENOUS
Status: DISCONTINUED | OUTPATIENT
Start: 2018-07-05 | End: 2018-07-05 | Stop reason: SDUPTHER

## 2018-07-05 RX ORDER — DEXTROMETHORPHAN/PSEUDOEPHED 2.5-7.5/.8
1.2 DROPS ORAL
Status: DISCONTINUED | OUTPATIENT
Start: 2018-07-05 | End: 2018-07-05 | Stop reason: HOSPADM

## 2018-07-05 RX ORDER — SODIUM CHLORIDE 0.9 % (FLUSH) 0.9 %
5-10 SYRINGE (ML) INJECTION AS NEEDED
Status: DISCONTINUED | OUTPATIENT
Start: 2018-07-05 | End: 2018-07-05 | Stop reason: SDUPTHER

## 2018-07-05 RX ORDER — EPINEPHRINE 0.1 MG/ML
1 INJECTION INTRACARDIAC; INTRAVENOUS
Status: DISCONTINUED | OUTPATIENT
Start: 2018-07-05 | End: 2018-07-05 | Stop reason: SDUPTHER

## 2018-07-05 RX ORDER — ASPIRIN 81 MG/1
81 TABLET ORAL DAILY
Status: DISCONTINUED | OUTPATIENT
Start: 2018-07-06 | End: 2018-07-06 | Stop reason: HOSPADM

## 2018-07-05 RX ORDER — GLYCOPYRROLATE 0.2 MG/ML
INJECTION INTRAMUSCULAR; INTRAVENOUS AS NEEDED
Status: DISCONTINUED | OUTPATIENT
Start: 2018-07-05 | End: 2018-07-05 | Stop reason: HOSPADM

## 2018-07-05 RX ORDER — DEXTROMETHORPHAN/PSEUDOEPHED 2.5-7.5/.8
1.2 DROPS ORAL
Status: DISCONTINUED | OUTPATIENT
Start: 2018-07-05 | End: 2018-07-05 | Stop reason: SDUPTHER

## 2018-07-05 RX ORDER — EPINEPHRINE 0.1 MG/ML
1 INJECTION INTRACARDIAC; INTRAVENOUS
Status: DISCONTINUED | OUTPATIENT
Start: 2018-07-05 | End: 2018-07-05 | Stop reason: HOSPADM

## 2018-07-05 RX ORDER — SODIUM CHLORIDE 0.9 % (FLUSH) 0.9 %
5-10 SYRINGE (ML) INJECTION EVERY 8 HOURS
Status: ACTIVE | OUTPATIENT
Start: 2018-07-05 | End: 2018-07-05

## 2018-07-05 RX ORDER — MIDAZOLAM HYDROCHLORIDE 1 MG/ML
1-3 INJECTION, SOLUTION INTRAMUSCULAR; INTRAVENOUS
Status: DISCONTINUED | OUTPATIENT
Start: 2018-07-05 | End: 2018-07-05 | Stop reason: HOSPADM

## 2018-07-05 RX ORDER — KETAMINE HYDROCHLORIDE 10 MG/ML
INJECTION, SOLUTION INTRAMUSCULAR; INTRAVENOUS AS NEEDED
Status: DISCONTINUED | OUTPATIENT
Start: 2018-07-05 | End: 2018-07-05 | Stop reason: HOSPADM

## 2018-07-05 RX ORDER — ATROPINE SULFATE 0.1 MG/ML
0.5 INJECTION INTRAVENOUS
Status: DISCONTINUED | OUTPATIENT
Start: 2018-07-05 | End: 2018-07-05 | Stop reason: HOSPADM

## 2018-07-05 RX ORDER — SODIUM CHLORIDE 9 MG/ML
75 INJECTION, SOLUTION INTRAVENOUS CONTINUOUS
Status: DISPENSED | OUTPATIENT
Start: 2018-07-05 | End: 2018-07-05

## 2018-07-05 RX ORDER — SODIUM CHLORIDE 0.9 % (FLUSH) 0.9 %
5-10 SYRINGE (ML) INJECTION EVERY 8 HOURS
Status: DISCONTINUED | OUTPATIENT
Start: 2018-07-05 | End: 2018-07-05 | Stop reason: SDUPTHER

## 2018-07-05 RX ORDER — NALOXONE HYDROCHLORIDE 0.4 MG/ML
0.4 INJECTION, SOLUTION INTRAMUSCULAR; INTRAVENOUS; SUBCUTANEOUS
Status: DISCONTINUED | OUTPATIENT
Start: 2018-07-05 | End: 2018-07-05 | Stop reason: HOSPADM

## 2018-07-05 RX ORDER — PROPOFOL 10 MG/ML
INJECTION, EMULSION INTRAVENOUS AS NEEDED
Status: DISCONTINUED | OUTPATIENT
Start: 2018-07-05 | End: 2018-07-05 | Stop reason: HOSPADM

## 2018-07-05 RX ORDER — SODIUM CHLORIDE 0.9 % (FLUSH) 0.9 %
5-10 SYRINGE (ML) INJECTION AS NEEDED
Status: ACTIVE | OUTPATIENT
Start: 2018-07-05 | End: 2018-07-05

## 2018-07-05 RX ORDER — MIDAZOLAM HYDROCHLORIDE 1 MG/ML
.25-5 INJECTION, SOLUTION INTRAMUSCULAR; INTRAVENOUS
Status: DISCONTINUED | OUTPATIENT
Start: 2018-07-05 | End: 2018-07-05 | Stop reason: HOSPADM

## 2018-07-05 RX ADMIN — CARVEDILOL 3.12 MG: 3.12 TABLET, FILM COATED ORAL at 16:48

## 2018-07-05 RX ADMIN — MULTIPLE VITAMINS W/ MINERALS TAB 1 TABLET: TAB at 09:00

## 2018-07-05 RX ADMIN — PANTOPRAZOLE SODIUM 40 MG: 40 TABLET, DELAYED RELEASE ORAL at 16:48

## 2018-07-05 RX ADMIN — TIMOLOL MALEATE 1 DROP: 5 SOLUTION/ DROPS OPHTHALMIC at 08:37

## 2018-07-05 RX ADMIN — TIMOLOL MALEATE 1 DROP: 5 SOLUTION/ DROPS OPHTHALMIC at 17:23

## 2018-07-05 RX ADMIN — LATANOPROST 1 DROP: 50 SOLUTION OPHTHALMIC at 21:06

## 2018-07-05 RX ADMIN — LIDOCAINE HYDROCHLORIDE 100 MG: 20 INJECTION, SOLUTION EPIDURAL; INFILTRATION; INTRACAUDAL; PERINEURAL at 09:35

## 2018-07-05 RX ADMIN — SODIUM CHLORIDE 40 MG: 9 INJECTION INTRAMUSCULAR; INTRAVENOUS; SUBCUTANEOUS at 09:00

## 2018-07-05 RX ADMIN — PRAVASTATIN SODIUM 20 MG: 10 TABLET ORAL at 21:06

## 2018-07-05 RX ADMIN — TICAGRELOR 90 MG: 90 TABLET ORAL at 21:06

## 2018-07-05 RX ADMIN — PANTOPRAZOLE SODIUM 40 MG: 40 TABLET, DELAYED RELEASE ORAL at 10:53

## 2018-07-05 RX ADMIN — DORZOLAMIDE HYDROCHLORIDE 1 DROP: 20 SOLUTION/ DROPS OPHTHALMIC at 08:43

## 2018-07-05 RX ADMIN — DORZOLAMIDE HYDROCHLORIDE 1 DROP: 20 SOLUTION/ DROPS OPHTHALMIC at 16:47

## 2018-07-05 RX ADMIN — GLYCOPYRROLATE 0.1 MG: 0.2 INJECTION INTRAMUSCULAR; INTRAVENOUS at 09:35

## 2018-07-05 RX ADMIN — Medication 5 ML: at 05:20

## 2018-07-05 RX ADMIN — SODIUM CHLORIDE 75 ML/HR: 900 INJECTION, SOLUTION INTRAVENOUS at 09:14

## 2018-07-05 RX ADMIN — PROPOFOL 80 MG: 10 INJECTION, EMULSION INTRAVENOUS at 09:46

## 2018-07-05 RX ADMIN — KETAMINE HYDROCHLORIDE 10 MG: 10 INJECTION, SOLUTION INTRAMUSCULAR; INTRAVENOUS at 09:52

## 2018-07-05 RX ADMIN — SODIUM CHLORIDE: 900 INJECTION, SOLUTION INTRAVENOUS at 09:20

## 2018-07-05 NOTE — PROGRESS NOTES
Pt transported back to room 3257  Per stretcher and per Karel Berg from [employer]. Pt in stable condition.
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
## 2018-07-05 NOTE — PROGRESS NOTES
Cardiology Progress Note      7/5/2018 1:13 PM    Admit Date: 7/3/2018    Admit Diagnosis: GI Bleed;GI bleed;melena      Subjective: Steven Dinh denies any chest pain. EGD findings, GI recommendation noted. Visit Vitals    /83    Pulse 60    Temp 97.8 °F (36.6 °C)    Resp 21    SpO2 98%       Current Facility-Administered Medications   Medication Dose Route Frequency    sodium chloride (NS) flush 5-10 mL  5-10 mL IntraVENous Q8H    0.9% sodium chloride infusion  75 mL/hr IntraVENous CONTINUOUS    pantoprazole (PROTONIX) tablet 40 mg  40 mg Oral ACB&D    dorzolamide (TRUSOPT) 2 % ophthalmic solution 1 Drop  1 Drop Both Eyes TID    carvedilol (COREG) tablet 3.125 mg  3.125 mg Oral BID WITH MEALS    multivitamin, tx-iron-ca-min (THERA-M w/ IRON) tablet 1 Tab  1 Tab Oral DAILY    lactase (LACTAID) tablet 3,000 Units  3,000 Units Oral TID WITH MEALS    latanoprost (XALATAN) 0.005 % ophthalmic solution 1 Drop  1 Drop Both Eyes QHS    timolol (TIMOPTIC) 0.5 % ophthalmic solution 1 Drop  1 Drop Both Eyes BID    acetaminophen (TYLENOL) tablet 650 mg  650 mg Oral Q6H PRN    ondansetron (ZOFRAN) injection 4 mg  4 mg IntraVENous Q4H PRN    0.9% sodium chloride infusion 250 mL  250 mL IntraVENous PRN    pravastatin (PRAVACHOL) tablet 20 mg  20 mg Oral QHS         Objective:      Physical Exam:  Visit Vitals    /83    Pulse 60    Temp 97.8 °F (36.6 °C)    Resp 21    SpO2 98%     General Appearance:  Well developed, well nourished,alert and oriented x 3, and individual in no acute distress. Ears/Nose/Mouth/Throat:   Hearing grossly normal.         Neck: Supple. Chest:   Lungs clear to auscultation bilaterally. Cardiovascular:  Regular rate and rhythm, S1, S2 normal, no murmur. Abdomen:   Soft, non-tender, bowel sounds are active. Extremities: No edema bilaterally. Skin: Warm and dry.                Data Review:   Labs:    Recent Results (from the past 24 hour(s))   HGB & HCT    Collection Time: 07/04/18  2:07 PM   Result Value Ref Range    HGB 10.0 (L) 12.1 - 17.0 g/dL    HCT 30.5 (L) 36.6 - 50.3 %   CBC W/O DIFF    Collection Time: 07/05/18 12:44 AM   Result Value Ref Range    WBC 7.1 4.1 - 11.1 K/uL    RBC 2.77 (L) 4.10 - 5.70 M/uL    HGB 10.4 (L) 12.1 - 17.0 g/dL    HCT 31.9 (L) 36.6 - 50.3 %    .2 (H) 80.0 - 99.0 FL    MCH 37.5 (H) 26.0 - 34.0 PG    MCHC 32.6 30.0 - 36.5 g/dL    RDW 18.7 (H) 11.5 - 14.5 %    PLATELET 513 448 - 840 K/uL    MPV 11.3 8.9 - 12.9 FL    NRBC 0.0 0  WBC    ABSOLUTE NRBC 0.00 0.00 - 2.15 K/uL   METABOLIC PANEL, BASIC    Collection Time: 07/05/18 12:44 AM   Result Value Ref Range    Sodium 144 136 - 145 mmol/L    Potassium 4.8 3.5 - 5.1 mmol/L    Chloride 114 (H) 97 - 108 mmol/L    CO2 20 (L) 21 - 32 mmol/L    Anion gap 10 5 - 15 mmol/L    Glucose 101 (H) 65 - 100 mg/dL    BUN 36 (H) 6 - 20 MG/DL    Creatinine 2.28 (H) 0.70 - 1.30 MG/DL    BUN/Creatinine ratio 16 12 - 20      GFR est AA 32 (L) >60 ml/min/1.73m2    GFR est non-AA 27 (L) >60 ml/min/1.73m2    Calcium 8.8 8.5 - 10.1 MG/DL   POC H. PYLORI, TISSUE    Collection Time: 07/05/18  9:35 AM   Result Value Ref Range    H. pylori from tissue Negative Negative    Positive control positive     Negative control negative     Lot no. [id number]        Telemetry: normal sinus rhythm      Assessment:     Principal Problem:    GI bleed (7/3/2018)    Active Problems:    Chronic kidney disease, stage IV (severe) (HCC) (12/6/2017)      ASCVD (arteriosclerotic cardiovascular disease) (12/6/2017)      Type 2 diabetes mellitus with nephropathy (HCC) (1/23/2018)      Anemia, blood loss (7/3/2018)        Plan:     Restart brilinta, ASA.     [address] [facility], MD

## 2018-07-05 NOTE — PERIOP NOTES
Anesthesia reports 80mg Propofol, 100mg Lidocaine 10mg Ephedrine 0.1mg Robinul and 200mL NS given during procedure. Received report from anesthesia staff on vital signs and status of patient.

## 2018-07-05 NOTE — PROGRESS NOTES
Bedside and Verbal shift change report given to [address] [place] [address] Ne (oncoming nurse) by Henry Sanchez (offgoing nurse). Report included the following information SBAR, Kardex, Intake/Output, MAR, Accordion, Recent Results and Cardiac Rhythm Paced.

## 2018-07-05 NOTE — PROGRESS NOTES
Therapy recommends Health care followed by OP cardiac rehab. MD-please write a RX for the latter.   Thanks

## 2018-07-05 NOTE — PROCEDURES
NAME:  Silvina Sloan   :   1921   MRN:   [id number]     Date/Time:  2018 9:57 AM    Esophagogastroduodenoscopy (EGD) Procedure Note    Procedure: Esophagogastroduodenoscopy with biopsy, control of bleeding, EDMUND test    Indication:  Melena/hematochezia  Pre-operative Diagnosis: see indication above  Post-operative Diagnosis: see findings below  :  Audra Lennox, MD  Referring Provider:   Deanne Myers MD    Exam:  Airway: clear, no airway problems anticipated  Heart: RRR, without gallops or rubs  Lungs: clear bilaterally without wheezes, crackles, or rhonchi  Abdomen: soft, nontender, nondistended, bowel sounds present  Mental Status: awake, alert and oriented to person, place and time     Anethesia/Sedation:  MAC anesthesia Propofol 80mg IV  Procedure Details   After informed consent was obtained for the procedure, with all risks and benefits of procedure explained the patient was taken to the endoscopy suite and placed in the left lateral decubitus position. Following sequential administration of sedation as per above, the [id number] gastroscope was inserted into the mouth and advanced under direct vision to second portion of the duodenum. A careful inspection was made as the gastroscope was withdrawn, including a retroflexed view of the proximal stomach; findings and interventions are described below. Findings:    -Normal esophageal mucosa  -Diffuse mild friable gastritis with minimal diffuse nodularity seen in proximal stomach and antrum; EDMUND test and biopsies obtained  -Single shallow 2mm ulceration noted in stomach 3cm distal to gastroesophageal junction with oozing noted. No visible vessel; two hemoclips placed over lesion  -Normal duodenum     Therapies:  biopsy of stomach cardia, antrum; Hemoclip x 2 as above  Specimens: EDMUND test; #1 gastric bx  EBL:  None. Complications:   None; patient tolerated the procedure well.            Impression:    -Normal esophageal mucosa  -Diffuse mild friable gastritis with minimal diffuse nodularity seen in proximal stomach and antrum; EDMUND test and biopsies obtained  -Single shallow 2mm ulceration noted in stomach, 3cm distal to gastroesophageal junction with oozing noted. No visible vessel; two hemoclips placed over lesion  -Normal duodenum     Recommendations:  -Continue acid suppression. ,   -Await pathology. ,   -Await EDMUND test result and treat for Helicobacter pylori if positive. ,   -Follow symptoms. ,   -Resume diet  -Resume Brilinta and ASA    Discharge disposition:  To room after recovery in Endoscopy    Idalia Stringer MD

## 2018-07-05 NOTE — PERIOP NOTES
TRANSFER - OUT REPORT:    Verbal report given to Donte Eaton RN(name) on Kacy Ramey  being transferred to (unit) for routine progression of care       Report consisted of patients Situation, Background, Assessment and   Recommendations(SBAR). Information from the following report(s) SBAR was reviewed with the receiving nurse. Lines:   Peripheral IV 07/03/18 Left Hand (Active)   Site Assessment Clean, dry, & intact 7/5/2018 10:14 AM   Phlebitis Assessment 0 7/5/2018 10:14 AM   Infiltration Assessment 0 7/5/2018 10:14 AM   Dressing Status Clean, dry, & intact 7/5/2018 10:14 AM   Dressing Type Transparent 7/5/2018 10:14 AM   Hub Color/Line Status Infusing 7/5/2018 10:14 AM        Opportunity for questions and clarification was provided.       Patient transported with:

## 2018-07-05 NOTE — ANESTHESIA PREPROCEDURE EVALUATION
Anesthetic History   No history of anesthetic complications            Review of Systems / Medical History  Patient summary reviewed, nursing notes reviewed and pertinent labs reviewed    Pulmonary  Within defined limits                 Neuro/Psych             Comments: Neuropathy  Syncope Cardiovascular    Hypertension        Dysrhythmias   Pacemaker, CAD, CABG and hyperlipidemia    Exercise tolerance: <4 METS  Comments: TTE (8/9/17): Moderate MR, mild TI, mild pHTN, EF=60-65%    Complete Heart Block   GI/Hepatic/Renal     GERD    Renal disease: CRI      Comments: Melena  CRI, Stage IV Endo/Other    Diabetes: well controlled, type 2    Anemia    Comments: Pernicious Anemia Other Findings   Comments: Glaucoma         Physical Exam    Airway  Mallampati: III  TM Distance: > 6 cm  Neck ROM: normal range of motion   Mouth opening: Normal     Cardiovascular  Regular rate and rhythm,  S1 and S2 normal,  no murmur, click, rub, or gallop             Dental    Dentition: Poor dentition and Caps/crowns  Comments: Multiple missing teeth, moderate decay, no loose teeth.    Pulmonary  Breath sounds clear to auscultation               Abdominal  GI exam deferred       Other Findings            Anesthetic Plan    ASA: 4  Anesthesia type: general and total IV anesthesia          Induction: Intravenous  Anesthetic plan and risks discussed with: Patient

## 2018-07-05 NOTE — PROGRESS NOTES
If in agreement, please order PT consult to assess mobility needs and address//document regarding code status. Chart ecin'd to [facility]. They can accept over the weekend. Need PT recommendations for short term skilled nursing vs assisted living . Family would transport pt home when stable. For EGD tomorrow.

## 2018-07-05 NOTE — PROGRESS NOTES
Michelle 37 with [facility] in Endoscopy, pt is tentatively scheduled to go down for EGD at 1030am. Primary nurse will inform daughter. 0685  Primary nurse called daughter, Brennan Castellanos, pt being taken down for EGD now and family will visit after lunch.

## 2018-07-05 NOTE — PROGRESS NOTES
[address] [place] [address] Ne, [address] [facility]  [phone]      Medical Progress Note      NAME: Garrett Lim   :  1921  MRM:  [id number]    Date/Time: 2018  7:29 AM         Subjective:     Chart reviewed and patient seen and examined and D/W his nurse this AM and all events noted. He was admitted on 7/3 with Melanotic heme positive stool and 3.5 point drop of Hgb to 8.3 on admission (11.7 baseline). This is presumed UGI bleed with blood loss anemia due to antiplatelet therapy for CAD with recent stents. He has received 1 unit or PRBC's with hgb 10.2 this AM. He denies abdominal pain or active bleeding. He has no chest pain or SOB or other cardio/respiratory c/o. There are no other c/o on complete ROS. .    Past Medical History:   Diagnosis Date    Abdominal pain 2017    Arteriosclerotic coronary artery disease 2017    Atrioventricular block, complete (HCC)     CAD (coronary artery disease)     Chronic kidney disease, stage IV (severe) (HCC) 2017    CKD (chronic kidney disease) stage 3, GFR 30-59 ml/min 2017    Diabetes mellitus ([address] 2017    Dizziness and giddiness     Fatigue 2017    GERD (gastroesophageal reflux disease)     GERD (gastroesophageal reflux disease) 2017    Glaucoma 2017    Hematuria 2017    Hyperlipidemia 2017    Hypertension     Mixed hyperlipidemia     Neuropathy 2017    Other acute and subacute form of ischemic heart disease     Pernicious anemia 2017    Sinoatrial node dysfunction (HCC)     Syncope and collapse     Thrush 2017    Thrush of mouth and esophagus ([address] 2017    Type 2 diabetes mellitus without complication, without long-term current use of insulin ([address] 2017       ROS:  General: negative for fever, chills, sweats, weakness   Eyes: negative for visual changes  ENT: negative for ST  Respiratory:  negative for cough, sputum production, SOB, wheezing, [place], pleuritic pain  Cardiology:  negative for chest pain, palpitations, orthopnea, PND, edema, syncope   Gastrointestinal: positive for melena without abdominal pain  Muskuloskeletal : negative for arthralgia, myalgia  Dermatological: negative for rash, ulceration, mole change, new lesion  Neurological: negative for headache, dizziness, confusion, focal weakness, paresthesia, memory loss, gait disturbance  Psychological: negative for anxiety, depression, agitation  Review of systems otherwise negative         Objective:       Vitals:        Last 24hrs VS reviewed since prior progress note. Most recent are:    Visit Vitals    /60    Pulse 63    Temp 97.9 °F (36.6 °C)    Resp 18    SpO2 97%     SpO2 Readings from Last 6 Encounters:   07/05/18 97%   07/03/18 98%   06/15/18 97%   06/08/18 97%   06/05/18 97%   05/29/18 98%            Intake/Output Summary (Last 24 hours) at 07/05/18 0729  Last data filed at 07/04/18 1834   Gross per 24 hour   Intake              960 ml   Output              560 ml   Net              400 ml        Telemetry reviewed:   normal sinus rhythm  Tubes:   N/A  X-Ray:  N/A   Procedures:   N/A    Exam:     General   well developed, well nourished, appears stated age, in no acute distress. [place]  Eyes: anicteric, PERRL, EOMI  ENT; no thrush  Respiratory   Clear To Auscultation bilaterally - no wheezes, rales, rhonchi, or crackles  Cardiology  Regular Rate and Rythmn  - no murmurs, rubs or gallops  Abdominal  Soft, non-tender, non-distended, positive bowel sounds, no hepatosplenomegaly  Extremities  No clubbing, cyanosis, or edema. Pulses intact. Skin  Normal skin turgor. No rashes or skin ulcers noted  Neurological  No focal neurological deficits noted  Psychological  Oriented x 3. Normal affect.   Exam otherwise negative     Lab Data Reviewed: (see below)    Medications Reviewed: (see below)    ______________________________________________________________________    Medications:     Current Facility-Administered Medications   Medication Dose Route Frequency    dorzolamide (TRUSOPT) 2 % ophthalmic solution 1 Drop  1 Drop Both Eyes TID    carvedilol (COREG) tablet 3.125 mg  3.125 mg Oral BID WITH MEALS    multivitamin, tx-iron-ca-min (THERA-M w/ IRON) tablet 1 Tab  1 Tab Oral DAILY    lactase (LACTAID) tablet 3,000 Units  3,000 Units Oral TID WITH MEALS    latanoprost (XALATAN) 0.005 % ophthalmic solution 1 Drop  1 Drop Both Eyes QHS    timolol (TIMOPTIC) 0.5 % ophthalmic solution 1 Drop  1 Drop Both Eyes BID    sodium chloride (NS) flush 5-10 mL  5-10 mL IntraVENous Q8H    sodium chloride (NS) flush 5-10 mL  5-10 mL IntraVENous PRN    acetaminophen (TYLENOL) tablet 650 mg  650 mg Oral Q6H PRN    ondansetron (ZOFRAN) injection 4 mg  4 mg IntraVENous Q4H PRN    pantoprazole (PROTONIX) 40 mg in sodium chloride 0.9% 10 mL injection  40 mg IntraVENous Q12H    0.9% sodium chloride infusion 250 mL  250 mL IntraVENous PRN    pravastatin (PRAVACHOL) tablet 20 mg  20 mg Oral QHS            Lab Review:     Recent Labs      07/05/18 0044 07/04/18   1407  07/04/18   0538  07/03/18   1654   WBC  7.1   --   6.9  7.3   HGB  10.4*  10.0*  10.0*  8.8*   HCT  31.9*  30.5*  31.2*  27.3*   PLT  191   --   211  209     Recent Labs      07/05/18 0044  07/04/18   0538  07/03/18   1654   NA  144  145  145   K  4.8  4.6  5.4*   CL  114*  116*  115*   CO2  20*  20*  19*   GLU  101*  112*  104*   BUN  36*  38*  40*   CREA  2.28*  2.18*  2.20*   CA  8.8  8.5  8.9   ALB   --    --   3.4*   TBILI   --    --   0.8   SGOT   --    --   46*   ALT   --    --   22     Lab Results   Component Value Date/Time    Glucose (POC) 154 (H) 11/05/2009 07:32 AM    Glucose (POC) 150 (H) 10/29/2009 08:05 AM    Glucose,  04/09/2018 03:31 PM     No results for input(s): PH, PCO2, PO2, HCO3, FIO2 in the last 72 hours.   No results for input(s): INR in the last 72 hours. No lab exists for component: INREXT, INREXT         Assessment:     Principal Problem:    GI bleed (7/3/2018)    Active Problems:    Chronic kidney disease, stage IV (severe) (HCC) (12/6/2017)      ASCVD (arteriosclerotic cardiovascular disease) (12/6/2017)      Type 2 diabetes mellitus with nephropathy ([facility] (1/23/2018)      Anemia, blood loss (7/3/2018)           Plan:     Risk of deterioration: medium             1. Continue IV Protonix BID  2. Holding ASA/Brilinta  3. NPO for EGD this AM  4. Serial H/H stable so D/C for now and check if rebleeds  5. Transfuse to keep hgb > 8 due to cardiac disease  6. GI following  7. EGD today  8. Follow renal function    9. Follow BS and treat SSI  10.  Cardiology consult note reviewed                     Care Plan discussed with: Patient and Dr. Anyi Adler as well as nurse    Discussed:  Care Plan    Prophylaxis:  SCD's and H2B/PPI    Disposition:  Home w/Family           ___________________________________________________    Attending Physician: Derrick Rice MD

## 2018-07-05 NOTE — ROUTINE PROCESS
Kwabena Anderson  4/23/1921  [id number]    Situation:  Verbal report received from: [address] RN  Procedure: Procedure(s):  ESOPHAGOGASTRODUODENOSCOPY (EGD)  ESOPHAGOGASTRODUODENAL (EGD) BIOPSY  RESOLUTION CLIP x 2    Background:    Preoperative diagnosis: melena  Postoperative diagnosis: Gastritis, gastric ulcer    :  Dr. Luciano Burger  Assistant(s): Endoscopy Technician-1: Seda Alvarado  Endoscopy RN-1: Sarah Zaman RN    Specimens:   ID Type Source Tests Collected by Time Destination   1 : Gastric biopsy Preservative   Dana Varghese MD 7/5/2018 8698 Pathology     H. Pylori  yes    Assessment:  Intra-procedure medications       Anesthesia gave intra-procedure sedation and medications, see anesthesia flow sheet yes    Intravenous fluids: NS@ KVO     Vital signs stable       Abdominal assessment: round and soft       Recommendation:  Discharge patient per MD order  .   Return to floor Yes room 3251  Family or Friend   Not here- daughter will come later  Permission to share finding with family or friend n/a

## 2018-07-05 NOTE — ANESTHESIA POSTPROCEDURE EVALUATION
Post-Anesthesia Evaluation and Assessment    Patient: Helena Clarke MRN: [id number]  SSN: [id number]    YOB: 1921  Age: 80 y.o. Sex: male       Cardiovascular Function/Vital Signs  Visit Vitals    /66    Pulse 62    Temp 36.6 °C (97.8 °F)    Resp 19    SpO2 98%       Patient is status post general, total IV anesthesia anesthesia for Procedure(s):  ESOPHAGOGASTRODUODENOSCOPY (EGD)  ESOPHAGOGASTRODUODENAL (EGD) BIOPSY  RESOLUTION CLIP x 2. Nausea/Vomiting: None    Postoperative hydration reviewed and adequate. Pain:  Pain Scale 1: Numeric (0 - 10) (07/05/18 1013)  Pain Intensity 1: 0 (07/05/18 1013)   Managed    Neurological Status:   Neuro  Neurologic State: Alert; Appropriate for age (07/05/18 0740)  Orientation Level: Oriented X4 (07/05/18 0740)  Speech: Appropriate for age;Clear (07/05/18 0740)  LUE Motor Response: Purposeful (07/05/18 0740)  LLE Motor Response: Purposeful (07/05/18 0740)  RUE Motor Response: Purposeful (07/05/18 0740)  RLE Motor Response: Purposeful (07/05/18 0740)   At baseline    Mental Status and Level of Consciousness: Arousable    Pulmonary Status:   O2 Device: Room air (07/05/18 1017)   Adequate oxygenation and airway patent    Complications related to anesthesia: None    Post-anesthesia assessment completed.  No concerns    Signed By: Liza Marin MD     July 5, 2018

## 2018-07-05 NOTE — PROGRESS NOTES
TRANSFER - IN REPORT:    Verbal report received from Zehra(name) on Karime Pinto  being received from [facility](unit) for ordered procedure      Report consisted of patients Situation, Background, Assessment and   Recommendations(SBAR). Information from the following report(s) SBAR, Procedure Summary and Recent Results was reviewed with the receiving nurse. Opportunity for questions and clarification was provided. Assessment completed upon patients arrival to unit and care assumed.

## 2018-07-05 NOTE — PROGRESS NOTES
Spiritual Care Partner Volunteer visited patient in [address] on 7/5/18.   Documented by:  [place]ana Colby, MPS, [address], Chaplain EDDIE LOMELI [facility] Paging Service  [phone])

## 2018-07-05 NOTE — PROGRESS NOTES
[address] Cat Rodgers for PT orders. Call disconnected    [address] Cat Rodgers office a second time.  No answer, left VM to return call

## 2018-07-05 NOTE — PROGRESS NOTES
Problem: Falls - Risk of  Goal: *Absence of Falls  Document Peg Fall Risk and appropriate interventions in the flowsheet.    Outcome: Progressing Towards Goal  Fall Risk Interventions:  Mobility Interventions: Bed/chair exit alarm, Communicate number of staff needed for ambulation/transfer, OT consult for ADLs, Patient to call before getting OOB, PT Consult for mobility concerns, PT Consult for assist device competence, Utilize walker, cane, or other assitive device         Medication Interventions: Bed/chair exit alarm, Evaluate medications/consider consulting pharmacy, Patient to call before getting OOB, Teach patient to arise slowly

## 2018-07-06 VITALS
HEART RATE: 60 BPM | DIASTOLIC BLOOD PRESSURE: 65 MMHG | TEMPERATURE: 97.3 F | OXYGEN SATURATION: 98 % | SYSTOLIC BLOOD PRESSURE: 149 MMHG | RESPIRATION RATE: 16 BRPM

## 2018-07-06 DIAGNOSIS — I25.10 ATHEROSCLEROSIS OF NATIVE CORONARY ARTERY OF NATIVE HEART WITHOUT ANGINA PECTORIS: Primary | ICD-10-CM

## 2018-07-06 PROCEDURE — 74011250637 HC RX REV CODE- 250/637: Performed by: INTERNAL MEDICINE

## 2018-07-06 RX ORDER — OMEPRAZOLE 40 MG/1
40 CAPSULE, DELAYED RELEASE ORAL 2 TIMES DAILY
Qty: 60 CAP | Status: ON HOLD | OUTPATIENT
Start: 2018-07-06 | End: 2019-01-18 | Stop reason: SDUPTHER

## 2018-07-06 RX ADMIN — PANTOPRAZOLE SODIUM 40 MG: 40 TABLET, DELAYED RELEASE ORAL at 08:22

## 2018-07-06 RX ADMIN — CARVEDILOL 3.12 MG: 3.12 TABLET, FILM COATED ORAL at 08:22

## 2018-07-06 RX ADMIN — TIMOLOL MALEATE 1 DROP: 5 SOLUTION/ DROPS OPHTHALMIC at 08:27

## 2018-07-06 RX ADMIN — TICAGRELOR 90 MG: 90 TABLET ORAL at 08:22

## 2018-07-06 RX ADMIN — MULTIPLE VITAMINS W/ MINERALS TAB 1 TABLET: TAB at 08:22

## 2018-07-06 RX ADMIN — DORZOLAMIDE HYDROCHLORIDE 1 DROP: 20 SOLUTION/ DROPS OPHTHALMIC at 08:27

## 2018-07-06 RX ADMIN — ASPIRIN 81 MG: 81 TABLET, COATED ORAL at 08:22

## 2018-07-06 NOTE — PROGRESS NOTES
Received call from [facility] at [facility] requesting an order for Cardiac Rehab for this patient. Patient was discharged today and transferred to [facility] (Rm [id number]). Per verbal order from Dr. Erick Gaitan, may place a referral for cardiac rehab for this patient and fax to [facility]. Referral order for Cardiac Rehab faxed to Garth Caceres at [facility] ([phone]).

## 2018-07-06 NOTE — PROGRESS NOTES
Attempted to call report x3. Once I spoke with a nurse and she asked me to call her back in 30 minutes. When I have attempted I have been unable to get any answer. Pt will leave around 2 pm. Will attempt to called report again prior to that time.

## 2018-07-06 NOTE — DISCHARGE SUMMARY
[address] [facility]  MR#: [id number]  : 1921  ACCOUNT #: [de-identified]   ADMIT DATE: 2018  DISCHARGE DATE:     FINAL DIAGNOSES:  1.  GI bleed. 2.  Gastric ulcer. 3.  Gastritis. 4.  CKD stage IV. 5.  ASCVD, status post recent stent. 6.  Type 2 diabetes. 7.  Blood loss anemia. CONSULTATIONS:  GI, Dr. Nicky Castellano. Cardiology consultation Dr. Juan Manuel Rodriguez. PROCEDURES:  EGD with gastric ulcer clipping. For details of admission history and physical, please see admit note. HOSPITAL COURSE:  Briefly, the patient is a 75-year-old white male who presented to the office on the day of admission with heme positive melanotic stools and was admitted to the hospital for a GI bleed with a significant drop of his hemoglobin. He was transfused 1 unit of packed red blood cells and his hemoglobin was followed and remained stable at 10 or above with hemoglobin 10.4 on the day prior to discharge. He was taken to the endoscopy lab on  and had endoscopy revealing diffuse gastritis with friable mucosa, but no active bleeding. He also was noted to have a gastric ulcer with no visible vessel and had clipping of the gastric ulcer. Dr. Pabon Frame recommendations after the EGD was to resume his aspirin and Brilinta in light of his recent stent placement. As noted, he was seen by Dr. Juan Manuel Rodriguez initially during the hospitalization and aspirin and Brilinta were held obviously secondary to the bleed and thus at this point per recommendation of Dr. Nicky Castellano, they will be resumed. At this point, he seems to be stable. He ambulated well in the cui yesterday, although feels he needs to go to the Health Care Unit at [address] [place] [address] and we will make that arrangement. DISCHARGE MEDICATIONS:  He will be discharged with one medication change, his Prilosec will be increased from 40 mg daily to 40 mg b.i.d.       Other discharge medications will consist of aspirin 81 mg daily, Brilinta 90 mg b.i.d., Azopt 1% ophthalmic suspension 1 drop both eyes 3 times daily, Coreg 3.125 two times daily, folic acid in the form of adult multivitamins with Lutein 1 tablet daily, lactate 3000 unit tablet by mouth 3 times daily, Xalatan 0.005% ophthalmic 1 drop both eyes at nighttime, Pravachol 20 mg daily, Timoptic 0.5% ophthalmic 1 drop 2 times daily in both eyes. He will have further followup by Dr. David Musa in about 5 days.     DISPOSITION:  home      Shante Plaza MD       KHR/SN  D: 07/06/2018 07:52     T: 07/06/2018 13:05  JOB #: [id number]  CC: Casimiro Cade MD  CC: Bayron Sorto MD  CC: Sterling Dougherty MD

## 2018-07-06 NOTE — PROGRESS NOTES
All in agreement with d/c today to Healthcare at [facility]. Please have dtr transport ~1pm.  Call report to either [phone] or [phone], send emar, d/c instructions, Rx, and emar. Please fax d/c instructions ahead of time to [phone] if possible.   Thanks    [phone]  I've left a VM for Dior in admissions asking for her assistance in our trying to call report

## 2018-07-06 NOTE — PROGRESS NOTES
TL Martines notified of patient's d/c to [address] [place] [address]./ Lennox Mcfadden [facility] Management

## 2018-07-06 NOTE — PROGRESS NOTES
Spoke with Dr. Juan Manuel Brooks nurse. I asked them to send a note/prescription to [facility] for the patient to have Cardiac Rehab as an outpatient there. I gave her their fax and phone number.

## 2018-07-06 NOTE — PROGRESS NOTES
Bedside and Verbal shift change report given to Sesar Roberts (oncoming nurse) by Kirti Varner RN (offgoing nurse). Report included the following information Lisy and MAR.

## 2018-07-06 NOTE — DISCHARGE INSTRUCTIONS
Doctor Mitchel [address]  [phone]      Patient Discharge Instructions    Lelia Mancini / [id number] : 1921    Admitted 7/3/2018 Discharged: 2018     Principal Problem:    GI bleed (7/3/2018)    Active Problems:    Chronic kidney disease, stage IV (severe) ([facility] [address] (2017)      ASCVD (arteriosclerotic cardiovascular disease) (2017)      Type 2 diabetes mellitus with nephropathy ([facility] (2018)      Anemia, blood loss (7/3/2018)      Acute gastric ulcer with hemorrhage (2018)      Acute gastritis without hemorrhage (2018)          Allergies   Allergen Reactions    Latex, Natural Rubber Other (comments)    Shellfish Derived Other (comments)     Crab meat       · It is important that you take the medication exactly as they are prescribed. · Do not take other medications without consulting your doctor. What to do at Next Level of Care    Disposition:  [id number] [facility] unit    Recommended diet: Usual    Recommended activity: Usual with assistance, Admit to health care unit [facility]          Information obtained by :  I understand that if any problems occur once I am at home I am to contact my physician. I understand and acknowledge receipt of the instructions indicated above.                                                                                                                                            Physician's or R.N.'s Signature                                                                  Date/Time                                                                                                                                              Patient or Representative Signature                                                          Date/Time

## 2018-07-06 NOTE — PROGRESS NOTES
GI Progress Note Liat Bonilla  NAME:Armen Cardozo EUZ: US   Nida Herrera MD  PCP: Brennen Carlton MD  Date/Time:  2018 12:02 PM   Assessment:   · Melena- resolved  · PUD- treated with hemoclips x 2  · Gastritis     Plan:   · Await final bx results  · Continue BID PPI x 2 mo  · Continue Brilinta  · Check H/H in 1 wk  · Agree with planned discharge   Will sign off  Subjective:   Discussed with RN events overnight. No bleeding noted. Single BM this hospitalization    Complaint Y/N Description   Abdominal Pain n    Hematemesis n    Hematochezia n    Melena n    Constipation n    Diarrhea n    Dyspepsia n    Dysphagia n    Jaundiced n    Nausea/vomiting n      Review of Systems:  Symptom Y/N Comments  Symptom Y/N Comments   Fever/Chills n   Chest Pain n    Cough n   Headaches n    Sputum n   Joint Pain n    SOB/HAMILTON n   Pruritis/Rash n    Tolerating Diet y cardiac  Other       Could NOT obtain due to:      Objective:   VITALS:   Last 24hrs VS reviewed since prior progress note. Most recent are:  Visit Vitals    /65 (BP 1 Location: Right arm, BP Patient Position: At rest)    Pulse 60    Temp 97.3 °F (36.3 °C)    Resp 16    SpO2 98%       Intake/Output Summary (Last 24 hours) at 18 1202  Last data filed at 18 1725   Gross per 24 hour   Intake              360 ml   Output              200 ml   Net              160 ml     PHYSICAL EXAM:  General: WD, WN. Alert, cooperative, no acute distress    HEENT: NC, Atraumatic. PERRL. Anicteric sclerae. Lungs:  CTA Bilaterally. No Wheezing/Rhonchi/Rales. Heart:  Regular  rhythm,  No murmur/Rub/Gallops  Abdomen: Soft, Non distended, Non tender.  +BS  Extremities: No c/c/e  Neurologic:  CN 2-12 gi, A/O X 3. No acute neurological distress   Psych:   Good insight. Not anxious nor agitated.     Lab and Radiology Data Reviewed: (see below)    Medications Reviewed: (see below)  PMH/SH reviewed - no change compared to H&P  ________________________________________________________________________  Total time spent with patient: 15 minutes   ________________________________________________________________________  Care Plan discussed with:  Patient y   Family     RN y              Consultant:       Juliano Abel MD     Procedures: see electronic medical records for all procedures/Xrays and details which were not copied into this note but were reviewed prior to creation of Plan. LABS:  Recent Labs      07/05/18   0044  07/04/18   1407  07/04/18   0538   WBC  7.1   --   6.9   HGB  10.4*  10.0*  10.0*   HCT  31.9*  30.5*  31.2*   PLT  191   --   211     Recent Labs      07/05/18   0044  07/04/18   0538  07/03/18   1654   NA  144  145  145   K  4.8  4.6  5.4*   CL  114*  116*  115*   CO2  20*  20*  19*   BUN  36*  38*  40*   CREA  2.28*  2.18*  2.20*   GLU  101*  112*  104*   CA  8.8  8.5  8.9     Recent Labs      07/03/18   1654   SGOT  46*   AP  77   TP  6.6   ALB  3.4*   GLOB  3.2     No results for input(s): INR, PTP, APTT in the last 72 hours. No lab exists for component: INREXT   No results for input(s): FE, TIBC, PSAT, FERR in the last 72 hours. No results found for: FOL, RBCF  No results for input(s): PH, PCO2, PO2 in the last 72 hours. No results for input(s): CPK, CKMB in the last 72 hours.     No lab exists for component: TROPONINI  Lab Results   Component Value Date/Time    Color YELLOW/STRAW 07/03/2018 07:17 PM    Appearance CLEAR 07/03/2018 07:17 PM    Specific gravity 1.017 07/03/2018 07:17 PM    pH (UA) 6.0 07/03/2018 07:17 PM    Protein TRACE (A) 07/03/2018 07:17 PM    Glucose NEGATIVE  07/03/2018 07:17 PM    Ketone NEGATIVE  07/03/2018 07:17 PM    Bilirubin NEGATIVE  07/03/2018 07:17 PM    Urobilinogen 0.2 07/03/2018 07:17 PM    Nitrites NEGATIVE  07/03/2018 07:17 PM    Leukocyte Esterase NEGATIVE  07/03/2018 07:17 PM    Epithelial cells FEW 07/03/2018 07:17 PM    Bacteria NEGATIVE  07/03/2018 07:17 PM    WBC 0-4 07/03/2018 07:17 PM    RBC 0-5 07/03/2018 07:17 PM       MEDICATIONS:  Current Facility-Administered Medications   Medication Dose Route Frequency    pantoprazole (PROTONIX) tablet 40 mg  40 mg Oral ACB&D    ticagrelor (BRILINTA) tablet 90 mg  90 mg Oral Q12H    aspirin delayed-release tablet 81 mg  81 mg Oral DAILY    dorzolamide (TRUSOPT) 2 % ophthalmic solution 1 Drop  1 Drop Both Eyes TID    carvedilol (COREG) tablet 3.125 mg  3.125 mg Oral BID WITH MEALS    multivitamin, tx-iron-ca-min (THERA-M w/ IRON) tablet 1 Tab  1 Tab Oral DAILY    lactase (LACTAID) tablet 3,000 Units  3,000 Units Oral TID WITH MEALS    latanoprost (XALATAN) 0.005 % ophthalmic solution 1 Drop  1 Drop Both Eyes QHS    timolol (TIMOPTIC) 0.5 % ophthalmic solution 1 Drop  1 Drop Both Eyes BID    acetaminophen (TYLENOL) tablet 650 mg  650 mg Oral Q6H PRN    ondansetron (ZOFRAN) injection 4 mg  4 mg IntraVENous Q4H PRN    0.9% sodium chloride infusion 250 mL  250 mL IntraVENous PRN    pravastatin (PRAVACHOL) tablet 20 mg  20 mg Oral QHS

## 2018-07-07 LAB
ABO + RH BLD: NORMAL
ANTIGENS PRESENT BLD: NORMAL
BLD PROD TYP BPU: NORMAL
BLD PROD TYP BPU: NORMAL
BLOOD GROUP ANTIBODIES SERPL: NORMAL
BLOOD GROUP ANTIBODIES SERPL: NORMAL
BPU ID: NORMAL
BPU ID: NORMAL
CROSSMATCH RESULT,%XM: NORMAL
CROSSMATCH RESULT,%XM: NORMAL
SPECIMEN EXP DATE BLD: NORMAL
STATUS OF UNIT,%ST: NORMAL
STATUS OF UNIT,%ST: NORMAL
UNIT DIVISION, %UDIV: 0
UNIT DIVISION, %UDIV: 0

## 2018-07-09 ENCOUNTER — OFFICE VISIT (OUTPATIENT)
Dept: CARDIOLOGY CLINIC | Age: 83
End: 2018-07-09

## 2018-07-09 VITALS
BODY MASS INDEX: 24.99 KG/M2 | WEIGHT: 164.9 LBS | RESPIRATION RATE: 18 BRPM | OXYGEN SATURATION: 99 % | DIASTOLIC BLOOD PRESSURE: 70 MMHG | HEART RATE: 60 BPM | HEIGHT: 68 IN | SYSTOLIC BLOOD PRESSURE: 118 MMHG

## 2018-07-09 DIAGNOSIS — N18.30 CKD (CHRONIC KIDNEY DISEASE) STAGE 3, GFR 30-59 ML/MIN (HCC): ICD-10-CM

## 2018-07-09 DIAGNOSIS — I25.10 ATHEROSCLEROSIS OF NATIVE CORONARY ARTERY OF NATIVE HEART WITHOUT ANGINA PECTORIS: Primary | ICD-10-CM

## 2018-07-09 DIAGNOSIS — E11.9 TYPE 2 DIABETES MELLITUS WITHOUT COMPLICATION, WITHOUT LONG-TERM CURRENT USE OF INSULIN (HCC): ICD-10-CM

## 2018-07-09 DIAGNOSIS — Z95.0 CARDIAC PACEMAKER IN SITU: ICD-10-CM

## 2018-07-09 DIAGNOSIS — E78.2 MIXED HYPERLIPIDEMIA: ICD-10-CM

## 2018-07-09 DIAGNOSIS — I10 ESSENTIAL HYPERTENSION, BENIGN: ICD-10-CM

## 2018-07-09 DIAGNOSIS — Z95.1 POSTSURGICAL AORTOCORONARY BYPASS STATUS: ICD-10-CM

## 2018-07-09 NOTE — PROGRESS NOTES
1. Have you been to the ER, urgent care clinic since your last visit? Hospitalized since your last visit? YES, HOSPITAL FOLLOW UP.     2. Have you seen or consulted any other health care providers outside of the [address] [place] [address] since your last visit? Include any pap smears or colon screening. NO    HOSPITAL FOLLOW UP, C/O SOB, SWELLING IN BLE.

## 2018-07-09 NOTE — MR AVS SNAPSHOT
Rashard Farrell [address] [facility] 
[phone] Patient: Temo Rodgers 
MRN: [id number] HYH:[id number] Visit Information Date & Time Provider Department Dept. Phone Encounter #  
 7/9/2018  1:00 PM Anna Pearl, [address] [facility] [phone] [id number] Your Appointments 7/10/2018 11:20 AM  
Office Visit with MD Jayla Tabares 26 ([facility]) Appt Note: hosp f/u/GI bleed [address] [phone] [address] [facility] [address]  
  
    
 7/24/2018  9:00 AM  
PROCEDURE with PACEMAKER, [facility] [address] [place] [address]) Appt Note: [facility] DCPM battery check and full check [address] [facility]  
[phone] [address] [facility]  
  
    
 9/7/2018 10:40 AM  
FOLLOW UP 10 with ARCELIA Benitez MD  
[facility] ([facility]) Appt Note: [address] [phone] [address] [facility] [address] 9/18/2018 11:15 AM  
ESTABLISHED PATIENT with Anna Kang MD  
[facility] [address] [place] [address]) Appt Note: P/Dr CHALINO muñoz 3 month fu bg  
 [address] [facility]  
[phone] [address] [facility]  
  
    
 9/27/2018 10:30 AM  
Follow Up with MD Jayla Parson 26 ([address]) Appt Note: [address] [phone] [address] [facility] [address] Upcoming Health Maintenance Date Due  
 FOOT EXAM Q1 4/23/1931 EYE EXAM RETINAL OR DILATED Q1 4/23/1931 DTaP/Tdap/Td series (1 - Tdap) 4/23/1942 ZOSTER VACCINE AGE 60> 2/23/1981 GLAUCOMA SCREENING Q2Y 4/23/1986 Pneumococcal 65+ Low/Medium Risk (1 of 2 - PCV13) 4/23/1986 MEDICARE YEARLY EXAM 3/14/2018 Influenza Age 5 to Adult 8/1/2018 MICROALBUMIN Q1 9/7/2018 HEMOGLOBIN A1C Q6M 10/25/2018 LIPID PANEL Q1 6/5/2019 Allergies as of 7/9/2018  Review Complete On: 7/9/2018 By: Krish Morrison LPN Severity Noted Reaction Type Reactions Latex, Natural Rubber  06/04/2018    Other (comments) Shellfish Derived  12/21/2012    Other (comments) Crab meat Current Immunizations  Never Reviewed No immunizations on file. Not reviewed this visit You Were Diagnosed With   
  
 Codes Comments Atherosclerosis of native coronary artery of native heart without angina pectoris    -  Primary ICD-10-CM: I25.10 ICD-9-CM: 414.01 Essential hypertension, benign     ICD-10-CM: I10 
ICD-9-CM: 401.1 Mixed hyperlipidemia     ICD-10-CM: E78.2 ICD-9-CM: 272.2 Postsurgical aortocoronary bypass status     ICD-10-CM: Z95.1 ICD-9-CM: V45.81 Cardiac pacemaker in situ     ICD-10-CM: Z95.0 ICD-9-CM: V45.01 Type 2 diabetes mellitus without complication, without long-term current use of insulin (HCC)     ICD-10-CM: E11.9 ICD-9-CM: 250.00 CKD (chronic kidney disease) stage 3, GFR 30-59 ml/min     ICD-10-CM: N18.3 ICD-9-CM: 468. 3 Vitals BP Pulse Resp Height(growth percentile) Weight(growth percentile) SpO2  
 118/70 (BP 1 Location: Right arm, BP Patient Position: Sitting) 60 18 5' 8\" (1.727 m) 164 lb 14.4 oz (74.8 kg) 99% BMI Smoking Status 25.07 kg/m2 Never Smoker Vitals History BMI and BSA Data Body Mass Index Body Surface Area 25.07 kg/m 2 1.89 m 2 Preferred Pharmacy Pharmacy Name Phone Hiro Pradhan, [facility] [phone] Your Updated Medication List  
 This list is accurate as of 7/9/18  2:34 PM.  Always use your most recent med list.  
  
  
  
  
 ADULT MULTIVITAMIN (W-LUTEIN) PO Take  by mouth. aspirin 81 mg chewable tablet Take 81 mg by mouth daily. AZOPT 1 % ophthalmic suspension Generic drug:  brinzolamide Administer 1 Drop to both eyes three (3) times daily. carvedilol 3.125 mg tablet Commonly known as:  Macel Ignacio Take 1 Tab by mouth two (2) times daily (with meals). latanoprost 0.005 % ophthalmic solution Commonly known as:  Brain Blind Administer 1 Drop to both eyes nightly. omeprazole 40 mg capsule Commonly known as:  PriLOSEC Take 1 Cap by mouth two (2) times a day. pravastatin 20 mg tablet Commonly known as:  PRAVACHOL Take 1 Tab by mouth nightly. ticagrelor 90 mg tablet Commonly known as:  Bad [employer] Take 1 Tab by mouth every twelve (12) hours every twelve (12) hours. timolol 0.5 % ophthalmic solution Commonly known as:  TIMOPTIC  
1 Drop two (2) times a day. We Performed the Following AMB POC EKG ROUTINE W/ 12 LEADS, INTER & REP [54738 CPT(R)] Introducing [facility]! Tristian Rivas introduces [employer] patient portal. Now you can access parts of your medical record, email your doctor's office, and request medication refills online. 1. In your internet browser, go to [email or web address] 2. Click on the First Time User? Click Here link in the Sign In box. You will see the New Member Sign Up page. 3. Enter your [employer] Access Code exactly as it appears below. You will not need to use this code after youve completed the sign-up process. If you do not sign up before the expiration date, you must request a new code. · [employer] Access Code: [email or web address] Expires: 9/13/2018  4:44 PM 
 
4. Enter the last four digits of your Social Security Number (xxxx) and Date of Birth (mm/dd/yyyy) as indicated and click Submit.  You will be taken to the next sign-up page. 5. Create a [employer] ID. This will be your [employer] login ID and cannot be changed, so think of one that is secure and easy to remember. 6. Create a [employer] password. You can change your password at any time. 7. Enter your Password Reset Question and Answer. This can be used at a later time if you forget your password. 8. Enter your e-mail address. You will receive e-mail notification when new information is available in [address]. 9. Click Sign Up. You can now view and download portions of your medical record. 10. Click the Download Summary menu link to download a portable copy of your medical information. If you have questions, please visit the Frequently Asked Questions section of the [employer] website. Remember, [employer] is NOT to be used for urgent needs. For medical emergencies, dial 911. Now available from your iPhone and Android! Please provide this summary of care documentation to your next provider. Your primary care clinician is listed as ARCELIA Bhatia. If you have any questions after today's visit, please call [phone].
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
## 2018-07-10 ENCOUNTER — OFFICE VISIT (OUTPATIENT)
Dept: INTERNAL MEDICINE CLINIC | Age: 83
End: 2018-07-10

## 2018-07-10 ENCOUNTER — PATIENT OUTREACH (OUTPATIENT)
Dept: INTERNAL MEDICINE CLINIC | Age: 83
End: 2018-07-10

## 2018-07-10 VITALS
SYSTOLIC BLOOD PRESSURE: 153 MMHG | HEART RATE: 58 BPM | TEMPERATURE: 98.7 F | DIASTOLIC BLOOD PRESSURE: 67 MMHG | BODY MASS INDEX: 25.07 KG/M2 | HEIGHT: 68 IN | RESPIRATION RATE: 16 BRPM | OXYGEN SATURATION: 98 %

## 2018-07-10 DIAGNOSIS — K21.9 GASTROESOPHAGEAL REFLUX DISEASE WITHOUT ESOPHAGITIS: ICD-10-CM

## 2018-07-10 DIAGNOSIS — K25.0 ACUTE GASTRIC ULCER WITH HEMORRHAGE: ICD-10-CM

## 2018-07-10 DIAGNOSIS — I10 ESSENTIAL HYPERTENSION, BENIGN: ICD-10-CM

## 2018-07-10 DIAGNOSIS — N18.4 CHRONIC KIDNEY DISEASE, STAGE IV (SEVERE) (HCC): ICD-10-CM

## 2018-07-10 DIAGNOSIS — E11.9 TYPE 2 DIABETES MELLITUS WITHOUT COMPLICATION, WITHOUT LONG-TERM CURRENT USE OF INSULIN (HCC): ICD-10-CM

## 2018-07-10 DIAGNOSIS — K27.9 PUD (PEPTIC ULCER DISEASE): ICD-10-CM

## 2018-07-10 DIAGNOSIS — I25.10 ATHEROSCLEROSIS OF NATIVE CORONARY ARTERY OF NATIVE HEART WITHOUT ANGINA PECTORIS: ICD-10-CM

## 2018-07-10 PROBLEM — B37.0 THRUSH: Status: RESOLVED | Noted: 2017-12-06 | Resolved: 2018-07-10

## 2018-07-10 PROBLEM — B37.0 THRUSH OF MOUTH AND ESOPHAGUS (HCC): Status: RESOLVED | Noted: 2017-12-06 | Resolved: 2018-07-10

## 2018-07-10 PROBLEM — J01.00 ACUTE NON-RECURRENT MAXILLARY SINUSITIS: Status: RESOLVED | Noted: 2018-04-06 | Resolved: 2018-07-10

## 2018-07-10 PROBLEM — N18.30 CKD (CHRONIC KIDNEY DISEASE) STAGE 3, GFR 30-59 ML/MIN (HCC): Status: RESOLVED | Noted: 2017-09-07 | Resolved: 2018-07-10

## 2018-07-10 PROBLEM — B37.81 THRUSH OF MOUTH AND ESOPHAGUS (HCC): Status: RESOLVED | Noted: 2017-12-06 | Resolved: 2018-07-10

## 2018-07-10 PROBLEM — R07.9 CHEST PAIN: Status: RESOLVED | Noted: 2018-06-03 | Resolved: 2018-07-10

## 2018-07-10 PROBLEM — K29.00 ACUTE GASTRITIS WITHOUT HEMORRHAGE: Status: RESOLVED | Noted: 2018-07-05 | Resolved: 2018-07-10

## 2018-07-10 NOTE — PROGRESS NOTES
Chief Complaint   Patient presents with   [facility] Follow Up     room 3     1. Have you been to the ER, urgent care clinic since your last visit? NO  Hospitalized since your last visit? YES, [facility], GI BLEED    2. Have you seen or consulted any other health care providers outside of the [address] [place] [address] since your last visit? Include any pap smears or colon screening.  CARDIOLOGY    PT IS HERE FOR HOSP F/U. PER PT GI BLEED AND \"STAGE 3 OR 4 KIDNEY DISEASE\"

## 2018-07-10 NOTE — PROGRESS NOTES
Transition of Care Coordination/Hospital to Post Acute Facility:     Date/Time:  7/10/2018 1:34 PM    Patient was admitted to [facility] on 7/3/18 and discharged on 18 for GI bleed. Patient was transferred to  [facility]  for continuation of care. Inpatient RRAT score: 52  Was this a readmission? no   Patient stated reason for the readmission: n/a     Nurse Navigator(NN) contacted [facility]  to verify admission, review discharge orders and reconcile discharge medications. Spoke to Andrei. Provided introduction to self, and explanation of the Nurse Navigator role. Verified name and  as patient identifiers. Admission verified. Discussed and reviewed  anticipated length of stay, medication reconciliation  She will fax medications to NN at [facility]. Top Challenges reviewed               Medication(s):     New Medications at Discharge: n/a  Changed Medications at Discharge: omeprazole  Discontinued Medications at Discharge: n/a     PCP/Specialist follow up: Future Appointments  Date Time Provider Cristian Alvarez   2018 9:00 AM PACEMAKER, [address]   2018 10:40 AM ARCELIA Mariscal MD [address] [place] [address],[facility], [id number]   2018 11:15 AM [address], MD [address] [facility]   2018 10:30 AM ARCELIA Mariscal MD [address] [place] [address],[facility], [address]        Opportunity to ask questions was provided. Contact information was provided for future reference or further questions. Will continue to monitor.
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
## 2018-07-10 NOTE — LETTER
7/10/2018 1:03 PM 
 
To Whom It May Concern: 
  
Re: Mr. See Hughes [address] [phone] Mr. Dali Almeida is currently medically stable and wishes to be discharged from healthcare to his home at [facility]. I agree that he will be able to manage his medication and can continue physical therapy through [facility]. Sincerely, Blanca Mccain MD

## 2018-07-10 NOTE — MR AVS SNAPSHOT
[address] [facility] [address] [address] [phone] Patient: Johnathon Kelly 
MRN: [id number] ATS:2/19/0934 Visit Information Date & Time Provider Department Dept. Phone Encounter #  
 7/10/2018 11:20 AM MD Marcelino Rivero 84 [phone] [id number] Your Appointments 7/24/2018  9:00 AM  
PROCEDURE with PACEMAKER, [facility]) Appt Note: [facility] DCPM battery check and full check [address] [facility] [address]  
[phone] [address] [facility] [address]  
  
    
 9/7/2018 10:40 AM  
FOLLOW UP 10 with MD YARIEL Rivero [facility] ([facility]) Appt Note: [address] [address] [address] [address] 9/18/2018 11:15 AM  
ESTABLISHED PATIENT with Ismael Randolph MD  
[facility]) Appt Note: P/Dr CHALINO muñoz 3 month fu bg  
 [address] [facility] [address]  
[phone] [address] [facility] [address]  
  
    
 9/27/2018 10:30 AM  
Follow Up with MD Marcelino Rivero 84 ([facility]) Appt Note: [address] [address] [address] [address] Upcoming Health Maintenance Date Due  
 FOOT EXAM Q1 4/23/1931 EYE EXAM RETINAL OR DILATED Q1 4/23/1931 DTaP/Tdap/Td series (1 - Tdap) 4/23/1942 ZOSTER VACCINE AGE 60> 2/23/1981 GLAUCOMA SCREENING Q2Y 4/23/1986 Pneumococcal 65+ Low/Medium Risk (1 of 2 - PCV13) 4/23/1986 MEDICARE YEARLY EXAM 3/14/2018 Influenza Age 5 to Adult 8/1/2018 MICROALBUMIN Q1 9/7/2018 HEMOGLOBIN A1C Q6M 10/25/2018 LIPID PANEL Q1 6/5/2019 Allergies as of 7/10/2018  Review Complete On: 7/10/2018 By: Agustin Wolf LPN Severity Noted Reaction Type Reactions Latex, Natural Rubber  06/04/2018    Other (comments) Shellfish Derived  12/21/2012    Other (comments) Crab meat Current Immunizations  Never Reviewed No immunizations on file. Not reviewed this visit You Were Diagnosed With   
  
 Codes Comments PUD (peptic ulcer disease)    -  Primary ICD-10-CM: K27.9 ICD-9-CM: 533.90 Atherosclerosis of native coronary artery of native heart without angina pectoris     ICD-10-CM: I25.10 ICD-9-CM: 414.01 Essential hypertension, benign     ICD-10-CM: I10 
ICD-9-CM: 401.1 Gastroesophageal reflux disease without esophagitis     ICD-10-CM: K21.9 ICD-9-CM: 530.81 Acute gastric ulcer with hemorrhage     ICD-10-CM: K25.0 ICD-9-CM: 531.00 Type 2 diabetes mellitus without complication, without long-term current use of insulin (HCC)     ICD-10-CM: E11.9 ICD-9-CM: 250.00 Chronic kidney disease, stage IV (severe) (HCC)     ICD-10-CM: N18.4 ICD-9-CM: 972. 4 Vitals BP Pulse Temp Resp Height(growth percentile) SpO2  
 153/67 (BP 1 Location: Left arm, BP Patient Position: Sitting) (!) 58 98.7 °F (37.1 °C) (Oral) 16 5' 8\" (1.727 m) 98% BMI Smoking Status 25.07 kg/m2 Never Smoker Vitals History BMI and BSA Data Body Mass Index Body Surface Area 25.07 kg/m 2 1.89 m 2 Preferred Pharmacy Pharmacy Name Phone [phone] [facility] [phone] Your Updated Medication List  
  
   
This list is accurate as of 7/10/18  1:11 PM.  Always use your most recent med list.  
  
  
  
  
 ADULT MULTIVITAMIN (W-LUTEIN) PO Take  by mouth. aspirin 81 mg chewable tablet Take 81 mg by mouth daily. AZOPT 1 % ophthalmic suspension Generic drug:  brinzolamide Administer 1 Drop to both eyes three (3) times daily. carvedilol 3.125 mg tablet Commonly known as:  Tristen Pintos Take 1 Tab by mouth two (2) times daily (with meals). latanoprost 0.005 % ophthalmic solution Commonly known as:  Verta Piety Administer 1 Drop to both eyes nightly. omeprazole 40 mg capsule Commonly known as:  PriLOSEC Take 1 Cap by mouth two (2) times a day. pravastatin 20 mg tablet Commonly known as:  PRAVACHOL Take 1 Tab by mouth nightly. ticagrelor 90 mg tablet Commonly known as:  [employer] Take 1 Tab by mouth every twelve (12) hours every twelve (12) hours. timolol 0.5 % ophthalmic solution Commonly known as:  TIMOPTIC  
1 Drop two (2) times a day. We Performed the Following REFERRAL TO NEPHROLOGY [BYC04 Custom] Comments:  
 Please see patient for worsening renal insufficiency. Referral Information Referral ID Referred By Referred To  
  
 [id number] [facility]   
   [address] [place] [address] Visits Status Start Date End Date 1 New Request 7/10/18 7/10/19 If your referral has a status of pending review or denied, additional information will be sent to support the outcome of this decision. Introducing [facility] & HEALTH SERVICES! [facility] introduces Spinal Modulation patient portal. Now you can access parts of your medical record, email your doctor's office, and request medication refills online. 1. In your internet browser, go to [email or web address] 2. Click on the First Time User? Click Here link in the Sign In box. You will see the New Member Sign Up page. 3. Enter your Spinal Modulation Access Code exactly as it appears below. You will not need to use this code after youve completed the sign-up process. If you do not sign up before the expiration date, you must request a new code. · Namely Access Code: [email or web address] Expires: 9/13/2018  4:44 PM 
 
4. Enter the last four digits of your Social Security Number (xxxx) and Date of Birth (mm/dd/yyyy) as indicated and click Submit. You will be taken to the next sign-up page. 5. Create a Namely ID. This will be your Namely login ID and cannot be changed, so think of one that is secure and easy to remember. 6. Create a Namely password. You can change your password at any time. 7. Enter your Password Reset Question and Answer. This can be used at a later time if you forget your password. 8. Enter your e-mail address. You will receive e-mail notification when new information is available in [address] 9. Click Sign Up. You can now view and download portions of your medical record. 10. Click the Download Summary menu link to download a portable copy of your medical information. If you have questions, please visit the Frequently Asked Questions section of the Namely website. Remember, Namely is NOT to be used for urgent needs. For medical emergencies, dial 911. Now available from your iPhone and Android! Please provide this summary of care documentation to your next provider. Your primary care clinician is listed as ARCELIA Andrade. If you have any questions after today's visit, please call [phone].

## 2018-07-18 ENCOUNTER — PATIENT OUTREACH (OUTPATIENT)
Dept: INTERNAL MEDICINE CLINIC | Age: 83
End: 2018-07-18

## 2018-07-18 NOTE — PROGRESS NOTES
Hospital Discharge Follow-Up      Date/Time:  2018 12:12 PM    Patient was admitted to [facility] on 7/3/18 and discharged on 18 for GI bleed. He was then transferred to [facility] from 18 to 7/10/18. The physician discharge summary was available at the time of outreach. Patient was contacted within nine business days of discharge. Top Challenges reviewed with the provider            Method of communication with provider :none    Inpatient RRAT score: 52  Was this a readmission? yes   Patient stated reason for the readmission: GI bleeding    Nurse Navigator (NN) contacted the patient by telephone to perform post hospital discharge assessment. Verified name and  with patient as identifiers. Provided introduction to self, and explanation of the Nurse Navigator role. Reviewed discharge instructions and red flags with patient who verbalized understanding. Patient given an opportunity to ask questions and does not have any further questions or concerns at this time. The patient agrees to contact the PCP office for questions related to their healthcare. NN provided contact information for future reference. Disease Specific:   N/A    Summary of patient's top problems:  1. Found to have increased creatinine level on JOSEFINA visit with PCP on 7/10/18. Has been referred to nephrologist and will see him on 18. Home Health orders at discharge: [address]: [facility]  Date of initial visit: 18    Durable Medical Equipment ordered/company: n/a  Durable Medical Equipment received: n/a    Barriers to care? none    Advance Care Planning:   Does patient have an Advance Directive:  not on file  Patient has one and will bring in to next PCP visit to be scanned into EMR.       Medication(s):     New Medications at Discharge: n/a  Changed Medications at Discharge: omeprazole  Discontinued Medications at Discharge: n/a    Medication reconciliation was performed with patient, who verbalizes understanding of administration of home medications. There were no barriers to obtaining medications identified at this time. Referral to Pharm D needed: no     Current Outpatient Prescriptions   Medication Sig    omeprazole (PRILOSEC) 40 mg capsule Take 1 Cap by mouth two (2) times a day.  folic acid/multivit-min/lutein (ADULT MULTIVITAMIN, W-LUTEIN, PO) Take  by mouth.  ticagrelor (BRILINTA) 90 mg tablet Take 1 Tab by mouth every twelve (12) hours every twelve (12) hours.  carvedilol (COREG) 3.125 mg tablet Take 1 Tab by mouth two (2) times daily (with meals).  pravastatin (PRAVACHOL) 20 mg tablet Take 1 Tab by mouth nightly.  timolol (TIMOPTIC) 0.5 % ophthalmic solution 1 Drop two (2) times a day.  brinzolamide (AZOPT) 1 % ophthalmic suspension Administer 1 Drop to both eyes three (3) times daily.  aspirin 81 mg chewable tablet Take 81 mg by mouth daily.  latanoprost (XALATAN) 0.005 % ophthalmic solution Administer 1 Drop to both eyes nightly. No current facility-administered medications for this visit. There are no discontinued medications. [facility] follow up appointment(s): Future Appointments  Date Time Provider Cristian Alvarez   7/24/2018 9:00 AM PACEMAKER, Wash Gentleman ANISA SCHED   9/7/2018 10:40 AM ARCELIA Haley MD [facility] ANISAABDIRAHMAN Weinberg   9/18/2018 11:15 AM Lesia Caro MD [facility] ANISA SCHED   9/27/2018 10:30 AM ARCELIA Haley MD [address] [place] [address], [address]      Non-[facility] follow up appointment(s): Dr. Denys Negrete 7/20/18  [facility]:  n/a       Goals      Attends follow-up appointments as directed. 7/18/18-Patient was found to have increased creatinine level on OV with PCP 7/10/18. He has been referred to nephrologist.  NN asked patient about this, and he states he has a visit Friday 7/20/18.   NN will f/u with patient in 2 weeks to discuss details of that visit./vs       Understands red flags post discharge. 7/18/18-NN contacted patient after recent hospital stay and rehab stay for GI bleed. He had his JOSEFINA visit with his PCP on 7/10/18. NN reviewed red flags with patient re: indication of GI bleeding, such as dark stools, abdominal pain, severe fatigue and told him to report those immediately to MD, should they occur.   Patient is well aware and will report any abnormalities to MD.  NN will check with patient in 2 weeks to follow up/ vs

## 2018-07-24 ENCOUNTER — CLINICAL SUPPORT (OUTPATIENT)
Dept: CARDIOLOGY CLINIC | Age: 83
End: 2018-07-24

## 2018-07-24 DIAGNOSIS — Z95.0 CARDIAC PACEMAKER IN SITU: Primary | ICD-10-CM

## 2018-07-24 NOTE — PROGRESS NOTES
See scanned [facility] dual chamber pacemaker check in chart. Battery check only. Next follow up 2 months for full device check  Non chargeable visit.

## 2018-07-30 ENCOUNTER — TELEPHONE (OUTPATIENT)
Dept: CARDIOLOGY CLINIC | Age: 83
End: 2018-07-30

## 2018-07-30 NOTE — TELEPHONE ENCOUNTER
Verified patient with two identifiers. Spoke with pt he has taken BP a couple of times since this am to report readings. 154/69, HR 61, 154/78 HR 60. No cardiac c/o. Advised him to call back if BP elevates, BP is now within the normal range. Pt verbalized understanding.

## 2018-07-30 NOTE — TELEPHONE ENCOUNTER
Verified patient with two identifiers. Spoke with pt c/o BP this am of 170/102. Pt said he felt weak, tired and had SOB. Went back to sleep, after he woke up went to clinic in [place] where he lives and BP was 167/84. He is feeling better still tired some but not as bad. Advised to take BP this afternoon and call back to see if BP is staying down.  Pt verbalized understanding

## 2018-07-31 ENCOUNTER — TELEPHONE (OUTPATIENT)
Dept: OTHER | Age: 83
End: 2018-07-31

## 2018-07-31 NOTE — TELEPHONE ENCOUNTER
Verified patient with two identifiers. Spoke with pt advising him his BP reading are expected to be slightly high when exercising. If BP stays high when not exercising or SOB stays call back. Pt verbalized understanding.

## 2018-07-31 NOTE — TELEPHONE ENCOUNTER
Patient is calling to give bp readings    179/81-exercise cardiac rehab  166/17  195/64    131/72 after finishing cardiac rehab     These readings are all within an hour of each other.     Please call pt on his cell number if you aren't able to reach hm on listed number [phone]

## 2018-08-02 ENCOUNTER — PATIENT OUTREACH (OUTPATIENT)
Dept: INTERNAL MEDICINE CLINIC | Age: 83
End: 2018-08-02

## 2018-08-06 ENCOUNTER — HOSPITAL ENCOUNTER (OUTPATIENT)
Dept: CT IMAGING | Age: 83
Discharge: HOME | End: 2018-08-06
Attending: NURSE PRACTITIONER
Payer: MEDICARE

## 2018-08-06 ENCOUNTER — PATIENT OUTREACH (OUTPATIENT)
Dept: INTERNAL MEDICINE CLINIC | Age: 83
End: 2018-08-06

## 2018-08-06 ENCOUNTER — OFFICE VISIT (OUTPATIENT)
Dept: INTERNAL MEDICINE CLINIC | Age: 83
End: 2018-08-06

## 2018-08-06 ENCOUNTER — HOSPITAL ENCOUNTER (EMERGENCY)
Age: 83
Discharge: LEFT BEFORE DISCHARGE | End: 2018-08-06
Attending: EMERGENCY MEDICINE
Payer: MEDICARE

## 2018-08-06 VITALS
HEART RATE: 91 BPM | TEMPERATURE: 97.8 F | RESPIRATION RATE: 18 BRPM | WEIGHT: 168.43 LBS | DIASTOLIC BLOOD PRESSURE: 81 MMHG | HEIGHT: 68 IN | BODY MASS INDEX: 25.53 KG/M2 | OXYGEN SATURATION: 100 % | SYSTOLIC BLOOD PRESSURE: 163 MMHG

## 2018-08-06 DIAGNOSIS — J90 PLEURAL EFFUSION, BILATERAL: ICD-10-CM

## 2018-08-06 DIAGNOSIS — D64.9 ANEMIA, UNSPECIFIED TYPE: ICD-10-CM

## 2018-08-06 DIAGNOSIS — Z53.21 PATIENT LEFT WITHOUT BEING SEEN: Primary | ICD-10-CM

## 2018-08-06 DIAGNOSIS — R07.9 CHEST PAIN, UNSPECIFIED TYPE: Primary | ICD-10-CM

## 2018-08-06 DIAGNOSIS — R06.02 SOB (SHORTNESS OF BREATH): ICD-10-CM

## 2018-08-06 LAB
BUN BLD-MCNC: 39 MG/DL (ref 9–20)
CALCIUM BLD-MCNC: 9 MG/DL (ref 8.4–10.2)
CHLORIDE BLD-SCNC: 114 MMOL/L (ref 98–107)
CO2 POC: 24 MMOL/L (ref 22–32)
CREAT BLD-MCNC: 2.1 MG/DL (ref 0.8–1.5)
CREAT BLD-MCNC: 2.4 MG/DL (ref 0.6–1.3)
EGFR (POC): 25.6
GLUCOSE POC: 189 MG/DL (ref 75–110)
GRAN# POC: 4.7 K/UL (ref 2–7.8)
GRAN% POC: 63.2 % (ref 37–92)
HCT VFR BLD CALC: 31 % (ref 37–51)
HGB BLD-MCNC: 10.4 G/DL (ref 12–18)
LY# POC: 1.8 K/UL (ref 0.6–4.1)
LY% POC: 26.9 % (ref 10–58.5)
MCH RBC QN: 36.8 PG (ref 26–32)
MCHC RBC-ENTMCNC: 33.6 G/DL (ref 30–36)
MCV RBC: 110 FL (ref 80–97)
MID #, POC: 0.6 K/UL (ref 0–1.8)
MID% POC: 9.9 % (ref 0.1–24)
PLATELET # BLD: 257 K/UL (ref 140–440)
POTASSIUM SERPL-SCNC: 4.9 MMOL/L (ref 3.6–5)
RBC # BLD: 2.83 M/UL (ref 4.2–6.3)
SODIUM SERPL-SCNC: 145 MMOL/L (ref 137–145)
WBC # BLD: 7.1 K/UL (ref 4.1–10.9)

## 2018-08-06 PROCEDURE — 71250 CT THORAX DX C-: CPT

## 2018-08-06 PROCEDURE — 75810000275 HC EMERGENCY DEPT VISIT NO LEVEL OF CARE

## 2018-08-06 PROCEDURE — 93005 ELECTROCARDIOGRAM TRACING: CPT

## 2018-08-06 PROCEDURE — 82565 ASSAY OF CREATININE: CPT

## 2018-08-06 RX ORDER — SODIUM BICARBONATE 650 MG/1
325 TABLET ORAL 3 TIMES DAILY
COMMUNITY
Start: 2018-08-02 | End: 2018-11-15

## 2018-08-06 RX ORDER — SODIUM CHLORIDE 0.9 % (FLUSH) 0.9 %
5-10 SYRINGE (ML) INJECTION EVERY 8 HOURS
Status: DISCONTINUED | OUTPATIENT
Start: 2018-08-06 | End: 2018-08-06 | Stop reason: HOSPADM

## 2018-08-06 RX ORDER — SODIUM CHLORIDE 0.9 % (FLUSH) 0.9 %
10 SYRINGE (ML) INJECTION
Status: ACTIVE | OUTPATIENT
Start: 2018-08-06 | End: 2018-08-07

## 2018-08-06 RX ORDER — SODIUM CHLORIDE 0.9 % (FLUSH) 0.9 %
5-10 SYRINGE (ML) INJECTION AS NEEDED
Status: DISCONTINUED | OUTPATIENT
Start: 2018-08-06 | End: 2018-08-06 | Stop reason: HOSPADM

## 2018-08-06 RX ORDER — SODIUM CHLORIDE 9 MG/ML
50 INJECTION, SOLUTION INTRAVENOUS
Status: DISPENSED | OUTPATIENT
Start: 2018-08-06 | End: 2018-08-07

## 2018-08-06 RX ORDER — FUROSEMIDE 20 MG/1
20 TABLET ORAL DAILY
Qty: 30 TAB | Refills: 1 | Status: SHIPPED | OUTPATIENT
Start: 2018-08-06 | End: 2018-08-13

## 2018-08-06 NOTE — PROGRESS NOTES
Lurene Mcardle presents with   Chief Complaint   Patient presents with    Shortness of Breath   Patient of Dr Tahir Kim here with complaint of shortness of breath that had gotten worse over the last several days. He is accompanied by his daughter. He brings home BP readings. He is status post stent placement several weeks ago by Dr Pancho Zhou. 1. Have you been to the ER, urgent care clinic since your last visit? Hospitalized since your last visit? No    2. Have you seen or consulted any other health care providers outside of the [facility] since your last visit? Include any pap smears or colon screening.  No

## 2018-08-06 NOTE — PROGRESS NOTES
8/6/18-NN received a call from patient who states he has been having difficulty with shortness of breath since Friday, 8/3/18. He was started on Sodium bicarb 2 tablets t.i.d. recently by nephrologist, Dr. Nicky Hastings. Since that time, he states he has had very little appetite in addition to increased dyspnea. He reports no chest pain, but does state he has some swelling in extremities. Discussed with nurse practitioner's nurse as patient's PCP is out of town. She will see patient at 2:15 today to evaluate. NN notified patient and he will come in at that time.   NN also advised patient to go to ED if symptoms worse prior to that appointment./ vs

## 2018-08-06 NOTE — MR AVS SNAPSHOT
[address] [facility] 
 
 
 [address] [address] [phone] Patient: Silvina Sloan 
MRN: [id number] APX:5/90/1162 Visit Information Date & Time Provider Department Dept. Phone Encounter #  
 8/6/2018  2:15 PM JOVANNY Quintana 26 [phone] [id number] Follow-up Instructions Return in about 2 days (around 8/8/2018). Your Appointments 8/7/2018  9:45 AM  
ESTABLISHED PATIENT with MD [facility] Appt Note: delia CHENG added to Dr. Sedrick Forrester for today [address]  
[phone] [address] [place] [address] [address] [address]  
  
    
 9/7/2018  8:20 AM  
Any with MD Jayla Scott ([facility]) Appt Note: f/u 3 m - ? re-check b. sugar and Hgb A1c also [address] [address] [address] [facility] [address] 9/18/2018 11:00 AM  
PROCEDURE with PACEMAKER, [facility]) Appt Note: battery check bsc dcpm, varun Pierson, [address] [facility]  
[phone] [address] [place] [address] [address] [address]  
  
    
 9/18/2018 11:15 AM  
ESTABLISHED PATIENT with MD Mary [facility]) Appt Note: P/Dr ALLRED schdl 3 month fu bg  
 [address] [place] [address]  
[phone]  
  
    
 9/27/2018 10:30 AM  
Follow Up with MD Jayla Scott ([facility]) Appt Note: [address] [address] [address] [facility] [address] Upcoming Health Maintenance  Date Due  
 FOOT EXAM Q1 4/23/1931 EYE EXAM RETINAL OR DILATED Q1 4/23/1931 DTaP/Tdap/Td series (1 - Tdap) 4/23/1942 ZOSTER VACCINE AGE 60> 2/23/1981 GLAUCOMA SCREENING Q2Y 4/23/1986 Pneumococcal 65+ Low/Medium Risk (1 of 2 - PCV13) 4/23/1986 MEDICARE YEARLY EXAM 7/24/2018 Influenza Age 5 to Adult 8/1/2018 MICROALBUMIN Q1 9/7/2018 HEMOGLOBIN A1C Q6M 10/25/2018 LIPID PANEL Q1 6/5/2019 Allergies as of 8/6/2018  Review Complete On: 8/6/2018 By: Abhishek Perez RN Severity Noted Reaction Type Reactions Latex, Natural Rubber  06/04/2018    Other (comments) Shellfish Derived  12/21/2012    Other (comments) Crab meat Current Immunizations  Never Reviewed No immunizations on file. Not reviewed this visit You Were Diagnosed With   
  
 Codes Comments Chest pain, unspecified type    -  Primary ICD-10-CM: R07.9 ICD-9-CM: 786.50 SOB (shortness of breath)     ICD-10-CM: R06.02 
ICD-9-CM: 786.05 Anemia, unspecified type     ICD-10-CM: D64.9 ICD-9-CM: 229. 9 Pleural effusion, bilateral     ICD-10-CM: J90 ICD-9-CM: 511.9 Vitals BP Pulse Height(growth percentile) SpO2 Smoking Status 148/70 60 5' 8\" (1.727 m) 98% Never Smoker Vitals History Preferred Pharmacy Pharmacy Name Phone [phone] [phone] [facility] [phone] Your Updated Medication List  
  
   
This list is accurate as of 8/6/18 11:59 PM.  Always use your most recent med list.  
  
  
  
  
 ADULT MULTIVITAMIN (W-LUTEIN) PO Take  by mouth. aspirin 81 mg chewable tablet Take 81 mg by mouth daily. AZOPT 1 % ophthalmic suspension Generic drug:  brinzolamide Administer 1 Drop to both eyes three (3) times daily. carvedilol 3.125 mg tablet Commonly known as:  Selena Ralph Take 1 Tab by mouth two (2) times daily (with meals). furosemide 20 mg tablet Commonly known as:  LASIX Take 1 Tab by mouth daily. latanoprost 0.005 % ophthalmic solution Commonly known as:  Kika Jase Administer 1 Drop to both eyes nightly. omeprazole 40 mg capsule Commonly known as:  PriLOSEC Take 1 Cap by mouth two (2) times a day. pravastatin 20 mg tablet Commonly known as:  PRAVACHOL Take 1 Tab by mouth nightly.  
  
 sodium bicarbonate 650 mg tablet  
  
 ticagrelor 90 mg tablet Commonly known as:  [employer] Take 1 Tab by mouth every twelve (12) hours every twelve (12) hours. timolol 0.5 % ophthalmic solution Commonly known as:  TIMOPTIC  
1 Drop two (2) times a day. Prescriptions Sent to Pharmacy Refills  
 furosemide (LASIX) 20 mg tablet 1 Sig: Take 1 Tab by mouth daily. Class: Normal  
 Pharmacy: [address] #: [phone] Route: Oral  
  
We Performed the Following AMB POC BASIC METABOLIC PANEL [83468 CPT(R)] AMB POC COMPLETE CBC,AUTOMATED ENTER R4561094 CPT(R)] AMB POC EKG ROUTINE W/ 12 LEADS, INTER & REP [92201 CPT(R)] Follow-up Instructions Return in about 2 days (around 8/8/2018). To-Do List   
 08/06/2018 Imaging:  XR CHEST PA LAT Patient Instructions Learning About Pleural Effusion What is pleural effusion? Pleural effusion (say \"PLER-cali hj-ALMV-gsza\") is the buildup of fluid in the space between tissues lining the lungs and chest wall. Because of the fluid buildup, the lungs may not be able to expand completely, which can make it hard to breathe. The lung, or part of it, may collapse. Pleural effusion has many causes, such as pneumonia, cancer, inflammation of the tissues around the lungs, and heart failure. Pleural effusion is usually diagnosed with an X-ray and a physical exam. The doctor listens to the air flow in your lungs. What are the symptoms? Symptoms of pleural effusion may include: · Trouble breathing. · Shortness of breath. · Chest pain. · Fever. · A cough. Minor pleural effusion may not cause any symptoms. How is pleural effusion treated? Doctors may need to treat the condition that is causing pleural effusion. For example, you may get medicines to treat pneumonia or congestive heart failure. Minor pleural effusion often goes away on its own without treatment. Removing fluid Pleural effusion can be treated by removing fluid from the space between the tissues around the lungs. This is done with a needle that's put into the chest (thoracentesis). A small amount of the fluid may be sent to a lab to find out what is causing the buildup of fluid. Removing the fluid may help to relieve symptoms, such as shortness of breath and chest pain. It can help the lungs to expand more fully. In some cases, if pleural effusion doesn't get better, a catheter may be placed in the chest. This is a flexible tube that allows fluid to drain from the lungs. The catheter stays in the chest until the doctor removes it. Some people may get a treatment that removes the fluid and then puts a medicine into the chest cavity. This helps to prevent too much fluid from building up again. Follow-up care is a key part of your treatment and safety. Be sure to make and go to all appointments, and call your doctor if you are having problems. It's also a good idea to know your test results and keep a list of the medicines you take. Where can you learn more? Go to http://vivek-talisha.info/. Enter A920 in the search box to learn more about \"Learning About Pleural Effusion. \" Current as of: December 6, 2017 Content Version: 11.7 © 3267-2333 [employer]. Care instructions adapted under license by [employer] (which disclaims liability or warranty for this information).  If you have questions about a medical condition or this instruction, always ask your healthcare professional. [place] [address] any warranty or liability for your use of this information. Introducing [facility] & HEALTH SERVICES! Remi Grant introduces Cloudbuild patient portal. Now you can access parts of your medical record, email your doctor's office, and request medication refills online. 1. In your internet browser, go to [email or web address] 2. Click on the First Time User? Click Here link in the Sign In box. You will see the New Member Sign Up page. 3. Enter your Cloudbuild Access Code exactly as it appears below. You will not need to use this code after youve completed the sign-up process. If you do not sign up before the expiration date, you must request a new code. · Cloudbuild Access Code: [email or web address] Expires: 9/13/2018  4:44 PM 
 
4. Enter the last four digits of your Social Security Number (xxxx) and Date of Birth (mm/dd/yyyy) as indicated and click Submit. You will be taken to the next sign-up page. 5. Create a Cloudbuild ID. This will be your Cloudbuild login ID and cannot be changed, so think of one that is secure and easy to remember. 6. Create a Cloudbuild password. You can change your password at any time. 7. Enter your Password Reset Question and Answer. This can be used at a later time if you forget your password. 8. Enter your e-mail address. You will receive e-mail notification when new information is available in [address] 9. Click Sign Up. You can now view and download portions of your medical record. 10. Click the Download Summary menu link to download a portable copy of your medical information. If you have questions, please visit the Frequently Asked Questions section of the Cloudbuild website. Remember, Cloudbuild is NOT to be used for urgent needs. For medical emergencies, dial 911. Now available from your iPhone and Android! Please provide this summary of care documentation to your next provider. Your primary care clinician is listed as ARCELIA Flores. If you have any questions after today's visit, please call [phone].

## 2018-08-07 ENCOUNTER — OFFICE VISIT (OUTPATIENT)
Dept: CARDIOLOGY CLINIC | Age: 83
End: 2018-08-07

## 2018-08-07 ENCOUNTER — TELEPHONE (OUTPATIENT)
Dept: CARDIOLOGY CLINIC | Age: 83
End: 2018-08-07

## 2018-08-07 VITALS
HEART RATE: 63 BPM | RESPIRATION RATE: 20 BRPM | BODY MASS INDEX: 25.51 KG/M2 | DIASTOLIC BLOOD PRESSURE: 86 MMHG | OXYGEN SATURATION: 97 % | WEIGHT: 168.3 LBS | HEIGHT: 68 IN | SYSTOLIC BLOOD PRESSURE: 166 MMHG

## 2018-08-07 VITALS
HEART RATE: 60 BPM | OXYGEN SATURATION: 98 % | HEIGHT: 68 IN | DIASTOLIC BLOOD PRESSURE: 70 MMHG | SYSTOLIC BLOOD PRESSURE: 148 MMHG

## 2018-08-07 DIAGNOSIS — Z95.0 CARDIAC PACEMAKER IN SITU: ICD-10-CM

## 2018-08-07 DIAGNOSIS — E78.2 MIXED HYPERLIPIDEMIA: ICD-10-CM

## 2018-08-07 DIAGNOSIS — I10 ESSENTIAL HYPERTENSION, BENIGN: ICD-10-CM

## 2018-08-07 DIAGNOSIS — I49.5 SINOATRIAL NODE DYSFUNCTION (HCC): ICD-10-CM

## 2018-08-07 DIAGNOSIS — I25.10 ATHEROSCLEROSIS OF NATIVE CORONARY ARTERY OF NATIVE HEART WITHOUT ANGINA PECTORIS: Primary | ICD-10-CM

## 2018-08-07 DIAGNOSIS — J90 PLEURAL EFFUSION: ICD-10-CM

## 2018-08-07 LAB
ATRIAL RATE: 59 BPM
CALCULATED R AXIS, ECG10: -60 DEGREES
CALCULATED T AXIS, ECG11: 101 DEGREES
DIAGNOSIS, 93000: NORMAL
P-R INTERVAL, ECG05: 178 MS
Q-T INTERVAL, ECG07: 478 MS
QRS DURATION, ECG06: 184 MS
QTC CALCULATION (BEZET), ECG08: 643 MS
VENTRICULAR RATE, ECG03: 109 BPM

## 2018-08-07 NOTE — PROGRESS NOTES
10:52 AM  A 24-gauge peripheral IV was placed in the right wrist ×1. Over 3 minutes 40 mg IV furosemide slow IV push. Tolerating well IV removed without incident.

## 2018-08-07 NOTE — TELEPHONE ENCOUNTER
Verified patient with two identifiers. Spoke with Aundra Heck on HIPAA, pt is having severe SOB, went to PCP, from there to ED, and d/c home. Pt is having SOB needs to be seen as soon as possible. Per JOVANNY Ayala pt is to come in today @ 9:45 to be seen by Dr. Westley Quintanilla. Pt informed of time.

## 2018-08-07 NOTE — PROGRESS NOTES
1. Have you been to the ER, urgent care clinic since your last visit? Hospitalized since your last visit? YES ED 8/6/18    2. Have you seen or consulted any other health care providers outside of the [facility] since your last visit? Include any pap smears or colon screening. NO    C/O SWELLING IN BLE, SOB, FEELS LIKE SOMETHING IS STUCK IN CHEST, LEGS WEAK UNSTEADY.
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
## 2018-08-07 NOTE — PROGRESS NOTES
Ct chest discussed with Mr Eber Loco daughter [de-identified]. To see Dr Samantha Ghotra this morning at 9:45.

## 2018-08-07 NOTE — ED NOTES
Daughter in to triage to let RN know that she was going to take the patient home to lay down because he is exhausted. Offered water and other positioning, but made aware that room is not available at this time. Daughter decided to take patient home at this time.

## 2018-08-07 NOTE — PROGRESS NOTES
NAME:  Helena Clarke   :   1921   MRN:   [id number]   PCP:  Brennen Carlton MD           Subjective: The patient is a 80y.o. year old male  who returns for a symptom based visit. Daughter reports DBPs for Mr Jeanine Cooley were > 100mmHg 5 days ago. Confirmed by clinic at [facility]. Pt has been progressively short of  breath. Saw NP at PCP office who noted fluid on chest xray and referred pt to [facility] for scan to R/O PE. Due to renal fxn pt was only able to have  CT without Contrast. Waited hours for VQ but left after 6 hours and no scan. He is short of breath at rest with edema. Daughter adds that he is less clear mentally.      Past Medical History:   Diagnosis Date    Abdominal pain 2017    Acute gastric ulcer with hemorrhage 2018    Arteriosclerotic coronary artery disease 2017    Atrioventricular block, complete (HCC)     CAD (coronary artery disease)     Chronic kidney disease, stage IV (severe) (HCC) 2017    CKD (chronic kidney disease) stage 3, GFR 30-59 ml/min 2017    Diabetes mellitus ([address] 2017    Dizziness and giddiness     Fatigue 2017    GERD (gastroesophageal reflux disease)     GERD (gastroesophageal reflux disease) 2017    Glaucoma 2017    Hematuria 2017    Hyperlipidemia 2017    Hypertension     Mixed hyperlipidemia     Neuropathy 2017    Other acute and subacute form of ischemic heart disease     Pernicious anemia 2017    Sinoatrial node dysfunction (HCC)     Syncope and collapse     Thrush 2017    Thrush of mouth and esophagus ([address] 2017    Type 2 diabetes mellitus without complication, without long-term current use of insulin ([address] 2017       Social History   Substance Use Topics    Smoking status: Never Smoker    Smokeless tobacco: Never Used    Alcohol use 0.6 oz/week     1 Glasses of wine per week      Family History   Problem Relation Age of Onset    Stroke Father Review of Systems  Constitutional: Negative for fever, chills, and diaphoresis. Respiratory: Negative for cough, hemoptysis, sputum production, Reports shortness of breath and wheezing. Cardiovascular: Negative for chest pain, palpitations, orthopnea, claudication, leg swelling and PND. Gastrointestinal: Negative for heartburn, nausea, vomiting, blood in stool and melena. Genitourinary: Negative for dysuria and flank pain. Musculoskeletal: Negative for joint pain and back pain. Skin: Negative for rash. Neurological: Negative for focal weakness, seizures, loss of consciousness, weakness and headaches. Endo/Heme/Allergies: Does not bruise/bleed easily. Psychiatric/Behavioral: Negative for memory loss. The patient does not have insomnia. Objective:       Vitals:    08/07/18 0953 08/07/18 1010   BP: 160/84 166/86   Pulse: 63    Resp: 20    SpO2: 97%    Weight: 168 lb 4.8 oz (76.3 kg)    Height: 5' 8\" (1.727 m)     Body mass index is 25.59 kg/(m^2). General PE    Gen: pursed lip breathing, pleasant    Mental Status - Alert. General Appearance - Not in acute distress. Neck - no JVD     Chest and Lung Exam     Inspection: Accessory muscles - No use of accessory muscles in breathing. Auscultation:   Breath sounds: - absent bilateral lower lobes posteriorly. Cardiovascular   Inspection: Jugular vein - Bilateral - Inspection Normal.   Palpation/Percussion:   Apical Impulse: - Normal.   Auscultation: Rhythm - Regular. Heart Sounds - S1 WNL and S2 WNL. No S3 or S4. Murmurs & Other Heart Sounds: Auscultation of the heart reveals - No Murmurs. Peripheral Vascular   Upper Extremity: Inspection - Bilateral - No Cyanotic nailbeds or Digital clubbing. Lower Extremity:   Palpation: Edema - Bilateral - + edema. Abdomen: Soft, non-tender, bowel sounds are active.      Neuro: A&O times 3, CN and motor grossly WNL      Data Review:     EKG -  V paced    LABS-   Lab Results   Component Value Date/Time    Sodium 144 07/05/2018 12:44 AM    Potassium 4.8 07/05/2018 12:44 AM    Chloride 114 (H) 07/05/2018 12:44 AM    CO2 20 (L) 07/05/2018 12:44 AM    Anion gap 10 07/05/2018 12:44 AM    Glucose 101 (H) 07/05/2018 12:44 AM    BUN 36 (H) 07/05/2018 12:44 AM    Creatinine 2.28 (H) 07/05/2018 12:44 AM    BUN/Creatinine ratio 16 07/05/2018 12:44 AM    GFR est AA 32 (L) 07/05/2018 12:44 AM    GFR est non-AA 27 (L) 07/05/2018 12:44 AM    Calcium 8.8 07/05/2018 12:44 AM    Bilirubin, total 0.8 07/03/2018 04:54 PM    AST (SGOT) 46 (H) 07/03/2018 04:54 PM    Alk. phosphatase 77 07/03/2018 04:54 PM    Protein, total 6.6 07/03/2018 04:54 PM    Albumin 3.4 (L) 07/03/2018 04:54 PM    Globulin 3.2 07/03/2018 04:54 PM    A-G Ratio 1.1 07/03/2018 04:54 PM    ALT (SGPT) 22 07/03/2018 04:54 PM         Allergies reviewed  Allergies   Allergen Reactions    Latex, Natural Rubber Other (comments)    Shellfish Derived Other (comments)     Crab meat       Medications reviewed  Current Outpatient Prescriptions   Medication Sig    sodium bicarbonate 650 mg tablet Take 325 mg by mouth three (3) times daily.  omeprazole (PRILOSEC) 40 mg capsule Take 1 Cap by mouth two (2) times a day.  folic acid/multivit-min/lutein (ADULT MULTIVITAMIN, W-LUTEIN, PO) Take  by mouth.  ticagrelor (BRILINTA) 90 mg tablet Take 1 Tab by mouth every twelve (12) hours every twelve (12) hours.  carvedilol (COREG) 3.125 mg tablet Take 1 Tab by mouth two (2) times daily (with meals).  pravastatin (PRAVACHOL) 20 mg tablet Take 1 Tab by mouth nightly.  timolol (TIMOPTIC) 0.5 % ophthalmic solution 1 Drop two (2) times a day.  brinzolamide (AZOPT) 1 % ophthalmic suspension Administer 1 Drop to both eyes three (3) times daily.  aspirin 81 mg chewable tablet Take 81 mg by mouth daily.  latanoprost (XALATAN) 0.005 % ophthalmic solution Administer 1 Drop to both eyes nightly.     furosemide (LASIX) 20 mg tablet Take 1 Tab by mouth daily. No current facility-administered medications for this visit. Assessment:       ICD-10-CM ICD-9-CM    1. Atherosclerosis of native coronary artery of native heart without angina pectoris I25.10 414.01 AMB POC EKG ROUTINE W/ 12 LEADS, INTER & REP      CBC W/O DIFF      MAGNESIUM      METABOLIC PANEL, BASIC   2. Essential hypertension, benign I10 401.1 CBC W/O DIFF      MAGNESIUM      METABOLIC PANEL, BASIC   3. Mixed hyperlipidemia E78.2 272.2 CBC W/O DIFF      MAGNESIUM      METABOLIC PANEL, BASIC   4. Sinoatrial node dysfunction (HCC) I49.5 427.81 CBC W/O DIFF      MAGNESIUM      METABOLIC PANEL, BASIC   5. Cardiac pacemaker in situ Z95.0 V45.01 CBC W/O DIFF      MAGNESIUM      METABOLIC PANEL, BASIC   6.  Pleural effusion J90 511.9 CBC W/O DIFF      MAGNESIUM      METABOLIC PANEL, BASIC        Orders Placed This Encounter    CBC W/O DIFF     Standing Status:   Future     Standing Expiration Date:   8/27/2018    MAGNESIUM     Standing Status:   Future     Standing Expiration Date:   3/63/2794    METABOLIC PANEL, BASIC     Standing Status:   Future     Standing Expiration Date:   8/27/2018    AMB POC EKG ROUTINE W/ 12 LEADS, INTER & REP     Order Specific Question:   Reason for Exam:     Answer:   ROUTINE       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    Mixed hyperlipidemia E78.2    Coronary atherosclerosis of native coronary artery I25.10    Cardiac pacemaker in situ Z95.0    Sinoatrial node dysfunction (HCC) I49.5    Type 2 diabetes mellitus without complication, without long-term current use of insulin ([facility]) E11.9    GERD (gastroesophageal reflux disease) K21.9    Fatigue R53.83    Chronic kidney disease, stage IV (severe) ([facility]) N18.4    ASCVD (arteriosclerotic cardiovascular disease) I25.10    Glaucoma H40.9    Neuropathy G62.9    Pernicious anemia D51.0    Hematuria R31.9    Abdominal pain R10.9    Pacemaker Z95.0    Type 2 diabetes mellitus with nephropathy (HCC) E11.21    S/P cardiac cath Z98.890    GI bleed K92.2    Anemia, blood loss D50.0    Acute gastric ulcer with hemorrhage K25.0       Plan:     Patient presents for symptom based visit. CT yesterday with Moderate left and small right pleural effusions may be due to fluid overload  given the presence of mild interstitial pulmonary edema. Bun/creat yesterday  39/2.1. Will give IV lasix 40mg now and sent pt home. Increase to 40 bid x 2 days then lower to 40 daily. Labs prior to follow up in next week. CHRISTIANO Ashraf    Patient seen and examined by me with nurse practitioner. I personally performed all components of the history, physical, and medical decision making and agree with the assessment and plan with minor modifications as noted. Discussed with ike. Recommend hospitalization if no improvement in 2-3 days. Daughter will call to update. P.CARLTON Becker MD, [facility]

## 2018-08-07 NOTE — PATIENT INSTRUCTIONS
Learning About Pleural Effusion  What is pleural effusion? Pleural effusion (say \"PLER-cali jr-JAAL-tjnu\") is the buildup of fluid in the space between tissues lining the lungs and chest wall. Because of the fluid buildup, the lungs may not be able to expand completely, which can make it hard to breathe. The lung, or part of it, may collapse. Pleural effusion has many causes, such as pneumonia, cancer, inflammation of the tissues around the lungs, and heart failure. Pleural effusion is usually diagnosed with an X-ray and a physical exam. The doctor listens to the air flow in your lungs. What are the symptoms? Symptoms of pleural effusion may include:  · Trouble breathing. · Shortness of breath. · Chest pain. · Fever. · A cough. Minor pleural effusion may not cause any symptoms. How is pleural effusion treated? Doctors may need to treat the condition that is causing pleural effusion. For example, you may get medicines to treat pneumonia or congestive heart failure. Minor pleural effusion often goes away on its own without treatment. Removing fluid  Pleural effusion can be treated by removing fluid from the space between the tissues around the lungs. This is done with a needle that's put into the chest (thoracentesis). A small amount of the fluid may be sent to a lab to find out what is causing the buildup of fluid. Removing the fluid may help to relieve symptoms, such as shortness of breath and chest pain. It can help the lungs to expand more fully. In some cases, if pleural effusion doesn't get better, a catheter may be placed in the chest. This is a flexible tube that allows fluid to drain from the lungs. The catheter stays in the chest until the doctor removes it. Some people may get a treatment that removes the fluid and then puts a medicine into the chest cavity. This helps to prevent too much fluid from building up again. Follow-up care is a key part of your treatment and safety.  Be sure to make and go to all appointments, and call your doctor if you are having problems. It's also a good idea to know your test results and keep a list of the medicines you take. Where can you learn more? Go to http://vivek-talisha.info/. Enter A920 in the search box to learn more about \"Learning About Pleural Effusion. \"  Current as of: December 6, 2017  Content Version: 11.7  © 1234-8645 [employer]. Care instructions adapted under license by [employer] (which disclaims liability or warranty for this information). If you have questions about a medical condition or this instruction, always ask your healthcare professional. [address] any warranty or liability for your use of this information.

## 2018-08-09 ENCOUNTER — PATIENT OUTREACH (OUTPATIENT)
Dept: INTERNAL MEDICINE CLINIC | Age: 83
End: 2018-08-09

## 2018-08-09 NOTE — PROGRESS NOTES
Goals      Attends follow-up appointments as directed. 8/9/18-Patient called NN on 8/6/18 with increased sob and swelling in lower extremities. PCP out of town, so NP saw patient. Found to have bilateral pleural effusions and sent for CT angio and started on Lasix 20 mg daily. Patient then saw cardiologist  8/7/18 and had IV lasix in the office and increased Lasix further. Patient states he initially felt some relief, but continues with sob. He has f/u for labs at cardiology office tomorrow and visit Monday there. Sodium bicarb stopped per nephrologist per patient's wife due to lack of appetite as well. NN asked patient to call me with update after he sees cardiologist, and we can also give him an appt with Dr. Lou Fontenot next week in necessary. NN will continue to monitor and check on patient. Will f/u next week with patient./ vs    8/2/18-NN spoke to PCP, Dr. Lou Fontenot, he states patient can have fasting blood sugar and perhaps HgbA1C at next visit. This is scheduled for 9/7/18. Notified patient and told him to call if any needs prior to that visit. NN will check back with patient in one week to check on his status. / v.s.     8/2/18-Patient calls to say he had visit with nephrologist, who started him on Sodium Bicarb 2 tablets t.i.d. He wanted me to give Dr. Lou Fontenot an update on that and also let him know his blood sugar in nephrologist office was 159, however he was not fasting at the time. NN will discuss with Dr. Lou Fontenot, PCP, and call patient back to see if he would like patient to come in for a fasting blood sugar. / vs    7/18/18-Patient was found to have increased creatinine level on OV with PCP 7/10/18. He has been referred to nephrologist.  NN asked patient about this, and he states he has a visit Friday 7/20/18. NN will f/u with patient in 2 weeks to discuss details of that visit./vs       Understands red flags post discharge.             8/9/18-Per patient sodium bicarb causing him to have loss of appetite. Per patient's wife they spoke to NP at nephrologist office and she had patient stop medication and suggested he take a teaspoon of soda with water daily. NN will check back on patient next week. No evidence of GI bleed symptoms at this time. / vs    8/2/18 - NN spoke to patient who has had no signs of GI bleed. He also saw Dr. Jus Lancaster, nephrologist that he was referred to and he started him on Sodium bicarb 2 tablets t.i.d. Shared information with PCP. NN will check back with patient in one week to see how he is tolerating medication. /vs    7/18/18-NN contacted patient after recent hospital stay and rehab stay for GI bleed. He had his JOSEFINA visit with his PCP on 7/10/18. NN reviewed red flags with patient re: indication of GI bleeding, such as dark stools, abdominal pain, severe fatigue and told him to report those immediately to MD, should they occur.   Patient is well aware and will report any abnormalities to MD.  NN will check with patient in 2 weeks to follow up/ vs

## 2018-08-10 ENCOUNTER — TELEPHONE (OUTPATIENT)
Dept: INTERNAL MEDICINE CLINIC | Age: 83
End: 2018-08-10

## 2018-08-10 ENCOUNTER — PATIENT OUTREACH (OUTPATIENT)
Dept: INTERNAL MEDICINE CLINIC | Age: 83
End: 2018-08-10

## 2018-08-10 DIAGNOSIS — I49.5 SINOATRIAL NODE DYSFUNCTION (HCC): ICD-10-CM

## 2018-08-10 DIAGNOSIS — I10 ESSENTIAL HYPERTENSION, BENIGN: ICD-10-CM

## 2018-08-10 DIAGNOSIS — E78.2 MIXED HYPERLIPIDEMIA: ICD-10-CM

## 2018-08-10 DIAGNOSIS — I25.10 ATHEROSCLEROSIS OF NATIVE CORONARY ARTERY OF NATIVE HEART WITHOUT ANGINA PECTORIS: ICD-10-CM

## 2018-08-10 DIAGNOSIS — Z95.0 CARDIAC PACEMAKER IN SITU: ICD-10-CM

## 2018-08-10 DIAGNOSIS — J90 PLEURAL EFFUSION: ICD-10-CM

## 2018-08-10 NOTE — PROGRESS NOTES
Goals      Attends follow-up appointments as directed. 8/10/18-Kristyn, patient's daughter calls to say her dad is feeling a little better today, but still has some sob. He will see Dr. Samantha Ghotra, cardiologist, on Monday 813/18 and she will call me with a f/u on Monday after he sees cardiologist/vs    8/9/18-Patient called NN on 8/6/18 with increased sob and swelling in lower extremities. PCP out of town, so NP saw patient. Found to have bilateral pleural effusions and sent for CT angio and started on Lasix 20 mg daily. Patient then saw cardiologist  8/7/18 and had IV lasix in the office and increased Lasix further. Patient states he initially felt some relief, but continues with sob. He has f/u for labs at cardiology office tomorrow and visit Monday there. Sodium bicarb stopped per nephrologist per patient's wife due to lack of appetite as well. NN asked patient to call me with update after he sees cardiologist, and we can also give him an appt with Dr. Lesvia Hernandez next week in necessary. NN will continue to monitor and check on patient. Will f/u next week with patient./ vs    8/2/18-NN spoke to PCP, Dr. Lesvia Hernandez, he states patient can have fasting blood sugar and perhaps HgbA1C at next visit. This is scheduled for 9/7/18. Notified patient and told him to call if any needs prior to that visit. NN will check back with patient in one week to check on his status. / v.s.     8/2/18-Patient calls to say he had visit with nephrologist, who started him on Sodium Bicarb 2 tablets t.i.d. He wanted me to give Dr. Lesvia Hernandez an update on that and also let him know his blood sugar in nephrologist office was 159, however he was not fasting at the time. NN will discuss with Dr. Lesvia Hernandez, PCP, and call patient back to see if he would like patient to come in for a fasting blood sugar. / vs    7/18/18-Patient was found to have increased creatinine level on OV with PCP 7/10/18.   He has been referred to nephrologist.  NN asked patient about this, and he states he has a visit Friday 7/20/18. NN will f/u with patient in 2 weeks to discuss details of that visit./vs       Understands red flags post discharge. 8/9/18-Per patient sodium bicarb causing him to have loss of appetite. Per patient's wife they spoke to NP at nephrologist office and she had patient stop medication and suggested he take a teaspoon of soda with water daily. NN will check back on patient next week. No evidence of GI bleed symptoms at this time. / vs    8/2/18 - NN spoke to patient who has had no signs of GI bleed. He also saw Dr. Sienna Singer, nephrologist that he was referred to and he started him on Sodium bicarb 2 tablets t.i.d. Shared information with PCP. NN will check back with patient in one week to see how he is tolerating medication. /vs    7/18/18-NN contacted patient after recent hospital stay and rehab stay for GI bleed. He had his JOSEFINA visit with his PCP on 7/10/18. NN reviewed red flags with patient re: indication of GI bleeding, such as dark stools, abdominal pain, severe fatigue and told him to report those immediately to MD, should they occur.   Patient is well aware and will report any abnormalities to MD.  NN will check with patient in 2 weeks to follow up/ vs

## 2018-08-10 NOTE — TELEPHONE ENCOUNTER
Patients wife, Stephaneradha Hastings is calling back in regards to a missed call from Arnaldo.      Best call back # for Salvador Hastings: [phone]

## 2018-08-11 LAB
BUN SERPL-MCNC: 36 MG/DL (ref 10–36)
BUN/CREAT SERPL: 15 (ref 10–24)
CALCIUM SERPL-MCNC: 8.6 MG/DL (ref 8.6–10.2)
CHLORIDE SERPL-SCNC: 109 MMOL/L (ref 96–106)
CO2 SERPL-SCNC: 21 MMOL/L (ref 20–29)
CREAT SERPL-MCNC: 2.4 MG/DL (ref 0.76–1.27)
ERYTHROCYTE [DISTWIDTH] IN BLOOD BY AUTOMATED COUNT: 17.7 % (ref 12.3–15.4)
GLUCOSE SERPL-MCNC: 136 MG/DL (ref 65–99)
HCT VFR BLD AUTO: 31.1 % (ref 37.5–51)
HGB BLD-MCNC: 9.9 G/DL (ref 13–17.7)
INTERPRETATION: NORMAL
MAGNESIUM SERPL-MCNC: 2.2 MG/DL (ref 1.6–2.3)
MCH RBC QN AUTO: 35.2 PG (ref 26.6–33)
MCHC RBC AUTO-ENTMCNC: 31.8 G/DL (ref 31.5–35.7)
MCV RBC AUTO: 111 FL (ref 79–97)
PLATELET # BLD AUTO: 221 X10E3/UL (ref 150–379)
POTASSIUM SERPL-SCNC: 4.4 MMOL/L (ref 3.5–5.2)
RBC # BLD AUTO: 2.81 X10E6/UL (ref 4.14–5.8)
SODIUM SERPL-SCNC: 145 MMOL/L (ref 134–144)
WBC # BLD AUTO: 5.7 X10E3/UL (ref 3.4–10.8)

## 2018-08-13 ENCOUNTER — TELEPHONE (OUTPATIENT)
Dept: CARDIOLOGY CLINIC | Age: 83
End: 2018-08-13

## 2018-08-13 ENCOUNTER — OFFICE VISIT (OUTPATIENT)
Dept: CARDIOLOGY CLINIC | Age: 83
End: 2018-08-13

## 2018-08-13 VITALS
RESPIRATION RATE: 18 BRPM | OXYGEN SATURATION: 98 % | BODY MASS INDEX: 25.61 KG/M2 | HEIGHT: 68 IN | SYSTOLIC BLOOD PRESSURE: 146 MMHG | HEART RATE: 60 BPM | WEIGHT: 169 LBS | DIASTOLIC BLOOD PRESSURE: 74 MMHG

## 2018-08-13 DIAGNOSIS — I25.10 ASCVD (ARTERIOSCLEROTIC CARDIOVASCULAR DISEASE): ICD-10-CM

## 2018-08-13 DIAGNOSIS — E78.2 MIXED HYPERLIPIDEMIA: ICD-10-CM

## 2018-08-13 DIAGNOSIS — I10 ESSENTIAL HYPERTENSION, BENIGN: ICD-10-CM

## 2018-08-13 DIAGNOSIS — I49.5 SINOATRIAL NODE DYSFUNCTION (HCC): Primary | ICD-10-CM

## 2018-08-13 RX ORDER — FUROSEMIDE 40 MG/1
40 TABLET ORAL DAILY
Qty: 45 TAB | Refills: 1 | Status: SHIPPED | OUTPATIENT
Start: 2018-08-13 | End: 2018-08-13 | Stop reason: SDUPTHER

## 2018-08-13 RX ORDER — FUROSEMIDE 40 MG/1
40 TABLET ORAL DAILY
Qty: 45 TAB | Refills: 1 | Status: SHIPPED | OUTPATIENT
Start: 2018-08-13 | End: 2018-09-18 | Stop reason: SDUPTHER

## 2018-08-13 NOTE — MR AVS SNAPSHOT
[address] [facility] 
[phone] Patient: Maryann Primrose 
MRN: [id number] DDZ:2/37/[id number] Visit Information Date & Time Provider Department Dept. Phone Encounter #  
 8/13/2018  2:45 PM Renata Davis Howe, [facility] [phone] [id number] Your Appointments 9/7/2018  8:20 AM  
Any with MD Jayla Henry 26 ([address]) Appt Note: f/u 3 m - ? re-check b. sugar and Hgb A1c also [address] [phone] [address] [facility] [phone] 9/18/2018 11:00 AM  
PROCEDURE with PACEMAKER, [facility] [address]) Appt Note: battery check bsc dcpm, adeles Dangelo, [address] [facility]  
[phone] [address] [facility] [address] [address]  
  
    
 9/18/2018 11:15 AM  
ESTABLISHED PATIENT with [facility] [address] [facility]) Appt Note: P/Dr CHALINO muñoz 3 month fu bg  
 [address] [facility]  
[phone] [address] [facility]  
  
    
 9/27/2018 10:30 AM  
Follow Up with MD Jayla Henry 26 ([address]) Appt Note: [address] [phone] [address] [facility] [address] Upcoming Health Maintenance Date Due  
 FOOT EXAM Q1 4/23/[birth year]31 EYE EXAM RETINAL OR DILATED Q1 4/23/1931 DTaP/Tdap/Td series (1 - Tdap) 4/23/1942 ZOSTER VACCINE AGE 60> 2/23/1981 GLAUCOMA SCREENING Q2Y 4/23/1986 Pneumococcal 65+ Low/Medium Risk (1 of 2 - PCV13) 4/23/1986 MEDICARE YEARLY EXAM 7/24/2018 Influenza Age 5 to Adult 8/1/2018 MICROALBUMIN Q1 9/7/2018 HEMOGLOBIN A1C Q6M 10/25/2018 LIPID PANEL Q1 6/5/2019 Allergies as of 8/13/2018  Review Complete On: 8/13/2018 By: Al Reveles Severity Noted Reaction Type Reactions Latex, Natural Rubber  06/04/2018    Other (comments) Shellfish Derived  12/21/2012    Other (comments) Crab meat Current Immunizations  Never Reviewed No immunizations on file. Not reviewed this visit You Were Diagnosed With   
  
 Codes Comments Sinoatrial node dysfunction (HCC)    -  Primary ICD-10-CM: I49.5 ICD-9-CM: 427.81 Essential hypertension, benign     ICD-10-CM: I10 
ICD-9-CM: 401.1 Mixed hyperlipidemia     ICD-10-CM: E78.2 ICD-9-CM: 272.2 ASCVD (arteriosclerotic cardiovascular disease)     ICD-10-CM: I25.10 ICD-9-CM: 429.2, 440.9 Vitals BP Pulse Resp Height(growth percentile) Weight(growth percentile) SpO2  
 146/74 (BP 1 Location: Right arm, BP Patient Position: Sitting) 60 18 5' 8\" (1.727 m) 169 lb (76.7 kg) 98% BMI Smoking Status 25.7 kg/m2 Never Smoker Vitals History BMI and BSA Data Body Mass Index Body Surface Area 25.7 kg/m 2 1.92 m 2 Preferred Pharmacy Pharmacy Name Phone [id number] [facility] [phone] Your Updated Medication List  
  
   
This list is accurate as of 8/13/18  3:47 PM.  Always use your most recent med list.  
  
  
  
  
 ADULT MULTIVITAMIN (W-LUTEIN) PO Take  by mouth. aspirin 81 mg chewable tablet Take 81 mg by mouth daily. AZOPT 1 % ophthalmic suspension Generic drug:  brinzolamide Administer 1 Drop to both eyes three (3) times daily. carvedilol 3.125 mg tablet Commonly known as:  Dareen Kaska Take 1 Tab by mouth two (2) times daily (with meals). furosemide 40 mg tablet Commonly known as:  LASIX Take 1 Tab by mouth daily.  Take 40mg MON, pm, TUES in AM and PM. Then lower daily dose. Once daily starting WED  
  
 latanoprost 0.005 % ophthalmic solution Commonly known as:  Kyler Buenrostro Administer 1 Drop to both eyes nightly. omeprazole 40 mg capsule Commonly known as:  PriLOSEC Take 1 Cap by mouth two (2) times a day. pravastatin 20 mg tablet Commonly known as:  PRAVACHOL Take 1 Tab by mouth nightly.  
  
 sodium bicarbonate 650 mg tablet Take 325 mg by mouth three (3) times daily. ticagrelor 90 mg tablet Commonly known as:  [employer] Take 1 Tab by mouth every twelve (12) hours every twelve (12) hours. timolol 0.5 % ophthalmic solution Commonly known as:  TIMOPTIC  
1 Drop two (2) times a day. Prescriptions Printed Refills  
 furosemide (LASIX) 40 mg tablet 1 Sig: Take 1 Tab by mouth daily. Take 40mg MON, pm, TUES in AM and PM. Then lower daily dose. Once daily starting WED Class: Print Route: Oral  
  
We Performed the Following AMB POC EKG ROUTINE W/ 12 LEADS, INTER & REP [27987 CPT(R)] Introducing [facility] SERVICES! [employer] introduces [employer] patient portal. Now you can access parts of your medical record, email your doctor's office, and request medication refills online. 1. In your internet browser, go to [email or web address] 2. Click on the First Time User? Click Here link in the Sign In box. You will see the New Member Sign Up page. 3. Enter your [employer] Access Code exactly as it appears below. You will not need to use this code after youve completed the sign-up process. If you do not sign up before the expiration date, you must request a new code. · [employer] Access Code: [email or web address] Expires: 9/13/2018  4:44 PM 
 
4. Enter the last four digits of your Social Security Number (xxxx) and Date of Birth (mm/dd/yyyy) as indicated and click Submit. You will be taken to the next sign-up page. 5. Create a [employer] ID.  This will be your [employer] login ID and cannot be changed, so think of one that is secure and easy to remember. 6. Create a AllergEase password. You can change your password at any time. 7. Enter your Password Reset Question and Answer. This can be used at a later time if you forget your password. 8. Enter your e-mail address. You will receive e-mail notification when new information is available in [address] 9. Click Sign Up. You can now view and download portions of your medical record. 10. Click the Download Summary menu link to download a portable copy of your medical information. If you have questions, please visit the Frequently Asked Questions section of the AllergEase website. Remember, AllergEase is NOT to be used for urgent needs. For medical emergencies, dial 911. Now available from your iPhone and Android! Please provide this summary of care documentation to your next provider. Your primary care clinician is listed as ARCELIA Sharma. If you have any questions after today's visit, please call [phone].

## 2018-08-13 NOTE — PROGRESS NOTES
Chief Complaint   Patient presents with    Hypertension     1 week f/u c/o SOB; fluid in lung    1. Have you been to the ER, urgent care clinic since your last visit? Hospitalized since your last visit? No    2. Have you seen or consulted any other health care providers outside of the [address] [place] [address] since your last visit? Include any pap smears or colon screening.  No

## 2018-08-13 NOTE — TELEPHONE ENCOUNTER
Received  Patient call from daughter Ted Alaniz and patient  Verified patient with 2 patient identifiers    Ted Alaniz states patient currently not taking fluid pills and does have a nephologist.  Patient has appointment with Renata PETTY scheduled today, patient has c/o SOB.

## 2018-08-13 NOTE — TELEPHONE ENCOUNTER
----- Message from CHRISTIANO Ash sent at 8/13/2018  8:48 AM EDT -----  Confirm that he has backed down on lasix as his renal fxn has worsened. His hemoglobin is also lower. Does he have a nephrologist?    Teodora Vargas- this is the gentleman you gave IV lasix to. I'm sending you this as Carmelita Hill is going out of town and will need to be followed/probably seen.    Thanks      ----- Message -----     From: Mitchell Vick Lab Results In     Sent: 8/11/2018   8:38 AM       To: CHRISTIANO Ash

## 2018-08-13 NOTE — PROGRESS NOTES
NAME:  Aubree Benitez   :   1921   MRN:   [id number]   PCP:  Mark Dickerson MD           Subjective: The patient is a 80y.o. year old male  who returns for a routine follow-up from medication adjustment. Since the last visit, patient reports little change since IV lasix last week. Daughter notes they did not take lasix at home after the visit due to a mis-undstanding. He remains dyspneic and his weight is up. Past Medical History:   Diagnosis Date    Abdominal pain 2017    Acute gastric ulcer with hemorrhage 2018    Arteriosclerotic coronary artery disease 2017    Atrioventricular block, complete (HCC)     CAD (coronary artery disease)     Chronic kidney disease, stage IV (severe) (HCC) 2017    CKD (chronic kidney disease) stage 3, GFR 30-59 ml/min 2017    Diabetes mellitus ([address] 2017    Dizziness and giddiness     Fatigue 2017    GERD (gastroesophageal reflux disease)     GERD (gastroesophageal reflux disease) 2017    Glaucoma 2017    Hematuria 2017    Hyperlipidemia 2017    Hypertension     Mixed hyperlipidemia     Neuropathy 2017    Other acute and subacute form of ischemic heart disease     Pernicious anemia 2017    Sinoatrial node dysfunction (HCC)     Syncope and collapse     Thrush 2017    Thrush of mouth and esophagus ([address] 2017    Type 2 diabetes mellitus without complication, without long-term current use of insulin ([address] 2017       Social History   Substance Use Topics    Smoking status: Never Smoker    Smokeless tobacco: Never Used    Alcohol use 0.6 oz/week     1 Glasses of wine per week      Family History   Problem Relation Age of Onset    Stroke Father         Review of Systems  Constitutional: Negative for fever, chills, and diaphoresis. Respiratory: Negative for cough, hemoptysis, sputum production, Reports shortness of breath and wheezing.    Cardiovascular: Negative for chest pain, palpitations, orthopnea, claudication, leg swelling and PND. Gastrointestinal: Negative for heartburn, nausea, vomiting, blood in stool and melena. Reports distention  Genitourinary: Negative for dysuria and flank pain. Musculoskeletal: Negative for joint pain and back pain. Skin: Negative for rash. Neurological: Negative for focal weakness, seizures, loss of consciousness, weakness and headaches. Endo/Heme/Allergies: Does not bruise/bleed easily. Psychiatric/Behavioral: Negative for memory loss. The patient does not have insomnia. Objective:       Vitals:    08/13/18 1449   BP: 146/74   Pulse: 60   Resp: 18   SpO2: 98%   Weight: 169 lb (76.7 kg)   Height: 5' 8\" (1.727 m)    Body mass index is 25.7 kg/(m^2). General PE    Gen: NAD     Mental Status - Alert. General Appearance - Not in acute distress. Neck - no JVD     Chest and Lung Exam     Inspection: Accessory muscles - No use of accessory muscles in breathing. Auscultation:   Breath sounds: - decreased lower lobe posteriorly    Cardiovascular   Inspection: Jugular vein - Bilateral - Inspection Normal.   Palpation/Percussion:   Apical Impulse: - Normal.   Auscultation: Rhythm - Regular. Heart Sounds - S1 WNL and S2 WNL. No S3 or S4. Murmurs & Other Heart Sounds: Auscultation of the heart reveals - No Murmurs. Peripheral Vascular   Upper Extremity: Inspection - Bilateral - No Cyanotic nailbeds or Digital clubbing. Lower Extremity:   Palpation: Edema - Bilateral - + edema. Abdomen: Soft, non-tender, bowel sounds are active.      Neuro: A&O times 3, CN and motor grossly WNL      Data Review:     EKG -    LABS-   Lab Results   Component Value Date/Time    Sodium 145 (H) 08/10/2018 09:43 AM    Potassium 4.4 08/10/2018 09:43 AM    Chloride 109 (H) 08/10/2018 09:43 AM    CO2 21 08/10/2018 09:43 AM    Anion gap 10 07/05/2018 12:44 AM    Glucose 136 (H) 08/10/2018 09:43 AM    BUN 36 08/10/2018 09:43 AM    Creatinine 2.40 (H) 08/10/2018 09:43 AM    BUN/Creatinine ratio 15 08/10/2018 09:43 AM    GFR est AA 25 (L) 08/10/2018 09:43 AM    GFR est non-AA 22 (L) 08/10/2018 09:43 AM    Calcium 8.6 08/10/2018 09:43 AM    Bilirubin, total 0.8 07/03/2018 04:54 PM    AST (SGOT) 46 (H) 07/03/2018 04:54 PM    Alk. phosphatase 77 07/03/2018 04:54 PM    Protein, total 6.6 07/03/2018 04:54 PM    Albumin 3.4 (L) 07/03/2018 04:54 PM    Globulin 3.2 07/03/2018 04:54 PM    A-G Ratio 1.1 07/03/2018 04:54 PM    ALT (SGPT) 22 07/03/2018 04:54 PM         Allergies reviewed  Allergies   Allergen Reactions    Latex, Natural Rubber Other (comments)    Shellfish Derived Other (comments)     Crab meat       Medications reviewed  Current Outpatient Prescriptions   Medication Sig    omeprazole (PRILOSEC) 40 mg capsule Take 1 Cap by mouth two (2) times a day.  folic acid/multivit-min/lutein (ADULT MULTIVITAMIN, W-LUTEIN, PO) Take  by mouth.  ticagrelor (BRILINTA) 90 mg tablet Take 1 Tab by mouth every twelve (12) hours every twelve (12) hours.  carvedilol (COREG) 3.125 mg tablet Take 1 Tab by mouth two (2) times daily (with meals).  pravastatin (PRAVACHOL) 20 mg tablet Take 1 Tab by mouth nightly.  timolol (TIMOPTIC) 0.5 % ophthalmic solution 1 Drop two (2) times a day.  brinzolamide (AZOPT) 1 % ophthalmic suspension Administer 1 Drop to both eyes three (3) times daily.  aspirin 81 mg chewable tablet Take 81 mg by mouth daily.  latanoprost (XALATAN) 0.005 % ophthalmic solution Administer 1 Drop to both eyes nightly.  sodium bicarbonate 650 mg tablet Take 325 mg by mouth three (3) times daily.  furosemide (LASIX) 20 mg tablet Take 1 Tab by mouth daily. No current facility-administered medications for this visit. Assessment:       ICD-10-CM ICD-9-CM    1. Sinoatrial node dysfunction (HCC) I49.5 427.81    2. Essential hypertension, benign I10 401.1    3.  Mixed hyperlipidemia E78.2 272.2 AMB POC EKG ROUTINE W/ 12 LEADS, INTER & REP   4. 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    Mixed hyperlipidemia E78.2    Coronary atherosclerosis of native coronary artery I25.10    Cardiac pacemaker in situ Z95.0    Sinoatrial node dysfunction (HCC) I49.5    Type 2 diabetes mellitus without complication, without long-term current use of insulin ([facility]) E11.9    GERD (gastroesophageal reflux disease) K21.9    Fatigue R53.83    Chronic kidney disease, stage IV (severe) ([facility]) N18.4    ASCVD (arteriosclerotic cardiovascular disease) I25.10    Glaucoma H40.9    Neuropathy G62.9    Pernicious anemia D51.0    Hematuria R31.9    Abdominal pain R10.9    Pacemaker Z95.0    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 He did not take lasix since our last visit. CT last week  with Moderate left and small right pleural effusions may be due to fluid overload  given the presence of mild interstitial pulmonary edema. Advised to to go to ED., after waiting hours last week, they declined. Rx for Increase to 40 bid x 2 days then lower to 40 daily. Advised pt and family if no improvement in symptoms, given his age and renal fxn, he should go to ED tomorrow morning. Pt verbalizes understanding and is in agreement with the plan. Reviewed plan with Dr Maria Antonia Rodriguez.      Renata Coyne, ANP

## 2018-08-14 ENCOUNTER — PATIENT OUTREACH (OUTPATIENT)
Dept: INTERNAL MEDICINE CLINIC | Age: 83
End: 2018-08-14

## 2018-08-14 DIAGNOSIS — I25.10 ASCVD (ARTERIOSCLEROTIC CARDIOVASCULAR DISEASE): Primary | ICD-10-CM

## 2018-08-14 DIAGNOSIS — I10 ESSENTIAL HYPERTENSION, BENIGN: ICD-10-CM

## 2018-08-14 DIAGNOSIS — I25.10 ATHEROSCLEROSIS OF NATIVE CORONARY ARTERY OF NATIVE HEART WITHOUT ANGINA PECTORIS: ICD-10-CM

## 2018-08-14 DIAGNOSIS — K25.0 ACUTE GASTRIC ULCER WITH HEMORRHAGE: ICD-10-CM

## 2018-08-14 NOTE — PROGRESS NOTES
Goals      Attends follow-up appointments as directed. 8/14/18-Patient's daughter states per Dr. Analia Thacker office, patient should continue with Lasix as outpatient at this time. She will call office if symptoms worsen or he becomes more sob. NN will check on patient in one week/vs    8/14/18-NN spoke to patient's daughter. He saw NP yesterday in Dr. Analia Thacker office. Due to misunderstanding, he had not been taking his lasix by mouth since he had IV lasix in the office on 8/7/18. He was sob in office and started back on Lasix 40 mg, which he took late yesterday and another 40 mg this a.m. Per his daughter, he is feeling better today, however they are awaiting a call back from NP in Dr. Analia Thacker office to see if he should be admitted for further diuresis. Patient's daughter will call this writer back after hearing from cardiology. / vs     8/10/18-Kristyn, patient's daughter calls to say her dad is feeling a little better today, but still has some sob. He will see Dr. Debra Spear, cardiologist, on Monday 813/18 and she will call me with a f/u on Monday after he sees cardiologist/vs    8/9/18-Patient called NN on 8/6/18 with increased sob and swelling in lower extremities. PCP out of town, so NP saw patient. Found to have bilateral pleural effusions and sent for CT angio and started on Lasix 20 mg daily. Patient then saw cardiologist  8/7/18 and had IV lasix in the office and increased Lasix further. Patient states he initially felt some relief, but continues with sob. He has f/u for labs at cardiology office tomorrow and visit Monday there. Sodium bicarb stopped per nephrologist per patient's wife due to lack of appetite as well. NN asked patient to call me with update after he sees cardiologist, and we can also give him an appt with Dr. China Chan next week in necessary. NN will continue to monitor and check on patient.   Will f/u next week with patient./ vs    8/2/18-NN spoke to PCP, Dr. China Chan, he states patient can have fasting blood sugar and perhaps HgbA1C at next visit. This is scheduled for 9/7/18. Notified patient and told him to call if any needs prior to that visit. NN will check back with patient in one week to check on his status. / v.s.     8/2/18-Patient calls to say he had visit with nephrologist, who started him on Sodium Bicarb 2 tablets t.i.d. He wanted me to give Dr. Shane Jones an update on that and also let him know his blood sugar in nephrologist office was 159, however he was not fasting at the time. NN will discuss with Dr. Shane Jones, PCP, and call patient back to see if he would like patient to come in for a fasting blood sugar. / vs    7/18/18-Patient was found to have increased creatinine level on OV with PCP 7/10/18. He has been referred to nephrologist.  NN asked patient about this, and he states he has a visit Friday 7/20/18. NN will f/u with patient in 2 weeks to discuss details of that visit./vs       Understands red flags post discharge. 8/14/18-NN spoke with patient's daughter, Violette Sarmiento. No evidence of symptoms that would indicate GI bleed currently. / vs    8/9/18-Per patient sodium bicarb causing him to have loss of appetite. Per patient's wife they spoke to NP at nephrologist office and she had patient stop medication and suggested he take a teaspoon of soda with water daily. NN will check back on patient next week. No evidence of GI bleed symptoms at this time. / vs    8/2/18 - NN spoke to patient who has had no signs of GI bleed. He also saw Dr. Meggan Khan, nephrologist that he was referred to and he started him on Sodium bicarb 2 tablets t.i.d. Shared information with PCP. NN will check back with patient in one week to see how he is tolerating medication. /vs    7/18/18-NN contacted patient after recent hospital stay and rehab stay for GI bleed. He had his JOSEFINA visit with his PCP on 7/10/18.   NN reviewed red flags with patient re: indication of GI bleeding, such as dark stools, abdominal pain, severe fatigue and told him to report those immediately to MD, should they occur.   Patient is well aware and will report any abnormalities to MD.  NN will check with patient in 2 weeks to follow up/ vs

## 2018-08-15 ENCOUNTER — TELEPHONE (OUTPATIENT)
Dept: CARDIOLOGY CLINIC | Age: 83
End: 2018-08-15

## 2018-08-15 NOTE — TELEPHONE ENCOUNTER
Verified patient with two identifiers. Spoke with Francis Koenig on HIPAA, advising her to have pt take 40 mg of Lasix twice a today and tomorrow, and 1 tab daily on Friday and there after. She verbalized understanding.

## 2018-08-15 NOTE — TELEPHONE ENCOUNTER
----- Message from CHRISTIANO Pat sent at 8/15/2018 10:28 AM EDT -----  I don't know if we touched base on him yesterday. I spoke with his daughter and then with Dr Aiden Francois yesterday. Dr Wolfe Angry would like him to continue with 40mg of lasix BID until Friday then go once a day starting Friday. Initially I said once a day starting today. Can you let his daughter know?   Thanks

## 2018-08-16 NOTE — TELEPHONE ENCOUNTER
Requested Prescriptions     Pending Prescriptions Disp Refills    pravastatin (PRAVACHOL) 20 mg tablet 90 Tab 3     Sig: Take 1 Tab by mouth nightly.        Last Refill: 06/5/18  Next Appointment:09/27/18

## 2018-08-17 ENCOUNTER — TELEPHONE (OUTPATIENT)
Dept: CARDIOLOGY CLINIC | Age: 83
End: 2018-08-17

## 2018-08-17 LAB
ERYTHROCYTE [DISTWIDTH] IN BLOOD BY AUTOMATED COUNT: 17.6 % (ref 12.3–15.4)
HCT VFR BLD AUTO: 32.7 % (ref 37.5–51)
HGB BLD-MCNC: 10.4 G/DL (ref 13–17.7)
INR PPP: 1.1 (ref 0.8–1.2)
MAGNESIUM SERPL-MCNC: 2.2 MG/DL (ref 1.6–2.3)
MCH RBC QN AUTO: 36.4 PG (ref 26.6–33)
MCHC RBC AUTO-ENTMCNC: 31.8 G/DL (ref 31.5–35.7)
MCV RBC AUTO: 114 FL (ref 79–97)
PLATELET # BLD AUTO: 224 X10E3/UL (ref 150–379)
PROTHROMBIN TIME: 11.6 SEC (ref 9.1–12)
RBC # BLD AUTO: 2.86 X10E6/UL (ref 4.14–5.8)
WBC # BLD AUTO: 6.4 X10E3/UL (ref 3.4–10.8)

## 2018-08-17 RX ORDER — PRAVASTATIN SODIUM 20 MG/1
20 TABLET ORAL
Qty: 90 TAB | Refills: 3 | Status: SHIPPED | OUTPATIENT
Start: 2018-08-17 | End: 2019-01-18

## 2018-08-17 NOTE — TELEPHONE ENCOUNTER
Verified patient with two identifiers. Spoke with Lakshmi Emery on HIPAA, advising her pt is stable no need to be seen. She verbalized understanding. Renata Bowman, CHRISTIANO Soares LPN        Caller: Unspecified (Today,  9:30 AM)                     No his renal fxn is stable and blood count is coming up.    Thanks

## 2018-08-17 NOTE — TELEPHONE ENCOUNTER
Mikie Benitez on HIPPA calling asking if pt needs to be seen today c/o lab results. Please advise. Thanks!

## 2018-08-19 LAB
BUN SERPL-MCNC: 35 MG/DL (ref 10–36)
BUN/CREAT SERPL: 12 (ref 10–24)
CALCIUM SERPL-MCNC: 8.7 MG/DL (ref 8.6–10.2)
CHLORIDE SERPL-SCNC: 105 MMOL/L (ref 96–106)
CO2 SERPL-SCNC: 24 MMOL/L (ref 20–29)
CREAT SERPL-MCNC: 2.89 MG/DL (ref 0.76–1.27)
GLUCOSE SERPL-MCNC: 160 MG/DL (ref 65–99)
INTERPRETATION: NORMAL
POTASSIUM SERPL-SCNC: 3.9 MMOL/L (ref 3.5–5.2)
SODIUM SERPL-SCNC: 151 MMOL/L (ref 134–144)
SPECIMEN STATUS REPORT, ROLRST: NORMAL

## 2018-08-19 NOTE — PROGRESS NOTES
Please call pt or daughter. Confirm that he is on lasix 40 mg daily. Should have follow up with Dr Brandi Sim when he gets back. Thanks.

## 2018-08-21 ENCOUNTER — TELEPHONE (OUTPATIENT)
Dept: CARDIOLOGY CLINIC | Age: 83
End: 2018-08-21

## 2018-08-21 NOTE — TELEPHONE ENCOUNTER
Verified patient with two identifiers. Daughter on [address] notifed forward other concerns to NP [facility].

## 2018-08-21 NOTE — TELEPHONE ENCOUNTER
----- Message from CHRISTIANO Acevedo sent at 8/19/2018  9:36 AM EDT -----  Please call pt or daughter. Confirm that he is on lasix 40 mg daily. Should have follow up with Dr Maycol Calloway when he gets back. Thanks.

## 2018-08-22 ENCOUNTER — PATIENT OUTREACH (OUTPATIENT)
Dept: INTERNAL MEDICINE CLINIC | Age: 83
End: 2018-08-22

## 2018-08-22 NOTE — PROGRESS NOTES
Patient has graduated from the Transitions of Care Coordination  program on 8/22/18. Patient's symptoms are stable at this time. Patient/family has the ability to self-manage. Care management goals have been completed at this time. No further nurse navigator follow up scheduled. Goals Addressed             Most Recent     COMPLETED: Attends follow-up appointments as directed. On track (8/22/2018)             8/22/18-NN spoke with patient's daughter. She states patient's lasix has been decreased by cardiologist and he is doing well. No episodes of increased sob. NN will be available as needed if issues arise./ vs    8/14/18-Patient's daughter states per Dr. Vitaly Short office, patient should continue with Lasix as outpatient at this time. She will call office if symptoms worsen or he becomes more sob. NN will check on patient in one week/vs    8/14/18-NN spoke to patient's daughter. He saw NP yesterday in Dr. Vitaly Short office. Due to misunderstanding, he had not been taking his lasix by mouth since he had IV lasix in the office on 8/7/18. He was sob in office and started back on Lasix 40 mg, which he took late yesterday and another 40 mg this a.m. Per his daughter, he is feeling better today, however they are awaiting a call back from NP in Dr. Vitaly Short office to see if he should be admitted for further diuresis. Patient's daughter will call this writer back after hearing from cardiology. / vs     8/10/18-Kristyn, patient's daughter calls to say her dad is feeling a little better today, but still has some sob. He will see Dr. Jaylyn Nuno, cardiologist, on Monday 813/18 and she will call me with a f/u on Monday after he sees cardiologist/vs    8/9/18-Patient called NN on 8/6/18 with increased sob and swelling in lower extremities. PCP out of town, so NP saw patient. Found to have bilateral pleural effusions and sent for CT angio and started on Lasix 20 mg daily.   Patient then saw cardiologist 8/7/18 and had IV lasix in the office and increased Lasix further. Patient states he initially felt some relief, but continues with sob. He has f/u for labs at cardiology office tomorrow and visit Monday there. Sodium bicarb stopped per nephrologist per patient's wife due to lack of appetite as well. NN asked patient to call me with update after he sees cardiologist, and we can also give him an appt with Dr. Myrna London next week in necessary. NN will continue to monitor and check on patient. Will f/u next week with patient./ vs    8/2/18-NN spoke to PCP, Dr. Myrna London, he states patient can have fasting blood sugar and perhaps HgbA1C at next visit. This is scheduled for 9/7/18. Notified patient and told him to call if any needs prior to that visit. NN will check back with patient in one week to check on his status. / v.s.     8/2/18-Patient calls to say he had visit with nephrologist, who started him on Sodium Bicarb 2 tablets t.i.d. He wanted me to give Dr. Myrna London an update on that and also let him know his blood sugar in nephrologist office was 159, however he was not fasting at the time. NN will discuss with Dr. Myrna London, PCP, and call patient back to see if he would like patient to come in for a fasting blood sugar. / vs    7/18/18-Patient was found to have increased creatinine level on OV with PCP 7/10/18. He has been referred to nephrologist.  NN asked patient about this, and he states he has a visit Friday 7/20/18. NN will f/u with patient in 2 weeks to discuss details of that visit./vs       COMPLETED: Understands red flags post discharge. On track (8/22/2018)             8/22/18-  NN spoke with patient's daughter, Konstantin Anderson. She reports her father is doing well and has had no further episodes that would indicate GI bleed. Will close case for now. Told daughter to call if needs arise. / vs    8/14/18-NN spoke with patient's daughter, Konstantin Anderson. No evidence of symptoms that would indicate GI bleed currently.  / vs    8/9/18-Per patient sodium bicarb causing him to have loss of appetite. Per patient's wife they spoke to NP at nephrologist office and she had patient stop medication and suggested he take a teaspoon of soda with water daily. NN will check back on patient next week. No evidence of GI bleed symptoms at this time. / vs    8/2/18 - NN spoke to patient who has had no signs of GI bleed. He also saw Dr. Desiree Barriga, nephrologist that he was referred to and he started him on Sodium bicarb 2 tablets t.i.d. Shared information with PCP. NN will check back with patient in one week to see how he is tolerating medication. /vs    7/18/18-NN contacted patient after recent hospital stay and rehab stay for GI bleed. He had his JOSEFINA visit with his PCP on 7/10/18. NN reviewed red flags with patient re: indication of GI bleeding, such as dark stools, abdominal pain, severe fatigue and told him to report those immediately to MD, should they occur. Patient is well aware and will report any abnormalities to MD.  NN will check with patient in 2 weeks to follow up/ vs            Pt has nurse navigator's contact information for any further questions, concerns, or needs.   Patients upcoming visits:  Future Appointments  Date Time Provider Cristian Alvarez   9/7/2018 8:20 AM ARCELIA Mariscal MD [address]   9/18/2018 11:00 AM PACEMAKER, Luis Rings ANISA SCHED   9/18/2018 11:15 AM Lesia Rodriguez MD [facility] ANISA SCHED   9/27/2018 10:30 AM ARCELIA Mariscal MD [address] [place] [address],[facility], [id number]

## 2018-08-31 ENCOUNTER — PATIENT OUTREACH (OUTPATIENT)
Dept: INTERNAL MEDICINE CLINIC | Age: 83
End: 2018-08-31

## 2018-08-31 ENCOUNTER — TELEPHONE (OUTPATIENT)
Dept: CARDIOLOGY CLINIC | Age: 83
End: 2018-08-31

## 2018-08-31 NOTE — PROGRESS NOTES
8/31/18- Patient's daughter, Florian Carrero, calls to say nephrologist office, Dr. Sarah Millan, called to say patient's kidney function has decreased to 17% from 28%. Dr. Sarah Millan told her to decrease patient's lasix to every other day.   Dr. Tiarra Cruz notified by NN. / vs

## 2018-08-31 NOTE — TELEPHONE ENCOUNTER
Patient's daughter called and stated that the patient's Nephrologist is really concerned because he was not aware that patient was taking furosemide medication. Patient's kidneys are functioning at 17% and patient is still taking the furosemide medication because Dr. Louis Guthrie prescribed it. Patient's daughter is concerned and states that she needs an answer before Tuesday. According to the patient's daughter, the furosemide medication was never entered into the [facility] System for Dr. Estela Espinosa to see. She is really panicky and needs answers. Carolyn Delacruz can be reached at [phone].

## 2018-08-31 NOTE — TELEPHONE ENCOUNTER
Judy Pacheco called to advise that the patient's kidney functions have dropped way down and his kidneys are failing. Dr. Griselda Law was not aware that pt was on lasix which they believe is what is causing the kidney issues.     Please call Dr. Griselda Law to discuss this @ [phone]      Thanks,

## 2018-09-05 ENCOUNTER — HOSPITAL ENCOUNTER (OUTPATIENT)
Dept: ULTRASOUND IMAGING | Age: 83
Discharge: HOME | End: 2018-09-05
Attending: INTERNAL MEDICINE
Payer: MEDICARE

## 2018-09-05 DIAGNOSIS — N18.30 CHRONIC KIDNEY DISEASE, STAGE III (MODERATE) (HCC): ICD-10-CM

## 2018-09-05 PROCEDURE — 76770 US EXAM ABDO BACK WALL COMP: CPT

## 2018-09-06 NOTE — PROGRESS NOTES
Identified pt with two pt identifiers(name and ). Reviewed record in preparation for visit and have obtained necessary documentation. Chief Complaint   Patient presents with    Fatigue     has gotten worse over the last few months; room 5        Health Maintenance Due   Topic    FOOT EXAM Q1     EYE EXAM RETINAL OR DILATED Q1     DTaP/Tdap/Td series (1 - Tdap)    ZOSTER VACCINE AGE 60>     GLAUCOMA SCREENING Q2Y     Pneumococcal 65+ Low/Medium Risk (1 of 2 - PCV13)    MEDICARE YEARLY EXAM     Influenza Age 5 to Adult        Coordination of Care Questionnaire:  :   1) Have you been to an emergency room, urgent care clinic since your last visit? no   Hospitalized since your last visit? no             2. Have seen or consulted any other health care provider since your last visit? NO  If yes, where when, and reason for visit? 3) Do you have an Advanced Directive/ Living Will in place? NO  If yes, do we have a copy on file NO  If no, would you like information NO    Patient is accompanied by spouse I have received verbal consent from Patience Zaragoza to discuss any/all medical information while they are present in the room. Ankle fracture, right  surgery 25 yrs ago - hardware removed  S/P coronary angiogram  2005, 2008, 2011 - drug eluding stents  S/P hernia repair  umbilical - 2008  S/P laparoscopic cholecystectomy  2008  S/P EMILY (total abdominal hysterectomy)

## 2018-09-07 ENCOUNTER — OFFICE VISIT (OUTPATIENT)
Dept: INTERNAL MEDICINE CLINIC | Age: 83
End: 2018-09-07

## 2018-09-07 VITALS
DIASTOLIC BLOOD PRESSURE: 76 MMHG | BODY MASS INDEX: 24.4 KG/M2 | SYSTOLIC BLOOD PRESSURE: 142 MMHG | RESPIRATION RATE: 20 BRPM | WEIGHT: 161 LBS | OXYGEN SATURATION: 98 % | HEIGHT: 68 IN | HEART RATE: 65 BPM

## 2018-09-07 DIAGNOSIS — I10 ESSENTIAL HYPERTENSION, BENIGN: ICD-10-CM

## 2018-09-07 DIAGNOSIS — I25.10 ASCVD (ARTERIOSCLEROTIC CARDIOVASCULAR DISEASE): ICD-10-CM

## 2018-09-07 DIAGNOSIS — E11.21 TYPE 2 DIABETES MELLITUS WITH NEPHROPATHY (HCC): Primary | ICD-10-CM

## 2018-09-07 DIAGNOSIS — N18.4 CHRONIC KIDNEY DISEASE, STAGE IV (SEVERE) (HCC): ICD-10-CM

## 2018-09-07 NOTE — PROGRESS NOTES
Chief Complaint   Patient presents with   [place] [facility]     re check bp and A1C/ 3 month follow up/ room 2      1. Have you been to the ER, urgent care clinic since your last visit? Hospitalized since your last visit? Yes, [address] [place] [address] [address] for Ultrasound on 9/5/8.      2. Have you seen or consulted any other health care providers outside of the [address] [place] [address] since your last visit? Include any pap smears or colon screening. Yes, on 9/6/18 went to [facility] for [address].      Visit Vitals    /76 (BP 1 Location: Left arm, BP Patient Position: Sitting)    Pulse 65    Resp 20    Ht 5' 8\" (1.727 m)    Wt 161 lb (73 kg)    SpO2 98%    BMI 24.48 kg/m2
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
## 2018-09-07 NOTE — MR AVS SNAPSHOT
[address] [facility] [address] [address] [phone] Patient: Daniel Nunez 
MRN: [id number] MARY:[birth year] Visit Information Date & Time Provider Department Dept. Phone Encounter #  
 9/7/2018 11:00 AM MD Marcelino Lion 84 [phone] [id number] Follow-up Instructions Return in about 3 months (around 12/7/2018) for follow up. Follow-up and Disposition History Your Appointments 9/18/2018 11:00 AM  
PROCEDURE with PACEMAKER, [facility] Appt Note: battery check bsc dcpm, varun Pierson, [address] [facility]  
[phone] [address] [address]  
  
    
 9/18/2018 11:15 AM  
ESTABLISHED PATIENT with Benji Eastman MD  
[facility]) Appt Note: P/Dr CHALINO muñoz 3 month fu bg  
 [address]  
[phone] [address]  
  
    
 9/27/2018 10:30 AM  
Follow Up with MD Marcelino Lion 84 ([facility]) Appt Note: [address] [address] [address] [facility] [address] Upcoming Health Maintenance Date Due  
 FOOT EXAM Q1 4[birth year]1931 EYE EXAM RETINAL OR DILATED Q1 4/23/1931 DTaP/Tdap/Td series (1 - Tdap) 4/23/1942 ZOSTER VACCINE AGE 60> 2/23/1981 GLAUCOMA SCREENING Q2Y 4/23/1986 Pneumococcal 65+ Low/Medium Risk (1 of 2 - PCV13) 4/23/1986 MEDICARE YEARLY EXAM 7/24/2018 Influenza Age 5 to Adult 8/1/2018 MICROALBUMIN Q1 9/7/2018 HEMOGLOBIN A1C Q6M 10/25/2018 LIPID PANEL Q1 6/5/2019 Allergies as of 9/7/2018  Review Complete On: 9/7/2018 By: Paras Lopez MD  
  
 Severity Noted Reaction Type Reactions Latex, Natural Rubber  06/04/2018    Other (comments) Shellfish Derived  12/21/2012    Other (comments) Crab meat Current Immunizations  Never Reviewed No immunizations on file. Not reviewed this visit You Were Diagnosed With   
  
 Codes Comments Type 2 diabetes mellitus with nephropathy ([facility]    -  Primary ICD-10-CM: E11.21 
ICD-9-CM: 250.40, 583.81 Chronic kidney disease, stage IV (severe) (HCC)     ICD-10-CM: N18.4 ICD-9-CM: 585.4 Essential hypertension, benign     ICD-10-CM: I10 
ICD-9-CM: 401.1 ASCVD (arteriosclerotic cardiovascular disease)     ICD-10-CM: I25.10 ICD-9-CM: 429.2, 440.9 Vitals BP Pulse Resp Height(growth percentile) Weight(growth percentile) SpO2  
 142/76 (BP 1 Location: Left arm, BP Patient Position: Sitting) 65 20 5' 8\" (1.727 m) 161 lb (73 kg) 98% BMI Smoking Status 24.48 kg/m2 Never Smoker Vitals History BMI and BSA Data Body Mass Index Body Surface Area  
 24.48 kg/m 2 1.87 m 2 Preferred Pharmacy Pharmacy Name Phone [id number] [facility] [phone] Your Updated Medication List  
  
   
This list is accurate as of 9/7/18  4:50 PM.  Always use your most recent med list.  
  
  
  
  
 ADULT MULTIVITAMIN (W-LUTEIN) PO Take  by mouth. aspirin 81 mg chewable tablet Take 81 mg by mouth daily. AZOPT 1 % ophthalmic suspension Generic drug:  brinzolamide Administer 1 Drop to both eyes three (3) times daily. carvedilol 3.125 mg tablet Commonly known as:  Yvonne Locket Take 1 Tab by mouth two (2) times daily (with meals). furosemide 40 mg tablet Commonly known as:  LASIX Take 1 Tab by mouth daily. Take 40mg MON, pm, TUES in AM and PM. Then lower daily dose. Once daily starting WED  
  
 latanoprost 0.005 % ophthalmic solution Commonly known as:  Kkia Laguna Administer 1 Drop to both eyes nightly. omeprazole 40 mg capsule Commonly known as:  PriLOSEC Take 1 Cap by mouth two (2) times a day. pravastatin 20 mg tablet Commonly known as:  PRAVACHOL Take 1 Tab by mouth nightly.  
  
 sodium bicarbonate 650 mg tablet Take 325 mg by mouth three (3) times daily. ticagrelor 90 mg tablet Commonly known as:  [employer] Take 1 Tab by mouth every twelve (12) hours every twelve (12) hours. timolol 0.5 % ophthalmic solution Commonly known as:  TIMOPTIC  
1 Drop two (2) times a day. We Performed the Following HEMOGLOBIN A1C WITH EAG [11235 CPT(R)] Follow-up Instructions Return in about 3 months (around 12/7/2018) for follow up. Introducing [facility]! Sally Reyes introduces [employer] patient portal. Now you can access parts of your medical record, email your doctor's office, and request medication refills online. 1. In your internet browser, go to [email or web address] 2. Click on the First Time User? Click Here link in the Sign In box. You will see the New Member Sign Up page. 3. Enter your [employer] Access Code exactly as it appears below. You will not need to use this code after youve completed the sign-up process. If you do not sign up before the expiration date, you must request a new code. · [employer] Access Code: [email or web address] Expires: 9/13/2018  4:44 PM 
 
4. Enter the last four digits of your Social Security Number (xxxx) and Date of Birth (mm/dd/yyyy) as indicated and click Submit. You will be taken to the next sign-up page. 5. Create a [employer] ID. This will be your [employer] login ID and cannot be changed, so think of one that is secure and easy to remember. 6. Create a [employer] password. You can change your password at any time. 7. Enter your Password Reset Question and Answer. This can be used at a later time if you forget your password. 8. Enter your e-mail address. You will receive e-mail notification when new information is available in [address] 9. Click Sign Up. You can now view and download portions of your medical record. 10. Click the Download Summary menu link to download a portable copy of your medical information. If you have questions, please visit the Frequently Asked Questions section of the [employer] website. Remember, [employer] is NOT to be used for urgent needs. For medical emergencies, dial 911. Now available from your iPhone and Android! Please provide this summary of care documentation to your next provider. Your primary care clinician is listed as ARCELIA Schultz. If you have any questions after today's visit, please call [phone].

## 2018-09-08 LAB
EST. AVERAGE GLUCOSE BLD GHB EST-MCNC: 148 MG/DL
HBA1C MFR BLD: 6.8 % (ref 4.8–5.6)

## 2018-09-18 ENCOUNTER — OFFICE VISIT (OUTPATIENT)
Dept: CARDIOLOGY CLINIC | Age: 83
End: 2018-09-18

## 2018-09-18 ENCOUNTER — CLINICAL SUPPORT (OUTPATIENT)
Dept: CARDIOLOGY CLINIC | Age: 83
End: 2018-09-18

## 2018-09-18 VITALS
SYSTOLIC BLOOD PRESSURE: 120 MMHG | WEIGHT: 162.9 LBS | RESPIRATION RATE: 16 BRPM | BODY MASS INDEX: 24.69 KG/M2 | DIASTOLIC BLOOD PRESSURE: 70 MMHG | OXYGEN SATURATION: 94 % | HEART RATE: 68 BPM | HEIGHT: 68 IN

## 2018-09-18 DIAGNOSIS — Z95.0 CARDIAC PACEMAKER IN SITU: Primary | ICD-10-CM

## 2018-09-18 DIAGNOSIS — I25.10 ASCVD (ARTERIOSCLEROTIC CARDIOVASCULAR DISEASE): Primary | ICD-10-CM

## 2018-09-18 DIAGNOSIS — E11.21 TYPE 2 DIABETES MELLITUS WITH NEPHROPATHY (HCC): ICD-10-CM

## 2018-09-18 DIAGNOSIS — I49.5 SINOATRIAL NODE DYSFUNCTION (HCC): ICD-10-CM

## 2018-09-18 DIAGNOSIS — I10 ESSENTIAL HYPERTENSION, BENIGN: ICD-10-CM

## 2018-09-18 DIAGNOSIS — E78.2 MIXED HYPERLIPIDEMIA: ICD-10-CM

## 2018-09-18 RX ORDER — FUROSEMIDE 40 MG/1
40 TABLET ORAL EVERY OTHER DAY
Qty: 45 TAB | Refills: 1
Start: 2018-09-18 | End: 2019-01-18

## 2018-09-18 NOTE — PROGRESS NOTES
NAME:  Aubree Benitez   :   1921   MRN:   [id number]   PCP:  Mark Dickerson MD           Subjective: The patient is a 80y.o. year old male  who returns for a routine follow-up. Since the last visit, patient reports no change in exercise tolerance, chest pain, edema, medication intolerance, palpitations, shortness of breath, PND/orthopnea wheezing, sputum, syncope, dizziness or light headedness. Doing well. Past Medical History:   Diagnosis Date    Abdominal pain 2017    Acute gastric ulcer with hemorrhage 2018    Arteriosclerotic coronary artery disease 2017    Atrioventricular block, complete (HCC)     CAD (coronary artery disease)     Chronic kidney disease, stage IV (severe) (HCC) 2017    CKD (chronic kidney disease) stage 3, GFR 30-59 ml/min 2017    Diabetes mellitus ([address] 2017    Dizziness and giddiness     Fatigue 2017    GERD (gastroesophageal reflux disease)     GERD (gastroesophageal reflux disease) 2017    Glaucoma 2017    Hematuria 2017    Hyperlipidemia 2017    Hypertension     Mixed hyperlipidemia     Neuropathy 2017    Other acute and subacute form of ischemic heart disease     Pernicious anemia 2017    Sinoatrial node dysfunction (HCC)     Syncope and collapse     Thrush 2017    Thrush of mouth and esophagus ([address] 2017    Type 2 diabetes mellitus without complication, without long-term current use of insulin ([facility] [address] 2017        ICD-10-CM ICD-9-CM    1. ASCVD (arteriosclerotic cardiovascular disease) I25.10 429.2      440.9    2. Essential hypertension, benign I10 401.1    3. Mixed hyperlipidemia E78.2 272.2    4.  Type 2 diabetes mellitus with nephropathy (HCC) E11.21 250.40      583.81       Social History   Substance Use Topics    Smoking status: Never Smoker    Smokeless tobacco: Never Used    Alcohol use 0.6 oz/week     1 Glasses of wine per week Family History   Problem Relation Age of Onset    Stroke Father         Review of Systems  General: Pt denies excessive weight gain or loss. Pt is able to conduct ADL's  HEENT: Denies blurred vision, headaches, epistaxis and difficulty swallowing. Respiratory: Denies shortness of breath, [place], wheezing or stridor. Cardiovascular: Denies precordial pain, palpitations, edema or PND  Gastrointestinal: Denies poor appetite, indigestion, abdominal pain or blood in stool  Musculoskeletal: Denies pain or swelling from muscles or joints  Neurologic: Denies tremor, paresthesias, or sensory motor disturbance  Skin: Denies rash, itching or texture change. Objective:       Vitals:    09/18/18 1136   BP: 120/70   Pulse: 68   Resp: 16   SpO2: 94%   Weight: 162 lb 14.4 oz (73.9 kg)   Height: 5' 8\" (1.727 m)    Body mass index is 24.77 kg/(m^2). General PE  Mental Status - Alert. General Appearance - Not in acute distress. Chest and Lung Exam   Inspection: Accessory muscles - No use of accessory muscles in breathing. Auscultation:   Breath sounds: - Normal.    Cardiovascular   Inspection: Jugular vein - Bilateral - Inspection Normal.  Palpation/Percussion:   Apical Impulse: - Normal.  Auscultation: Rhythm - Regular. Heart Sounds - S1 WNL and S2 WNL. No S3 or S4. Murmurs & Other Heart Sounds: Auscultation of the heart reveals - No Murmurs. Peripheral Vascular   Upper Extremity: Inspection - Bilateral - No Cyanotic nailbeds or Digital clubbing. Lower Extremity:   Palpation: Edema - Bilateral - No edema. Data Review:     EKG -EKG: none    Medications reviewed  Current Outpatient Prescriptions   Medication Sig    cranberry extract 450 mg tab tablet Take 450 mg by mouth. Tsp Baking soda QHS with   Cranberry  Juice 8 oz    vit A/vit C/vit E/zinc/copper (ICAPS AREDS PO) Take  by mouth.  furosemide (LASIX) 40 mg tablet Take 1 Tab by mouth every other day.     pravastatin (PRAVACHOL) 20 mg tablet Take 1 Tab by mouth nightly.  omeprazole (PRILOSEC) 40 mg capsule Take 1 Cap by mouth two (2) times a day.  ticagrelor (BRILINTA) 90 mg tablet Take 1 Tab by mouth every twelve (12) hours every twelve (12) hours.  carvedilol (COREG) 3.125 mg tablet Take 1 Tab by mouth two (2) times daily (with meals).  brinzolamide (AZOPT) 1 % ophthalmic suspension Administer 1 Drop to both eyes three (3) times daily.  aspirin 81 mg chewable tablet Take 81 mg by mouth daily.  latanoprost (XALATAN) 0.005 % ophthalmic solution Administer 1 Drop to both eyes nightly.  sodium bicarbonate 650 mg tablet Take 325 mg by mouth three (3) times daily.  timolol (TIMOPTIC) 0.5 % ophthalmic solution 1 Drop two (2) times a day. No current facility-administered medications for this visit. Assessment:       ICD-10-CM ICD-9-CM    1. ASCVD (arteriosclerotic cardiovascular disease) I25.10 429.2      440.9    2. Essential hypertension, benign I10 401.1    3. Mixed hyperlipidemia E78.2 272.2    4. Type 2 diabetes mellitus with nephropathy (HCC) E11.21 250.40      583.81         Plan:     Patient presents doing well and stable from cardiac standpoint. Edema, weight stable. Renal function stable. Clinically he is doing much better. Followed by nephrology. On lasix 40mg every other day. Continue current care and follow up in 3 months or sooner if necessary.     Gisela Koo MD

## 2018-09-18 NOTE — PROGRESS NOTES
1. Have you been to the ER, urgent care clinic since your last visit? Hospitalized since your last visit? No    2. Have you seen or consulted any other health care providers outside of the [address] [place] [address] since your last visit? Include any pap smears or colon screening. No     Patient C/O fluid accumulation in his lungs more noticeable with activity.

## 2018-09-18 NOTE — MR AVS SNAPSHOT
Rashard Farrell [address] 
[phone] Patient: Lelia Mancini 
MRN: [id number] DXV:4/95/3493 Visit Information Date & Time Provider Department Dept. Phone Encounter #  
 9/18/2018 11:15 AM Adin Negrete MD [facility] [phone] [id number] Your Appointments 9/27/2018 10:30 AM  
Follow Up with MD YARIEL [facility] ([facility] Appt Note: [address] [address] [address] [address]  
  
    
 12/4/2018 11:00 AM  
PROCEDURE with PACEMAKER, [facility] Appt Note: [facility] dc[facility], near [place], [place] [address] [address]  
[phone] [address] [address]  
  
    
 12/4/2018 11:15 AM  
ESTABLISHED PATIENT with Rylee Carcamo MD  
[place] [facility] [facility] Appt Note: bsc dcpm, near [place], dependant [address]  
[phone] [address] Upcoming Health Maintenance Date Due  
 FOOT EXAM Q1 4/23/1931 EYE EXAM RETINAL OR DILATED Q1 4/23/1931 DTaP/Tdap/Td series (1 - Tdap) 4/23/1942 ZOSTER VACCINE AGE 60> 2/23/1981 GLAUCOMA SCREENING Q2Y 4/23/1986 Pneumococcal 65+ Low/Medium Risk (1 of 2 - PCV13) 4/23/1986 MEDICARE YEARLY EXAM 7/24/2018 Influenza Age 5 to Adult 8/1/2018 MICROALBUMIN Q1 9/7/2018 HEMOGLOBIN A1C Q6M 3/7/2019 LIPID PANEL Q1 6/5/2019 Allergies as of 9/18/2018  Review Complete On: 9/18/2018 By: Alex Gaviria LPN Severity Noted Reaction Type Reactions Latex, Natural Rubber  06/04/2018    Other (comments) Shellfish Derived  12/21/2012    Other (comments) Crab meat Current Immunizations  Never Reviewed No immunizations on file. Not reviewed this visit Vitals BP Pulse Resp Height(growth percentile) Weight(growth percentile) SpO2  
 120/70 (BP Patient Position: Sitting) 68 16 5' 8\" (1.727 m) 162 lb 14.4 oz (73.9 kg) 94% BMI Smoking Status 24.77 kg/m2 Never Smoker BMI and BSA Data Body Mass Index Body Surface Area 24.77 kg/m 2 1.88 m 2 Preferred Pharmacy Pharmacy Name Phone [facility]/PHARMACY [facility][id number] [facility] [phone] Your Updated Medication List  
  
   
This list is accurate as of 9/18/18 12:16 PM.  Always use your most recent med list.  
  
  
  
  
 aspirin 81 mg chewable tablet Take 81 mg by mouth daily. AZOPT 1 % ophthalmic suspension Generic drug:  brinzolamide Administer 1 Drop to both eyes three (3) times daily. carvedilol 3.125 mg tablet Commonly known as:  Temi Buster Take 1 Tab by mouth two (2) times daily (with meals). cranberry extract 450 mg Tab tablet Take 450 mg by mouth. Tsp Baking soda QHS with   Cranberry  Juice 8 oz  
  
 furosemide 40 mg tablet Commonly known as:  LASIX Take 1 Tab by mouth daily. Take 40mg MON, pm, TUES in AM and PM. Then lower daily dose. Once daily starting WED ICAPS AREDS PO Take  by mouth.  
  
 latanoprost 0.005 % ophthalmic solution Commonly known as:  Deyanne Pace Administer 1 Drop to both eyes nightly. omeprazole 40 mg capsule Commonly known as:  PriLOSEC Take 1 Cap by mouth two (2) times a day. pravastatin 20 mg tablet Commonly known as:  PRAVACHOL Take 1 Tab by mouth nightly.  
  
 sodium bicarbonate 650 mg tablet Take 325 mg by mouth three (3) times daily. ticagrelor 90 mg tablet Commonly known as:  [facility] [employer] [employer] Take 1 Tab by mouth every twelve (12) hours every twelve (12) hours. timolol 0.5 % ophthalmic solution Commonly known as:  TIMOPTIC  
1 Drop two (2) times a day. Introducing [facility] & HEALTH SERVICES! [facility] introduces Bango patient portal. Now you can access parts of your medical record, email your doctor's office, and request medication refills online. 1. In your internet browser, go to [email or web address] 2. Click on the First Time User? Click Here link in the Sign In box. You will see the New Member Sign Up page. 3. Enter your Bango Access Code exactly as it appears below. You will not need to use this code after youve completed the sign-up process. If you do not sign up before the expiration date, you must request a new code. · Bango Access Code: [email or web address] Expires: 12/17/2018 11:31 AM 
 
4. Enter the last four digits of your Social Security Number (xxxx) and Date of Birth (mm/dd/yyyy) as indicated and click Submit. You will be taken to the next sign-up page. 5. Create a Bango ID. This will be your Bango login ID and cannot be changed, so think of one that is secure and easy to remember. 6. Create a Bango password. You can change your password at any time. 7. Enter your Password Reset Question and Answer. This can be used at a later time if you forget your password. 8. Enter your e-mail address. You will receive e-mail notification when new information is available in [address] 9. Click Sign Up. You can now view and download portions of your medical record. 10. Click the Download Summary menu link to download a portable copy of your medical information. If you have questions, please visit the Frequently Asked Questions section of the Bango website. Remember, Bango is NOT to be used for urgent needs. For medical emergencies, dial 911. Now available from your iPhone and Android! Please provide this summary of care documentation to your next provider. Your primary care clinician is listed as ARCELIA Pineda.  If you have any questions after today's visit, please call [phone].

## 2018-09-22 RX ORDER — CARVEDILOL 3.12 MG/1
TABLET ORAL
Qty: 60 TAB | Refills: 3 | Status: ON HOLD | OUTPATIENT
Start: 2018-09-22 | End: 2019-01-18 | Stop reason: SDUPTHER

## 2018-09-27 ENCOUNTER — OFFICE VISIT (OUTPATIENT)
Dept: INTERNAL MEDICINE CLINIC | Age: 83
End: 2018-09-27

## 2018-09-27 VITALS
SYSTOLIC BLOOD PRESSURE: 143 MMHG | HEIGHT: 68 IN | BODY MASS INDEX: 25.04 KG/M2 | WEIGHT: 165.2 LBS | OXYGEN SATURATION: 98 % | RESPIRATION RATE: 16 BRPM | HEART RATE: 60 BPM | DIASTOLIC BLOOD PRESSURE: 64 MMHG | TEMPERATURE: 98.1 F

## 2018-09-27 DIAGNOSIS — N18.4 CHRONIC KIDNEY DISEASE, STAGE IV (SEVERE) (HCC): Primary | ICD-10-CM

## 2018-09-27 DIAGNOSIS — E11.21 TYPE 2 DIABETES MELLITUS WITH NEPHROPATHY (HCC): ICD-10-CM

## 2018-09-27 DIAGNOSIS — I10 ESSENTIAL HYPERTENSION, BENIGN: ICD-10-CM

## 2018-09-27 DIAGNOSIS — I25.10 ATHEROSCLEROSIS OF NATIVE CORONARY ARTERY OF NATIVE HEART WITHOUT ANGINA PECTORIS: ICD-10-CM

## 2018-09-27 NOTE — MR AVS SNAPSHOT
[address] [facility] 
 
 
 [address] [address] [phone] Patient: Kylah Hall 
MRN: [id number] XAQ:0/10/5839 Visit Information Date & Time Provider Department Dept. Phone Encounter #  
 9/27/2018 10:30 AM ARCELIA Fregoso MD [facility] [phone] [id number] Follow-up Instructions Return in about 4 months (around 1/27/2019) for follow up. Your Appointments 12/4/2018 10:15 AM  
ESTABLISHED PATIENT with Jess Oviedo MD  
[facility]) Appt Note: 3 month f/u 9/18/18 kb  
 [address]  
[phone] [address]  
  
    
 12/4/2018 11:00 AM  
PROCEDURE with PACEMAKER, [facility]) Appt Note: bsc dcpm, near [place], dependant [address] [place] [address]  
[phone] [address]  
  
    
 12/4/2018 11:15 AM  
ESTABLISHED PATIENT with Sunshine Alexis MD  
[facility]) Appt Note: bsc dcpm, near [facility], dependant [address] [place] [address]  
[phone] [address] Upcoming Health Maintenance Date Due  
 FOOT EXAM Q1 4/23/1931 EYE EXAM RETINAL OR DILATED Q1 4/23/1931 DTaP/Tdap/Td series (1 - Tdap) 4/23/1942 Shingrix Vaccine Age 50> (1 of 2) 4/23/1971 GLAUCOMA SCREENING Q2Y 4/23/1986 Pneumococcal 65+ Low/Medium Risk (1 of 2 - PCV13) 4/23/1986 MEDICARE YEARLY EXAM 7/24/2018 Influenza Age 5 to Adult 8/1/2018 HEMOGLOBIN A1C Q6M 3/7/2019 LIPID PANEL Q1 6/5/2019 MICROALBUMIN Q1 7/24/2019 Allergies as of 9/27/2018  Review Complete On: 9/27/2018 By: Praveena Lee MD  
  
 Severity Noted Reaction Type Reactions Latex, Natural Rubber  06/04/2018    Other (comments) Shellfish Derived  12/21/2012    Other (comments) Crab meat Current Immunizations  Never Reviewed No immunizations on file. Not reviewed this visit You Were Diagnosed With   
  
 Codes Comments Chronic kidney disease, stage IV (severe) (HCC)    -  Primary ICD-10-CM: N18.4 ICD-9-CM: 137. 4 Type 2 diabetes mellitus with nephropathy (HCC)     ICD-10-CM: E11.21 
ICD-9-CM: 250.40, 583.81 Essential hypertension, benign     ICD-10-CM: I10 
ICD-9-CM: 401.1 Atherosclerosis of native coronary artery of native heart without angina pectoris     ICD-10-CM: I25.10 ICD-9-CM: 414.01 Vitals BP Pulse Temp Resp Height(growth percentile) Weight(growth percentile) 143/64 (BP 1 Location: Left arm, BP Patient Position: Sitting) 60 98.1 °F (36.7 °C) (Oral) 16 5' 8\" (1.727 m) 165 lb 3.2 oz (74.9 kg) SpO2 BMI Smoking Status 98% 25.12 kg/m2 Never Smoker Vitals History BMI and BSA Data Body Mass Index Body Surface Area  
 25.12 kg/m 2 1.9 m 2 Preferred Pharmacy Pharmacy Name Phone [id number]- [phone] [facility] [phone] Your Updated Medication List  
  
   
This list is accurate as of 9/27/18 11:43 AM.  Always use your most recent med list.  
  
  
  
  
 aspirin 81 mg chewable tablet Take 81 mg by mouth daily. AZOPT 1 % ophthalmic suspension Generic drug:  brinzolamide Administer 1 Drop to both eyes three (3) times daily. carvedilol 3.125 mg tablet Commonly known as:  COREG  
TAKE 1 TABLET BY MOUTH TWICE A DAY WITH MEALS  
  
 cranberry extract 450 mg Tab tablet Take 450 mg by mouth. Tsp Baking soda QHS with   Cranberry  Juice 8 oz  
  
 furosemide 40 mg tablet Commonly known as:  LASIX Take 1 Tab by mouth every other day. ICAPS AREDS PO Take  by mouth. latanoprost 0.005 % ophthalmic solution Commonly known as:  Wing Dimes Administer 1 Drop to both eyes nightly. omeprazole 40 mg capsule Commonly known as:  PriLOSEC Take 1 Cap by mouth two (2) times a day. pravastatin 20 mg tablet Commonly known as:  PRAVACHOL Take 1 Tab by mouth nightly.  
  
 sodium bicarbonate 650 mg tablet Take 325 mg by mouth three (3) times daily. ticagrelor 90 mg tablet Commonly known as:  [employer] Take 1 Tab by mouth every twelve (12) hours every twelve (12) hours. timolol 0.5 % ophthalmic solution Commonly known as:  TIMOPTIC  
1 Drop two (2) times a day. Follow-up Instructions Return in about 4 months (around 1/27/2019) for follow up. Introducing [facility] & HEALTH SERVICES! [facility] [facility] introduces Perceptis patient portal. Now you can access parts of your medical record, email your doctor's office, and request medication refills online. 1. In your internet browser, go to [email or web address] 2. Click on the First Time User? Click Here link in the Sign In box. You will see the New Member Sign Up page. 3. Enter your Perceptis Access Code exactly as it appears below. You will not need to use this code after youve completed the sign-up process. If you do not sign up before the expiration date, you must request a new code. · Perceptis Access Code: [email or web address] Expires: 12/17/2018 11:31 AM 
 
4. Enter the last four digits of your Social Security Number (xxxx) and Date of Birth (mm/dd/yyyy) as indicated and click Submit. You will be taken to the next sign-up page. 5. Create a Shopulart ID. This will be your Perceptis login ID and cannot be changed, so think of one that is secure and easy to remember. 6. Create a Shopulart password. You can change your password at any time. 7. Enter your Password Reset Question and Answer. This can be used at a later time if you forget your password. 8. Enter your e-mail address. You will receive e-mail notification when new information is available in [address] 9. Click Sign Up. You can now view and download portions of your medical record. 10. Click the Download Summary menu link to download a portable copy of your medical information. If you have questions, please visit the Frequently Asked Questions section of the Car[employer] website. Remember, [employer] is NOT to be used for urgent needs. For medical emergencies, dial 911. Now available from your iPhone and Android! Please provide this summary of care documentation to your next provider. Your primary care clinician is listed as ARCELIA Schwartz. If you have any questions after today's visit, please call [phone].

## 2018-09-27 NOTE — PROGRESS NOTES
Chief Complaint   Patient presents with    Hypertension     room 2    Cholesterol Problem    GERD     1. Have you been to the ER, urgent care clinic since your last visit? NO Hospitalized since your last visit? NO    2. Have you seen or consulted any other health care providers outside of the [address] [place] [address] since your last visit? Include any pap smears or colon screening. DR. Conner Lara (OPHTHAMOLOGIST) 8/2018    PT IS HERE FOR ROUTINE CHECK-UP, PT IS NOT FASTING.
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
## 2018-11-08 ENCOUNTER — OFFICE VISIT (OUTPATIENT)
Dept: CARDIOLOGY CLINIC | Age: 83
End: 2018-11-08

## 2018-11-08 ENCOUNTER — CLINICAL SUPPORT (OUTPATIENT)
Dept: CARDIOLOGY CLINIC | Age: 83
End: 2018-11-08

## 2018-11-08 VITALS
BODY MASS INDEX: 25.16 KG/M2 | WEIGHT: 166 LBS | HEART RATE: 63 BPM | SYSTOLIC BLOOD PRESSURE: 120 MMHG | DIASTOLIC BLOOD PRESSURE: 50 MMHG | OXYGEN SATURATION: 99 % | RESPIRATION RATE: 18 BRPM | HEIGHT: 68 IN

## 2018-11-08 VITALS
HEIGHT: 68 IN | BODY MASS INDEX: 25.19 KG/M2 | HEART RATE: 63 BPM | RESPIRATION RATE: 18 BRPM | WEIGHT: 166.2 LBS | SYSTOLIC BLOOD PRESSURE: 120 MMHG | OXYGEN SATURATION: 99 % | DIASTOLIC BLOOD PRESSURE: 50 MMHG

## 2018-11-08 DIAGNOSIS — Z95.0 CARDIAC PACEMAKER IN SITU: Primary | ICD-10-CM

## 2018-11-08 DIAGNOSIS — E11.9 TYPE 2 DIABETES MELLITUS WITHOUT COMPLICATION, WITHOUT LONG-TERM CURRENT USE OF INSULIN (HCC): ICD-10-CM

## 2018-11-08 DIAGNOSIS — N18.30 CKD (CHRONIC KIDNEY DISEASE) STAGE 3, GFR 30-59 ML/MIN (HCC): ICD-10-CM

## 2018-11-08 DIAGNOSIS — I25.10 ASCVD (ARTERIOSCLEROTIC CARDIOVASCULAR DISEASE): Primary | ICD-10-CM

## 2018-11-08 DIAGNOSIS — I10 ESSENTIAL HYPERTENSION, BENIGN: ICD-10-CM

## 2018-11-08 DIAGNOSIS — Z95.0 CARDIAC PACEMAKER IN SITU: ICD-10-CM

## 2018-11-08 DIAGNOSIS — I25.10 ASHD (ARTERIOSCLEROTIC HEART DISEASE): ICD-10-CM

## 2018-11-08 DIAGNOSIS — I49.5 SSS (SICK SINUS SYNDROME) (HCC): ICD-10-CM

## 2018-11-08 DIAGNOSIS — Z95.1 POSTSURGICAL AORTOCORONARY BYPASS STATUS: ICD-10-CM

## 2018-11-08 DIAGNOSIS — E78.5 HYPERLIPIDEMIA, UNSPECIFIED HYPERLIPIDEMIA TYPE: ICD-10-CM

## 2018-11-08 DIAGNOSIS — E78.2 MIXED HYPERLIPIDEMIA: ICD-10-CM

## 2018-11-08 DIAGNOSIS — I44.2 CHB (COMPLETE HEART BLOCK) (HCC): ICD-10-CM

## 2018-11-08 DIAGNOSIS — I49.5 SINOATRIAL NODE DYSFUNCTION (HCC): ICD-10-CM
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
## 2018-11-08 NOTE — PROGRESS NOTES
1. Have you been to the ER, urgent care clinic since your last visit? Hospitalized since your last visit? NO    2. Have you seen or consulted any other health care providers outside of the [facility] since your last visit? Include any pap smears or colon screening. NO    3 MONTH FOLLOW UP. C/O DIZZINESS, SOB, NO ENERGY, SWELLING IN ANKLES.

## 2018-11-13 ENCOUNTER — CLINICAL SUPPORT (OUTPATIENT)
Dept: CARDIOLOGY CLINIC | Age: 83
End: 2018-11-13

## 2018-11-13 DIAGNOSIS — R06.09 DOE (DYSPNEA ON EXERTION): Primary | ICD-10-CM

## 2018-11-13 DIAGNOSIS — Z95.1 POSTSURGICAL AORTOCORONARY BYPASS STATUS: ICD-10-CM

## 2018-11-13 DIAGNOSIS — R53.83 FATIGUE, UNSPECIFIED TYPE: ICD-10-CM

## 2018-11-13 DIAGNOSIS — I25.10 ASCVD (ARTERIOSCLEROTIC CARDIOVASCULAR DISEASE): ICD-10-CM

## 2018-11-14 NOTE — PROGRESS NOTES
Verified patient with two identifiers. Spoke with Juan Courser on [facility] patient regarding ECHO test results. Alesha Casey, please call Juan Courser to schedule an apt with Dr. Yeison Iniguez in the next week or so, can bring him anytime.   Thanks

## 2018-11-14 NOTE — PROGRESS NOTES
Kaley Solano: Please call patient ultrasound shows severe leakage in the mitral valve have him follow-up with Dr. Herminio Stafford to discuss treatment options.

## 2018-11-15 ENCOUNTER — OFFICE VISIT (OUTPATIENT)
Dept: CARDIOLOGY CLINIC | Age: 83
End: 2018-11-15

## 2018-11-15 VITALS
DIASTOLIC BLOOD PRESSURE: 60 MMHG | OXYGEN SATURATION: 94 % | BODY MASS INDEX: 25.36 KG/M2 | SYSTOLIC BLOOD PRESSURE: 150 MMHG | HEIGHT: 68 IN | RESPIRATION RATE: 16 BRPM | HEART RATE: 50 BPM | WEIGHT: 167.3 LBS

## 2018-11-15 DIAGNOSIS — Z95.1 POSTSURGICAL AORTOCORONARY BYPASS STATUS: ICD-10-CM

## 2018-11-15 DIAGNOSIS — Z95.0 CARDIAC PACEMAKER IN SITU: ICD-10-CM

## 2018-11-15 DIAGNOSIS — I10 ESSENTIAL HYPERTENSION, BENIGN: ICD-10-CM

## 2018-11-15 DIAGNOSIS — E78.2 MIXED HYPERLIPIDEMIA: ICD-10-CM

## 2018-11-15 DIAGNOSIS — I25.10 ASCVD (ARTERIOSCLEROTIC CARDIOVASCULAR DISEASE): ICD-10-CM

## 2018-11-15 DIAGNOSIS — I44.2 CHB (COMPLETE HEART BLOCK) (HCC): ICD-10-CM

## 2018-11-15 DIAGNOSIS — I34.0 NON-RHEUMATIC MITRAL REGURGITATION: Primary | ICD-10-CM

## 2018-11-15 NOTE — PROGRESS NOTES
1. Have you been to the ER, urgent care clinic since your last visit? Hospitalized since your last visit? NO 
 
2. Have you seen or consulted any other health care providers outside of the [address] [place] [address] since your last visit? Include any pap smears or colon screening. NO 
 
C/O DIZZINESS, EXTREMELY TIRED, WEAK, SOB.
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
## 2018-11-19 ENCOUNTER — PATIENT OUTREACH (OUTPATIENT)
Dept: INTERNAL MEDICINE CLINIC | Age: 83
End: 2018-11-19

## 2018-11-19 NOTE — PROGRESS NOTES
11/19/18-Patient's daughter calls to say Dr. Isaiah Wheeler, cardiologist for her dad, discovered a mitral valve leak. Patient will be having a AMBERLY tomorrow, and then will possibly be referred to a specialist for a mitral clip next week.  / vs

## 2018-11-20 ENCOUNTER — HOSPITAL ENCOUNTER (OUTPATIENT)
Dept: CARDIAC CATH/INVASIVE PROCEDURES | Age: 83
Discharge: HOME | End: 2018-11-20
Attending: INTERNAL MEDICINE | Admitting: INTERNAL MEDICINE
Payer: MEDICARE

## 2018-11-20 VITALS
TEMPERATURE: 98.6 F | BODY MASS INDEX: 25.01 KG/M2 | DIASTOLIC BLOOD PRESSURE: 61 MMHG | HEART RATE: 62 BPM | WEIGHT: 165 LBS | OXYGEN SATURATION: 96 % | HEIGHT: 68 IN | SYSTOLIC BLOOD PRESSURE: 170 MMHG | RESPIRATION RATE: 20 BRPM

## 2018-11-20 DIAGNOSIS — R06.02 SOB (SHORTNESS OF BREATH): Primary | ICD-10-CM

## 2018-11-20 LAB
ANION GAP SERPL CALC-SCNC: 5 MMOL/L (ref 5–15)
BASOPHILS # BLD: 0.1 K/UL (ref 0–0.1)
BASOPHILS NFR BLD: 1 % (ref 0–1)
BUN SERPL-MCNC: 45 MG/DL (ref 6–20)
BUN/CREAT SERPL: 14 (ref 12–20)
CALCIUM SERPL-MCNC: 8.3 MG/DL (ref 8.5–10.1)
CHLORIDE SERPL-SCNC: 111 MMOL/L (ref 97–108)
CO2 SERPL-SCNC: 29 MMOL/L (ref 21–32)
CREAT SERPL-MCNC: 3.18 MG/DL (ref 0.7–1.3)
DIFFERENTIAL METHOD BLD: ABNORMAL
EOSINOPHIL # BLD: 0.9 K/UL (ref 0–0.4)
EOSINOPHIL NFR BLD: 12 % (ref 0–7)
ERYTHROCYTE [DISTWIDTH] IN BLOOD BY AUTOMATED COUNT: 16.2 % (ref 11.5–14.5)
GLUCOSE BLD STRIP.AUTO-MCNC: 149 MG/DL (ref 65–100)
GLUCOSE SERPL-MCNC: 119 MG/DL (ref 65–100)
HCT VFR BLD AUTO: 24.8 % (ref 36.6–50.3)
HGB BLD-MCNC: 8.2 G/DL (ref 12.1–17)
IMM GRANULOCYTES # BLD: 0 K/UL (ref 0–0.04)
IMM GRANULOCYTES NFR BLD AUTO: 0 % (ref 0–0.5)
LYMPHOCYTES # BLD: 1.6 K/UL (ref 0.8–3.5)
LYMPHOCYTES NFR BLD: 23 % (ref 12–49)
MCH RBC QN AUTO: 40.6 PG (ref 26–34)
MCHC RBC AUTO-ENTMCNC: 33.1 G/DL (ref 30–36.5)
MCV RBC AUTO: 122.8 FL (ref 80–99)
MONOCYTES # BLD: 0.6 K/UL (ref 0–1)
MONOCYTES NFR BLD: 9 % (ref 5–13)
NEUTS SEG # BLD: 3.9 K/UL (ref 1.8–8)
NEUTS SEG NFR BLD: 55 % (ref 32–75)
NRBC # BLD: 0 K/UL (ref 0–0.01)
NRBC BLD-RTO: 0 PER 100 WBC
PLATELET # BLD AUTO: 205 K/UL (ref 150–400)
PMV BLD AUTO: 11.1 FL (ref 8.9–12.9)
POTASSIUM SERPL-SCNC: 3.7 MMOL/L (ref 3.5–5.1)
RBC # BLD AUTO: 2.02 M/UL (ref 4.1–5.7)
RBC MORPH BLD: ABNORMAL
RBC MORPH BLD: ABNORMAL
SERVICE CMNT-IMP: ABNORMAL
SODIUM SERPL-SCNC: 145 MMOL/L (ref 136–145)
WBC # BLD AUTO: 7.1 K/UL (ref 4.1–11.1)

## 2018-11-20 PROCEDURE — 96374 THER/PROPH/DIAG INJ IV PUSH: CPT

## 2018-11-20 PROCEDURE — 36415 COLL VENOUS BLD VENIPUNCTURE: CPT

## 2018-11-20 PROCEDURE — 82962 GLUCOSE BLOOD TEST: CPT

## 2018-11-20 PROCEDURE — 74011000250 HC RX REV CODE- 250: Performed by: INTERNAL MEDICINE

## 2018-11-20 PROCEDURE — 74011250636 HC RX REV CODE- 250/636

## 2018-11-20 PROCEDURE — 80048 BASIC METABOLIC PNL TOTAL CA: CPT

## 2018-11-20 PROCEDURE — 85025 COMPLETE CBC W/AUTO DIFF WBC: CPT

## 2018-11-20 RX ORDER — FENTANYL CITRATE 50 UG/ML
25-50 INJECTION, SOLUTION INTRAMUSCULAR; INTRAVENOUS
Status: DISCONTINUED | OUTPATIENT
Start: 2018-11-20 | End: 2018-11-20

## 2018-11-20 RX ORDER — SODIUM CHLORIDE 0.9 % (FLUSH) 0.9 %
SYRINGE (ML) INJECTION
Status: DISCONTINUED
Start: 2018-11-20 | End: 2018-11-20 | Stop reason: HOSPADM

## 2018-11-20 RX ORDER — FENTANYL CITRATE 50 UG/ML
INJECTION, SOLUTION INTRAMUSCULAR; INTRAVENOUS
Status: DISCONTINUED
Start: 2018-11-20 | End: 2018-11-20 | Stop reason: HOSPADM

## 2018-11-20 RX ORDER — MIDAZOLAM HYDROCHLORIDE 1 MG/ML
INJECTION, SOLUTION INTRAMUSCULAR; INTRAVENOUS
Status: DISCONTINUED
Start: 2018-11-20 | End: 2018-11-20 | Stop reason: HOSPADM

## 2018-11-20 RX ORDER — MIDAZOLAM HYDROCHLORIDE 1 MG/ML
.5-1 INJECTION, SOLUTION INTRAMUSCULAR; INTRAVENOUS
Status: DISCONTINUED | OUTPATIENT
Start: 2018-11-20 | End: 2018-11-20

## 2018-11-20 RX ORDER — LIDOCAINE HYDROCHLORIDE 20 MG/ML
SOLUTION OROPHARYNGEAL
Status: DISCONTINUED
Start: 2018-11-20 | End: 2018-11-20 | Stop reason: HOSPADM

## 2018-11-20 RX ORDER — LIDOCAINE HYDROCHLORIDE 20 MG/ML
20 SOLUTION OROPHARYNGEAL ONCE
Status: COMPLETED | OUTPATIENT
Start: 2018-11-20 | End: 2018-11-20

## 2018-11-20 RX ADMIN — LIDOCAINE HYDROCHLORIDE 15 ML: 20 SOLUTION ORAL; TOPICAL at 09:41

## 2018-11-20 RX ADMIN — MIDAZOLAM HYDROCHLORIDE 1 MG: 1 INJECTION, SOLUTION INTRAMUSCULAR; INTRAVENOUS at 09:58

## 2018-11-20 RX ADMIN — FENTANYL CITRATE 12.5 MCG: 50 INJECTION, SOLUTION INTRAMUSCULAR; INTRAVENOUS at 10:00

## 2018-11-20 RX ADMIN — BENZOCAINE, BUTAMBEN, AND TETRACAINE HYDROCHLORIDE 1 SPRAY: .028; .004; .004 AEROSOL, SPRAY TOPICAL at 09:51

## 2018-11-20 RX ADMIN — BENZOCAINE, BUTAMBEN, AND TETRACAINE HYDROCHLORIDE 1 SPRAY: .028; .004; .004 AEROSOL, SPRAY TOPICAL at 09:50

## 2018-11-20 NOTE — PROGRESS NOTES
Discharge instructions given and reviewed with patient and daughter. All questions and concerns answered. Pt and daughter voiced understanding of instructions, copy of same given to daughter. Pt ashley/alexandre.

## 2018-11-20 NOTE — INTERVAL H&P NOTE
H&P Update: 
Cyrus Garcia was seen and examined. History and physical has been reviewed. The patient has been examined.  There have been no significant clinical changes since the completion of the originally dated History and Physical. 
 
Signed By: Claus Crawley MD   
 November 20, 2018 7:26 AM

## 2018-11-20 NOTE — ROUTINE PROCESS
Cardiac Cath Lab Recovery Arrival Note: 
 
 
Oneyda Tom arrived to Cardiac Cath Lab, Recovery Area. Staff introduced to patient. Patient identifiers verified with NAME and DATE OF BIRTH. Procedure verified with patient. Consent forms reviewed and signed by patient or authorized representative and verified. Allergies verified. Patient and family oriented to department. Patient and family informed of procedure and plan of care. Questions answered with review. Patient prepped for procedure, per orders from physician, prior to arrival. 
 
Patient on cardiac monitor, non-invasive blood pressure, SPO2 monitor. On room air. Patient is A&Ox 4. Patient reports no complaints. Patient in stretcher, in low position, with side rails up, call bell within reach, patient instructed to call if assistance as needed. Patient prep in: [address], [place] [address]. Family in: waiting room . Prep by: Odie Dakins.  Leonardo BA

## 2018-11-20 NOTE — DISCHARGE INSTRUCTIONS
[address]  [phone]      AMBERLY and Cardioversion Discharge Instructions    Patient ID:  Elton Ponce  [id number]  64 y.o.  4/23/1921    Date: 11/20/2018    Attending Physician: Lane Fraser MD     Admission Diagnoses:   cath    Discharge Diagnoses: Active Problems:    * No active hospital problems. *      Discharge Condition: Good    Cardiology Procedures this Admission:  AMBERLY    Disposition: home    Reference discharge instructions provided by nursing for diet and activity. Signed:  Lane Fraser MD  11/20/2018  12:48 PM      AMBERLY : After Your Visit     Your Care Instructions    After your AMBERLY and cardioversion, do not eat or drink for 2 hours. You have received a mild anesthesia and numbing medicine in your throat. After these 2 hours, start with sips of water. If you are able to swallow water easily, try something soft (bananna, applesauce). If your throat feels normal, you may resume your regular diet. Do not drive for the rest of the day or as instructed by the physician. Notify your physician if you have any problems after the test: swelling of your tongue or throat, shortness of breath or coughing up blood. Follow-up care is a key part of your treatment and safety. Be sure to make and go to all appointments, and call your doctor if you are having problems. Its also a good idea to know your test results and keep a list of the medicines you take. How can you care for yourself at home? Medicines  Take your medicines exactly as prescribed. Call your doctor if you think you are having a problem with your medicine. You may take some of the following medicines:  Beta-blockers such as propranolol (Inderal), metoprolol (Lopressor), or carvedilol (Coreg). Calcium channel blockers such as diltiazem (Cardizem) or verapamil (Calan). Digoxin (Lanoxin).   Antiarrhythmic medicines such as amiodarone (Cordarone), procainamide, or flecainide (Tambocor). You will get more details on the specific medicines your doctor prescribes. If your doctor has given you blood thinners such as warfarin (Coumadin), enoxaparin (Lovenox), or aspirin to prevent blood clots, be sure to: Take your medicine at the same time each day. Talk with your doctor before you make any major changes to your diet or start a weight loss plan. The foods that you eat can affect how well blood thinners work for you. Tell your dentist, pharmacist, and other health professionals that you take blood thinners. Watch for unusual bruising or bleeding, such as blood in urine, maroon or black stools, or bleeding from the nose or gums. Get regular blood tests to check how fast your blood clots, if you are taking Coumadin. Wear medical alert jewelry that says you take blood thinners. You can buy this at most drugstores. Do not take any vitamins, over-the-counter medicines, or herbal products without talking to your doctor first.    Lifestyle changes  Do not smoke. Smoking increases your risk of stroke and heart attack. If you need help quitting, talk to your doctor about stop-smoking programs and medicines. These can increase your chances of quitting for good. Limit alcohol to 2 drinks a day for men and 1 drink a day for women. Alcohol may interfere with blood thinners. It also increases your risk of falls, which can cause bruising and bleeding. Limit or avoid caffeine (coffee, tea, and cola drinks) and other stimulants (decongestants, over-the-counter cold and flu medicines, and illegal substances) if your doctor says to. They can trigger heart problems. If you are taking blood thinners, avoid sports or activities that could lead to injury. Make your home safe and take other measures to reduce your risk of falling. Always wear a seat belt while in a car. Activity  If your doctor recommends it, get more exercise. Walking is a good choice.  Bit by bit, increase the amount you walk every day. Try for 30 minutes on most days of the week. You also may want to swim, bike, or do other activities. When you exercise, watch for signs that your heart is working too hard. You are pushing too hard if you cannot talk while you are exercising. If you become short of breath or dizzy or have chest pain, sit down and rest immediately. If your doctor has not set you up with a cardiac rehabilitation program, talk to him or her about whether that is right for you. Cardiac rehab includes supervised exercise, help with diet and lifestyle changes, and emotional support. It may reduce your risk of future heart problems. Check your pulse regularly. Place two fingers on the artery at the palm side of your wrist in line with your thumb. If your heartbeat seems uneven or fast, talk to your doctor. When should you call for help? Call 911 anytime you think you may need emergency care. For example, call if:  You have severe trouble breathing. You cough up pink, foamy mucus and you have trouble breathing. You have symptoms of a heart attack such as:  Chest pain or pressure. Sweating. Shortness of breath. Nausea or vomiting. Pain that spreads from the chest to the neck, jaw, or one or both shoulders or arms. Dizziness or lightheadedness. A fast or uneven pulse. After calling 911, chew 1 adult-strength aspirin. Wait for an ambulance. Do not try to drive yourself. You have signs of a stroke. These may include:  Sudden numbness, paralysis, or weakness in your face, arm, or leg, especially on only one side of your body. New problems with walking or balance. Sudden vision changes. Drooling or slurred speech. New problems speaking or understanding simple statements, or feeling confused. A sudden, severe headache that is different from past headaches. You cough up blood. You vomit blood or what looks like coffee grounds. You pass maroon or very bloody stools.   You passed out (lost consciousness). Call your doctor now or seek immediate medical care if:  You have new or increased shortness of breath. You are dizzy or lightheaded, or you feel like you may faint. You have sudden weight gain, such as 3 pounds or more in 2 to 3 days. You have increased swelling in your legs, ankles, or feet. You have new bruises or blood spots under your skin. You have a nosebleed. Your gums bleed when you brush your teeth. You have blood in your urine. Your stools are black and tarlike or have streaks of blood. You have vaginal bleeding when you are not having your period, or heavy period bleeding. Watch closely for changes in your health, and be sure to contact your doctor if you have any problems. Where can you learn more? Go to [employer]. © 7665-7592 [employer]. Care instructions adapted under license by [facility] (which disclaims liability or warranty for this information). This care instruction is for use with your licensed healthcare professional. If you have questions about a medical condition or this instruction, always ask your healthcare professional. Joel العليorn any warranty or liability for your use of this information.

## 2018-11-20 NOTE — PROGRESS NOTES
Pt arrived for AMBERLY with Dr. Eric Culver, 1mg of Versed and 12. 5mcg of fentanyl with effect, AMBERLY completed, vital sign stable.

## 2018-11-21 ENCOUNTER — DOCUMENTATION ONLY (OUTPATIENT)
Dept: CARDIOLOGY CLINIC | Age: 83
End: 2018-11-21

## 2018-11-21 DIAGNOSIS — D64.9 ANEMIA, UNSPECIFIED TYPE: Primary | ICD-10-CM

## 2018-11-21 DIAGNOSIS — N18.4 CHRONIC KIDNEY DISEASE, STAGE IV (SEVERE) (HCC): ICD-10-CM

## 2018-11-21 DIAGNOSIS — D75.89 MACROCYTOSIS: ICD-10-CM

## 2018-11-21 DIAGNOSIS — E53.8 B12 DEFICIENCY: ICD-10-CM

## 2018-11-23 ENCOUNTER — APPOINTMENT (OUTPATIENT)
Dept: INTERNAL MEDICINE CLINIC | Age: 83
End: 2018-11-23

## 2018-11-23 DIAGNOSIS — N18.4 CHRONIC KIDNEY DISEASE, STAGE IV (SEVERE) (HCC): ICD-10-CM

## 2018-11-23 DIAGNOSIS — D75.89 MACROCYTOSIS: ICD-10-CM

## 2018-11-23 DIAGNOSIS — D64.9 ANEMIA, UNSPECIFIED TYPE: ICD-10-CM

## 2018-11-23 DIAGNOSIS — E53.8 B12 DEFICIENCY: ICD-10-CM

## 2018-11-24 LAB
EPO SERPL-ACNC: 417.3 MIU/ML (ref 2.6–18.5)
IRON SATN MFR SERPL: 41 % (ref 15–55)
IRON SERPL-MCNC: 100 UG/DL (ref 38–169)
TIBC SERPL-MCNC: 244 UG/DL (ref 250–450)
UIBC SERPL-MCNC: 144 UG/DL (ref 111–343)
VIT B12 SERPL-MCNC: 610 PG/ML (ref 232–1245)

## 2018-11-26 ENCOUNTER — CLINICAL SUPPORT (OUTPATIENT)
Dept: CARDIOLOGY CLINIC | Age: 83
End: 2018-11-26

## 2018-11-26 ENCOUNTER — HOSPITAL ENCOUNTER (OUTPATIENT)
Dept: INFUSION THERAPY | Age: 83
Discharge: HOME | End: 2018-11-26
Payer: MEDICARE

## 2018-11-26 DIAGNOSIS — R06.02 SOB (SHORTNESS OF BREATH): ICD-10-CM

## 2018-11-26 DIAGNOSIS — R93.1 ABNORMAL NUCLEAR CARDIAC IMAGING TEST: Primary | ICD-10-CM

## 2018-11-26 PROCEDURE — 86900 BLOOD TYPING SEROLOGIC ABO: CPT

## 2018-11-26 PROCEDURE — 86870 RBC ANTIBODY IDENTIFICATION: CPT

## 2018-11-26 PROCEDURE — 86920 COMPATIBILITY TEST SPIN: CPT

## 2018-11-26 PROCEDURE — 86922 COMPATIBILITY TEST ANTIGLOB: CPT

## 2018-11-26 PROCEDURE — 86921 COMPATIBILITY TEST INCUBATE: CPT

## 2018-11-26 PROCEDURE — 36415 COLL VENOUS BLD VENIPUNCTURE: CPT

## 2018-11-26 RX ORDER — SODIUM CHLORIDE 9 MG/ML
250 INJECTION, SOLUTION INTRAVENOUS AS NEEDED
Status: CANCELLED | OUTPATIENT
Start: 2018-11-26

## 2018-11-26 RX ORDER — SODIUM CHLORIDE 0.9 % (FLUSH) 0.9 %
5-10 SYRINGE (ML) INJECTION AS NEEDED
Status: CANCELLED | OUTPATIENT
Start: 2018-11-26 | End: 2018-11-27

## 2018-11-26 RX ORDER — SODIUM CHLORIDE 9 MG/ML
250 INJECTION, SOLUTION INTRAVENOUS AS NEEDED
Status: DISCONTINUED | OUTPATIENT
Start: 2018-11-26 | End: 2018-11-26

## 2018-11-26 NOTE — PROGRESS NOTES
OPIC Lab Visit: 
 
1700:  Pt arrived ambulatory and in no distress accompanied by daughter for a type and cross, labs drawn per Frantz Ross out of right forearm. Patient had a nuclear stress this morning  Pt is getting transfused 1 unit of PRBC's in peds opic tomorrow morning. Departed [facility] ambulatory and in no distress @ 1715. Please see CC for pending results .

## 2018-11-27 ENCOUNTER — HOSPITAL ENCOUNTER (OUTPATIENT)
Dept: INFUSION THERAPY | Age: 83
Discharge: HOME | End: 2018-11-27
Payer: MEDICARE

## 2018-11-27 RX ORDER — ACETAMINOPHEN 325 MG/1
650 TABLET ORAL ONCE
Status: ACTIVE | OUTPATIENT
Start: 2018-11-27 | End: 2018-11-27

## 2018-11-27 RX ORDER — DIPHENHYDRAMINE HCL 25 MG
25 CAPSULE ORAL ONCE
Status: ACTIVE | OUTPATIENT
Start: 2018-11-27 | End: 2018-11-27

## 2018-11-27 RX ORDER — SODIUM CHLORIDE 9 MG/ML
25 INJECTION, SOLUTION INTRAVENOUS CONTINUOUS
Status: DISPENSED | OUTPATIENT
Start: 2018-11-27 | End: 2018-11-28

## 2018-11-28 ENCOUNTER — HOSPITAL ENCOUNTER (OUTPATIENT)
Dept: INFUSION THERAPY | Age: 83
Discharge: HOME | End: 2018-11-28
Payer: MEDICARE

## 2018-11-28 VITALS
HEART RATE: 72 BPM | DIASTOLIC BLOOD PRESSURE: 83 MMHG | RESPIRATION RATE: 16 BRPM | OXYGEN SATURATION: 99 % | SYSTOLIC BLOOD PRESSURE: 166 MMHG | TEMPERATURE: 98.4 F

## 2018-11-28 PROCEDURE — P9016 RBC LEUKOCYTES REDUCED: HCPCS

## 2018-11-28 PROCEDURE — 74011250636 HC RX REV CODE- 250/636: Performed by: INTERNAL MEDICINE

## 2018-11-28 PROCEDURE — 36430 TRANSFUSION BLD/BLD COMPNT: CPT

## 2018-11-28 PROCEDURE — 77030013169 SET IV BLD ICUM -A

## 2018-11-28 PROCEDURE — 74011250637 HC RX REV CODE- 250/637: Performed by: INTERNAL MEDICINE

## 2018-11-28 RX ORDER — ACETAMINOPHEN 325 MG/1
650 TABLET ORAL ONCE
Status: COMPLETED | OUTPATIENT
Start: 2018-11-28 | End: 2018-11-28

## 2018-11-28 RX ORDER — DIPHENHYDRAMINE HCL 25 MG
25 CAPSULE ORAL ONCE
Status: COMPLETED | OUTPATIENT
Start: 2018-11-28 | End: 2018-11-28

## 2018-11-28 RX ORDER — SODIUM CHLORIDE 9 MG/ML
250 INJECTION, SOLUTION INTRAVENOUS AS NEEDED
Status: DISPENSED | OUTPATIENT
Start: 2018-11-28 | End: 2018-11-29

## 2018-11-28 RX ADMIN — SODIUM CHLORIDE 250 ML: 900 INJECTION, SOLUTION INTRAVENOUS at 09:53

## 2018-11-28 RX ADMIN — ACETAMINOPHEN 650 MG: 325 TABLET ORAL at 09:53

## 2018-11-28 RX ADMIN — DIPHENHYDRAMINE HYDROCHLORIDE 25 MG: 25 CAPSULE ORAL at 09:53

## 2018-11-28 NOTE — PROGRESS NOTES
Spoke with Mariela Hastings on HIPAA regarding STRESS test results. Verified patient with two identifiers.

## 2018-11-28 NOTE — PROGRESS NOTES
Outpatient Infusion Center Progress Note 0930 Pt admit to [facility] for Blood Transfusion ambulatory in stable condition. Assessment completed. No new concerns voiced. PIV established to left arm with positive blood return. Patient Vitals for the past 12 hrs: 
 Temp Pulse Resp BP SpO2  
11/28/18 1340 98.4 °F (36.9 °C) 72 16 166/83   
11/28/18 1240 98.5 °F (36.9 °C) 66 18 (!) 165/98   
11/28/18 1140 98.4 °F (36.9 °C) 61 16 152/70   
11/28/18 1110 98.5 °F (36.9 °C) 75 16 168/83   
11/28/18 1055 98.5 °F (36.9 °C) 74 16 158/80   
11/28/18 1033 98 °F (36.7 °C) 61 16 163/75   
11/28/18 0938 97.4 °F (36.3 °C) 68 18 144/73 99 % Medications: 
Tylenol Benadryl NS 
1 unit PRBC's 
 
 
1040 First unit initiated. 1252 First unit completed. Pt monitored post transfusion with no s/s of reaction. Educated and provided with written material regarding s/s of delayed reaction to watch for at home. 1355 Pt tolerated treatment well. D/c home ambulatory in no distress accompanied by  Daughter.

## 2018-11-29 ENCOUNTER — APPOINTMENT (OUTPATIENT)
Dept: INFUSION THERAPY | Age: 83
End: 2018-11-29
Payer: MEDICARE

## 2018-11-29 LAB
ABO + RH BLD: NORMAL
BLD PROD TYP BPU: NORMAL
BLOOD BANK CMNT PATIENT-IMP: NORMAL
BLOOD GROUP ANTIBODIES SERPL: NORMAL
BLOOD GROUP ANTIBODIES SERPL: NORMAL
BPU ID: NORMAL
CROSSMATCH RESULT,%XM: NORMAL
SPECIMEN EXP DATE BLD: NORMAL
STATUS OF UNIT,%ST: NORMAL
UNIT DIVISION, %UDIV: 0

## 2018-11-30 ENCOUNTER — APPOINTMENT (OUTPATIENT)
Dept: INFUSION THERAPY | Age: 83
End: 2018-11-30
Payer: MEDICARE

## 2018-12-06 ENCOUNTER — OFFICE VISIT (OUTPATIENT)
Dept: INTERNAL MEDICINE CLINIC | Age: 83
End: 2018-12-06

## 2018-12-06 VITALS
SYSTOLIC BLOOD PRESSURE: 169 MMHG | BODY MASS INDEX: 25.49 KG/M2 | WEIGHT: 168.2 LBS | DIASTOLIC BLOOD PRESSURE: 82 MMHG | TEMPERATURE: 97.7 F | HEIGHT: 68 IN | OXYGEN SATURATION: 98 % | HEART RATE: 60 BPM | RESPIRATION RATE: 16 BRPM

## 2018-12-06 DIAGNOSIS — K21.9 GASTROESOPHAGEAL REFLUX DISEASE WITHOUT ESOPHAGITIS: ICD-10-CM

## 2018-12-06 DIAGNOSIS — I25.10 ATHEROSCLEROSIS OF NATIVE CORONARY ARTERY OF NATIVE HEART WITHOUT ANGINA PECTORIS: ICD-10-CM

## 2018-12-06 DIAGNOSIS — E11.21 TYPE 2 DIABETES MELLITUS WITH NEPHROPATHY (HCC): ICD-10-CM

## 2018-12-06 DIAGNOSIS — I10 ESSENTIAL HYPERTENSION, BENIGN: Primary | ICD-10-CM

## 2018-12-06 DIAGNOSIS — R53.83 FATIGUE, UNSPECIFIED TYPE: ICD-10-CM

## 2018-12-06 DIAGNOSIS — N18.4 CHRONIC KIDNEY DISEASE, STAGE IV (SEVERE) (HCC): ICD-10-CM

## 2018-12-06 DIAGNOSIS — Z95.0 CARDIAC PACEMAKER IN SITU: ICD-10-CM

## 2018-12-06 DIAGNOSIS — I34.0 NON-RHEUMATIC MITRAL REGURGITATION: ICD-10-CM

## 2018-12-06 NOTE — PROGRESS NOTES
Elton Ponce is a 80 y.o. male Chief Complaint Patient presents with  Anemia  Other Aranesp injection recommended by Dr. Mor Pemberton.  Results Pt's daughter would like Dr. Ismael Proctor to go over his Cardiology Results. 1. Have you been to the ER, urgent care clinic since your last visit? Hospitalized since your last visit? No 
 
2. Have you seen or consulted any other health care providers outside of the [address] [place] [address] since your last visit? Include any pap smears or colon screening.  No
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
## 2018-12-27 ENCOUNTER — PATIENT OUTREACH (OUTPATIENT)
Dept: INTERNAL MEDICINE CLINIC | Age: 83
End: 2018-12-27

## 2019-01-13 ENCOUNTER — HOSPITAL ENCOUNTER (INPATIENT)
Age: 84
LOS: 4 days | Discharge: NURSING, REHAB OR LONG-TERM CARE | DRG: 378 | End: 2019-01-18
Attending: EMERGENCY MEDICINE | Admitting: HOSPITALIST
Payer: MEDICARE

## 2019-01-13 DIAGNOSIS — N18.4 CHRONIC KIDNEY DISEASE, STAGE IV (SEVERE) (HCC): ICD-10-CM

## 2019-01-13 DIAGNOSIS — E78.2 MIXED HYPERLIPIDEMIA: ICD-10-CM

## 2019-01-13 DIAGNOSIS — D50.0 ANEMIA, BLOOD LOSS: ICD-10-CM

## 2019-01-13 DIAGNOSIS — I25.10 ASCVD (ARTERIOSCLEROTIC CARDIOVASCULAR DISEASE): ICD-10-CM

## 2019-01-13 DIAGNOSIS — N18.9 CHRONIC RENAL FAILURE, UNSPECIFIED CKD STAGE: ICD-10-CM

## 2019-01-13 DIAGNOSIS — I10 ESSENTIAL HYPERTENSION, BENIGN: ICD-10-CM

## 2019-01-13 DIAGNOSIS — K92.2 ACUTE LOWER GI BLEEDING: Primary | ICD-10-CM

## 2019-01-13 DIAGNOSIS — Z95.0 CARDIAC PACEMAKER IN SITU: ICD-10-CM

## 2019-01-13 DIAGNOSIS — E11.21 TYPE 2 DIABETES MELLITUS WITH NEPHROPATHY (HCC): ICD-10-CM

## 2019-01-13 DIAGNOSIS — K62.5 RECTAL BLEEDING: ICD-10-CM

## 2019-01-13 DIAGNOSIS — D64.9 ACUTE ON CHRONIC ANEMIA: ICD-10-CM

## 2019-01-13 DIAGNOSIS — Z95.1 POSTSURGICAL AORTOCORONARY BYPASS STATUS: ICD-10-CM

## 2019-01-13 DIAGNOSIS — I25.10 ATHEROSCLEROSIS OF NATIVE CORONARY ARTERY OF NATIVE HEART WITHOUT ANGINA PECTORIS: ICD-10-CM

## 2019-01-13 DIAGNOSIS — Z98.61 S/P PTCA (PERCUTANEOUS TRANSLUMINAL CORONARY ANGIOPLASTY): ICD-10-CM

## 2019-01-13 PROCEDURE — 99285 EMERGENCY DEPT VISIT HI MDM: CPT

## 2019-01-13 PROCEDURE — 86920 COMPATIBILITY TEST SPIN: CPT

## 2019-01-13 PROCEDURE — 84484 ASSAY OF TROPONIN QUANT: CPT

## 2019-01-13 PROCEDURE — 85730 THROMBOPLASTIN TIME PARTIAL: CPT

## 2019-01-13 PROCEDURE — 86900 BLOOD TYPING SEROLOGIC ABO: CPT

## 2019-01-13 PROCEDURE — 85610 PROTHROMBIN TIME: CPT

## 2019-01-13 PROCEDURE — 85025 COMPLETE CBC W/AUTO DIFF WBC: CPT

## 2019-01-13 PROCEDURE — 82550 ASSAY OF CK (CPK): CPT

## 2019-01-13 PROCEDURE — 86870 RBC ANTIBODY IDENTIFICATION: CPT

## 2019-01-13 PROCEDURE — 36415 COLL VENOUS BLD VENIPUNCTURE: CPT

## 2019-01-13 PROCEDURE — 80053 COMPREHEN METABOLIC PANEL: CPT

## 2019-01-13 PROCEDURE — 93005 ELECTROCARDIOGRAM TRACING: CPT

## 2019-01-13 RX ORDER — SODIUM CHLORIDE 0.9 % (FLUSH) 0.9 %
5-40 SYRINGE (ML) INJECTION EVERY 8 HOURS
Status: DISCONTINUED | OUTPATIENT
Start: 2019-01-13 | End: 2019-01-14

## 2019-01-13 RX ORDER — SODIUM CHLORIDE 0.9 % (FLUSH) 0.9 %
5-40 SYRINGE (ML) INJECTION AS NEEDED
Status: DISCONTINUED | OUTPATIENT
Start: 2019-01-13 | End: 2019-01-18 | Stop reason: HOSPADM

## 2019-01-14 ENCOUNTER — APPOINTMENT (OUTPATIENT)
Dept: CT IMAGING | Age: 84
DRG: 378 | End: 2019-01-14
Attending: EMERGENCY MEDICINE
Payer: MEDICARE

## 2019-01-14 PROBLEM — Z98.61 S/P PTCA (PERCUTANEOUS TRANSLUMINAL CORONARY ANGIOPLASTY): Status: ACTIVE | Noted: 2019-01-14

## 2019-01-14 PROBLEM — K62.5 RECTAL BLEEDING: Status: ACTIVE | Noted: 2019-01-14

## 2019-01-14 LAB
ALBUMIN SERPL-MCNC: 3.1 G/DL (ref 3.5–5)
ALBUMIN/GLOB SERPL: 0.9 {RATIO} (ref 1.1–2.2)
ALP SERPL-CCNC: 119 U/L (ref 45–117)
ALT SERPL-CCNC: 16 U/L (ref 12–78)
ANION GAP SERPL CALC-SCNC: 10 MMOL/L (ref 5–15)
ANION GAP SERPL CALC-SCNC: 9 MMOL/L (ref 5–15)
APTT PPP: 25.2 SEC (ref 22.1–32)
AST SERPL-CCNC: 23 U/L (ref 15–37)
ATRIAL RATE: 65 BPM
BASOPHILS # BLD: 0.1 K/UL (ref 0–0.1)
BASOPHILS NFR BLD: 1 % (ref 0–1)
BILIRUB SERPL-MCNC: 0.5 MG/DL (ref 0.2–1)
BUN SERPL-MCNC: 39 MG/DL (ref 6–20)
BUN SERPL-MCNC: 44 MG/DL (ref 6–20)
BUN/CREAT SERPL: 13 (ref 12–20)
BUN/CREAT SERPL: 16 (ref 12–20)
CALCIUM SERPL-MCNC: 7.9 MG/DL (ref 8.5–10.1)
CALCIUM SERPL-MCNC: 7.9 MG/DL (ref 8.5–10.1)
CALCULATED P AXIS, ECG09: -4 DEGREES
CALCULATED R AXIS, ECG10: -67 DEGREES
CALCULATED T AXIS, ECG11: 91 DEGREES
CHLORIDE SERPL-SCNC: 114 MMOL/L (ref 97–108)
CHLORIDE SERPL-SCNC: 116 MMOL/L (ref 97–108)
CK MB CFR SERPL CALC: 3.4 % (ref 0–2.5)
CK MB SERPL-MCNC: 1.5 NG/ML (ref 5–25)
CK SERPL-CCNC: 44 U/L (ref 39–308)
CO2 SERPL-SCNC: 22 MMOL/L (ref 21–32)
CO2 SERPL-SCNC: 23 MMOL/L (ref 21–32)
COMMENT, HOLDF: NORMAL
CREAT SERPL-MCNC: 2.78 MG/DL (ref 0.7–1.3)
CREAT SERPL-MCNC: 2.89 MG/DL (ref 0.7–1.3)
DIAGNOSIS, 93000: NORMAL
DIFFERENTIAL METHOD BLD: ABNORMAL
EOSINOPHIL # BLD: 0.4 K/UL (ref 0–0.4)
EOSINOPHIL NFR BLD: 5 % (ref 0–7)
ERYTHROCYTE [DISTWIDTH] IN BLOOD BY AUTOMATED COUNT: 17.3 % (ref 11.5–14.5)
GLOBULIN SER CALC-MCNC: 3.6 G/DL (ref 2–4)
GLUCOSE BLD STRIP.AUTO-MCNC: 110 MG/DL (ref 65–100)
GLUCOSE BLD STRIP.AUTO-MCNC: 121 MG/DL (ref 65–100)
GLUCOSE BLD STRIP.AUTO-MCNC: 123 MG/DL (ref 65–100)
GLUCOSE BLD STRIP.AUTO-MCNC: 131 MG/DL (ref 65–100)
GLUCOSE SERPL-MCNC: 128 MG/DL (ref 65–100)
GLUCOSE SERPL-MCNC: 171 MG/DL (ref 65–100)
HCT VFR BLD AUTO: 20.9 % (ref 36.6–50.3)
HCT VFR BLD AUTO: 22.3 % (ref 36.6–50.3)
HCT VFR BLD AUTO: 24.4 % (ref 36.6–50.3)
HEMOCCULT STL QL: POSITIVE
HGB BLD-MCNC: 6.8 G/DL (ref 12.1–17)
HGB BLD-MCNC: 7.1 G/DL (ref 12.1–17)
HGB BLD-MCNC: 8.1 G/DL (ref 12.1–17)
IMM GRANULOCYTES # BLD AUTO: 0.1 K/UL (ref 0–0.04)
IMM GRANULOCYTES NFR BLD AUTO: 1 % (ref 0–0.5)
INR PPP: 1.1 (ref 0.9–1.1)
LACTATE SERPL-SCNC: 2 MMOL/L (ref 0.4–2)
LYMPHOCYTES # BLD: 2.1 K/UL (ref 0.8–3.5)
LYMPHOCYTES NFR BLD: 27 % (ref 12–49)
MCH RBC QN AUTO: 39.3 PG (ref 26–34)
MCHC RBC AUTO-ENTMCNC: 33.2 G/DL (ref 30–36.5)
MCV RBC AUTO: 118.4 FL (ref 80–99)
MONOCYTES # BLD: 0.6 K/UL (ref 0–1)
MONOCYTES NFR BLD: 8 % (ref 5–13)
NEUTS SEG # BLD: 4.6 K/UL (ref 1.8–8)
NEUTS SEG NFR BLD: 58 % (ref 32–75)
NRBC # BLD: 0 K/UL (ref 0–0.01)
NRBC BLD-RTO: 0 PER 100 WBC
P-R INTERVAL, ECG05: 174 MS
PLATELET # BLD AUTO: 250 K/UL (ref 150–400)
PMV BLD AUTO: 11.6 FL (ref 8.9–12.9)
POTASSIUM SERPL-SCNC: 4.5 MMOL/L (ref 3.5–5.1)
POTASSIUM SERPL-SCNC: 5 MMOL/L (ref 3.5–5.1)
PROT SERPL-MCNC: 6.7 G/DL (ref 6.4–8.2)
PROTHROMBIN TIME: 11.7 SEC (ref 9–11.1)
Q-T INTERVAL, ECG07: 492 MS
QRS DURATION, ECG06: 188 MS
QTC CALCULATION (BEZET), ECG08: 507 MS
RBC # BLD AUTO: 2.06 M/UL (ref 4.1–5.7)
RBC MORPH BLD: ABNORMAL
RBC MORPH BLD: ABNORMAL
SAMPLES BEING HELD,HOLD: NORMAL
SERVICE CMNT-IMP: ABNORMAL
SODIUM SERPL-SCNC: 147 MMOL/L (ref 136–145)
SODIUM SERPL-SCNC: 147 MMOL/L (ref 136–145)
THERAPEUTIC RANGE,PTTT: NORMAL SECS (ref 58–77)
TROPONIN I SERPL-MCNC: <0.05 NG/ML
VENTRICULAR RATE, ECG03: 64 BPM
WBC # BLD AUTO: 7.9 K/UL (ref 4.1–11.1)

## 2019-01-14 PROCEDURE — 86922 COMPATIBILITY TEST ANTIGLOB: CPT

## 2019-01-14 PROCEDURE — P9016 RBC LEUKOCYTES REDUCED: HCPCS

## 2019-01-14 PROCEDURE — 74011250637 HC RX REV CODE- 250/637: Performed by: HOSPITALIST

## 2019-01-14 PROCEDURE — 82962 GLUCOSE BLOOD TEST: CPT

## 2019-01-14 PROCEDURE — C9113 INJ PANTOPRAZOLE SODIUM, VIA: HCPCS | Performed by: HOSPITALIST

## 2019-01-14 PROCEDURE — 96374 THER/PROPH/DIAG INJ IV PUSH: CPT

## 2019-01-14 PROCEDURE — 86921 COMPATIBILITY TEST INCUBATE: CPT

## 2019-01-14 PROCEDURE — 80048 BASIC METABOLIC PNL TOTAL CA: CPT

## 2019-01-14 PROCEDURE — 65660000000 HC RM CCU STEPDOWN

## 2019-01-14 PROCEDURE — 82272 OCCULT BLD FECES 1-3 TESTS: CPT

## 2019-01-14 PROCEDURE — 74011250636 HC RX REV CODE- 250/636: Performed by: EMERGENCY MEDICINE

## 2019-01-14 PROCEDURE — 85018 HEMOGLOBIN: CPT

## 2019-01-14 PROCEDURE — 74011000250 HC RX REV CODE- 250: Performed by: HOSPITALIST

## 2019-01-14 PROCEDURE — 36430 TRANSFUSION BLD/BLD COMPNT: CPT

## 2019-01-14 PROCEDURE — 74176 CT ABD & PELVIS W/O CONTRAST: CPT

## 2019-01-14 PROCEDURE — 83605 ASSAY OF LACTIC ACID: CPT

## 2019-01-14 PROCEDURE — C9113 INJ PANTOPRAZOLE SODIUM, VIA: HCPCS | Performed by: EMERGENCY MEDICINE

## 2019-01-14 PROCEDURE — 30233N1 TRANSFUSION OF NONAUTOLOGOUS RED BLOOD CELLS INTO PERIPHERAL VEIN, PERCUTANEOUS APPROACH: ICD-10-PCS | Performed by: HOSPITALIST

## 2019-01-14 PROCEDURE — 36415 COLL VENOUS BLD VENIPUNCTURE: CPT

## 2019-01-14 PROCEDURE — 74011250636 HC RX REV CODE- 250/636: Performed by: HOSPITALIST

## 2019-01-14 RX ORDER — SODIUM CHLORIDE 9 MG/ML
250 INJECTION, SOLUTION INTRAVENOUS AS NEEDED
Status: DISCONTINUED | OUTPATIENT
Start: 2019-01-14 | End: 2019-01-18 | Stop reason: HOSPADM

## 2019-01-14 RX ORDER — ONDANSETRON 2 MG/ML
4 INJECTION INTRAMUSCULAR; INTRAVENOUS
Status: DISCONTINUED | OUTPATIENT
Start: 2019-01-14 | End: 2019-01-18 | Stop reason: HOSPADM

## 2019-01-14 RX ORDER — SODIUM CHLORIDE 0.9 % (FLUSH) 0.9 %
5-40 SYRINGE (ML) INJECTION AS NEEDED
Status: DISCONTINUED | OUTPATIENT
Start: 2019-01-14 | End: 2019-01-18 | Stop reason: HOSPADM

## 2019-01-14 RX ORDER — LATANOPROST 50 UG/ML
1 SOLUTION/ DROPS OPHTHALMIC
Status: DISCONTINUED | OUTPATIENT
Start: 2019-01-14 | End: 2019-01-18 | Stop reason: HOSPADM

## 2019-01-14 RX ORDER — PANTOPRAZOLE SODIUM 40 MG/10ML
40 INJECTION, POWDER, LYOPHILIZED, FOR SOLUTION INTRAVENOUS
Status: COMPLETED | OUTPATIENT
Start: 2019-01-14 | End: 2019-01-14

## 2019-01-14 RX ORDER — SODIUM CHLORIDE 0.9 % (FLUSH) 0.9 %
5-40 SYRINGE (ML) INJECTION EVERY 8 HOURS
Status: DISCONTINUED | OUTPATIENT
Start: 2019-01-14 | End: 2019-01-18 | Stop reason: HOSPADM

## 2019-01-14 RX ORDER — ACETAMINOPHEN 325 MG/1
650 TABLET ORAL
Status: DISCONTINUED | OUTPATIENT
Start: 2019-01-14 | End: 2019-01-18 | Stop reason: HOSPADM

## 2019-01-14 RX ORDER — DEXTROSE 50 % IN WATER (D50W) INTRAVENOUS SYRINGE
12.5-25 AS NEEDED
Status: DISCONTINUED | OUTPATIENT
Start: 2019-01-14 | End: 2019-01-18 | Stop reason: HOSPADM

## 2019-01-14 RX ORDER — DORZOLAMIDE HCL 20 MG/ML
1 SOLUTION/ DROPS OPHTHALMIC
Status: DISCONTINUED | OUTPATIENT
Start: 2019-01-14 | End: 2019-01-17

## 2019-01-14 RX ORDER — NALOXONE HYDROCHLORIDE 0.4 MG/ML
0.4 INJECTION, SOLUTION INTRAMUSCULAR; INTRAVENOUS; SUBCUTANEOUS AS NEEDED
Status: DISCONTINUED | OUTPATIENT
Start: 2019-01-14 | End: 2019-01-18 | Stop reason: HOSPADM

## 2019-01-14 RX ORDER — PRAVASTATIN SODIUM 10 MG/1
20 TABLET ORAL
Status: DISCONTINUED | OUTPATIENT
Start: 2019-01-14 | End: 2019-01-18 | Stop reason: HOSPADM

## 2019-01-14 RX ORDER — MAGNESIUM SULFATE 100 %
4 CRYSTALS MISCELLANEOUS AS NEEDED
Status: DISCONTINUED | OUTPATIENT
Start: 2019-01-14 | End: 2019-01-18 | Stop reason: HOSPADM

## 2019-01-14 RX ORDER — INSULIN LISPRO 100 [IU]/ML
INJECTION, SOLUTION INTRAVENOUS; SUBCUTANEOUS
Status: DISCONTINUED | OUTPATIENT
Start: 2019-01-14 | End: 2019-01-18 | Stop reason: HOSPADM

## 2019-01-14 RX ORDER — CARVEDILOL 3.12 MG/1
3.12 TABLET ORAL 2 TIMES DAILY WITH MEALS
Status: DISCONTINUED | OUTPATIENT
Start: 2019-01-14 | End: 2019-01-16

## 2019-01-14 RX ADMIN — PRAVASTATIN SODIUM 20 MG: 10 TABLET ORAL at 22:05

## 2019-01-14 RX ADMIN — SODIUM CHLORIDE 40 MG: 9 INJECTION, SOLUTION INTRAMUSCULAR; INTRAVENOUS; SUBCUTANEOUS at 08:13

## 2019-01-14 RX ADMIN — CARVEDILOL 3.12 MG: 3.12 TABLET, FILM COATED ORAL at 08:13

## 2019-01-14 RX ADMIN — Medication 10 ML: at 05:51

## 2019-01-14 RX ADMIN — PANTOPRAZOLE SODIUM 40 MG: 40 INJECTION, POWDER, FOR SOLUTION INTRAVENOUS at 00:18

## 2019-01-14 RX ADMIN — SODIUM CHLORIDE 40 MG: 9 INJECTION, SOLUTION INTRAMUSCULAR; INTRAVENOUS; SUBCUTANEOUS at 22:04

## 2019-01-14 RX ADMIN — CARVEDILOL 3.12 MG: 3.12 TABLET, FILM COATED ORAL at 16:41

## 2019-01-14 RX ADMIN — Medication 10 ML: at 22:23
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
## 2019-01-14 NOTE — ED NOTES
>assumed care of pt. Coming from [facility]. Reports 2 bloody bowel movements while at home, states there was [place] lot of blood\" secondary to same. Denies any hx of this in the past. Reports yesterday got a shot to left deltoid to help increase his hemoglobin. Denies any pain or discomfort. Placed on CM, PIV established, workup initiated.

## 2019-01-14 NOTE — PROGRESS NOTES
PROGRESS NOTE 
 
NAME:  Maynor Franklin  
:   1921 MRN:   [id number] Date/Time:  2019 7:43 AM 
Subjective:  
History:  Pt. Admitted after hematochezia X 2, general weakness. Currently denies CP, SOB at rest, PND, orthopnea. Denies N/V, abdominal pain, fever or rigors. Medications reviewed: 
Current Facility-Administered Medications Medication Dose Route Frequency  0.9% sodium chloride infusion 250 mL  250 mL IntraVENous PRN  pantoprazole (PROTONIX) 40 mg in sodium chloride 0.9% 10 mL injection  40 mg IntraVENous Q12H  dorzolamide (TRUSOPT) 2 % ophthalmic solution 1 Drop  1 Drop Both Eyes QHS  carvedilol (COREG) tablet 3.125 mg  3.125 mg Oral BID WITH MEALS  latanoprost (XALATAN) 0.005 % ophthalmic solution 1 Drop  1 Drop Both Eyes QHS  pravastatin (PRAVACHOL) tablet 20 mg  20 mg Oral QHS  sodium chloride (NS) flush 5-40 mL  5-40 mL IntraVENous Q8H  
 sodium chloride (NS) flush 5-40 mL  5-40 mL IntraVENous PRN  
 acetaminophen (TYLENOL) tablet 650 mg  650 mg Oral Q4H PRN  
 naloxone (NARCAN) injection 0.4 mg  0.4 mg IntraVENous PRN  
 ondansetron (ZOFRAN) injection 4 mg  4 mg IntraVENous Q4H PRN  
 insulin lispro (HUMALOG) injection   SubCUTAneous AC&HS  
 glucose chewable tablet 16 g  4 Tab Oral PRN  
 dextrose (D50W) injection syrg 12.5-25 g  12.5-25 g IntraVENous PRN  
 glucagon (GLUCAGEN) injection 1 mg  1 mg IntraMUSCular PRN  
 sodium chloride (NS) flush 5-40 mL  5-40 mL IntraVENous Q8H  
 sodium chloride (NS) flush 5-40 mL  5-40 mL IntraVENous PRN Objective:  
Vitals: 
Visit Vitals /60 (BP 1 Location: Right arm, BP Patient Position: At rest) Pulse 72 Temp 98.8 °F (37.1 °C) Resp 14 Ht 5' 8\" (1.727 m) Wt 160 lb (72.6 kg) SpO2 100% BMI 24.33 kg/m² O2 Device: Room air Temp (24hrs), Av.9 °F (36.6 °C), Min:97.5 °F (36.4 °C), Max:98.8 °F (37.1 °C) Last 24hr Input/Output: Intake/Output Summary (Last 24 hours) at 1/14/2019 1527 Last data filed at 1/14/2019 3256 Gross per 24 hour Intake  Output 150 ml Net -150 ml PHYSICAL EXAM: 
General:     Alert, cooperative, no distress, appears stated age. Head:    Normocephalic, without obvious abnormality, atraumatic. Eyes:    Conjunctivae/corneas clear. PERRLA Nose:   Nares normal. No drainage or sinus tenderness. Throat:     Lips, mucosa, and tongue normal.  No Thrush Neck:   Supple, symmetrical,  no adenopathy, thyroid: non tender 
   no carotid bruit and no JVD. Back:     Symmetric,  No CVA tenderness. Lungs:    Clear to auscultation bilaterally. No Wheezing or Rhonchi. No rales. Heart:    Regular rate and rhythm,  no murmur, rub or gallop. Abdomen:    Soft, non-tender. Not distended. Bowel sounds normal. No masses Extremities:  Extremities normal, atraumatic, No cyanosis. trace edema R>L. No clubbing Lymph nodes:  Cervical, supraclavicular normal. 
Neurologic:  Normal strength, Alert and oriented X 3. Skin:                No rash Lab Data Reviewed: 
 
Recent Results (from the past 24 hour(s)) CBC WITH AUTOMATED DIFF Collection Time: 01/13/19 11:30 PM  
Result Value Ref Range WBC 7.9 4.1 - 11.1 K/uL  
 RBC 2.06 (L) 4.10 - 5.70 M/uL HGB 8.1 (L) 12.1 - 17.0 g/dL HCT 24.4 (L) 36.6 - 50.3 % .4 (H) 80.0 - 99.0 FL  
 MCH 39.3 (H) 26.0 - 34.0 PG  
 MCHC 33.2 30.0 - 36.5 g/dL  
 RDW 17.3 (H) 11.5 - 14.5 % PLATELET 517 944 - 137 K/uL MPV 11.6 8.9 - 12.9 FL  
 NRBC 0.0 0  WBC ABSOLUTE NRBC 0.00 0.00 - 0.01 K/uL NEUTROPHILS 58 32 - 75 % LYMPHOCYTES 27 12 - 49 % MONOCYTES 8 5 - 13 % EOSINOPHILS 5 0 - 7 % BASOPHILS 1 0 - 1 % IMMATURE GRANULOCYTES 1 (H) 0.0 - 0.5 % ABS. NEUTROPHILS 4.6 1.8 - 8.0 K/UL  
 ABS. LYMPHOCYTES 2.1 0.8 - 3.5 K/UL  
 ABS. MONOCYTES 0.6 0.0 - 1.0 K/UL  
 ABS. EOSINOPHILS 0.4 0.0 - 0.4 K/UL  
 ABS. BASOPHILS 0.1 0.0 - 0.1 K/UL ABS. IMM. GRANS. 0.1 (H) 0.00 - 0.04 K/UL  
 DF AUTOMATED    
 RBC COMMENTS ANISOCYTOSIS 1+ 
    
 RBC COMMENTS MACROCYTOSIS 
2+ METABOLIC PANEL, COMPREHENSIVE Collection Time: 01/13/19 11:30 PM  
Result Value Ref Range Sodium 147 (H) 136 - 145 mmol/L Potassium 5.0 3.5 - 5.1 mmol/L Chloride 114 (H) 97 - 108 mmol/L  
 CO2 23 21 - 32 mmol/L Anion gap 10 5 - 15 mmol/L Glucose 171 (H) 65 - 100 mg/dL BUN 39 (H) 6 - 20 MG/DL Creatinine 2.89 (H) 0.70 - 1.30 MG/DL  
 BUN/Creatinine ratio 13 12 - 20 GFR est AA 25 (L) >60 ml/min/1.73m2 GFR est non-AA 20 (L) >60 ml/min/1.73m2 Calcium 7.9 (L) 8.5 - 10.1 MG/DL Bilirubin, total 0.5 0.2 - 1.0 MG/DL  
 ALT (SGPT) 16 12 - 78 U/L  
 AST (SGOT) 23 15 - 37 U/L Alk. phosphatase 119 (H) 45 - 117 U/L Protein, total 6.7 6.4 - 8.2 g/dL Albumin 3.1 (L) 3.5 - 5.0 g/dL Globulin 3.6 2.0 - 4.0 g/dL A-G Ratio 0.9 (L) 1.1 - 2.2 PROTHROMBIN TIME + INR Collection Time: 01/13/19 11:30 PM  
Result Value Ref Range INR 1.1 0.9 - 1.1 Prothrombin time 11.7 (H) 9.0 - 11.1 sec PTT Collection Time: 01/13/19 11:30 PM  
Result Value Ref Range aPTT 25.2 22.1 - 32.0 sec  
 aPTT, therapeutic range     58.0 - 77.0 SECS  
TYPE & SCREEN Collection Time: 01/13/19 11:30 PM  
Result Value Ref Range Crossmatch Expiration 01/16/2019 ABO/Rh(D) O NEGATIVE Antibody screen POS Comment Previously identified Anti D Antibody ID NO ADDITIONAL ANTIBODIES DETECTED Unit number [id number] Blood component type [facility] Unit division 00 Status of unit ALLOCATED Crossmatch result Compatible Unit number [id number] Blood component type [facility] Unit division 00 Status of unit ALLOCATED Crossmatch result Compatible   
CK W/ CKMB & INDEX Collection Time: 01/13/19 11:30 PM  
Result Value Ref Range  CK 44 39 - 308 U/L  
 CK - MB 1.5 <3.6 NG/ML  
 CK-MB Index 3.4 (H) 0 - 2.5    
TROPONIN I  
 Collection Time: 01/13/19 11:30 PM  
Result Value Ref Range Troponin-I, Qt. <0.05 <0.05 ng/mL EKG, 12 LEAD, INITIAL Collection Time: 01/13/19 11:44 PM  
Result Value Ref Range Ventricular Rate 64 BPM  
 Atrial Rate 65 BPM  
 P-R Interval 174 ms QRS Duration 188 ms Q-T Interval 492 ms QTC Calculation (Bezet) 507 ms Calculated P Axis -4 degrees Calculated R Axis -67 degrees Calculated T Axis 91 degrees Diagnosis AV dual-paced rhythm When compared with ECG of 06-AUG-2018 18:49, 
premature ventricular complexes are no longer present Vent. rate has decreased BY  45 BPM 
  
OCCULT BLOOD, STOOL Collection Time: 01/14/19 12:14 AM  
Result Value Ref Range Occult blood, stool POSITIVE (A) NEG    
LACTIC ACID Collection Time: 01/14/19 12:20 AM  
Result Value Ref Range Lactic acid 2.0 0.4 - 2.0 MMOL/L  
METABOLIC PANEL, BASIC Collection Time: 01/14/19  4:06 AM  
Result Value Ref Range Sodium 147 (H) 136 - 145 mmol/L Potassium 4.5 3.5 - 5.1 mmol/L Chloride 116 (H) 97 - 108 mmol/L  
 CO2 22 21 - 32 mmol/L Anion gap 9 5 - 15 mmol/L Glucose 128 (H) 65 - 100 mg/dL BUN 44 (H) 6 - 20 MG/DL Creatinine 2.78 (H) 0.70 - 1.30 MG/DL  
 BUN/Creatinine ratio 16 12 - 20 GFR est AA 26 (L) >60 ml/min/1.73m2 GFR est non-AA 21 (L) >60 ml/min/1.73m2 Calcium 7.9 (L) 8.5 - 10.1 MG/DL  
HGB & HCT Collection Time: 01/14/19  4:06 AM  
Result Value Ref Range HGB 7.1 (L) 12.1 - 17.0 g/dL HCT 22.3 (L) 36.6 - 50.3 % GLUCOSE, POC Collection Time: 01/14/19  7:29 AM  
Result Value Ref Range Glucose (POC) 110 (H) 65 - 100 mg/dL Performed by Farhan Bosch (PCT) Assessment/Plan:  
 
Principal Problem: 
  Rectal bleeding (1/14/2019) 
 
  
___________________________________________________ PLAN: 
 
1.  Hgb stable, anemia in setting of hematochezia, renal insufficiency receiving EPO and followed by nephrology 2. Antiplatelet agents held 3.  T&S done, Hgb currently 7.1, serial H/H 
4.  GI consult, CT abdomen results noted diverticular disease 5. Cardiology consult, valvular heart disease and coronary disease 
 
 
 
 
 
___________________________________________________ Attending Physician: Shaniqua Griffin MD

## 2019-01-14 NOTE — PROGRESS NOTES
TRANSFER - IN REPORT: 
 
Verbal report received from [facility]) on [address]  being received from ED(unit) for routine progression of care Report consisted of patients Situation, Background, Assessment and  
Recommendations(SBAR). Information from the following report(s) SBAR, Kardex, ED Summary, Intake/Output, MAR and Cardiac Rhythm Paced was reviewed with the receiving nurse. Opportunity for questions and clarification was provided. Assessment completed upon patients arrival to unit and care assumed.

## 2019-01-14 NOTE — PROGRESS NOTES
Problem: Falls - Risk of 
Goal: *Absence of Falls Document Xavi Covington Fall Risk and appropriate interventions in the flowsheet. Outcome: Progressing Towards Goal 
Fall Risk Interventions: 
Mobility Interventions: Bed/chair exit alarm, Communicate number of staff needed for ambulation/transfer, Patient to call before getting OOB, PT Consult for mobility concerns, PT Consult for assist device competence, Utilize walker, cane, or other assistive device Medication Interventions: Bed/chair exit alarm, Evaluate medications/consider consulting pharmacy, Patient to call before getting OOB, Teach patient to arise slowly Elimination Interventions: Bed/chair exit alarm, Call light in reach, Urinal in reach, Toileting schedule/hourly rounds, Patient to call for help with toileting needs
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
## 2019-01-14 NOTE — PROGRESS NOTES
Reason for Admission:   LGIB 
            
RRAT Score:     45 Resources/supports as identified by patient/family:   Independent living at [facility] Top Challenges facing patient (as identified by patient/family and CM): Finances/Medication cost?      no 
           
Transportation?  family Support system or lack thereof?  family Living arrangements? Ind. living Self-care/ADLs/Cognition? yes Current Advanced Directive/Advance Care Plan:  Not in CC Plan for utilizing home health:    [facility] Loyd if needed Likelihood of readmission: high Transition of Care Plan:           Pt admitted from independent living at [facility] where he lives with his wife. Pt is a full code, has C, uses [facility], Dr Betty Hill, is CKD 5, has a pacemaker, drives, does not want [facility], [facility] uses [facility], I notified Nurse navigator, Jeri Thompson, of the admission, I faxed H and P, GI consult to [facility], and I've asked them to fax me confirmation of his code status. Will follow. Care Management Interventions PCP Verified by CM: Yes Transition of Care Consult (CM Consult): Discharge Planning MyChart Signup: No 
Discharge Durable Medical Equipment: No 
Health Maintenance Reviewed: Yes Physical Therapy Consult: No 
Occupational Therapy Consult: No 
Speech Therapy Consult: No 
Current Support Network: Lives with Spouse Confirm Follow Up Transport: Family Plan discussed with Pt/Family/Caregiver: Yes Freedom of Choice Offered: Yes Discharge Location Discharge Placement: Home

## 2019-01-14 NOTE — CONSULTS
[address], [facility] [address] [facility]  [phone] [address] [facility] Date of  Admission: 1/13/2019 11:11 PM  
 
Admission type:Emergency Consult for:  GI bleed with recent stenting Consult by: hospitalist  
 
 Subjective: Lul Stringer is a 80 y.o. male with PMH  mitral regurg, CAD s/p CABG and stenting (06/18), HTN, HLD, anemia, CKD, SSS s/p pacer who was admitted for Rectal bleeding. Per ED provider note, Mr. Hansa Young presented to the ED with c/o rectal bleeding. Cardiology consulted due to the bleeding with the patient's recent stent and dual antiplatelet therapy. On assessment, Mr. Hansa Young endorses the above rectal bleeding. He denies any pain with the bleeding, but he did start feeling weak after the first episode of BRBPR at home. He denies history of bleeding like this in the past.  No chest pain, SOB, palpitations, leg swelling, orthopnea, n/v/d. Mr. Hansa Young follows with Dr. Caro Hernadez for cardiology. Stress 11/18 with no change from prior. AMBERLY 11/18 with EF 55-60%; NWMA; LA mod dilated; mod MR. Cath 06/18 with multiple ERICA to RCA and LAD. Patient Active Problem List  
 Diagnosis Date Noted  Rectal bleeding 01/14/2019  S/P PTCA (percutaneous transluminal coronary angioplasty) 01/14/2019  Non-rheumatic mitral regurgitation 11/15/2018  Acute gastric ulcer with hemorrhage 07/05/2018  GI bleed 07/03/2018  Anemia, blood loss 07/03/2018  S/P cardiac cath 06/04/2018  Type 2 diabetes mellitus with nephropathy ([address] 01/23/2018  Fatigue 12/06/2017  Chronic kidney disease, stage IV (severe) ([address] 12/06/2017  ASCVD (arteriosclerotic cardiovascular disease) 12/06/2017  Glaucoma 12/06/2017  Neuropathy 12/06/2017  Pernicious anemia 12/06/2017  Hematuria 12/06/2017  Abdominal pain 12/06/2017  Pacemaker 12/06/2017  Type 2 diabetes mellitus without complication, without long-term current use of insulin ([facility] [address] 09/07/2017  GERD (gastroesophageal reflux disease) 09/07/2017  Sinoatrial node dysfunction ([facility] [address] 04/23/2012  Essential hypertension, benign 04/16/2012  Postsurgical aortocoronary bypass status 04/16/2012  Mixed hyperlipidemia 04/16/2012  Coronary atherosclerosis of native coronary artery 04/16/2012  Cardiac pacemaker in situ 04/16/2012 Aleksey Ariza MD 
Past Medical History:  
Diagnosis Date  Abdominal pain 12/6/2017  Acute gastric ulcer with hemorrhage 7/5/2018  Arteriosclerotic coronary artery disease 12/6/2017  Atrioventricular block, complete ([facility])  CAD (coronary artery disease)  Chronic kidney disease, stage IV (severe) ([facility] [address] 12/6/2017  CKD (chronic kidney disease) stage 3, GFR 30-59 ml/min ([facility]) 9/7/2017  Diabetes mellitus ([facility] [address] 12/6/2017  Dizziness and giddiness  Fatigue 12/6/2017  GERD (gastroesophageal reflux disease)  GERD (gastroesophageal reflux disease) 9/7/2017  Glaucoma 12/6/2017  Hematuria 12/6/2017  Hyperlipidemia 12/6/2017  Hypertension  Mixed hyperlipidemia  Neuropathy 12/6/2017  Other acute and subacute form of ischemic heart disease  Pernicious anemia 12/6/2017  Sinoatrial node dysfunction (HCC)  Syncope and collapse Terence Castanedamitz 12/6/2017  Thrush of mouth and esophagus ([facility] [address] 12/6/2017  Type 2 diabetes mellitus without complication, without long-term current use of insulin ([facility] [address] 9/7/2017 Social History Socioeconomic History  Marital status:  Spouse name: Not on file  Number of children: Not on file  Years of education: Not on file  Highest education level: Not on file Tobacco Use  Smoking status: Never Smoker  Smokeless tobacco: Never Used Substance and Sexual Activity  Alcohol use: Yes Alcohol/week: 0.6 oz Types: 1 Glasses of wine per week  Drug use: No  
 Sexual activity: Not Currently Allergies Allergen Reactions  Latex, Natural Rubber Other (comments)  Shellfish Derived Other (comments) Crab meat Family History Problem Relation Age of Onset  Stroke Father Current Facility-Administered Medications Medication Dose Route Frequency  0.9% sodium chloride infusion 250 mL  250 mL IntraVENous PRN  pantoprazole (PROTONIX) 40 mg in sodium chloride 0.9% 10 mL injection  40 mg IntraVENous Q12H  dorzolamide (TRUSOPT) 2 % ophthalmic solution 1 Drop  1 Drop Both Eyes QHS  carvedilol (COREG) tablet 3.125 mg  3.125 mg Oral BID WITH MEALS  latanoprost (XALATAN) 0.005 % ophthalmic solution 1 Drop  1 Drop Both Eyes QHS  pravastatin (PRAVACHOL) tablet 20 mg  20 mg Oral QHS  sodium chloride (NS) flush 5-40 mL  5-40 mL IntraVENous Q8H  
 sodium chloride (NS) flush 5-40 mL  5-40 mL IntraVENous PRN  
 acetaminophen (TYLENOL) tablet 650 mg  650 mg Oral Q4H PRN  
 naloxone (NARCAN) injection 0.4 mg  0.4 mg IntraVENous PRN  
 ondansetron (ZOFRAN) injection 4 mg  4 mg IntraVENous Q4H PRN  
 insulin lispro (HUMALOG) injection   SubCUTAneous AC&HS  
 glucose chewable tablet 16 g  4 Tab Oral PRN  
 dextrose (D50W) injection syrg 12.5-25 g  12.5-25 g IntraVENous PRN  
 glucagon (GLUCAGEN) injection 1 mg  1 mg IntraMUSCular PRN  
 0.9% sodium chloride infusion 250 mL  250 mL IntraVENous PRN  
 sodium chloride (NS) flush 5-40 mL  5-40 mL IntraVENous PRN Review of Symptoms:  
11 systems reviewed, negative other than as stated in the HPI Objective:  
  
Visit Vitals /53 Pulse (!) 59 Temp 98.3 °F (36.8 °C) Resp 14 Ht 5' 8\" (1.727 m) Wt 160 lb (72.6 kg) SpO2 99% BMI 24.33 kg/m² Physical:  
General: pleasant, elderly  male resting in bed in NAD; [place] Heart: RRR, 3/6 systolic murmur Lungs: clear Abdomen: Soft, +BS, NTND Extremities: LE nbole +DP/PT, no edema Neurologic: Grossly normal 
 
Data Review:  
Recent Labs  
  01/14/19 [id number] 01/14/19 0406 01/13/19 2330 WBC  --   --  7.9 HGB 6.8* 7.1* 8.1* HCT 20.9* 22.3* 24.4*  
PLT  --   --  250 Recent Labs  
  01/14/19 
0406 01/13/19 2330 * 147* K 4.5 5.0  
* 114* CO2 22 23 * 171* BUN 44* 39* CREA 2.78* 2.89* CA 7.9* 7.9* ALB  --  3.1* TBILI  --  0.5 SGOT  --  23 ALT  --  16 INR  --  1.1 Recent Labs  
  01/13/19 2330 TROIQ <0.05 CPK 44 CKMB 1.5 Intake/Output Summary (Last 24 hours) at 1/14/2019 1845 Last data filed at 1/14/2019 9104 Gross per 24 hour Intake  Output 150 ml Net -150 ml  
  
 
Cardiographics Telemetry: V and AV pacing;  occ PVC 
ECG: AV paced Echocardiogram: last as above CT abd/pelv: \"1. No acute findings. 2. Diverticulosis. 3. Left pleural effusion. 4. Nephrolithiasis. \" 
 
 
 Assessment:  
  
 Principal Problem: 
  Rectal bleeding (1/14/2019) Active Problems: 
  Postsurgical aortocoronary bypass status (4/16/2012) Mixed hyperlipidemia (4/16/2012) Coronary atherosclerosis of native coronary artery (4/16/2012) Cardiac pacemaker in situ (4/16/2012) Chronic kidney disease, stage IV (severe) ([facility] (12/6/2017) ASCVD (arteriosclerotic cardiovascular disease) (12/6/2017) S/P PTCA (percutaneous transluminal coronary angioplasty) (1/14/2019) Plan:  
 
Kusum Weinstein is a 80 y.o. male who presented to the hospital after having rectal bleeding. Only other complaint was weakness. H/H 7.1/22.3 today; creat 2.78. Troponin negative. · Agree with holding ASA and Brilinta. Had stenting last in June 2018, so stents in place greater than 6 months and patient is at a lower risk with needing to hold antiplatelets. Restart if/when cleared by GI. · Continue BB and statin for CAD history · Patient's daughter is worried about the patient's pacemaker because the battery is nearing end of life. Will interrogate pacemaker to check battery level. Thank you for consulting [facility] Mary Lowery NP 
DNP, RN, AGACNP-BC Patient seen and examined by me with nurse practitioner Mary Lowery. I personally performed all components of the history, physical, and medical decision making and agree with the assessment and plan with minor modifications as noted. Risk of aspirin and Brilinta exceeds benefit at this point greater than 6 months after last stent. Continue GI workup as you are doing. Check pacemaker. Dr. Lloyd Lepe will follow with you starting tomorrow. Thanks for the consult.

## 2019-01-15 LAB
ABO + RH BLD: NORMAL
ANION GAP SERPL CALC-SCNC: 9 MMOL/L (ref 5–15)
BLD PROD TYP BPU: NORMAL
BLD PROD TYP BPU: NORMAL
BLOOD BANK CMNT PATIENT-IMP: NORMAL
BLOOD GROUP ANTIBODIES SERPL: NORMAL
BLOOD GROUP ANTIBODIES SERPL: NORMAL
BPU ID: NORMAL
BPU ID: NORMAL
BUN SERPL-MCNC: 45 MG/DL (ref 6–20)
BUN/CREAT SERPL: 17 (ref 12–20)
CALCIUM SERPL-MCNC: 8.3 MG/DL (ref 8.5–10.1)
CHLORIDE SERPL-SCNC: 116 MMOL/L (ref 97–108)
CO2 SERPL-SCNC: 20 MMOL/L (ref 21–32)
CREAT SERPL-MCNC: 2.62 MG/DL (ref 0.7–1.3)
CROSSMATCH RESULT,%XM: NORMAL
CROSSMATCH RESULT,%XM: NORMAL
GLUCOSE BLD STRIP.AUTO-MCNC: 117 MG/DL (ref 65–100)
GLUCOSE BLD STRIP.AUTO-MCNC: 119 MG/DL (ref 65–100)
GLUCOSE BLD STRIP.AUTO-MCNC: 132 MG/DL (ref 65–100)
GLUCOSE BLD STRIP.AUTO-MCNC: 140 MG/DL (ref 65–100)
GLUCOSE SERPL-MCNC: 99 MG/DL (ref 65–100)
HCT VFR BLD AUTO: 27.9 % (ref 36.6–50.3)
HCT VFR BLD AUTO: 30.7 % (ref 36.6–50.3)
HGB BLD-MCNC: 10.3 G/DL (ref 12.1–17)
HGB BLD-MCNC: 9.2 G/DL (ref 12.1–17)
POTASSIUM SERPL-SCNC: 4.4 MMOL/L (ref 3.5–5.1)
SERVICE CMNT-IMP: ABNORMAL
SODIUM SERPL-SCNC: 145 MMOL/L (ref 136–145)
SPECIMEN EXP DATE BLD: NORMAL
STATUS OF UNIT,%ST: NORMAL
STATUS OF UNIT,%ST: NORMAL
UNIT DIVISION, %UDIV: 0
UNIT DIVISION, %UDIV: 0

## 2019-01-15 PROCEDURE — 74011250637 HC RX REV CODE- 250/637: Performed by: HOSPITALIST

## 2019-01-15 PROCEDURE — 74011000250 HC RX REV CODE- 250: Performed by: HOSPITALIST

## 2019-01-15 PROCEDURE — 97165 OT EVAL LOW COMPLEX 30 MIN: CPT | Performed by: OCCUPATIONAL THERAPIST

## 2019-01-15 PROCEDURE — 97116 GAIT TRAINING THERAPY: CPT

## 2019-01-15 PROCEDURE — 80048 BASIC METABOLIC PNL TOTAL CA: CPT

## 2019-01-15 PROCEDURE — 36415 COLL VENOUS BLD VENIPUNCTURE: CPT

## 2019-01-15 PROCEDURE — 65660000000 HC RM CCU STEPDOWN

## 2019-01-15 PROCEDURE — 82962 GLUCOSE BLOOD TEST: CPT

## 2019-01-15 PROCEDURE — 97535 SELF CARE MNGMENT TRAINING: CPT | Performed by: OCCUPATIONAL THERAPIST

## 2019-01-15 PROCEDURE — 77030032490 HC SLV COMPR SCD KNE COVD -B

## 2019-01-15 PROCEDURE — C9113 INJ PANTOPRAZOLE SODIUM, VIA: HCPCS | Performed by: HOSPITALIST

## 2019-01-15 PROCEDURE — 85018 HEMOGLOBIN: CPT

## 2019-01-15 PROCEDURE — 97161 PT EVAL LOW COMPLEX 20 MIN: CPT

## 2019-01-15 PROCEDURE — 74011250636 HC RX REV CODE- 250/636: Performed by: HOSPITALIST

## 2019-01-15 RX ORDER — PANTOPRAZOLE SODIUM 40 MG/1
40 TABLET, DELAYED RELEASE ORAL
Status: DISCONTINUED | OUTPATIENT
Start: 2019-01-16 | End: 2019-01-18 | Stop reason: HOSPADM

## 2019-01-15 RX ADMIN — CARVEDILOL 3.12 MG: 3.12 TABLET, FILM COATED ORAL at 17:45

## 2019-01-15 RX ADMIN — Medication 10 ML: at 21:04

## 2019-01-15 RX ADMIN — CARVEDILOL 3.12 MG: 3.12 TABLET, FILM COATED ORAL at 08:07

## 2019-01-15 RX ADMIN — LATANOPROST 1 DROP: 50 SOLUTION OPHTHALMIC at 21:01

## 2019-01-15 RX ADMIN — Medication 10 ML: at 05:15

## 2019-01-15 RX ADMIN — SODIUM CHLORIDE 40 MG: 9 INJECTION, SOLUTION INTRAMUSCULAR; INTRAVENOUS; SUBCUTANEOUS at 08:07

## 2019-01-15 RX ADMIN — Medication 10 ML: at 14:35
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
## 2019-01-15 NOTE — PROGRESS NOTES
Bedside and Verbal shift change report given to Jennifer Rodriguez (oncoming nurse) by Aranza Tobin (offgoing nurse). Report included the following information SBAR, Kardex, Intake/Output, MAR and Recent Results.

## 2019-01-15 NOTE — PROGRESS NOTES
[address] [place] [address]  [phone] Cardiology Progress Note 1/15/2019 4 PM 
 
Admit Date: 1/13/2019 Admit Diagnosis:  
Rectal bleeding Subjective: Trent Landeros is a 80 y.o. male with PMH  mitral regurg, CAD s/p CABG and stenting (06/18), HTN, HLD, anemia, CKD, SSS s/p pacer who was admitted for Rectal bleeding. 
  
 
Overnight events: 
-BP slightly elevated 
-labs steady 
-Mr. Jerome states  He's feeling well today. He denies chest pain, SOb, palpitations, further bleeding. Visit Vitals /69 (BP 1 Location: Left arm, BP Patient Position: Sitting) Pulse 60 Temp 97.5 °F (36.4 °C) Resp 18 Ht 5' 8\" (1.727 m) Wt 72.6 kg (160 lb) SpO2 99% BMI 24.33 kg/m² Current Facility-Administered Medications Medication Dose Route Frequency  [START ON 1/16/2019] pantoprazole (PROTONIX) tablet 40 mg  40 mg Oral ACB  
 0.9% sodium chloride infusion 250 mL  250 mL IntraVENous PRN  
 dorzolamide (TRUSOPT) 2 % ophthalmic solution 1 Drop  1 Drop Both Eyes QHS  carvedilol (COREG) tablet 3.125 mg  3.125 mg Oral BID WITH MEALS  latanoprost (XALATAN) 0.005 % ophthalmic solution 1 Drop  1 Drop Both Eyes QHS  pravastatin (PRAVACHOL) tablet 20 mg  20 mg Oral QHS  sodium chloride (NS) flush 5-40 mL  5-40 mL IntraVENous Q8H  
 sodium chloride (NS) flush 5-40 mL  5-40 mL IntraVENous PRN  
 acetaminophen (TYLENOL) tablet 650 mg  650 mg Oral Q4H PRN  
 naloxone (NARCAN) injection 0.4 mg  0.4 mg IntraVENous PRN  
 ondansetron (ZOFRAN) injection 4 mg  4 mg IntraVENous Q4H PRN  
 insulin lispro (HUMALOG) injection   SubCUTAneous AC&HS  
 glucose chewable tablet 16 g  4 Tab Oral PRN  
 dextrose (D50W) injection syrg 12.5-25 g  12.5-25 g IntraVENous PRN  
 glucagon (GLUCAGEN) injection 1 mg  1 mg IntraMUSCular PRN  
 0.9% sodium chloride infusion 250 mL  250 mL IntraVENous PRN  
 sodium chloride (NS) flush 5-40 mL  5-40 mL IntraVENous PRN  
   
 Objective:  
  
Physical Exam: 
General: pleasant, elderly  male resting in bed in NAD; [facility] Heart: RRR, 3/6 systolic murmur Lungs: clear Abdomen: Soft, +BS, NTND Extremities: LE noble +DP/PT, no edema Neurologic: Grossly normal 
Skin:  Warm and dry.  
 
Data Review:  
Recent Labs  
  01/15/19 
0458 01/14/19 
1612 01/14/19 
0406 01/13/19 
2330 WBC  --   --   --  7.9 HGB 9.2* 6.8* 7.1* 8.1* HCT 27.9* 20.9* 22.3* 24.4*  
PLT  --   --   --  250 Recent Labs  
  01/15/19 
0458 01/14/19 
0406 01/13/19 
2330  147* 147* K 4.4 4.5 5.0  
* 116* 114* CO2 20* 22 23 GLU 99 128* 171* BUN 45* 44* 39* CREA 2.62* 2.78* 2.89* CA 8.3* 7.9* 7.9* ALB  --   --  3.1* TBILI  --   --  0.5 SGOT  --   --  23 ALT  --   --  16 INR  --   --  1.1 Recent Labs  
  01/13/19 
2330 TROIQ <0.05 CPK 44 CKMB 1.5 Intake/Output Summary (Last 24 hours) at 1/15/2019 1639 Last data filed at 1/15/2019 1200 Gross per 24 hour Intake 600 ml Output 200 ml Net 400 ml Telemetry: V and AV pacing;  occ PVC 
ECG: AV paced CT abd/pelv: \"1. No acute findings. 2. Diverticulosis. 3. Left pleural effusion. 4. Nephrolithiasis. \" 
 
 
 
Assessment:  
 
Principal Problem: 
  Rectal bleeding (1/14/2019) Active Problems: 
  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  
 
Rectal bleeding on DAPT for recent cardiac stenting (06/18): No further bleeding today. Labs steady. No plans for colonoscopy per GI. · Continue to hold Brilinta. Would like to restart ASA if okay with GI. · Continue BB and statin for CAD history. HTN: slightly elevated at times. · Continue BB 
 
 
CKD:  Creatinine steady Ivis Ballard NP 
DNP, RN, [facility] Cardiology 1/15/2019 Patient seen, examined by me personally. Plan discussed as detailed. Agree with note as outlined by  NP. I confirm findings in history and physical exam. No additional findings noted. Agree with plan as outlined above. Remains stable from cardiac stand point.  
 
Magdaleno Saavedra MD

## 2019-01-15 NOTE — PROGRESS NOTES
Bedside and Verbal shift change report given to JESENIA Briones (oncoming nurse) by Oscar Belcher RN (offgoing nurse). Report included the following information SBAR, Kardex, MAR and Recent Results.
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
## 2019-01-15 NOTE — PROGRESS NOTES
2128: 1st Unit of Blood complete 
 
0112: 2nd Unit of Blood complete 
 
0500: H/H drawn *Hgb: 9.2 Hct: 20.9

## 2019-01-15 NOTE — PROGRESS NOTES
GI Progress Note [place] NAME:Armen Jerome UMQ:[id number] THV:[id number] ATTG: JUS Aguilar MD  PCP: Harini Morales MD 
Date/Time:  1/15/2019 10:45 AM  
Assessment:  
· Hematochezia- c/w diverticular source. · Acute Blood Loss anemia- improved since transfusion Plan: · Serial H/H q12hrs · Continue CLD today · No plan for Colonoscopy · Will continue PPI as PO QD Subjective:  
Discussed with RN events overnight. 1 bloody BM yeaterday afternoon, none since then. Complaint Y/N Description Abdominal Pain n Hematemesis n Hematochezia n Melena n   
Constipation n   
Diarrhea n Dyspepsia n Dysphagia n   
Jaundiced n   
Nausea/vomiting n Review of Systems: 
Symptom Y/N Comments  Symptom Y/N Comments Fever/Chills n   Chest Pain n   
Cough n   Headaches n Sputum n   Joint Pain n   
SOB/HAMILTON n   Pruritis/Rash n Tolerating Diet y CLD  Other Could NOT obtain due to:   
 
Objective: VITALS:  
Last 24hrs VS reviewed since prior progress note. Most recent are: 
Visit Vitals /78 (BP 1 Location: Left arm, BP Patient Position: Sitting) Pulse 79 Temp 98.2 °F (36.8 °C) Resp 18 Ht 5' 8\" (1.727 m) Wt 72.6 kg (160 lb) SpO2 99% BMI 24.33 kg/m² Intake/Output Summary (Last 24 hours) at 1/15/2019 1045 Last data filed at 1/15/2019 7120 Gross per 24 hour Intake 600 ml Output  Net 600 ml PHYSICAL EXAM: 
General: WD, WN. Alert, cooperative, no acute distress   
HEENT: NC, Atraumatic. PERRL Anicteric sclerae. Lungs:  CTA Bilaterally. No Wheezing/Rhonchi/Rales. Heart:  Regular  rhythm,  No murmur/Rub/Gallops. +CW Pacer Abdomen: Soft, Non distended, Non tender.  +BS Extremities: No c/c/e Neurologic:  CN 2-12 gi, A/O X 3. No acute neurological distress Psych:   Good insight. Not anxious nor agitated. Lab and Radiology Data Reviewed: (see below) Medications Reviewed: (see below) PMH/SH reviewed - no change compared to H&P 
 ________________________________________________________________________ Total time spent with patient: 15 minutes ________________________________________________________________________ Care Plan discussed with: 
Patient y Family  y  
RN y  
           Consultant:    
 
Manjula Buchanan MD  
 
Procedures: see electronic medical records for all procedures/Xrays and details which were not copied into this note but were reviewed prior to creation of Plan. LABS: 
Recent Labs  
  01/15/19 
0458 01/14/19 
1612  01/13/19 2330 WBC  --   --   --  7.9 HGB 9.2* 6.8*   < > 8.1* HCT 27.9* 20.9*   < > 24.4*  
PLT  --   --   --  250  
 < > = values in this interval not displayed. Recent Labs  
  01/15/19 
0458 01/14/19 
0406 01/13/19 2330  147* 147* K 4.4 4.5 5.0  
* 116* 114* CO2 20* 22 23 BUN 45* 44* 39* CREA 2.62* 2.78* 2.89* GLU 99 128* 171* CA 8.3* 7.9* 7.9* Recent Labs  
  01/13/19 2330 SGOT 23 * TP 6.7 ALB 3.1*  
GLOB 3.6 Recent Labs  
  01/13/19 2330 INR 1.1 PTP 11.7* APTT 25.2 No results for input(s): FE, TIBC, PSAT, FERR in the last 72 hours. No results found for: FOL, RBCF No results for input(s): PH, PCO2, PO2 in the last 72 hours. Recent Labs  
  01/13/19 2330 CPK 44 CKMB 1.5 Lab Results Component Value Date/Time  Color YELLOW/STRAW 07/03/2018 07:17 PM  
 Appearance CLEAR 07/03/2018 07:17 PM  
 Specific gravity 1.017 07/03/2018 07:17 PM  
 pH (UA) 6.0 07/03/2018 07:17 PM  
 Protein TRACE (A) 07/03/2018 07:17 PM  
 Glucose NEGATIVE  07/03/2018 07:17 PM  
 Ketone NEGATIVE  07/03/2018 07:17 PM  
 Bilirubin NEGATIVE  07/03/2018 07:17 PM  
 Urobilinogen 0.2 07/03/2018 07:17 PM  
 Nitrites NEGATIVE  07/03/2018 07:17 PM  
 Leukocyte Esterase NEGATIVE  07/03/2018 07:17 PM  
 Epithelial cells FEW 07/03/2018 07:17 PM  
 Bacteria NEGATIVE  07/03/2018 07:17 PM  
 WBC 0-4 07/03/2018 07:17 PM  
 RBC 0-5 07/03/2018 07:17 PM  
 
 
 MEDICATIONS: 
Current Facility-Administered Medications Medication Dose Route Frequency  0.9% sodium chloride infusion 250 mL  250 mL IntraVENous PRN  pantoprazole (PROTONIX) 40 mg in sodium chloride 0.9% 10 mL injection  40 mg IntraVENous Q12H  dorzolamide (TRUSOPT) 2 % ophthalmic solution 1 Drop  1 Drop Both Eyes QHS  carvedilol (COREG) tablet 3.125 mg  3.125 mg Oral BID WITH MEALS  latanoprost (XALATAN) 0.005 % ophthalmic solution 1 Drop  1 Drop Both Eyes QHS  pravastatin (PRAVACHOL) tablet 20 mg  20 mg Oral QHS  sodium chloride (NS) flush 5-40 mL  5-40 mL IntraVENous Q8H  
 sodium chloride (NS) flush 5-40 mL  5-40 mL IntraVENous PRN  
 acetaminophen (TYLENOL) tablet 650 mg  650 mg Oral Q4H PRN  
 naloxone (NARCAN) injection 0.4 mg  0.4 mg IntraVENous PRN  
 ondansetron (ZOFRAN) injection 4 mg  4 mg IntraVENous Q4H PRN  
 insulin lispro (HUMALOG) injection   SubCUTAneous AC&HS  
 glucose chewable tablet 16 g  4 Tab Oral PRN  
 dextrose (D50W) injection syrg 12.5-25 g  12.5-25 g IntraVENous PRN  
 glucagon (GLUCAGEN) injection 1 mg  1 mg IntraMUSCular PRN  
 0.9% sodium chloride infusion 250 mL  250 mL IntraVENous PRN  
 sodium chloride (NS) flush 5-40 mL  5-40 mL IntraVENous PRN

## 2019-01-15 NOTE — PROGRESS NOTES
PROGRESS NOTE 
 
NAME:  Jaguar Garcia  
:   1921 MRN:   [id number] Date/Time:  1/15/2019 7:52AM 
Subjective:  
History:  Pt. Admitted after hematochezia X 2, general weakness. Currently denies CP, SOB at rest, PND, orthopnea. Denies N/V, abdominal pain, fever or rigors. Had additional bloody stools yesterday and Hgb dropped below 7. Transfused 2 units PRBCs last pm. 
 
 
Medications reviewed: 
Current Facility-Administered Medications Medication Dose Route Frequency  0.9% sodium chloride infusion 250 mL  250 mL IntraVENous PRN  pantoprazole (PROTONIX) 40 mg in sodium chloride 0.9% 10 mL injection  40 mg IntraVENous Q12H  dorzolamide (TRUSOPT) 2 % ophthalmic solution 1 Drop  1 Drop Both Eyes QHS  carvedilol (COREG) tablet 3.125 mg  3.125 mg Oral BID WITH MEALS  latanoprost (XALATAN) 0.005 % ophthalmic solution 1 Drop  1 Drop Both Eyes QHS  pravastatin (PRAVACHOL) tablet 20 mg  20 mg Oral QHS  sodium chloride (NS) flush 5-40 mL  5-40 mL IntraVENous Q8H  
 sodium chloride (NS) flush 5-40 mL  5-40 mL IntraVENous PRN  
 acetaminophen (TYLENOL) tablet 650 mg  650 mg Oral Q4H PRN  
 naloxone (NARCAN) injection 0.4 mg  0.4 mg IntraVENous PRN  
 ondansetron (ZOFRAN) injection 4 mg  4 mg IntraVENous Q4H PRN  
 insulin lispro (HUMALOG) injection   SubCUTAneous AC&HS  
 glucose chewable tablet 16 g  4 Tab Oral PRN  
 dextrose (D50W) injection syrg 12.5-25 g  12.5-25 g IntraVENous PRN  
 glucagon (GLUCAGEN) injection 1 mg  1 mg IntraMUSCular PRN  
 0.9% sodium chloride infusion 250 mL  250 mL IntraVENous PRN  
 sodium chloride (NS) flush 5-40 mL  5-40 mL IntraVENous PRN Objective:  
Vitals: 
Visit Vitals /79 (BP 1 Location: Right arm, BP Patient Position: At rest) Pulse 79 Temp 98.2 °F (36.8 °C) Resp 18 Ht 5' 8\" (1.727 m) Wt 160 lb (72.6 kg) SpO2 99% BMI 24.33 kg/m²    O2 Device: Room air Temp (24hrs), Av °F (36.7 °C), Min:96.6 °F (35.9 °C), Max:98.4 °F (36.9 °C) Last 24hr Input/Output: 
No intake or output data in the 24 hours ending 01/15/19 0751 PHYSICAL EXAM: 
General:     Alert, cooperative, no distress, appears stated age. Head:    Normocephalic, without obvious abnormality, atraumatic. Eyes:    Conjunctivae/corneas clear. PERRLA Nose:   Nares normal. No drainage or sinus tenderness. Throat:     Lips, mucosa, and tongue normal.  No Thrush Neck:   Supple, symmetrical,  no adenopathy, thyroid: non tender 
   no carotid bruit and no JVD. Back:     Symmetric,  No CVA tenderness. Lungs:    Clear to auscultation bilaterally. No Wheezing or Rhonchi. No rales. Heart:    Regular rate and rhythm,  no murmur, rub or gallop. Abdomen:    Soft, non-tender. Not distended. Bowel sounds normal. No masses Extremities:  Extremities normal, atraumatic, No cyanosis. trace edema R>L. No clubbing Lymph nodes:  Cervical, supraclavicular normal. 
Neurologic:  Normal strength, Alert and oriented X 3. Skin:                No rash Lab Data Reviewed: 
 
Recent Results (from the past 24 hour(s)) GLUCOSE, POC Collection Time: 01/14/19 12:14 PM  
Result Value Ref Range Glucose (POC) 131 (H) 65 - 100 mg/dL Performed by Gianna Zheng (PCT) HGB & HCT Collection Time: 01/14/19  4:12 PM  
Result Value Ref Range HGB 6.8 (L) 12.1 - 17.0 g/dL HCT 20.9 (L) 36.6 - 50.3 % GLUCOSE, POC Collection Time: 01/14/19  4:29 PM  
Result Value Ref Range Glucose (POC) 123 (H) 65 - 100 mg/dL Performed by Trista Lozano (CON) PCT   
GLUCOSE, POC Collection Time: 01/14/19  9:12 PM  
Result Value Ref Range Glucose (POC) 121 (H) 65 - 100 mg/dL Performed by Zuhair Tomlin (PCT) HGB & HCT Collection Time: 01/15/19  4:58 AM  
Result Value Ref Range HGB 9.2 (L) 12.1 - 17.0 g/dL HCT 27.9 (L) 36.6 - 50.3 % METABOLIC PANEL, BASIC Collection Time: 01/15/19  4:58 AM  
Result Value Ref Range Sodium 145 136 - 145 mmol/L Potassium 4.4 3.5 - 5.1 mmol/L Chloride 116 (H) 97 - 108 mmol/L  
 CO2 20 (L) 21 - 32 mmol/L Anion gap 9 5 - 15 mmol/L Glucose 99 65 - 100 mg/dL BUN 45 (H) 6 - 20 MG/DL Creatinine 2.62 (H) 0.70 - 1.30 MG/DL  
 BUN/Creatinine ratio 17 12 - 20 GFR est AA 28 (L) >60 ml/min/1.73m2 GFR est non-AA 23 (L) >60 ml/min/1.73m2 Calcium 8.3 (L) 8.5 - 10.1 MG/DL  
GLUCOSE, POC Collection Time: 01/15/19  7:28 AM  
Result Value Ref Range Glucose (POC) 117 (H) 65 - 100 mg/dL Performed by Ermelinda VIDALES (CON) PCT Assessment/Plan:  
 
Principal Problem: 
  Rectal bleeding (1/14/2019) Active Problems: 
  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 
 
  
___________________________________________________ PLAN: 
 
1. Hemoglobin dropped with continued lower GI bleeding requiring 2 units packed red blood cells with hemoglobin of 9.2 this a.m. 
2.  Antiplatelet agents held 3. Continue serial H&H 
4.  GI consult appreciated, CT abdomen results noted diverticular disease 5. Cardiology consult appreciated, valvular heart disease and coronary disease 6. Discussed DNR status with patient. He would like to be made DNR recognizing his age and advanced coronary disease. 
 
 
 
 
___________________________________________________ Attending Physician: Truong Ku MD

## 2019-01-15 NOTE — PROGRESS NOTES
Problem: Falls - Risk of 
Goal: *Absence of Falls Document Orvel Buffy Fall Risk and appropriate interventions in the flowsheet. Outcome: Progressing Towards Goal 
Fall Risk Interventions: 
Mobility Interventions: Bed/chair exit alarm, Patient to call before getting OOB Medication Interventions: Evaluate medications/consider consulting pharmacy, Patient to call before getting OOB, Teach patient to arise slowly Elimination Interventions: Call light in reach, Patient to call for help with toileting needs
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
## 2019-01-16 LAB
ANION GAP SERPL CALC-SCNC: 8 MMOL/L (ref 5–15)
BUN SERPL-MCNC: 42 MG/DL (ref 6–20)
BUN/CREAT SERPL: 17 (ref 12–20)
CALCIUM SERPL-MCNC: 8.3 MG/DL (ref 8.5–10.1)
CHLORIDE SERPL-SCNC: 115 MMOL/L (ref 97–108)
CO2 SERPL-SCNC: 19 MMOL/L (ref 21–32)
CREAT SERPL-MCNC: 2.54 MG/DL (ref 0.7–1.3)
GLUCOSE BLD STRIP.AUTO-MCNC: 105 MG/DL (ref 65–100)
GLUCOSE BLD STRIP.AUTO-MCNC: 115 MG/DL (ref 65–100)
GLUCOSE BLD STRIP.AUTO-MCNC: 236 MG/DL (ref 65–100)
GLUCOSE BLD STRIP.AUTO-MCNC: 243 MG/DL (ref 65–100)
GLUCOSE BLD STRIP.AUTO-MCNC: 97 MG/DL (ref 65–100)
GLUCOSE SERPL-MCNC: 94 MG/DL (ref 65–100)
HCT VFR BLD AUTO: 26.4 % (ref 36.6–50.3)
HGB BLD-MCNC: 8.7 G/DL (ref 12.1–17)
POTASSIUM SERPL-SCNC: 4.5 MMOL/L (ref 3.5–5.1)
SERVICE CMNT-IMP: ABNORMAL
SERVICE CMNT-IMP: NORMAL
SODIUM SERPL-SCNC: 142 MMOL/L (ref 136–145)

## 2019-01-16 PROCEDURE — 74011636637 HC RX REV CODE- 636/637: Performed by: HOSPITALIST

## 2019-01-16 PROCEDURE — 74011250637 HC RX REV CODE- 250/637: Performed by: NURSE PRACTITIONER

## 2019-01-16 PROCEDURE — 85018 HEMOGLOBIN: CPT

## 2019-01-16 PROCEDURE — 94760 N-INVAS EAR/PLS OXIMETRY 1: CPT

## 2019-01-16 PROCEDURE — 74011000250 HC RX REV CODE- 250: Performed by: HOSPITALIST

## 2019-01-16 PROCEDURE — 97110 THERAPEUTIC EXERCISES: CPT

## 2019-01-16 PROCEDURE — 36415 COLL VENOUS BLD VENIPUNCTURE: CPT

## 2019-01-16 PROCEDURE — 82962 GLUCOSE BLOOD TEST: CPT

## 2019-01-16 PROCEDURE — 74011250637 HC RX REV CODE- 250/637: Performed by: HOSPITALIST

## 2019-01-16 PROCEDURE — 97535 SELF CARE MNGMENT TRAINING: CPT | Performed by: OCCUPATIONAL THERAPIST

## 2019-01-16 PROCEDURE — 74011250637 HC RX REV CODE- 250/637: Performed by: INTERNAL MEDICINE

## 2019-01-16 PROCEDURE — 97116 GAIT TRAINING THERAPY: CPT

## 2019-01-16 PROCEDURE — 80048 BASIC METABOLIC PNL TOTAL CA: CPT

## 2019-01-16 PROCEDURE — 65660000000 HC RM CCU STEPDOWN

## 2019-01-16 RX ORDER — CARVEDILOL 6.25 MG/1
6.25 TABLET ORAL 2 TIMES DAILY WITH MEALS
Status: DISCONTINUED | OUTPATIENT
Start: 2019-01-16 | End: 2019-01-18 | Stop reason: HOSPADM

## 2019-01-16 RX ADMIN — CARVEDILOL 6.25 MG: 6.25 TABLET, FILM COATED ORAL at 17:30

## 2019-01-16 RX ADMIN — Medication 10 ML: at 17:30

## 2019-01-16 RX ADMIN — CARVEDILOL 3.12 MG: 3.12 TABLET, FILM COATED ORAL at 09:41

## 2019-01-16 RX ADMIN — PANTOPRAZOLE SODIUM 40 MG: 40 TABLET, DELAYED RELEASE ORAL at 09:42

## 2019-01-16 RX ADMIN — Medication 10 ML: at 21:03

## 2019-01-16 RX ADMIN — INSULIN LISPRO 2 UNITS: 100 INJECTION, SOLUTION INTRAVENOUS; SUBCUTANEOUS at 12:32

## 2019-01-16 RX ADMIN — Medication 10 ML: at 05:29

## 2019-01-16 RX ADMIN — LATANOPROST 1 DROP: 50 SOLUTION OPHTHALMIC at 21:05

## 2019-01-16 NOTE — PROGRESS NOTES
Problem: Mobility Impaired (Adult and Pediatric) Goal: *Acute Goals and Plan of Care (Insert Text) Physical Therapy Goals Initiated 1/15/2019 1. Patient will move from supine to sit and sit to supine , scoot up and down and roll side to side in bed with independence within 7 day(s). 2.  Patient will transfer from bed to chair and chair to bed with modified independence using the least restrictive device within 7 day(s). 3.  Patient will perform sit to stand with modified independence within 7 day(s). 4.  Patient will ambulate with modified independence for 250 feet with the least restrictive device within 7 day(s). physical Therapy TREATMENT Patient: Lul Stirnger (54 y.o. male) Date: 1/16/2019 Diagnosis: Rectal bleeding Rectal bleeding Precautions:   
Chart, physical therapy assessment, plan of care and goals were reviewed. ASSESSMENT: 
Physical therapy visit completed on a 80year old male admitted with rectal bleeding. Patient received in bed and agreeable to participate, nursing cleared to see. Patient able to demonstrate bed mobility and transfer at stand by assist level, verbal cues for sequence and patient requires extra time. Patient able to balance on edge of bed and find center of balance. Transfer to stand with close stand by assist and cues for sequence and patient ambulated x 100 feet with RW and contact guard. Gait sam is slowed and patient uses wide VALERIE and flexed posture, mild increased trunk sway but no LOB. Patient returned to chair and performed seated ther ex for LEs with good tolerance. Educated on falls prevention and safety, pacing and activity progression. Patient will benefit from continued rehab at [facility] to maximize recovery. Progression toward goals: 
[x]    Improving appropriately and progressing toward goals 
[]    Improving slowly and progressing toward goals 
[]    Not making progress toward goals and plan of care will be adjusted PLAN: 
Patient continues to benefit from skilled intervention to address the above impairments. Continue treatment per established plan of care. Discharge Recommendations:  [facility] Further Equipment Recommendations for Discharge: Will defer to SNF SUBJECTIVE:  
Patient stated I have been feeling tired for a few weeks.  OBJECTIVE DATA SUMMARY:  
Critical Behavior: 
Neurologic State: Alert Orientation Level: Oriented X4 Cognition: Appropriate safety awareness Safety/Judgement: Awareness of environment, Good awareness of safety precautions Functional Mobility Training: 
Bed Mobility: 
Rolling: Independent Supine to Sit: Independent Scooting: Independent Transfers: 
Sit to Stand: Stand-by assistance Stand to Sit: Stand-by assistance Bed to Chair: Stand-by assistance Balance: 
Sitting: Intact Standing: Impaired Standing - Static: Good Standing - Dynamic : FairAmbulation/Gait Training: 
Distance (ft): 100 Feet (ft) Assistive Device: Gait belt;Walker, rolling Ambulation - Level of Assistance: Contact guard assistance Gait Abnormalities: Decreased step clearance Speed/Crystal: Pace decreased (<100 feet/min) Step Length: Left shortened;Right shortened Stairs: Therapeutic Exercises: Ankle pumps, LAQ, marching, hip abduction adduction x 8 reps bilaterally Pain: 
Pain Scale 1: Numeric (0 - 10) Pain Intensity 1: 0 Activity Tolerance:  
Fair, fatigued after ambulating Please refer to the flowsheet for vital signs taken during this treatment. After treatment:  
[x]    Patient left in no apparent distress sitting up in chair 
[]    Patient left in no apparent distress in bed 
[x]    Call bell left within reach [x]    Nursing notified 
[]    Caregiver present [x]    Chair alarm activated COMMUNICATION/COLLABORATION:  
 The patients plan of care was discussed with: Registered Nurse Phoenix Pelletier Time Calculation: 31 mins

## 2019-01-16 NOTE — PROGRESS NOTES
Physical Therapy Chart reviewed and nursing cleared to see, but patient had just started eating lunch. Will continue to follow. Magaly Weiner

## 2019-01-16 NOTE — PROGRESS NOTES
Bedside and Verbal shift change report given to [facility] (oncoming nurse) by Bill Calhoun RN (offgoing nurse). Report included the following information SBAR, Kardex, Intake/Output, MAR and Recent Results.

## 2019-01-16 NOTE — PROGRESS NOTES
Hg noted to be 10.3 yesterday and 8.7 today. Pt has been accepted into [facility] at [facility]. They are aware of no d/c today. I spoke with dtr Abdulaziz Watt on the phone and updated her regarding the above. We will aim for 11a d/c tomorrow if stable and all agree.

## 2019-01-16 NOTE — PROGRESS NOTES
Problem: Falls - Risk of 
Goal: *Absence of Falls Document Sissy Arechiga Fall Risk and appropriate interventions in the flowsheet. Outcome: Progressing Towards Goal 
Fall Risk Interventions: 
Mobility Interventions: Bed/chair exit alarm, Patient to call before getting OOB Medication Interventions: Patient to call before getting OOB Elimination Interventions: Call light in reach, Patient to call for help with toileting needs

## 2019-01-16 NOTE — PROGRESS NOTES
GI Progress Note Garima Bee) NAME:Armen Jerome WRR:[id number] XUO:[id number] ATTG: JUS Aguilar MD  PCP: Huey Ortiz MD 
Date/Time:  1/16/2019 1524 Assessment:  
· Hematochezia- c/w diverticular source. Single small volume dark stool today · Acute Blood Loss anemia- improved since transfusion. The Hgb 8.7 noted today is more consistent with transfusion of 2units PRBC at level 6.8. Plan: · Serial H/H q12hrs · Advance diet as you have done to ADA · No plan for Colonoscopy · Will continue PPI as PO QD Subjective:  
Discussed with RN events overnight. 1 small volume dark clot this PM. Complaint Y/N Description Abdominal Pain n Hematemesis n Hematochezia y Melena n   
Constipation n   
Diarrhea n Dyspepsia n Dysphagia n   
Jaundiced n   
Nausea/vomiting n Review of Systems: 
Symptom Y/N Comments  Symptom Y/N Comments Fever/Chills n   Chest Pain n   
Cough n   Headaches n Sputum n   Joint Pain n   
SOB/HAMILTON n   Pruritis/Rash n Tolerating Diet y ADA  Other Could NOT obtain due to:   
 
Objective: VITALS:  
Last 24hrs VS reviewed since prior progress note. Most recent are: 
Visit Vitals /62 (BP 1 Location: Right arm, BP Patient Position: At rest) Pulse 64 Temp 97.6 °F (36.4 °C) Resp 18 Ht 5' 8\" (1.727 m) Wt 72.6 kg (160 lb) SpO2 99% BMI 24.33 kg/m² Intake/Output Summary (Last 24 hours) at 1/16/2019 1524 Last data filed at 1/16/2019 3425 Gross per 24 hour Intake 120 ml Output  Net 120 ml PHYSICAL EXAM: 
General: WD, WN. Alert, cooperative, no acute distress   
HEENT: NC, Atraumatic. PERRL Anicteric sclerae. Lungs:  CTA Bilaterally. No Wheezing/Rhonchi/Rales. Heart:  Regular  rhythm,  No murmur/Rub/Gallops. +CW Pacer Abdomen: Soft, Non distended, Non tender.  +BS Extremities: No c/c/e Neurologic:  CN 2-12 gi, A/O X 3. No acute neurological distress Psych:   Good insight. Not anxious nor agitated. Lab and Radiology Data Reviewed: (see below) Medications Reviewed: (see below) PMH/SH reviewed - no change compared to H&P 
________________________________________________________________________ Total time spent with patient: 15 minutes ________________________________________________________________________ Care Plan discussed with: 
Patient y Family RN y  
           Consultant:    
 
Maria Ines Villalobos MD  
 
Procedures: see electronic medical records for all procedures/Xrays and details which were not copied into this note but were reviewed prior to creation of Plan. LABS: 
Recent Labs  
  01/16/19 
0346 01/15/19 
1748  01/13/19 2330 WBC  --   --   --  7.9 HGB 8.7* 10.3*   < > 8.1* HCT 26.4* 30.7*   < > 24.4*  
PLT  --   --   --  250  
 < > = values in this interval not displayed. Recent Labs  
  01/16/19 
0346 01/15/19 
0458 01/14/19 
0406  145 147* K 4.5 4.4 4.5  
* 116* 116* CO2 19* 20* 22 BUN 42* 45* 44* CREA 2.54* 2.62* 2.78* GLU 94 99 128* CA 8.3* 8.3* 7.9* Recent Labs  
  01/13/19 2330 SGOT 23 * TP 6.7 ALB 3.1*  
GLOB 3.6 Recent Labs  
  01/13/19 2330 INR 1.1 PTP 11.7* APTT 25.2 No results for input(s): FE, TIBC, PSAT, FERR in the last 72 hours. No results found for: FOL, RBCF No results for input(s): PH, PCO2, PO2 in the last 72 hours. Recent Labs  
  01/13/19 
2330 CPK 44 CKMB 1.5 Lab Results Component Value Date/Time  Color YELLOW/STRAW 07/03/2018 07:17 PM  
 Appearance CLEAR 07/03/2018 07:17 PM  
 Specific gravity 1.017 07/03/2018 07:17 PM  
 pH (UA) 6.0 07/03/2018 07:17 PM  
 Protein TRACE (A) 07/03/2018 07:17 PM  
 Glucose NEGATIVE  07/03/2018 07:17 PM  
 Ketone NEGATIVE  07/03/2018 07:17 PM  
 Bilirubin NEGATIVE  07/03/2018 07:17 PM  
 Urobilinogen 0.2 07/03/2018 07:17 PM  
 Nitrites NEGATIVE  07/03/2018 07:17 PM  
 Leukocyte Esterase NEGATIVE  07/03/2018 07:17 PM  
 Epithelial cells FEW 07/03/2018 07:17 PM  
 Bacteria NEGATIVE  07/03/2018 07:17 PM  
 WBC 0-4 07/03/2018 07:17 PM  
 RBC 0-5 07/03/2018 07:17 PM  
 
 
MEDICATIONS: 
Current Facility-Administered Medications Medication Dose Route Frequency  carvedilol (COREG) tablet 6.25 mg  6.25 mg Oral BID WITH MEALS  pantoprazole (PROTONIX) tablet 40 mg  40 mg Oral ACB  
 0.9% sodium chloride infusion 250 mL  250 mL IntraVENous PRN  
 dorzolamide (TRUSOPT) 2 % ophthalmic solution 1 Drop  1 Drop Both Eyes QHS  latanoprost (XALATAN) 0.005 % ophthalmic solution 1 Drop  1 Drop Both Eyes QHS  pravastatin (PRAVACHOL) tablet 20 mg  20 mg Oral QHS  sodium chloride (NS) flush 5-40 mL  5-40 mL IntraVENous Q8H  
 sodium chloride (NS) flush 5-40 mL  5-40 mL IntraVENous PRN  
 acetaminophen (TYLENOL) tablet 650 mg  650 mg Oral Q4H PRN  
 naloxone (NARCAN) injection 0.4 mg  0.4 mg IntraVENous PRN  
 ondansetron (ZOFRAN) injection 4 mg  4 mg IntraVENous Q4H PRN  
 insulin lispro (HUMALOG) injection   SubCUTAneous AC&HS  
 glucose chewable tablet 16 g  4 Tab Oral PRN  
 dextrose (D50W) injection syrg 12.5-25 g  12.5-25 g IntraVENous PRN  
 glucagon (GLUCAGEN) injection 1 mg  1 mg IntraMUSCular PRN  
 0.9% sodium chloride infusion 250 mL  250 mL IntraVENous PRN  
 sodium chloride (NS) flush 5-40 mL  5-40 mL IntraVENous PRN

## 2019-01-16 NOTE — PROGRESS NOTES
0700: Bedside shift change report given to JESENIA Briones (oncoming nurse) by [facility] nurse). Report included the following information SBAR, Kardex, Intake/Output, MAR, Accordion and Recent Results. *No BM overnight. Pt. Resting comfortably

## 2019-01-16 NOTE — PROGRESS NOTES
Problem: Self Care Deficits Care Plan (Adult) Goal: *Acute Goals and Plan of Care (Insert Text) Occupational Therapy Goals Initiated 1/15/2019 1. Patient will perform grooming standing at the sink with modified independence within 7 day(s). 2.  Patient will perform bathing with supervision/distant supervision within 7 day(s). 3.  Patient will perform lower body dressing with modified independence with AE as needed within 7 day(s). 4.  Patient will perform toilet transfers with modified independence within 7 day(s). 5.  Patient will perform all aspects of toileting with modified independence within 7 day(s). Occupational Therapy TREATMENT Patient: Barron Rhodes (25 y.o. male) Date: 1/16/2019 Diagnosis: Rectal bleeding Rectal bleeding Precautions:   
Chart, occupational therapy assessment, plan of care, and goals were reviewed. ASSESSMENT: 
Patient is making some progress in self-care. Still limited by activity tolerance. He was concerned by the way his BM was really dark. Notified nursing. Patient reports being fatigued and requested to get in the bed at the end of the session. Phone and call bell left in his reach. Progression toward goals: 
[]       Improving appropriately and progressing toward goals [x]       Improving slowly and progressing toward goals 
[]       Not making progress toward goals and plan of care will be adjusted PLAN: 
Patient continues to benefit from skilled intervention to address the above impairments. Continue treatment per established plan of care. Discharge Recommendations:  Health Care unit at [facility] Further Equipment Recommendations for Discharge:  None anticipated SUBJECTIVE:  
Patient stated Royer Martino you save that and show it to my nurse?  OBJECTIVE DATA SUMMARY:  
Cognitive/Behavioral Status: 
Neurologic State: Alert Orientation Level: Oriented X4 Cognition: Appropriate safety awareness Functional Mobility and Transfers for ADLs:Bed Mobility: 
Rolling: Independent Supine to Sit: Independent Scooting: Independent Transfers: 
Sit to Stand: Stand-by assistance Functional Transfers Bathroom Mobility: Stand-by assistance Toilet Transfer : Stand-by assistance Cues: Verbal cues provided;Visual cues provided Bed to Chair: Stand-by assistance Balance: 
Sitting: Intact Standing: Impaired Standing - Static: Good Standing - Dynamic : Fair ADL Intervention: 
  
 
Grooming Washing Hands: Stand-by assistance Brushing/Combing Hair: Stand-by assistance Toileting Bladder Hygiene: Supervision/set-up Bowel Hygiene: Stand-by assistance Clothing Management: Stand-by assistance Cues: Verbal cues provided Adaptive Equipment: Walker;Grab bars Pain: 
Pain Scale 1: Numeric (0 - 10) Pain Intensity 1: 0 Activity Tolerance:  
Fair After treatment:  
[] Patient left in no apparent distress sitting up in chair 
[x] Patient left in no apparent distress in bed 
[x] Call bell left within reach [x] Nursing notified 
[] Caregiver present [x] Bed alarm activated COMMUNICATION/COLLABORATION:  
The patients plan of care was discussed with: Physical Therapist and Registered Nurse ALEXA Friedman/L Time Calculation: 18 mins

## 2019-01-16 NOTE — PROGRESS NOTES
[address] [address] [address] [facility]  [phone] Cardiology Progress Note 1/16/2019 930AM 
 
Admit Date: 1/13/2019 Admit Diagnosis:  
Rectal bleeding Subjective: Erica Sutton is a 80 y.o. male with PMH  mitral regurg, CAD s/p CABG and stenting (06/18), HTN, HLD, anemia, CKD, SSS s/p pacer who was admitted for Rectal bleeding. 
  
 
Overnight events: 
-BP elevated 
-decrease in H/H 
-Mr. Jerome states  He's feeling well today. He denies chest pain, SOB, palpitations, further bleeding. He wants to know if he gets to leave today. Visit Vitals /80 (BP 1 Location: Right arm, BP Patient Position: At rest;Sitting) Pulse 80 Temp 98 °F (36.7 °C) Resp 17 Ht 5' 8\" (1.727 m) Wt 72.6 kg (160 lb) SpO2 99% BMI 24.33 kg/m² Current Facility-Administered Medications Medication Dose Route Frequency  pantoprazole (PROTONIX) tablet 40 mg  40 mg Oral ACB  
 0.9% sodium chloride infusion 250 mL  250 mL IntraVENous PRN  
 dorzolamide (TRUSOPT) 2 % ophthalmic solution 1 Drop  1 Drop Both Eyes QHS  carvedilol (COREG) tablet 3.125 mg  3.125 mg Oral BID WITH MEALS  latanoprost (XALATAN) 0.005 % ophthalmic solution 1 Drop  1 Drop Both Eyes QHS  pravastatin (PRAVACHOL) tablet 20 mg  20 mg Oral QHS  sodium chloride (NS) flush 5-40 mL  5-40 mL IntraVENous Q8H  
 sodium chloride (NS) flush 5-40 mL  5-40 mL IntraVENous PRN  
 acetaminophen (TYLENOL) tablet 650 mg  650 mg Oral Q4H PRN  
 naloxone (NARCAN) injection 0.4 mg  0.4 mg IntraVENous PRN  
 ondansetron (ZOFRAN) injection 4 mg  4 mg IntraVENous Q4H PRN  
 insulin lispro (HUMALOG) injection   SubCUTAneous AC&HS  
 glucose chewable tablet 16 g  4 Tab Oral PRN  
 dextrose (D50W) injection syrg 12.5-25 g  12.5-25 g IntraVENous PRN  
 glucagon (GLUCAGEN) injection 1 mg  1 mg IntraMUSCular PRN  
 0.9% sodium chloride infusion 250 mL  250 mL IntraVENous PRN  
  sodium chloride (NS) flush 5-40 mL  5-40 mL IntraVENous PRN Objective:  
  
Physical Exam: 
General: pleasant, elderly  male resting in bed in NAD; [place] Heart: RRR, 2-0/0 systolic murmur Lungs: clear Abdomen: Soft, +BS, NTND Extremities: LE noble +DP/PT, no edema Neurologic: Grossly normal 
Skin:  Warm and dry.  
 
Data Review:  
Recent Labs  
  01/16/19 
0346 01/15/19 
1748 01/15/19 
0458  01/13/19 
2330 WBC  --   --   --   --  7.9 HGB 8.7* 10.3* 9.2*   < > 8.1* HCT 26.4* 30.7* 27.9*   < > 24.4*  
PLT  --   --   --   --  250  
 < > = values in this interval not displayed. Recent Labs  
  01/16/19 
0346 01/15/19 
0458 01/14/19 
0406 01/13/19 
2330  145 147* 147* K 4.5 4.4 4.5 5.0  
* 116* 116* 114* CO2 19* 20* 22 23 GLU 94 99 128* 171* BUN 42* 45* 44* 39* CREA 2.54* 2.62* 2.78* 2.89* CA 8.3* 8.3* 7.9* 7.9* ALB  --   --   --  3.1* TBILI  --   --   --  0.5 SGOT  --   --   --  23 ALT  --   --   --  16 INR  --   --   --  1.1 Recent Labs  
  01/13/19 
2330 TROIQ <0.05 CPK 44 CKMB 1.5 Intake/Output Summary (Last 24 hours) at 1/16/2019 1003 Last data filed at 1/16/2019 9260 Gross per 24 hour Intake 120 ml Output 200 ml Net -80 ml Telemetry: V and AV pacing;  occ PVC 
ECG: AV paced CT abd/pelv: \"1. No acute findings. 2. Diverticulosis. 3. Left pleural effusion. 4. Nephrolithiasis. \" 
 
 
 
Assessment:  
 
Principal Problem: 
  Rectal bleeding (1/14/2019) Active Problems: 
  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  
 
Rectal bleeding on DAPT for recent cardiac stenting (06/18): No further bleeding today, but decrease in H/H. No plans for colonoscopy per GI. · Continue to hold Brilinta. Would like to restart ASA if okay with GI. Await their input. · Continue BB and statin for CAD history. HTN: elevated at times. · Increase BB 
 
 
CKD:  Creatinine steady Karla Robertson NP 
DNP, RN, [facility] 1/16/2019 Patient seen, examined by me personally. Plan discussed as detailed. Agree with note as outlined by  NP. I confirm findings in history and physical exam. No additional findings noted. Agree with plan as outlined above. Remains asymptomatic.  
 
Mindy Larkin MD

## 2019-01-16 NOTE — PROGRESS NOTES
Problem: Falls - Risk of 
Goal: *Absence of Falls Document Nati Hernandezs Fall Risk and appropriate interventions in the flowsheet. Outcome: Progressing Towards Goal 
Fall Risk Interventions: 
Mobility Interventions: Bed/chair exit alarm, Patient to call before getting OOB Medication Interventions: Patient to call before getting OOB Elimination Interventions: Call light in reach, Patient to call for help with toileting needs

## 2019-01-16 NOTE — PROGRESS NOTES
Bedside and Verbal shift change report given to He Adams (oncoming nurse) by Jayesh Meyers (offgoing nurse). Report included the following information SBAR, Kardex, Intake/Output and MAR.

## 2019-01-16 NOTE — PROGRESS NOTES
PROGRESS NOTE 
 
NAME:  Reza Clements  
:   1921 MRN:   [id number] Date/Time:  2019 7:29AM 
Subjective:  
History: no stools, bloody or otherwise, no CP/SOB/PND/orthpnea/pedal edema, tolerating clears Medications reviewed: 
Current Facility-Administered Medications Medication Dose Route Frequency  pantoprazole (PROTONIX) tablet 40 mg  40 mg Oral ACB  
 0.9% sodium chloride infusion 250 mL  250 mL IntraVENous PRN  
 dorzolamide (TRUSOPT) 2 % ophthalmic solution 1 Drop  1 Drop Both Eyes QHS  carvedilol (COREG) tablet 3.125 mg  3.125 mg Oral BID WITH MEALS  latanoprost (XALATAN) 0.005 % ophthalmic solution 1 Drop  1 Drop Both Eyes QHS  pravastatin (PRAVACHOL) tablet 20 mg  20 mg Oral QHS  sodium chloride (NS) flush 5-40 mL  5-40 mL IntraVENous Q8H  
 sodium chloride (NS) flush 5-40 mL  5-40 mL IntraVENous PRN  
 acetaminophen (TYLENOL) tablet 650 mg  650 mg Oral Q4H PRN  
 naloxone (NARCAN) injection 0.4 mg  0.4 mg IntraVENous PRN  
 ondansetron (ZOFRAN) injection 4 mg  4 mg IntraVENous Q4H PRN  
 insulin lispro (HUMALOG) injection   SubCUTAneous AC&HS  
 glucose chewable tablet 16 g  4 Tab Oral PRN  
 dextrose (D50W) injection syrg 12.5-25 g  12.5-25 g IntraVENous PRN  
 glucagon (GLUCAGEN) injection 1 mg  1 mg IntraMUSCular PRN  
 0.9% sodium chloride infusion 250 mL  250 mL IntraVENous PRN  
 sodium chloride (NS) flush 5-40 mL  5-40 mL IntraVENous PRN Objective:  
Vitals: 
Visit Vitals /46 Pulse 72 Temp 97.4 °F (36.3 °C) Resp 16 Ht 5' 8\" (1.727 m) Wt 160 lb (72.6 kg) SpO2 97% BMI 24.33 kg/m² O2 Device: Room air Temp (24hrs), Av.8 °F (36.6 °C), Min:97.4 °F (36.3 °C), Max:98.2 °F (36.8 °C) Last 24hr Input/Output: 
 
Intake/Output Summary (Last 24 hours) at 2019 0309 Last data filed at 1/15/2019 1200 Gross per 24 hour Intake 600 ml Output 200 ml Net 400 ml PHYSICAL EXAM: 
 General:     Alert, cooperative, no distress, appears stated age. Head:    Normocephalic, without obvious abnormality, atraumatic. Eyes:    Conjunctivae/corneas clear. PERRLA Nose:   Nares normal. No drainage or sinus tenderness. Throat:     Lips, mucosa, and tongue normal.  No Thrush Neck:   Supple, symmetrical,  no adenopathy, thyroid: non tender 
   no carotid bruit and no JVD. Back:     Symmetric,  No CVA tenderness. Lungs:    Clear to auscultation bilaterally. No Wheezing or Rhonchi. No rales. Heart:    Regular rate and rhythm,  no murmur, rub or gallop. Abdomen:    Soft, non-tender. Not distended. Bowel sounds normal. No masses Extremities:  Extremities normal, atraumatic, No cyanosis. trace edema R>L. No clubbing Lymph nodes:  Cervical, supraclavicular normal. 
Neurologic:  Normal strength, Alert and oriented X 3. Skin:                No rash Lab Data Reviewed: 
 
Recent Results (from the past 24 hour(s)) GLUCOSE, POC Collection Time: 01/15/19 11:56 AM  
Result Value Ref Range Glucose (POC) 140 (H) 65 - 100 mg/dL Performed by Gianna Zheng (PCT) GLUCOSE, POC Collection Time: 01/15/19  4:51 PM  
Result Value Ref Range Glucose (POC) 119 (H) 65 - 100 mg/dL Performed by Miguelina Flores HGB & HCT Collection Time: 01/15/19  5:48 PM  
Result Value Ref Range HGB 10.3 (L) 12.1 - 17.0 g/dL HCT 30.7 (L) 36.6 - 50.3 % GLUCOSE, POC Collection Time: 01/15/19  9:37 PM  
Result Value Ref Range Glucose (POC) 132 (H) 65 - 100 mg/dL Performed by Zuhair Tomlin (PCT) HGB & HCT Collection Time: 01/16/19  3:46 AM  
Result Value Ref Range HGB 8.7 (L) 12.1 - 17.0 g/dL HCT 26.4 (L) 36.6 - 50.3 % METABOLIC PANEL, BASIC Collection Time: 01/16/19  3:46 AM  
Result Value Ref Range Sodium 142 136 - 145 mmol/L Potassium 4.5 3.5 - 5.1 mmol/L Chloride 115 (H) 97 - 108 mmol/L  
 CO2 19 (L) 21 - 32 mmol/L  Anion gap 8 5 - 15 mmol/L  
 Glucose 94 65 - 100 mg/dL BUN 42 (H) 6 - 20 MG/DL Creatinine 2.54 (H) 0.70 - 1.30 MG/DL  
 BUN/Creatinine ratio 17 12 - 20 GFR est AA 29 (L) >60 ml/min/1.73m2 GFR est non-AA 24 (L) >60 ml/min/1.73m2 Calcium 8.3 (L) 8.5 - 10.1 MG/DL Assessment/Plan:  
 
Principal Problem: 
  Rectal bleeding (1/14/2019) Active Problems: 
  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 
 
  
___________________________________________________ PLAN: 
 
1.Hgb 8.7 this am 
2. Antiplatelet agents held, ? resume ASA 3. Follow up H/H in am 
4.  GI consult appreciated, CT abdomen results noted diverticular disease 5. Cardiology consult appreciated, valvular heart disease and coronary disease 6. Discussed DNR status with patient. He would like to be made DNR recognizing his age and advanced coronary disease. 7. Advance diet 
 
 
 
___________________________________________________ Attending Physician: Franki Giles MD

## 2019-01-17 LAB
ANION GAP SERPL CALC-SCNC: 9 MMOL/L (ref 5–15)
BUN SERPL-MCNC: 39 MG/DL (ref 6–20)
BUN/CREAT SERPL: 16 (ref 12–20)
CALCIUM SERPL-MCNC: 8.1 MG/DL (ref 8.5–10.1)
CHLORIDE SERPL-SCNC: 117 MMOL/L (ref 97–108)
CO2 SERPL-SCNC: 18 MMOL/L (ref 21–32)
CREAT SERPL-MCNC: 2.45 MG/DL (ref 0.7–1.3)
GLUCOSE BLD STRIP.AUTO-MCNC: 103 MG/DL (ref 65–100)
GLUCOSE BLD STRIP.AUTO-MCNC: 130 MG/DL (ref 65–100)
GLUCOSE BLD STRIP.AUTO-MCNC: 143 MG/DL (ref 65–100)
GLUCOSE BLD STRIP.AUTO-MCNC: 147 MG/DL (ref 65–100)
GLUCOSE SERPL-MCNC: 98 MG/DL (ref 65–100)
HCT VFR BLD AUTO: 26.2 % (ref 36.6–50.3)
HGB BLD-MCNC: 8.5 G/DL (ref 12.1–17)
POTASSIUM SERPL-SCNC: 4.2 MMOL/L (ref 3.5–5.1)
SERVICE CMNT-IMP: ABNORMAL
SODIUM SERPL-SCNC: 144 MMOL/L (ref 136–145)

## 2019-01-17 PROCEDURE — 74011000250 HC RX REV CODE- 250: Performed by: INTERNAL MEDICINE

## 2019-01-17 PROCEDURE — 74011250637 HC RX REV CODE- 250/637: Performed by: NURSE PRACTITIONER

## 2019-01-17 PROCEDURE — 80048 BASIC METABOLIC PNL TOTAL CA: CPT

## 2019-01-17 PROCEDURE — 82962 GLUCOSE BLOOD TEST: CPT

## 2019-01-17 PROCEDURE — 85018 HEMOGLOBIN: CPT

## 2019-01-17 PROCEDURE — 97116 GAIT TRAINING THERAPY: CPT

## 2019-01-17 PROCEDURE — 36415 COLL VENOUS BLD VENIPUNCTURE: CPT

## 2019-01-17 PROCEDURE — 65660000000 HC RM CCU STEPDOWN

## 2019-01-17 PROCEDURE — 97110 THERAPEUTIC EXERCISES: CPT

## 2019-01-17 PROCEDURE — 74011250637 HC RX REV CODE- 250/637: Performed by: INTERNAL MEDICINE

## 2019-01-17 RX ORDER — DORZOLAMIDE HCL 20 MG/ML
1 SOLUTION/ DROPS OPHTHALMIC 2 TIMES DAILY
Status: DISCONTINUED | OUTPATIENT
Start: 2019-01-17 | End: 2019-01-18 | Stop reason: HOSPADM

## 2019-01-17 RX ADMIN — TICAGRELOR 90 MG: 90 TABLET ORAL at 13:45

## 2019-01-17 RX ADMIN — DORZOLAMIDE HYDROCHLORIDE 1 DROP: 20 SOLUTION/ DROPS OPHTHALMIC at 17:00

## 2019-01-17 RX ADMIN — Medication 10 ML: at 05:14

## 2019-01-17 RX ADMIN — CARVEDILOL 6.25 MG: 6.25 TABLET, FILM COATED ORAL at 09:45

## 2019-01-17 RX ADMIN — TICAGRELOR 90 MG: 90 TABLET ORAL at 23:32

## 2019-01-17 RX ADMIN — Medication 10 ML: at 13:45

## 2019-01-17 RX ADMIN — Medication 5 ML: at 21:12

## 2019-01-17 RX ADMIN — CARVEDILOL 6.25 MG: 6.25 TABLET, FILM COATED ORAL at 16:57

## 2019-01-17 RX ADMIN — PANTOPRAZOLE SODIUM 40 MG: 40 TABLET, DELAYED RELEASE ORAL at 09:45

## 2019-01-17 RX ADMIN — LATANOPROST 1 DROP: 50 SOLUTION OPHTHALMIC at 21:13

## 2019-01-17 NOTE — PROGRESS NOTES
PROGRESS NOTE 
 
NAME:  Jared Ny  
:   1921 MRN:   [id number] Date/Time:  2019 7:33AM 
Subjective:  
History: nurse reports one small maroon stool last pm, pt. Tolerating po Medications reviewed: 
Current Facility-Administered Medications Medication Dose Route Frequency  carvedilol (COREG) tablet 6.25 mg  6.25 mg Oral BID WITH MEALS  pantoprazole (PROTONIX) tablet 40 mg  40 mg Oral ACB  
 0.9% sodium chloride infusion 250 mL  250 mL IntraVENous PRN  
 dorzolamide (TRUSOPT) 2 % ophthalmic solution 1 Drop  1 Drop Both Eyes QHS  latanoprost (XALATAN) 0.005 % ophthalmic solution 1 Drop  1 Drop Both Eyes QHS  pravastatin (PRAVACHOL) tablet 20 mg  20 mg Oral QHS  sodium chloride (NS) flush 5-40 mL  5-40 mL IntraVENous Q8H  
 sodium chloride (NS) flush 5-40 mL  5-40 mL IntraVENous PRN  
 acetaminophen (TYLENOL) tablet 650 mg  650 mg Oral Q4H PRN  
 naloxone (NARCAN) injection 0.4 mg  0.4 mg IntraVENous PRN  
 ondansetron (ZOFRAN) injection 4 mg  4 mg IntraVENous Q4H PRN  
 insulin lispro (HUMALOG) injection   SubCUTAneous AC&HS  
 glucose chewable tablet 16 g  4 Tab Oral PRN  
 dextrose (D50W) injection syrg 12.5-25 g  12.5-25 g IntraVENous PRN  
 glucagon (GLUCAGEN) injection 1 mg  1 mg IntraMUSCular PRN  
 0.9% sodium chloride infusion 250 mL  250 mL IntraVENous PRN  
 sodium chloride (NS) flush 5-40 mL  5-40 mL IntraVENous PRN Objective:  
Vitals: 
Visit Vitals /72 (BP 1 Location: Right arm, BP Patient Position: At rest) Pulse 72 Temp 97.7 °F (36.5 °C) Resp 16 Ht 5' 8\" (1.727 m) Wt 160 lb (72.6 kg) SpO2 97% BMI 24.33 kg/m² O2 Device: Room air Temp (24hrs), Av.7 °F (36.5 °C), Min:97.3 °F (36.3 °C), Max:98 °F (36.7 °C) Last 24hr Input/Output: 
 
Intake/Output Summary (Last 24 hours) at 2019 2976 Last data filed at 2019 3755 Gross per 24 hour Intake 120 ml Output  Net 120 ml PHYSICAL EXAM: 
 General:     Alert, cooperative, no distress, appears stated age. Head:    Normocephalic, without obvious abnormality, atraumatic. Eyes:    Conjunctivae/corneas clear. PERRLA Nose:   Nares normal. No drainage or sinus tenderness. Throat:     Lips, mucosa, and tongue normal.  No Thrush Neck:   Supple, symmetrical,  no adenopathy, thyroid: non tender 
   no carotid bruit and no JVD. Back:     Symmetric,  No CVA tenderness. Lungs:    Clear to auscultation bilaterally. No Wheezing or Rhonchi. No rales. Heart:    Regular rate and rhythm,  no murmur, rub or gallop. Abdomen:    Soft, non-tender. Not distended. Bowel sounds normal. No masses Extremities:  Extremities normal, atraumatic, No cyanosis. No pedal edema. No clubbing Lymph nodes:  Cervical, supraclavicular normal. 
Neurologic:  Normal strength, Alert and oriented X 3. Skin:                No rash Lab Data Reviewed: 
 
Recent Results (from the past 24 hour(s)) GLUCOSE, POC Collection Time: 01/16/19  7:47 AM  
Result Value Ref Range Glucose (POC) 105 (H) 65 - 100 mg/dL Performed by Renetta Baez GLUCOSE, POC Collection Time: 01/16/19 11:17 AM  
Result Value Ref Range Glucose (POC) 236 (H) 65 - 100 mg/dL Performed by Dereck Hurtado (PCT) GLUCOSE, POC Collection Time: 01/16/19 11:18 AM  
Result Value Ref Range Glucose (POC) 243 (H) 65 - 100 mg/dL Performed by Dereck Hurtado (PCT) GLUCOSE, POC Collection Time: 01/16/19  4:56 PM  
Result Value Ref Range Glucose (POC) 97 65 - 100 mg/dL Performed by Enriqueta Taylor GLUCOSE, POC Collection Time: 01/16/19  8:58 PM  
Result Value Ref Range Glucose (POC) 115 (H) 65 - 100 mg/dL Performed by [employer] METABOLIC PANEL, BASIC Collection Time: 01/17/19  2:55 AM  
Result Value Ref Range Sodium 144 136 - 145 mmol/L Potassium 4.2 3.5 - 5.1 mmol/L  Chloride 117 (H) 97 - 108 mmol/L  
 CO2 18 (L) 21 - 32 mmol/L  
 Anion gap 9 5 - 15 mmol/L Glucose 98 65 - 100 mg/dL BUN 39 (H) 6 - 20 MG/DL Creatinine 2.45 (H) 0.70 - 1.30 MG/DL  
 BUN/Creatinine ratio 16 12 - 20 GFR est AA 30 (L) >60 ml/min/1.73m2 GFR est non-AA 25 (L) >60 ml/min/1.73m2 Calcium 8.1 (L) 8.5 - 10.1 MG/DL  
HGB & HCT Collection Time: 01/17/19  2:55 AM  
Result Value Ref Range HGB 8.5 (L) 12.1 - 17.0 g/dL HCT 26.2 (L) 36.6 - 50.3 % GLUCOSE, POC Collection Time: 01/17/19  7:19 AM  
Result Value Ref Range Glucose (POC) 103 (H) 65 - 100 mg/dL Performed by Xavier Jay Assessment/Plan:  
 
Principal Problem: 
  Rectal bleeding (1/14/2019) Active Problems: 
  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 
 
  
___________________________________________________ PLAN: 
 
1.Hgb 8.5 this am 
2. Antiplatelet agents held, ? resume ASA 3. Follow up H/H in am 
4.  GI consult appreciated, CT abdomen results noted diverticular disease 5. Cardiology consult appreciated, valvular heart disease and coronary disease 6. Discussed DNR status with patient. He would like to be made DNR recognizing his age and advanced coronary disease. 7. Advance diet 8. dispo planning, probably to health care unit at [facility] (1/18) 
 
 
 
___________________________________________________ Attending Physician: Joaquin Colon MD

## 2019-01-17 NOTE — PROGRESS NOTES
GI Progress Note Elaine Mosqueda) NAME:Armen Jerome IBI:[id number] NYJ:[id number] ATTG: JUS Aguilar MD  PCP: Talbot Landau, MD 
Date/Time:  1/17/2019 1227 Assessment:  
· Hematochezia- c/w diverticular source. Single small volume dark stool yesterday · Acute Blood Loss anemia- improved since transfusion. The Hgb 8.5 noted stable and is more consistent with transfusion of 2units PRBC at level 6.8. Plan: · Serial H/H qAM 
· Continue ADA · OK to resume anticoagulation- would chose more critical agent Brilinta or ASA, and resume now and then resume second agent in a few days · No plan for Colonoscopy · Will continue PPI as PO QD Subjective:  
Discussed with RN events overnight. 1 small volume dark clot yesterday PM and small smear of dark stool noted today. Complaint Y/N Description Abdominal Pain n Hematemesis n Hematochezia y minimal  
Melena n   
Constipation n   
Diarrhea n Dyspepsia n Dysphagia n   
Jaundiced n   
Nausea/vomiting n Review of Systems: 
Symptom Y/N Comments  Symptom Y/N Comments Fever/Chills n   Chest Pain n   
Cough n   Headaches n Sputum n   Joint Pain n   
SOB/HAMILTON n   Pruritis/Rash n Tolerating Diet y ADA  Other Could NOT obtain due to:   
 
Objective: VITALS:  
Last 24hrs VS reviewed since prior progress note. Most recent are: 
Visit Vitals /86 Pulse 60 Temp 97.5 °F (36.4 °C) Resp 16 Ht 5' 8\" (1.727 m) Wt 72.6 kg (160 lb) SpO2 99% BMI 24.33 kg/m² Intake/Output Summary (Last 24 hours) at 1/17/2019 1227 Last data filed at 1/17/2019 1012 Gross per 24 hour Intake 120 ml Output  Net 120 ml PHYSICAL EXAM: 
General: WD, WN. Alert, cooperative, no acute distress   
HEENT: NC, Atraumatic. PERRL Anicteric sclerae. Lungs:  CTA Bilaterally. No Wheezing/Rhonchi/Rales. Heart:  Regular  rhythm,  No murmur/Rub/Gallops. +CW Pacer Abdomen: Soft, Non distended, Non tender.  +BS Extremities: No c/c/e 
 Neurologic:  CN 2-12 gi, A/O X 3. No acute neurological distress Psych:   Good insight. Not anxious nor agitated. Lab and Radiology Data Reviewed: (see below) Medications Reviewed: (see below) PMH/SH reviewed - no change compared to H&P 
________________________________________________________________________ Total time spent with patient: 15 minutes ________________________________________________________________________ Care Plan discussed with: 
Patient y Family RN y  
           Consultant:    
 
Aracely Oneill MD  
 
Procedures: see electronic medical records for all procedures/Xrays and details which were not copied into this note but were reviewed prior to creation of Plan. LABS: 
Recent Labs  
  01/17/19 0255 01/16/19 
0346 HGB 8.5* 8.7* HCT 26.2* 26.4* Recent Labs  
  01/17/19 0255 01/16/19 0346 01/15/19 
0458  142 145  
K 4.2 4.5 4.4  
* 115* 116* CO2 18* 19* 20* BUN 39* 42* 45* CREA 2.45* 2.54* 2.62* GLU 98 94 99  
CA 8.1* 8.3* 8.3* No results for input(s): SGOT, GPT, AP, TBIL, TP, ALB, GLOB, GGT, AML, LPSE in the last 72 hours. No lab exists for component: AMYP, HLPSE No results for input(s): INR, PTP, APTT in the last 72 hours. No lab exists for component: INREXT, INREXT No results for input(s): FE, TIBC, PSAT, FERR in the last 72 hours. No results found for: FOL, RBCF No results for input(s): PH, PCO2, PO2 in the last 72 hours. No results for input(s): CPK, CKMB in the last 72 hours. No lab exists for component: TROPONINI Lab Results Component Value Date/Time  Color YELLOW/STRAW 07/03/2018 07:17 PM  
 Appearance CLEAR 07/03/2018 07:17 PM  
 Specific gravity 1.017 07/03/2018 07:17 PM  
 pH (UA) 6.0 07/03/2018 07:17 PM  
 Protein TRACE (A) 07/03/2018 07:17 PM  
 Glucose NEGATIVE  07/03/2018 07:17 PM  
 Ketone NEGATIVE  07/03/2018 07:17 PM  
 Bilirubin NEGATIVE  07/03/2018 07:17 PM  
 Urobilinogen 0.2 07/03/2018 07:17 PM  
 Nitrites NEGATIVE  07/03/2018 07:17 PM  
 Leukocyte Esterase NEGATIVE  07/03/2018 07:17 PM  
 Epithelial cells FEW 07/03/2018 07:17 PM  
 Bacteria NEGATIVE  07/03/2018 07:17 PM  
 WBC 0-4 07/03/2018 07:17 PM  
 RBC 0-5 07/03/2018 07:17 PM  
 
 
MEDICATIONS: 
Current Facility-Administered Medications Medication Dose Route Frequency  dorzolamide (TRUSOPT) 2 % ophthalmic solution 1 Drop  1 Drop Both Eyes BID  ticagrelor (BRILINTA) tablet 90 mg  90 mg Oral Q12H  carvedilol (COREG) tablet 6.25 mg  6.25 mg Oral BID WITH MEALS  pantoprazole (PROTONIX) tablet 40 mg  40 mg Oral ACB  
 0.9% sodium chloride infusion 250 mL  250 mL IntraVENous PRN  
 latanoprost (XALATAN) 0.005 % ophthalmic solution 1 Drop  1 Drop Both Eyes QHS  pravastatin (PRAVACHOL) tablet 20 mg  20 mg Oral QHS  sodium chloride (NS) flush 5-40 mL  5-40 mL IntraVENous Q8H  
 sodium chloride (NS) flush 5-40 mL  5-40 mL IntraVENous PRN  
 acetaminophen (TYLENOL) tablet 650 mg  650 mg Oral Q4H PRN  
 naloxone (NARCAN) injection 0.4 mg  0.4 mg IntraVENous PRN  
 ondansetron (ZOFRAN) injection 4 mg  4 mg IntraVENous Q4H PRN  
 insulin lispro (HUMALOG) injection   SubCUTAneous AC&HS  
 glucose chewable tablet 16 g  4 Tab Oral PRN  
 dextrose (D50W) injection syrg 12.5-25 g  12.5-25 g IntraVENous PRN  
 glucagon (GLUCAGEN) injection 1 mg  1 mg IntraMUSCular PRN  
 0.9% sodium chloride infusion 250 mL  250 mL IntraVENous PRN  
 sodium chloride (NS) flush 5-40 mL  5-40 mL IntraVENous PRN

## 2019-01-17 NOTE — PROGRESS NOTES
I spoke with dtr Nagi Baker who has spoken to Dr Francoise Bleu this am.  All in agreement with d/c tomorrow to [facility] ~11a-dtr to transport. I left a VM for Jimenez, admissions at [facility].

## 2019-01-17 NOTE — PROGRESS NOTES
Problem: Falls - Risk of 
Goal: *Absence of Falls Document Orvel Buffy Fall Risk and appropriate interventions in the flowsheet. Outcome: Progressing Towards Goal 
Fall Risk Interventions: 
Mobility Interventions: Patient to call before getting OOB Medication Interventions: Patient to call before getting OOB, Teach patient to arise slowly Elimination Interventions: Call light in reach, Patient to call for help with toileting needs History of Falls Interventions: Bed/chair exit alarm, Room close to nurse's station

## 2019-01-17 NOTE — PROGRESS NOTES
Bedside and Verbal shift change report given to Fabio Steele (oncoming nurse) by Ezra Jamil (offgoing nurse). Report included the following information SBAR, Kardex, Intake/Output, MAR and Recent Results.

## 2019-01-17 NOTE — PROGRESS NOTES
Bedside shift change report given to JESENIA Briones (oncoming nurse) by Mariana Orozco (offgoing nurse). Report included the following information SBAR, Kardex, Procedure Summary, Intake/Output, MAR and Recent Results.

## 2019-01-17 NOTE — PROGRESS NOTES
[address] [place] [address], [facility]  [phone] Cardiology Progress Note 1/17/2019 11AM 
 
Admit Date: 1/13/2019 Admit Diagnosis:  
Rectal bleeding Subjective: Arvind Latham is a 80 y.o. male with PMH  mitral regurg, CAD s/p CABG and stenting (06/18), HTN, HLD, anemia, CKD, SSS s/p pacer who was admitted for Rectal bleeding. 
  
 
Overnight events: 
-VSS 
-H/H steady 
-Mr. Jerome states  He's feeling well today. He denies chest pain, SOB, palpitations. He states the bowel movement he had yesterday was not actually a bowel movement. Visit Vitals /86 Pulse 60 Temp 97.5 °F (36.4 °C) Resp 16 Ht 5' 8\" (1.727 m) Wt 72.6 kg (160 lb) SpO2 99% BMI 24.33 kg/m² Current Facility-Administered Medications Medication Dose Route Frequency  dorzolamide (TRUSOPT) 2 % ophthalmic solution 1 Drop  1 Drop Both Eyes BID  ticagrelor (BRILINTA) tablet 90 mg  90 mg Oral Q12H  carvedilol (COREG) tablet 6.25 mg  6.25 mg Oral BID WITH MEALS  pantoprazole (PROTONIX) tablet 40 mg  40 mg Oral ACB  
 0.9% sodium chloride infusion 250 mL  250 mL IntraVENous PRN  
 latanoprost (XALATAN) 0.005 % ophthalmic solution 1 Drop  1 Drop Both Eyes QHS  pravastatin (PRAVACHOL) tablet 20 mg  20 mg Oral QHS  sodium chloride (NS) flush 5-40 mL  5-40 mL IntraVENous Q8H  
 sodium chloride (NS) flush 5-40 mL  5-40 mL IntraVENous PRN  
 acetaminophen (TYLENOL) tablet 650 mg  650 mg Oral Q4H PRN  
 naloxone (NARCAN) injection 0.4 mg  0.4 mg IntraVENous PRN  
 ondansetron (ZOFRAN) injection 4 mg  4 mg IntraVENous Q4H PRN  
 insulin lispro (HUMALOG) injection   SubCUTAneous AC&HS  
 glucose chewable tablet 16 g  4 Tab Oral PRN  
 dextrose (D50W) injection syrg 12.5-25 g  12.5-25 g IntraVENous PRN  
 glucagon (GLUCAGEN) injection 1 mg  1 mg IntraMUSCular PRN  
 0.9% sodium chloride infusion 250 mL  250 mL IntraVENous PRN  
  sodium chloride (NS) flush 5-40 mL  5-40 mL IntraVENous PRN Objective:  
  
Physical Exam: 
General: pleasant, elderly  male resting in bed in NAD; [place] Heart: RRR, no systolic murmur Lungs: clear Abdomen: Soft, +BS, NTND Extremities: LE noble +DP/PT, no edema Neurologic: Grossly normal 
Skin:  Warm and dry.  
 
Data Review:  
Recent Labs  
  01/17/19 
0255 01/16/19 
0346 01/15/19 
1748 HGB 8.5* 8.7* 10.3* HCT 26.2* 26.4* 30.7* Recent Labs  
  01/17/19 0255 01/16/19 
0346 01/15/19 
0458  142 145  
K 4.2 4.5 4.4  
* 115* 116* CO2 18* 19* 20* GLU 98 94 99 BUN 39* 42* 45* CREA 2.45* 2.54* 2.62* CA 8.1* 8.3* 8.3* No results for input(s): TROIQ, CPK, CKMB in the last 72 hours. Intake/Output Summary (Last 24 hours) at 1/17/2019 1139 Last data filed at 1/17/2019 1012 Gross per 24 hour Intake 120 ml Output  Net 120 ml Telemetry: V and AV pacing;  occ PVC 
ECG: AV paced CT abd/pelv: \"1. No acute findings. 2. Diverticulosis. 3. Left pleural effusion. 4. Nephrolithiasis. \" 
 
 
 
Assessment:  
 
Principal Problem: 
  Rectal bleeding (1/14/2019) Active Problems: 
  Postsurgical aortocoronary bypass status (4/16/2012) Mixed hyperlipidemia (4/16/2012) Coronary atherosclerosis of native coronary artery (4/16/2012) Cardiac pacemaker in situ (4/16/2012) Chronic kidney disease, stage IV (severe) ([facility] [address] (12/6/2017) ASCVD (arteriosclerotic cardiovascular disease) (12/6/2017) Type 2 diabetes mellitus with nephropathy ([facility] [address] (1/23/2018) Anemia, blood loss (7/3/2018) S/P PTCA (percutaneous transluminal coronary angioplasty) (1/14/2019) Plan:  
 
Rectal bleeding on DAPT for recent cardiac stenting (06/18): H/H steady. No plans for colonoscopy per GI. · Restart Brilinta today. Discussed with Dr. Yosi Alonzo. Plan to restart ASA in 2-3 days if no problems with the Brilinta. · Continue BB and statin for CAD history. HTN: elevated at times. · Continue increased BB 
 
 
CKD:  Creatinine steady Destiny Arteaga NP 
DNP, RN, [facility] Cardiology 1/17/2019 Patient seen, examined by me personally. Plan discussed as detailed. Agree with note as outlined by  NP. I confirm findings in history and physical exam. No additional findings noted. Agree with plan as outlined above.   
 
Aurelia Ferrell MD

## 2019-01-17 NOTE — PROGRESS NOTES
Bedside and Verbal shift change report given to [facility] (oncoming nurse) by Selena Monsalve RN (offgoing nurse). Report included the following information SBAR, Kardex, Intake/Output, MAR and Recent Results.

## 2019-01-17 NOTE — PROGRESS NOTES
Problem: Mobility Impaired (Adult and Pediatric) Goal: *Acute Goals and Plan of Care (Insert Text) Physical Therapy Goals Initiated 1/15/2019 1. Patient will move from supine to sit and sit to supine , scoot up and down and roll side to side in bed with independence within 7 day(s). 2.  Patient will transfer from bed to chair and chair to bed with modified independence using the least restrictive device within 7 day(s). 3.  Patient will perform sit to stand with modified independence within 7 day(s). 4.  Patient will ambulate with modified independence for 250 feet with the least restrictive device within 7 day(s). physical Therapy TREATMENT Patient: Anita Tinsley (33 y.o. male) Date: 1/17/2019 Diagnosis: Rectal bleeding Rectal bleeding Precautions:   
Chart, physical therapy assessment, plan of care and goals were reviewed. ASSESSMENT: 
Physical therapy visit completed on a 80year old male admitted with rectal bleeding. Patient received in bed and agreeable to participate. Patient performed bed mobility at supervision level and demonstrates good balance seated on edge of bed. Patient able to increase ambulation distance in hallway to 120 feet with contact guard using RW. Gait steady with wide VALERIE. Patient returned to chair and able to perform seated ther ex with good tolerance. Patient is making good progress and expected to discharge tomorrow to healthcare section of [address] [place] [address] for continued rehab. Progression toward goals: 
[x]    Improving appropriately and progressing toward goals 
[]    Improving slowly and progressing toward goals 
[]    Not making progress toward goals and plan of care will be adjusted PLAN: 
Patient continues to benefit from skilled intervention to address the above impairments. Continue treatment per established plan of care. Discharge Recommendations:  Rojas Gonzalez Further Equipment Recommendations for Discharge: SUBJECTIVE:  
Patient stated I am ready to get out of here.  OBJECTIVE DATA SUMMARY:  
Critical Behavior: 
Neurologic State: Alert Orientation Level: Oriented X4 Cognition: Appropriate for age attention/concentration Safety/Judgement: Awareness of environment, Good awareness of safety precautions Functional Mobility Training: 
Bed Mobility: 
Rolling: Supervision Supine to Sit: Supervision Scooting: Supervision Transfers: 
Sit to Stand: Stand-by assistance Stand to Sit: Stand-by assistance Bed to Chair: Stand-by assistance Balance: 
Sitting: Intact Standing: Impaired Standing - Static: Good Standing - Dynamic : FairAmbulation/Gait Training: 
Distance (ft): 120 Feet (ft) Assistive Device: Gait belt;Walker, rolling Ambulation - Level of Assistance: Contact guard assistance Gait Abnormalities: Decreased step clearance Speed/Crystal: Pace decreased (<100 feet/min) Step Length: Left shortened;Right shortened Stairs: Therapeutic Exercises: Ankle pumps, LAQ, marching, hip abduction/adduction x 10 bilaterally Pain: 
Pain Scale 1: Numeric (0 - 10) Pain Intensity 1: 0 Activity Tolerance:  
Fair, improving Please refer to the flowsheet for vital signs taken during this treatment. After treatment:  
[x]    Patient left in no apparent distress sitting up in chair 
[]    Patient left in no apparent distress in bed 
[x]    Call bell left within reach [x]    Nursing notified 
[]    Caregiver present [x]    Chair alarm activated COMMUNICATION/COLLABORATION:  
The patients plan of care was discussed with: Registered Nurse Marina Bolus Time Calculation: (P) 27 mins

## 2019-01-18 VITALS
DIASTOLIC BLOOD PRESSURE: 69 MMHG | HEIGHT: 68 IN | HEART RATE: 61 BPM | OXYGEN SATURATION: 98 % | RESPIRATION RATE: 16 BRPM | BODY MASS INDEX: 24.25 KG/M2 | SYSTOLIC BLOOD PRESSURE: 152 MMHG | TEMPERATURE: 97.6 F | WEIGHT: 160 LBS

## 2019-01-18 LAB
GLUCOSE BLD STRIP.AUTO-MCNC: 154 MG/DL (ref 65–100)
HCT VFR BLD AUTO: 27.9 % (ref 36.6–50.3)
HGB BLD-MCNC: 9.1 G/DL (ref 12.1–17)
SERVICE CMNT-IMP: ABNORMAL

## 2019-01-18 PROCEDURE — 36415 COLL VENOUS BLD VENIPUNCTURE: CPT

## 2019-01-18 PROCEDURE — 85018 HEMOGLOBIN: CPT

## 2019-01-18 PROCEDURE — 74011250637 HC RX REV CODE- 250/637: Performed by: INTERNAL MEDICINE

## 2019-01-18 PROCEDURE — 82962 GLUCOSE BLOOD TEST: CPT

## 2019-01-18 PROCEDURE — 74011250637 HC RX REV CODE- 250/637: Performed by: NURSE PRACTITIONER

## 2019-01-18 RX ORDER — BRINZOLAMIDE 10 MG/ML
1 SUSPENSION/ DROPS OPHTHALMIC 2 TIMES DAILY
Qty: 15 ML | Refills: 3 | Status: SHIPPED | OUTPATIENT
Start: 2019-01-18 | End: 2020-01-01 | Stop reason: SDUPTHER

## 2019-01-18 RX ORDER — OMEPRAZOLE 40 MG/1
40 CAPSULE, DELAYED RELEASE ORAL DAILY
Qty: 90 CAP | Status: SHIPPED | OUTPATIENT
Start: 2019-01-18 | End: 2019-09-25

## 2019-01-18 RX ORDER — CARVEDILOL 6.25 MG/1
TABLET ORAL
Qty: 180 TAB | Refills: 3 | Status: SHIPPED | OUTPATIENT
Start: 2019-01-18 | End: 2019-01-28 | Stop reason: DRUGHIGH

## 2019-01-18 RX ADMIN — CARVEDILOL 6.25 MG: 6.25 TABLET, FILM COATED ORAL at 09:38

## 2019-01-18 RX ADMIN — TICAGRELOR 90 MG: 90 TABLET ORAL at 13:18

## 2019-01-18 RX ADMIN — DORZOLAMIDE HYDROCHLORIDE 1 DROP: 20 SOLUTION/ DROPS OPHTHALMIC at 09:38

## 2019-01-18 RX ADMIN — Medication 5 ML: at 06:37

## 2019-01-18 RX ADMIN — PANTOPRAZOLE SODIUM 40 MG: 40 TABLET, DELAYED RELEASE ORAL at 09:38

## 2019-01-18 NOTE — PROGRESS NOTES
[address] [place] [address] [address] [facility]  [phone] Cardiology Progress Note 1/18/2019 1230PM 
 
Admit Date: 1/13/2019 Admit Diagnosis:  
Rectal bleeding Subjective: Lul Stringer is a 80 y.o. male with PMH  mitral regurg, CAD s/p CABG and stenting (06/18), HTN, HLD, anemia, CKD, SSS s/p pacer who was admitted for Rectal bleeding. 
  
 
Overnight events: 
-VSS 
-H/H steady 
-Mr. Jerome states  He's feeling well today. He denies chest pain, SOB, palpitations. He is pending discharge. Visit Vitals /69 (BP 1 Location: Right arm, BP Patient Position: At rest) Pulse 61 Temp 97.6 °F (36.4 °C) Resp 16 Ht 5' 8\" (1.727 m) Wt 72.6 kg (160 lb) SpO2 98% BMI 24.33 kg/m² Current Facility-Administered Medications Medication Dose Route Frequency  dorzolamide (TRUSOPT) 2 % ophthalmic solution 1 Drop  1 Drop Both Eyes BID  ticagrelor (BRILINTA) tablet 90 mg  90 mg Oral Q12H  carvedilol (COREG) tablet 6.25 mg  6.25 mg Oral BID WITH MEALS  pantoprazole (PROTONIX) tablet 40 mg  40 mg Oral ACB  
 0.9% sodium chloride infusion 250 mL  250 mL IntraVENous PRN  
 latanoprost (XALATAN) 0.005 % ophthalmic solution 1 Drop  1 Drop Both Eyes QHS  pravastatin (PRAVACHOL) tablet 20 mg  20 mg Oral QHS  sodium chloride (NS) flush 5-40 mL  5-40 mL IntraVENous Q8H  
 sodium chloride (NS) flush 5-40 mL  5-40 mL IntraVENous PRN  
 acetaminophen (TYLENOL) tablet 650 mg  650 mg Oral Q4H PRN  
 naloxone (NARCAN) injection 0.4 mg  0.4 mg IntraVENous PRN  
 ondansetron (ZOFRAN) injection 4 mg  4 mg IntraVENous Q4H PRN  
 insulin lispro (HUMALOG) injection   SubCUTAneous AC&HS  
 glucose chewable tablet 16 g  4 Tab Oral PRN  
 dextrose (D50W) injection syrg 12.5-25 g  12.5-25 g IntraVENous PRN  
 glucagon (GLUCAGEN) injection 1 mg  1 mg IntraMUSCular PRN  
 0.9% sodium chloride infusion 250 mL  250 mL IntraVENous PRN  
  sodium chloride (NS) flush 5-40 mL  5-40 mL IntraVENous PRN Objective:  
  
Physical Exam: 
General: pleasant, elderly  male sitting in chair in NAD; [facility] Heart: RRR, no murmur Lungs: clear Abdomen: Soft, +BS, NTND Extremities: LE noble +DP/PT, no edema Neurologic: Grossly normal 
Skin:  Warm and dry.  
 
Data Review:  
Recent Labs  
  01/18/19 0345 01/17/19 0255 01/16/19 0346 HGB 9.1* 8.5* 8.7* HCT 27.9* 26.2* 26.4* Recent Labs  
  01/17/19 0255 01/16/19 0346  142  
K 4.2 4.5  
* 115* CO2 18* 19* GLU 98 94 BUN 39* 42* CREA 2.45* 2.54* CA 8.1* 8.3* No results for input(s): TROIQ, CPK, CKMB in the last 72 hours. Intake/Output Summary (Last 24 hours) at 1/18/2019 1243 Last data filed at 1/17/2019 1348 Gross per 24 hour Intake 240 ml Output  Net 240 ml Telemetry: off tele ECG: AV paced CT abd/pelv: \"1. No acute findings. 2. Diverticulosis. 3. Left pleural effusion. 4. Nephrolithiasis. \" 
 
 
 
Assessment:  
 
Principal Problem: 
  Rectal bleeding (1/14/2019) Active Problems: 
  Postsurgical aortocoronary bypass status (4/16/2012) Mixed hyperlipidemia (4/16/2012) Coronary atherosclerosis of native coronary artery (4/16/2012) Cardiac pacemaker in situ (4/16/2012) Chronic kidney disease, stage IV (severe) ([facility] (12/6/2017) ASCVD (arteriosclerotic cardiovascular disease) (12/6/2017) Type 2 diabetes mellitus with nephropathy ([facility] [address] (1/23/2018) Anemia, blood loss (7/3/2018) S/P PTCA (percutaneous transluminal coronary angioplasty) (1/14/2019) Plan:  
 
Rectal bleeding on DAPT for recent cardiac stenting (06/18): H/H steady. No plans for colonoscopy per GI. · Brilinta restarted yesterday. Discussed with Dr. Kem Armstrong. Plan to restart ASA in 2-3 days if no problems with the Brilinta. Can wait until follow up. · Continue BB for CAD history. · I discontinued statin per patient request.  He previously had muscle pain while on statin. HTN: elevated at times. · Continue increased BB 
 
 
CKD:  Creatinine steady Discharge pending. Follow up at out office in 2-4 weeks. Yisel Pike NP DNP, RN, [facility] Cardiology 1/18/2019 Patient seen, examined by me personally. Plan discussed as detailed. Agree with note as outlined by  NP. I confirm findings in history and physical exam. No additional findings noted. Agree with plan as outlined above. Will sign off. Please call if questions.  
 
Isabel Oakley MD
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
## 2019-01-18 NOTE — PROGRESS NOTES
PROGRESS NOTE 
 
NAME:  Karey Baron  
:   1921 MRN:   [id number] Date/Time:  2019 7:43AM 
Subjective:  
History: denies complaint, no stools, pt. Has had some residual melena, no CP/SOB/HAMILTON/edema Medications reviewed: 
Current Facility-Administered Medications Medication Dose Route Frequency  dorzolamide (TRUSOPT) 2 % ophthalmic solution 1 Drop  1 Drop Both Eyes BID  ticagrelor (BRILINTA) tablet 90 mg  90 mg Oral Q12H  carvedilol (COREG) tablet 6.25 mg  6.25 mg Oral BID WITH MEALS  pantoprazole (PROTONIX) tablet 40 mg  40 mg Oral ACB  
 0.9% sodium chloride infusion 250 mL  250 mL IntraVENous PRN  
 latanoprost (XALATAN) 0.005 % ophthalmic solution 1 Drop  1 Drop Both Eyes QHS  pravastatin (PRAVACHOL) tablet 20 mg  20 mg Oral QHS  sodium chloride (NS) flush 5-40 mL  5-40 mL IntraVENous Q8H  
 sodium chloride (NS) flush 5-40 mL  5-40 mL IntraVENous PRN  
 acetaminophen (TYLENOL) tablet 650 mg  650 mg Oral Q4H PRN  
 naloxone (NARCAN) injection 0.4 mg  0.4 mg IntraVENous PRN  
 ondansetron (ZOFRAN) injection 4 mg  4 mg IntraVENous Q4H PRN  
 insulin lispro (HUMALOG) injection   SubCUTAneous AC&HS  
 glucose chewable tablet 16 g  4 Tab Oral PRN  
 dextrose (D50W) injection syrg 12.5-25 g  12.5-25 g IntraVENous PRN  
 glucagon (GLUCAGEN) injection 1 mg  1 mg IntraMUSCular PRN  
 0.9% sodium chloride infusion 250 mL  250 mL IntraVENous PRN  
 sodium chloride (NS) flush 5-40 mL  5-40 mL IntraVENous PRN Objective:  
Vitals: 
Visit Vitals /73 (BP 1 Location: Right arm, BP Patient Position: At rest) Pulse 60 Temp 97.6 °F (36.4 °C) Resp 18 Ht 5' 8\" (1.727 m) Wt 160 lb (72.6 kg) SpO2 98% BMI 24.33 kg/m² O2 Device: Room air Temp (24hrs), Av °F (36.7 °C), Min:97.5 °F (36.4 °C), Max:98.7 °F (37.1 °C) Last 24hr Input/Output: 
 
Intake/Output Summary (Last 24 hours) at 2019 8699 Last data filed at 2019 5966 Gross per 24 hour Intake 360 ml Output  Net 360 ml PHYSICAL EXAM: 
General:     Alert, cooperative, no distress, appears stated age. Head:    Normocephalic, without obvious abnormality, atraumatic. Eyes:    Conjunctivae/corneas clear. PERRLA Nose:   Nares normal. No drainage or sinus tenderness. Throat:     Lips, mucosa, and tongue normal.  No Thrush Neck:   Supple, symmetrical,  no adenopathy, thyroid: non tender 
   no carotid bruit and no JVD. Back:     Symmetric,  No CVA tenderness. Lungs:    Clear to auscultation bilaterally. No Wheezing or Rhonchi. No rales. Heart:    Regular rate and rhythm,  1/6 syst mitral murmur, no rub or gallop. Abdomen:    Soft, non-tender. Not distended. Bowel sounds normal. No masses Extremities:  Extremities normal, atraumatic, No cyanosis. No pedal edema. No clubbing Lymph nodes:  Cervical, supraclavicular normal. 
Neurologic:  Normal strength, Alert and oriented X 3. Skin:                No rash Lab Data Reviewed: 
 
Recent Results (from the past 24 hour(s)) GLUCOSE, POC Collection Time: 01/17/19 11:30 AM  
Result Value Ref Range Glucose (POC) 130 (H) 65 - 100 mg/dL Performed by Danielle Sykes GLUCOSE, POC Collection Time: 01/17/19  3:34 PM  
Result Value Ref Range Glucose (POC) 147 (H) 65 - 100 mg/dL Performed by Mahogany Mendoza ([facility]ly [facility]) GLUCOSE, POC Collection Time: 01/17/19  8:21 PM  
Result Value Ref Range Glucose (POC) 143 (H) 65 - 100 mg/dL Performed by [place] (PCT) HGB & HCT Collection Time: 01/18/19  3:45 AM  
Result Value Ref Range HGB 9.1 (L) 12.1 - 17.0 g/dL HCT 27.9 (L) 36.6 - 50.3 % Assessment/Plan:  
 
Principal Problem: 
  Rectal bleeding (1/14/2019) Active Problems: 
  Postsurgical aortocoronary bypass status (4/16/2012) Mixed hyperlipidemia (4/16/2012) Coronary atherosclerosis of native coronary artery (4/16/2012) Cardiac pacemaker in situ (4/16/2012) Chronic kidney disease, stage IV (severe) ([facility] (12/6/2017) ASCVD (arteriosclerotic cardiovascular disease) (12/6/2017) Type 2 diabetes mellitus with nephropathy ([facility] (1/23/2018) Anemia, blood loss (7/3/2018) S/P PTCA (percutaneous transluminal coronary angioplasty) (1/14/2019) 
 
  
___________________________________________________ PLAN: 
 
1.Hgb 9.1 this am 
2. Note Brilinta restarted and to resume ASA in 3 days if stable 3. Follow up H/H in am 
4.  GI consult appreciated, CT abdomen results noted diverticular disease 5. Cardiology consult appreciated, valvular heart disease and coronary disease 6. Discussed DNR status with patient. He would like to be made DNR recognizing his age and advanced coronary disease. 7. Advance diet 8. to health care unit at [facility] 
 
 
 
___________________________________________________ Attending Physician: Truong Ku MD

## 2019-01-18 NOTE — DISCHARGE SUMMARY
Physician Discharge Summary Patient ID: Silvia [facility] 
[id number] 
49 y.o. 
4/23/1921 Admit date: 1/13/2019 Discharge date and time:  1/18/19  7:57 AM 
 
Discharge Diagnoses: blood loss anemia, rectal bleeding, CAD, CKD IV, hyperlipidemia, hypercholesterolemia, AODM (diet controlled) Hospital Course: Mr. Raysa Gold was admitted with bright red blood per rectum. He had two large bloody stools prior to admission and presented with a hemoglobin of 7.1 which went down to 6.8 before being transfused 2 units PRBCs. He remained hemodynamically stable. He has had anemia in the setting of CKD stage 4 and is on aranesp per his nephrologist on a monthly basis. His furosemide was held as were his antiplatelet agents, Brilinta and aspirin. He was see by cardiology and GI. As his bleeding was self limiting and his hemoglobin remained stable, he did not undergo endoscopy. His hemoglobin on the morning of discharge was 9.1. His Brilinta was resumed on 1/17 and he can resume his aspirin in 3 days on the 1/21 if no further bleeding occurs. He remains off of furosemide at this time and his weights and fluid status will need to be monitored. He will be discharged to skilled nursing care at [facility]. PCP: Ruperto Redding MD 
Consults: Cardiology and GI Significant Diagnostic Studies: CT abdomen/pelvis [unfilled] [unfilled] Discharge Exam: 
Visit Vitals /73 (BP 1 Location: Right arm, BP Patient Position: At rest) Pulse 60 Temp 97.6 °F (36.4 °C) Resp 18 Ht 5' 8\" (1.727 m) Wt 160 lb (72.6 kg) SpO2 98% BMI 24.33 kg/m² General:  Alert, cooperative, no distress, appears stated age. Head:  Normocephalic, without obvious abnormality, atraumatic. Eyes:  Conjunctivae/corneas clear. PERRL, EOMs intact. Fundi benign Ears:  Normal TMs and external ear canals both ears. Nose: Nares normal. Septum midline. Mucosa normal. No drainage or sinus tenderness. Throat: Lips, mucosa, and tongue normal. Teeth and gums normal.  
Neck: Supple, symmetrical, trachea midline, no adenopathy, thyroid: no enlargement/tenderness/nodules, no carotid bruit and no JVD. Back:   Symmetric, no curvature. ROM normal. No CVA tenderness. Lungs:   Clear to auscultation bilaterally. Chest wall:  No tenderness or deformity. Heart:  Regular rate and rhythm, S1, S2 normal, no murmur, click, rub or gallop. Abdomen:   Soft, non-tender. Bowel sounds normal. No masses,  No organomegaly. Genitalia:    
Rectal:    
Extremities: Extremities normal, atraumatic, no cyanosis or edema. Pulses: 2+ and symmetric all extremities. Skin: Skin color, texture, turgor normal. No rashes or lesions Lymph nodes: Cervical, supraclavicular, and axillary nodes normal.  
Neurologic: CNII-XII intact. Normal strength, sensation and reflexes throughout. Disposition: [facility] Patient Instructions:  
Current Discharge Medication List  
  
CONTINUE these medications which have CHANGED Details  
brinzolamide (AZOPT) 1 % ophthalmic suspension Administer 1 Drop to both eyes two (2) times a day. Qty: 15 mL, Refills: 3  
  
carvedilol (COREG) 6.25 mg tablet TAKE 1 TABLET BY MOUTH TWICE A DAY WITH MEALS Qty: 180 Tab, Refills: 3  
  
omeprazole (PRILOSEC) 40 mg capsule Take 1 Cap by mouth daily. Qty: 90 Cap, Refills: prn CONTINUE these medications which have NOT CHANGED Details  
vit A/vit C/vit E/zinc/copper (ICAPS AREDS PO) Take  by mouth.  
  
pravastatin (PRAVACHOL) 20 mg tablet Take 1 Tab by mouth nightly. Qty: 90 Tab, Refills: 3  
  
ticagrelor (BRILINTA) 90 mg tablet Take 1 Tab by mouth every twelve (12) hours every twelve (12) hours. Qty: 180 Tab, Refills: 3  
  
latanoprost (XALATAN) 0.005 % ophthalmic solution Administer 1 Drop to both eyes nightly. STOP taking these medications  
  
 cranberry extract 450 mg tab tablet Comments:  
Reason for Stopping: furosemide (LASIX) 40 mg tablet Comments:  
Reason for Stopping:   
   
 aspirin 81 mg chewable tablet Comments:  
Reason for Stopping:   
   
  
 
Plan to resume aspirin 81 mg on 1/21.  
 
Signed: 
Elaine De La Paz MD 
1/18/2019 
7:56 AM

## 2019-01-18 NOTE — DISCHARGE INSTRUCTIONS
[address]  [phone]      Patient Discharge Instructions    Maynor Franklin / [id number] : 1921    Admitted 2019 Discharged: 19     Take Home Medications            · It is important that you take the medication exactly as they are prescribed. · Keep your medication in the bottles provided by the pharmacist and keep a list of the medication names, dosages, and times to be taken in your wallet. · Do not take other medications without consulting your doctor. What to do at Home    Recommended diet: Cardiac Diet,     Recommended activity: Activity as tolerated, PT/OT      Follow-up with Dr. Melania Hatch in 1 week. Call [phone] for your appointment. Information obtained by :  I understand that if any problems occur once I am at home I am to contact my physician. I understand and acknowledge receipt of the instructions indicated above.                                                                                                                                            Physician's or R.N.'s Signature                                                                  Date/Time                                                                                                                                              Patient or Representative Signature                                                          Date/Time

## 2019-01-18 NOTE — PROGRESS NOTES
All in agreement with d/c today to [facility]. Dtr to transport at [facility]. Please call report to [phone], send emar, d/c instructions, completed DDNR, and Rx

## 2019-01-18 NOTE — PROGRESS NOTES
Bedside and Verbal shift change report given to Trisha RN (oncoming nurse) by Solomon Holm RN (offgoing nurse). Report included the following information SBAR, Kardex, Intake/Output, MAR and Recent Results.

## 2019-01-22 ENCOUNTER — PATIENT OUTREACH (OUTPATIENT)
Dept: INTERNAL MEDICINE CLINIC | Age: 84
End: 2019-01-22

## 2019-01-22 NOTE — PROGRESS NOTES
Transition of Care Coordination/Hospital to Post Acute Facility: 
  
Date/Time:  2019 10:16 AM 
 
Patient was admitted to [facility] on 18 and discharged on 19 for rectal bleeding. Patient was transferred to [facility] for continuation of care. Inpatient RRAT score: 47 Top Challenges reviewed Method of communication with care team :chart routing Nurse Navigator(NN) spoke with nurse at healthcare center to provide introduction to self and explanation of the Nurse Navigator Role. Verified name and  as patient identifiers. Discussed and reviewed  anticipated length of stay, medication reconciliation ACP:  
Does the patient have a current ACP (including DDNR):  yes Does the post acute facility have a copy of the patients ACP:  yes Medication(s):  
New Medications at Discharge: n/a Changed Medications at Discharge: azopt ophthalmic solution, coreg, prilosec Discontinued Medications at Discharge: cranberry extract, furosemide, asa PCP/Specialist follow up:  
Future Appointments Date Time Provider Cristian Kim 2019 10:20 AM Cookie Pineda MD [facility] SCHED  
2019  9:45 AM PACEMAKER, [facility] SCHED  
2019 11:00 AM Lili Pierson MD [address] [facility] Opportunity to ask questions was provided. Contact information was provided for future reference or further questions. Will continue to monitor.

## 2019-01-28 RX ORDER — CARVEDILOL 3.12 MG/1
TABLET ORAL
Qty: 60 TAB | Refills: 3 | Status: SHIPPED | OUTPATIENT
Start: 2019-01-28 | End: 2019-03-19

## 2019-02-01 ENCOUNTER — PATIENT OUTREACH (OUTPATIENT)
Dept: INTERNAL MEDICINE CLINIC | Age: 84
End: 2019-02-01

## 2019-02-01 NOTE — PROGRESS NOTES
Hospital Discharge Follow-Up Date/Time:  2019 10:21 AM 
 
Patient was admitted to [facility] on 19 and discharged on 19 for rectal bleed. The patient was then transferred to [facility] and discharged to assisted living there on 19. The physician discharge summary was available at the time of outreach. Patient was contacted within two business days of discharge. Top Challenges reviewed with the provider Method of communication with provider :chart routing Inpatient RRAT score: 47 Was this a readmission? no  
Patient stated reason for the readmission: n/a Nurse Navigator (NN) contacted the family by telephone to perform post hospital discharge assessment. Verified name and  with family as identifiers. Provided introduction to self, and explanation of the Nurse Navigator role. Reviewed discharge instructions and red flags with family who verbalized understanding. Family given an opportunity to ask questions and does not have any further questions or concerns at this time. The family agrees to contact the PCP office for questions related to their healthcare. NN provided contact information for future reference. Disease Specific:   N/A Summary of patient's top problems: 1. Recent hospital stay for rectal bleeding now resolved. 2. Chronic kidney disease  -  On Aranesp every month per nephrologist 
3. ASCVD - followed by cardiologist 
 
Home Health orders at discharge: none [address]: n/a Date of initial visit: n/a Durable Medical Equipment ordered/company: n/a Durable Medical Equipment received: n/a Barriers to care? ineffective coping Advance Care Planning:  
Does patient have an Advance Directive:  reviewed and current Medication(s):  
New Medications at Discharge: n/a Changed Medications at Discharge: azopt ophthalmic solution, coreg, prilosec Discontinued Medications at Discharge: asa, cranberry extract, furosemide, pravastatin Medication reconciliation was not performed with family, as she did not have access to them at this time. Daughter will bring a copy of his medications when she brings him in to [facility] visit 2/4/19. There were no barriers to obtaining medications identified at this time. Referral to Pharm D needed: no  
 
Current Outpatient Medications Medication Sig  carvedilol (COREG) 3.125 mg tablet TAKE 1 TABLET BY MOUTH TWICE A DAY WITH MEALS  
 brinzolamide (AZOPT) 1 % ophthalmic suspension Administer 1 Drop to both eyes two (2) times a day.  omeprazole (PRILOSEC) 40 mg capsule Take 1 Cap by mouth daily.  vit A/vit C/vit E/zinc/copper (ICAPS AREDS PO) Take  by mouth.  ticagrelor (BRILINTA) 90 mg tablet Take 1 Tab by mouth every twelve (12) hours every twelve (12) hours.  latanoprost (XALATAN) 0.005 % ophthalmic solution Administer 1 Drop to both eyes nightly. No current facility-administered medications for this visit. There are no discontinued medications. [facility] follow up appointment(s):  
Future Appointments Date Time Provider Cristian Alvarez 2/4/2019  2:50 PM Daina Newman MD [address]  
2/12/2019  9:45 AM PACEMAKER, [facility] ANISA SCHED  
5/8/2019 11:00 AM Ora Pierson MD [address] [facility] follow up appointment(s): n/a [facility]:  n/a  
 
 
Goals  Supportive resources in place to maintain patient in the community (ie. Home Health, DME equipment, refer to, medication assistant plan, etc.)   
  2/1/19-NN spoke to patient's daughter, Nancy Butler, who calls in today to say her dad was discharged from health care at [facility] yesterday. He and his wife have been transitioned to assisted living at [facility] at this time.   He has had no more episodes of rectal bleeding which he was initially admitted for  to [facility] on 1/13/19. He has a JOSEFINA visit with PCP on 2/4/19. He will receive supportive services with PT, and will be assisted with medication administration in the assisted living environment as well as other needs as they arise, such as help with ADL's.   Patient's daughter has NN contact information should she need assistance for her father. / vs

## 2019-02-04 ENCOUNTER — OFFICE VISIT (OUTPATIENT)
Dept: INTERNAL MEDICINE CLINIC | Age: 84
End: 2019-02-04

## 2019-02-04 VITALS
OXYGEN SATURATION: 98 % | HEART RATE: 60 BPM | HEIGHT: 68 IN | SYSTOLIC BLOOD PRESSURE: 158 MMHG | DIASTOLIC BLOOD PRESSURE: 78 MMHG | RESPIRATION RATE: 16 BRPM | WEIGHT: 165 LBS | BODY MASS INDEX: 25.01 KG/M2

## 2019-02-04 DIAGNOSIS — Z95.0 CARDIAC PACEMAKER IN SITU: ICD-10-CM

## 2019-02-04 DIAGNOSIS — I25.10 ATHEROSCLEROSIS OF NATIVE CORONARY ARTERY OF NATIVE HEART WITHOUT ANGINA PECTORIS: ICD-10-CM

## 2019-02-04 DIAGNOSIS — I10 ESSENTIAL HYPERTENSION, BENIGN: Primary | ICD-10-CM

## 2019-02-04 DIAGNOSIS — D50.0 ANEMIA, BLOOD LOSS: ICD-10-CM

## 2019-02-04 DIAGNOSIS — K21.9 GASTROESOPHAGEAL REFLUX DISEASE WITHOUT ESOPHAGITIS: ICD-10-CM

## 2019-02-04 DIAGNOSIS — E11.9 TYPE 2 DIABETES MELLITUS WITHOUT COMPLICATION, WITHOUT LONG-TERM CURRENT USE OF INSULIN (HCC): ICD-10-CM

## 2019-02-04 DIAGNOSIS — N18.4 CHRONIC KIDNEY DISEASE, STAGE IV (SEVERE) (HCC): ICD-10-CM

## 2019-02-04 DIAGNOSIS — I34.0 NON-RHEUMATIC MITRAL REGURGITATION: ICD-10-CM

## 2019-02-04 DIAGNOSIS — K92.2 GASTROINTESTINAL HEMORRHAGE, UNSPECIFIED GASTROINTESTINAL HEMORRHAGE TYPE: ICD-10-CM
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
## 2019-02-04 NOTE — PROGRESS NOTES
Tara Jenkins presents today at the clinic for    Chief Complaint   Patient presents with   [place] 1/13/19-1/18/19 and [address] 1/18/19-1/31/19, then discharged to [address] from Last 3 Encounters:   02/04/19 165 lb (74.8 kg)   01/13/19 160 lb (72.6 kg)   12/06/18 168 lb 3.2 oz (76.3 kg)     Temp Readings from Last 3 Encounters:   01/18/19 97.6 °F (36.4 °C)   12/06/18 97.7 °F (36.5 °C) (Oral)   11/28/18 98.4 °F (36.9 °C)     BP Readings from Last 3 Encounters:   02/04/19 158/78   01/18/19 152/69   12/06/18 169/82     Pulse Readings from Last 3 Encounters:   02/04/19 60   01/18/19 61   12/06/18 60       Health Maintenance Due   Topic    FOOT EXAM Q1     EYE EXAM RETINAL OR DILATED     DTaP/Tdap/Td series (1 - Tdap)    Shingrix Vaccine Age 50> (1 of 2)    GLAUCOMA SCREENING Q2Y     Pneumococcal 65+ High/Highest Risk (1 of 2 - PCV13)    MEDICARE YEARLY EXAM     Influenza Age 5 to Adult          Learning Assessment:  :     Learning Assessment 9/7/2017 2/25/2014   PRIMARY LEARNER Patient Patient   HIGHEST LEVEL OF EDUCATION - PRIMARY LEARNER  - GRADUATED HIGH SCHOOL OR GED   BARRIERS PRIMARY LEARNER - HEARING   CO-LEARNER CAREGIVER - Yes   CO-LEARNER NAME - [address] [place], [place] [address] LEVEL OF EDUCATION - GRADUATED HIGH SCHOOL OR GED   Laurence David    NEED - No   LEARNER PREFERENCE PRIMARY READING OTHER (COMMENT)   LEARNER [address] (COMMENT)   [address] - DNR papers   ANSWERED BY self patient   RELATIONSHIP SELF SELF       Depression Screening:  :     PHQ over the last two weeks 11/15/2018   Little interest or pleasure in doing things Not at all   Feeling down, depressed, irritable, or hopeless Not at all   Total Score PHQ 2 0       Fall Risk Assessment:  :     Fall Risk Assessment, last [facility] 12/6/2018   Able to walk? Yes   Fall in past 12 months? No       Abuse Screening:  :     Abuse Screening Questionnaire 9/18/2018 9/7/2017   Do you ever feel afraid of your partner? N N   Are you in a relationship with someone who physically or mentally threatens you? N N   Is it safe for you to go home? Y Y       Coordination of Care Questionnaire:  :     1. Have you been to the ER, urgent care clinic since your last visit? Hospitalized since your last visit      [address] 1/13/19-1/18/19 for rectal bleed, then [address] 1/18/19-1/31/19, then discharged to [facility]. 2. Have you seen or consulted any other health care providers outside of the [address] [place] [address] since your last visit? Include any pap smears or colon screening.  No

## 2019-02-14 ENCOUNTER — OFFICE VISIT (OUTPATIENT)
Dept: CARDIOLOGY CLINIC | Age: 84
End: 2019-02-14

## 2019-02-14 ENCOUNTER — CLINICAL SUPPORT (OUTPATIENT)
Dept: CARDIOLOGY CLINIC | Age: 84
End: 2019-02-14

## 2019-02-14 VITALS
OXYGEN SATURATION: 100 % | RESPIRATION RATE: 16 BRPM | BODY MASS INDEX: 24.77 KG/M2 | HEART RATE: 60 BPM | SYSTOLIC BLOOD PRESSURE: 122 MMHG | HEIGHT: 68 IN | WEIGHT: 163.4 LBS | DIASTOLIC BLOOD PRESSURE: 68 MMHG

## 2019-02-14 DIAGNOSIS — N18.4 CKD (CHRONIC KIDNEY DISEASE), STAGE IV (HCC): ICD-10-CM

## 2019-02-14 DIAGNOSIS — Z95.1 POSTSURGICAL AORTOCORONARY BYPASS STATUS: ICD-10-CM

## 2019-02-14 DIAGNOSIS — I25.10 ATHEROSCLEROSIS OF NATIVE CORONARY ARTERY OF NATIVE HEART WITHOUT ANGINA PECTORIS: Primary | ICD-10-CM

## 2019-02-14 DIAGNOSIS — I25.10 ASCVD (ARTERIOSCLEROTIC CARDIOVASCULAR DISEASE): ICD-10-CM

## 2019-02-14 DIAGNOSIS — E78.2 MIXED HYPERLIPIDEMIA: ICD-10-CM

## 2019-02-14 DIAGNOSIS — R06.09 DOE (DYSPNEA ON EXERTION): ICD-10-CM

## 2019-02-14 DIAGNOSIS — E11.21 TYPE 2 DIABETES MELLITUS WITH NEPHROPATHY (HCC): ICD-10-CM

## 2019-02-14 DIAGNOSIS — I10 ESSENTIAL HYPERTENSION, BENIGN: ICD-10-CM

## 2019-02-14 DIAGNOSIS — E78.5 HYPERLIPIDEMIA, UNSPECIFIED HYPERLIPIDEMIA TYPE: ICD-10-CM

## 2019-02-14 DIAGNOSIS — I49.5 SINOATRIAL NODE DYSFUNCTION (HCC): ICD-10-CM

## 2019-02-14 DIAGNOSIS — Z95.0 CARDIAC PACEMAKER IN SITU: ICD-10-CM

## 2019-02-14 DIAGNOSIS — Z95.0 CARDIAC PACEMAKER IN SITU: Primary | ICD-10-CM

## 2019-02-14 RX ORDER — ASPIRIN 81 MG/1
TABLET ORAL DAILY
COMMUNITY
End: 2019-07-17

## 2019-02-14 RX ORDER — FUROSEMIDE 20 MG/1
TABLET ORAL
Qty: 40 TAB | Refills: 6 | Status: ON HOLD | OUTPATIENT
Start: 2019-02-14 | End: 2019-03-27 | Stop reason: SDUPTHER

## 2019-02-14 NOTE — PROGRESS NOTES
1. Have you been to the ER, urgent care clinic since your last visit? Hospitalized since your last visit? YES, [facility]. January 2019 RECTAL BLEED 2. Have you seen or consulted any other health care providers outside of the [address] [place] [address] since your last visit? Include any pap smears or colon screening. YES, DR. Emmanuel Garvin, 
 
C/O SWELLING IN BLE, DIZZINESS, SOB. [employer] STATED \"CRACKLES\" HEARD LAST NIGHT, CHEST XRAY LAST WEEK CLEAR.
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
## 2019-02-15 ENCOUNTER — PATIENT OUTREACH (OUTPATIENT)
Dept: INTERNAL MEDICINE CLINIC | Age: 84
End: 2019-02-15

## 2019-02-15 NOTE — PROGRESS NOTES
Patient has graduated from the Transitions of Care Coordination  program on 2/15/19 [facility] Patient's symptoms are stable at this time. Patient/family has the ability to self-manage. Care management goals have been completed at this time. No further nurse navigator follow up scheduled. Goals Addressed This Visit's Progress  COMPLETED: Supportive resources in place to maintain patient in the community (ie. Home Health, DME equipment, refer to, medication assistant plan, etc.)   On track 2/1/19-NN spoke to patient's daughter, Amanda Balbuena, who calls in today to say her dad was discharged from health care at [facility] yesterday. He and his wife have been transitioned to assisted living at [facility] at this time. He has had no more episodes of rectal bleeding which he was initially admitted for  to [facility] on 1/13/19. He has a JOSEFINA visit with PCP on 2/4/19. He will receive supportive services with PT, and will be assisted with medication administration in the assisted living environment as well as other needs as they arise, such as help with ADL's. Patient's daughter has NN contact information should she need assistance for her father. / vs 
  
  
 
 
Pt has nurse navigator's contact information for any further questions, concerns, or needs. Patients upcoming visits:   
Future Appointments Date Time Provider Cristian Alvarez 3/4/2019 10:15 AM Ravindra, Romayne Cleaver, MD [facility] ANISA SCHED  
4/23/2019  9:45 [facility]  
4/23/2019 10:00 AM Kiran Arredondo MD [facility] ANISA SCHED  
5/8/2019 11:00 AM Theresa Pierson MD [facility]

## 2019-02-28 DIAGNOSIS — I25.10 ASCVD (ARTERIOSCLEROTIC CARDIOVASCULAR DISEASE): ICD-10-CM

## 2019-02-28 DIAGNOSIS — Z95.1 POSTSURGICAL AORTOCORONARY BYPASS STATUS: ICD-10-CM

## 2019-02-28 DIAGNOSIS — Z95.0 CARDIAC PACEMAKER IN SITU: ICD-10-CM

## 2019-02-28 DIAGNOSIS — I10 ESSENTIAL HYPERTENSION, BENIGN: ICD-10-CM

## 2019-03-04 ENCOUNTER — OFFICE VISIT (OUTPATIENT)
Dept: CARDIOLOGY CLINIC | Age: 84
End: 2019-03-04

## 2019-03-04 VITALS
WEIGHT: 159 LBS | BODY MASS INDEX: 24.1 KG/M2 | HEIGHT: 68 IN | DIASTOLIC BLOOD PRESSURE: 66 MMHG | RESPIRATION RATE: 18 BRPM | HEART RATE: 59 BPM | SYSTOLIC BLOOD PRESSURE: 146 MMHG | OXYGEN SATURATION: 99 %

## 2019-03-04 DIAGNOSIS — I25.10 ATHEROSCLEROSIS OF NATIVE CORONARY ARTERY OF NATIVE HEART WITHOUT ANGINA PECTORIS: ICD-10-CM

## 2019-03-04 DIAGNOSIS — R06.09 DOE (DYSPNEA ON EXERTION): Primary | ICD-10-CM

## 2019-03-04 DIAGNOSIS — Z95.1 POSTSURGICAL AORTOCORONARY BYPASS STATUS: ICD-10-CM

## 2019-03-04 DIAGNOSIS — I10 ESSENTIAL HYPERTENSION, BENIGN: ICD-10-CM

## 2019-03-04 DIAGNOSIS — Z95.0 CARDIAC PACEMAKER IN SITU: ICD-10-CM

## 2019-03-04 DIAGNOSIS — E78.2 MIXED HYPERLIPIDEMIA: ICD-10-CM
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
## 2019-03-04 NOTE — PROGRESS NOTES
Chief Complaint   Patient presents with    Follow-up     2 wk    Hypertension     1. Have you been to the ER, urgent care clinic since your last visit? Hospitalized since your last visit? No    2. Have you seen or consulted any other health care providers outside of the [address] [place] [address] since your last visit? Include any pap smears or colon screening.  No

## 2019-03-05 LAB
BUN SERPL-MCNC: 46 MG/DL (ref 10–36)
BUN/CREAT SERPL: 16 (ref 10–24)
CALCIUM SERPL-MCNC: 9.2 MG/DL (ref 8.6–10.2)
CHLORIDE SERPL-SCNC: 109 MMOL/L (ref 96–106)
CO2 SERPL-SCNC: 22 MMOL/L (ref 20–29)
CREAT SERPL-MCNC: 2.79 MG/DL (ref 0.76–1.27)
GLUCOSE SERPL-MCNC: 109 MG/DL (ref 65–99)
INTERPRETATION: NORMAL
POTASSIUM SERPL-SCNC: 4.8 MMOL/L (ref 3.5–5.2)
SODIUM SERPL-SCNC: 147 MMOL/L (ref 134–144)

## 2019-03-19 ENCOUNTER — DOCUMENTATION ONLY (OUTPATIENT)
Dept: INTERNAL MEDICINE CLINIC | Age: 84
End: 2019-03-19

## 2019-03-19 ENCOUNTER — APPOINTMENT (OUTPATIENT)
Dept: GENERAL RADIOLOGY | Age: 84
DRG: 378 | End: 2019-03-19
Attending: EMERGENCY MEDICINE
Payer: MEDICARE

## 2019-03-19 ENCOUNTER — TELEPHONE (OUTPATIENT)
Dept: CARDIOLOGY CLINIC | Age: 84
End: 2019-03-19

## 2019-03-19 ENCOUNTER — HOSPITAL ENCOUNTER (INPATIENT)
Age: 84
LOS: 8 days | Discharge: NURSING, REHAB OR LONG-TERM CARE | DRG: 378 | End: 2019-03-27
Attending: EMERGENCY MEDICINE | Admitting: FAMILY MEDICINE
Payer: MEDICARE

## 2019-03-19 DIAGNOSIS — I34.0 MITRAL VALVE INSUFFICIENCY, UNSPECIFIED ETIOLOGY: ICD-10-CM

## 2019-03-19 DIAGNOSIS — D64.9 ANEMIA, UNSPECIFIED TYPE: ICD-10-CM

## 2019-03-19 DIAGNOSIS — K92.2 GASTROINTESTINAL HEMORRHAGE, UNSPECIFIED GASTROINTESTINAL HEMORRHAGE TYPE: ICD-10-CM

## 2019-03-19 DIAGNOSIS — R55 NEAR SYNCOPE: Primary | ICD-10-CM

## 2019-03-19 LAB
ALBUMIN SERPL-MCNC: 2.7 G/DL (ref 3.5–5)
ALBUMIN/GLOB SERPL: 0.9 {RATIO} (ref 1.1–2.2)
ALP SERPL-CCNC: 99 U/L (ref 45–117)
ALT SERPL-CCNC: 16 U/L (ref 12–78)
ANION GAP SERPL CALC-SCNC: 9 MMOL/L (ref 5–15)
APTT PPP: 21.6 SEC (ref 22.1–32)
AST SERPL-CCNC: 18 U/L (ref 15–37)
ATRIAL RATE: 60 BPM
BASOPHILS # BLD: 0 K/UL (ref 0–0.1)
BASOPHILS NFR BLD: 0 % (ref 0–1)
BILIRUB SERPL-MCNC: 0.4 MG/DL (ref 0.2–1)
BNP SERPL-MCNC: 1430 PG/ML (ref 0–450)
BUN SERPL-MCNC: 81 MG/DL (ref 6–20)
BUN/CREAT SERPL: 26 (ref 12–20)
CALCIUM SERPL-MCNC: 8.2 MG/DL (ref 8.5–10.1)
CALCULATED R AXIS, ECG10: -73 DEGREES
CALCULATED T AXIS, ECG11: 93 DEGREES
CHLORIDE SERPL-SCNC: 113 MMOL/L (ref 97–108)
CO2 SERPL-SCNC: 23 MMOL/L (ref 21–32)
COMMENT, HOLDF: NORMAL
CREAT SERPL-MCNC: 3.09 MG/DL (ref 0.7–1.3)
DIAGNOSIS, 93000: NORMAL
DIFFERENTIAL METHOD BLD: ABNORMAL
EOSINOPHIL # BLD: 0.5 K/UL (ref 0–0.4)
EOSINOPHIL NFR BLD: 4 % (ref 0–7)
ERYTHROCYTE [DISTWIDTH] IN BLOOD BY AUTOMATED COUNT: 18.9 % (ref 11.5–14.5)
EST. AVERAGE GLUCOSE BLD GHB EST-MCNC: 120 MG/DL
GLOBULIN SER CALC-MCNC: 3 G/DL (ref 2–4)
GLUCOSE BLD STRIP.AUTO-MCNC: 130 MG/DL (ref 65–100)
GLUCOSE BLD STRIP.AUTO-MCNC: 159 MG/DL (ref 65–100)
GLUCOSE SERPL-MCNC: 151 MG/DL (ref 65–100)
HBA1C MFR BLD: 5.8 % (ref 4.2–6.3)
HCT VFR BLD AUTO: 19.4 % (ref 36.6–50.3)
HCT VFR BLD AUTO: 22.3 % (ref 36.6–50.3)
HEMOCCULT STL QL: POSITIVE
HGB BLD-MCNC: 5.9 G/DL (ref 12.1–17)
HGB BLD-MCNC: 7.1 G/DL (ref 12.1–17)
IMM GRANULOCYTES # BLD AUTO: 0.1 K/UL (ref 0–0.04)
IMM GRANULOCYTES NFR BLD AUTO: 1 % (ref 0–0.5)
INR PPP: 1.1 (ref 0.9–1.1)
LACTATE SERPL-SCNC: 1.8 MMOL/L (ref 0.4–2)
LACTATE SERPL-SCNC: 2.9 MMOL/L (ref 0.4–2)
LYMPHOCYTES # BLD: 3.6 K/UL (ref 0.8–3.5)
LYMPHOCYTES NFR BLD: 32 % (ref 12–49)
MAGNESIUM SERPL-MCNC: 2.1 MG/DL (ref 1.6–2.4)
MCH RBC QN AUTO: 35.9 PG (ref 26–34)
MCHC RBC AUTO-ENTMCNC: 31.8 G/DL (ref 30–36.5)
MCV RBC AUTO: 112.6 FL (ref 80–99)
MONOCYTES # BLD: 0.8 K/UL (ref 0–1)
MONOCYTES NFR BLD: 7 % (ref 5–13)
NEUTS SEG # BLD: 6.3 K/UL (ref 1.8–8)
NEUTS SEG NFR BLD: 56 % (ref 32–75)
NRBC # BLD: 0 K/UL (ref 0–0.01)
NRBC BLD-RTO: 0 PER 100 WBC
P-R INTERVAL, ECG05: 190 MS
PLATELET # BLD AUTO: 242 K/UL (ref 150–400)
PLATELET COMMENTS,PCOM: ABNORMAL
PMV BLD AUTO: 11.5 FL (ref 8.9–12.9)
POTASSIUM SERPL-SCNC: 4.9 MMOL/L (ref 3.5–5.1)
PROT SERPL-MCNC: 5.7 G/DL (ref 6.4–8.2)
PROTHROMBIN TIME: 11.2 SEC (ref 9–11.1)
Q-T INTERVAL, ECG07: 524 MS
QRS DURATION, ECG06: 192 MS
QTC CALCULATION (BEZET), ECG08: 524 MS
RBC # BLD AUTO: 1.98 M/UL (ref 4.1–5.7)
RBC MORPH BLD: ABNORMAL
RBC MORPH BLD: ABNORMAL
SAMPLES BEING HELD,HOLD: NORMAL
SERVICE CMNT-IMP: ABNORMAL
SERVICE CMNT-IMP: ABNORMAL
SODIUM SERPL-SCNC: 145 MMOL/L (ref 136–145)
THERAPEUTIC RANGE,PTTT: ABNORMAL SECS (ref 58–77)
TROPONIN I SERPL-MCNC: <0.05 NG/ML
VENTRICULAR RATE, ECG03: 60 BPM
WBC # BLD AUTO: 11.3 K/UL (ref 4.1–11.1)

## 2019-03-19 PROCEDURE — 86920 COMPATIBILITY TEST SPIN: CPT

## 2019-03-19 PROCEDURE — 36430 TRANSFUSION BLD/BLD COMPNT: CPT

## 2019-03-19 PROCEDURE — 96374 THER/PROPH/DIAG INJ IV PUSH: CPT

## 2019-03-19 PROCEDURE — 85025 COMPLETE CBC W/AUTO DIFF WBC: CPT

## 2019-03-19 PROCEDURE — 82962 GLUCOSE BLOOD TEST: CPT

## 2019-03-19 PROCEDURE — 30233N1 TRANSFUSION OF NONAUTOLOGOUS RED BLOOD CELLS INTO PERIPHERAL VEIN, PERCUTANEOUS APPROACH: ICD-10-PCS | Performed by: HOSPITALIST

## 2019-03-19 PROCEDURE — 84484 ASSAY OF TROPONIN QUANT: CPT

## 2019-03-19 PROCEDURE — C9113 INJ PANTOPRAZOLE SODIUM, VIA: HCPCS | Performed by: NURSE PRACTITIONER

## 2019-03-19 PROCEDURE — 71045 X-RAY EXAM CHEST 1 VIEW: CPT

## 2019-03-19 PROCEDURE — 99284 EMERGENCY DEPT VISIT MOD MDM: CPT

## 2019-03-19 PROCEDURE — 86870 RBC ANTIBODY IDENTIFICATION: CPT

## 2019-03-19 PROCEDURE — 83605 ASSAY OF LACTIC ACID: CPT

## 2019-03-19 PROCEDURE — P9016 RBC LEUKOCYTES REDUCED: HCPCS

## 2019-03-19 PROCEDURE — 74011000250 HC RX REV CODE- 250: Performed by: NURSE PRACTITIONER

## 2019-03-19 PROCEDURE — 80053 COMPREHEN METABOLIC PANEL: CPT

## 2019-03-19 PROCEDURE — 86900 BLOOD TYPING SEROLOGIC ABO: CPT

## 2019-03-19 PROCEDURE — 93005 ELECTROCARDIOGRAM TRACING: CPT

## 2019-03-19 PROCEDURE — 86922 COMPATIBILITY TEST ANTIGLOB: CPT

## 2019-03-19 PROCEDURE — 74011250636 HC RX REV CODE- 250/636: Performed by: EMERGENCY MEDICINE

## 2019-03-19 PROCEDURE — 74011250636 HC RX REV CODE- 250/636: Performed by: NURSE PRACTITIONER

## 2019-03-19 PROCEDURE — 36415 COLL VENOUS BLD VENIPUNCTURE: CPT

## 2019-03-19 PROCEDURE — 85610 PROTHROMBIN TIME: CPT

## 2019-03-19 PROCEDURE — 74011250636 HC RX REV CODE- 250/636: Performed by: FAMILY MEDICINE

## 2019-03-19 PROCEDURE — C9113 INJ PANTOPRAZOLE SODIUM, VIA: HCPCS | Performed by: EMERGENCY MEDICINE

## 2019-03-19 PROCEDURE — 83880 ASSAY OF NATRIURETIC PEPTIDE: CPT

## 2019-03-19 PROCEDURE — 83036 HEMOGLOBIN GLYCOSYLATED A1C: CPT

## 2019-03-19 PROCEDURE — 96361 HYDRATE IV INFUSION ADD-ON: CPT

## 2019-03-19 PROCEDURE — 86921 COMPATIBILITY TEST INCUBATE: CPT

## 2019-03-19 PROCEDURE — 99218 HC RM OBSERVATION: CPT

## 2019-03-19 PROCEDURE — 82272 OCCULT BLD FECES 1-3 TESTS: CPT

## 2019-03-19 PROCEDURE — 85730 THROMBOPLASTIN TIME PARTIAL: CPT

## 2019-03-19 PROCEDURE — 65660000001 HC RM ICU INTERMED STEPDOWN

## 2019-03-19 PROCEDURE — 85018 HEMOGLOBIN: CPT

## 2019-03-19 PROCEDURE — 83735 ASSAY OF MAGNESIUM: CPT

## 2019-03-19 RX ORDER — SODIUM CHLORIDE 0.9 % (FLUSH) 0.9 %
5-40 SYRINGE (ML) INJECTION AS NEEDED
Status: DISCONTINUED | OUTPATIENT
Start: 2019-03-19 | End: 2019-03-27 | Stop reason: HOSPADM

## 2019-03-19 RX ORDER — PRAVASTATIN SODIUM 20 MG/1
20 TABLET ORAL
COMMUNITY
End: 2019-03-19

## 2019-03-19 RX ORDER — SODIUM CHLORIDE 9 MG/ML
250 INJECTION, SOLUTION INTRAVENOUS AS NEEDED
Status: DISCONTINUED | OUTPATIENT
Start: 2019-03-19 | End: 2019-03-27 | Stop reason: HOSPADM

## 2019-03-19 RX ORDER — SODIUM BICARBONATE 650 MG/1
TABLET ORAL 4 TIMES DAILY
COMMUNITY
End: 2019-03-19

## 2019-03-19 RX ORDER — SODIUM CHLORIDE 9 MG/ML
50 INJECTION, SOLUTION INTRAVENOUS CONTINUOUS
Status: DISCONTINUED | OUTPATIENT
Start: 2019-03-19 | End: 2019-03-21

## 2019-03-19 RX ORDER — INSULIN LISPRO 100 [IU]/ML
INJECTION, SOLUTION INTRAVENOUS; SUBCUTANEOUS
Status: DISCONTINUED | OUTPATIENT
Start: 2019-03-19 | End: 2019-03-24

## 2019-03-19 RX ORDER — SODIUM BICARBONATE 650 MG/1
650 TABLET ORAL 3 TIMES DAILY
COMMUNITY
End: 2020-01-01 | Stop reason: SDUPTHER

## 2019-03-19 RX ORDER — MAGNESIUM SULFATE 100 %
4 CRYSTALS MISCELLANEOUS AS NEEDED
Status: DISCONTINUED | OUTPATIENT
Start: 2019-03-19 | End: 2019-03-24

## 2019-03-19 RX ORDER — SODIUM CHLORIDE 0.9 % (FLUSH) 0.9 %
5-40 SYRINGE (ML) INJECTION EVERY 8 HOURS
Status: DISCONTINUED | OUTPATIENT
Start: 2019-03-19 | End: 2019-03-27 | Stop reason: HOSPADM

## 2019-03-19 RX ORDER — DEXTROSE 50 % IN WATER (D50W) INTRAVENOUS SYRINGE
25-50 AS NEEDED
Status: DISCONTINUED | OUTPATIENT
Start: 2019-03-19 | End: 2019-03-24

## 2019-03-19 RX ORDER — CARVEDILOL 6.25 MG/1
6.25 TABLET ORAL 2 TIMES DAILY WITH MEALS
Status: ON HOLD | COMMUNITY
End: 2019-03-27 | Stop reason: SDUPTHER

## 2019-03-19 RX ORDER — PANTOPRAZOLE SODIUM 40 MG/10ML
40 INJECTION, POWDER, LYOPHILIZED, FOR SOLUTION INTRAVENOUS
Status: COMPLETED | OUTPATIENT
Start: 2019-03-19 | End: 2019-03-19

## 2019-03-19 RX ADMIN — SODIUM CHLORIDE 1000 ML: 900 INJECTION, SOLUTION INTRAVENOUS at 15:10

## 2019-03-19 RX ADMIN — PANTOPRAZOLE SODIUM 40 MG: 40 INJECTION, POWDER, FOR SOLUTION INTRAVENOUS at 18:30

## 2019-03-19 RX ADMIN — SODIUM CHLORIDE 50 ML/HR: 900 INJECTION, SOLUTION INTRAVENOUS at 18:31

## 2019-03-19 RX ADMIN — PANTOPRAZOLE SODIUM 40 MG: 40 INJECTION, POWDER, FOR SOLUTION INTRAVENOUS at 15:10

## 2019-03-19 RX ADMIN — Medication 10 ML: at 18:31

## 2019-03-19 RX ADMIN — Medication 10 ML: at 22:00

## 2019-03-19 NOTE — PROGRESS NOTES
Patient's daughter, Amanda Balbuena, was in office for testing and requested her father be seen due to extreme weakness. She states he having to hold on to the wall to get around. He can barely sit up to eat breakfast.  He has history of CHF and renal failure. Discussed with Rose Hines NP. Raul advised to have patient go to the emergency room to be assessed. Informed Adonay Luocarmen of Raul's advice. Adonay Shaw was adamant about not taking him to the emergency room. I advised he should be assessed and if not the emergency room, she could take him to [address] [place] [address], but it would be better to take him to the emergency room. Daughter states she is going to call his cardiologist, but may have her  take him to [address] [place] [address]. Dr. Emily Rob notified.

## 2019-03-19 NOTE — PROGRESS NOTES
0526 During [facility] downtime from 1 a.m. - 5 a.m. Pt received his 2nd unit of PRBC's which was started at [id number] and ended at [address].  
 
2141 MD came to floor to sign blood consent at bedside. 2115 Paged MD about needing to come to bedside to complete blood consent form. 2110 Paged MD that blood consent was not signed while pt was in ED. MD asked for consent to be tubed down to ED for him to complete and send back. After call ended, spoke with charge RN who informed me that MD must come to bedside for blood consent form to be completed. 2100 Paged MD regarding hgb of of 5.9 that resulted at [facility] 1978 while pt was still in ED. Pt came to floor at 1925 with no orders for blood transfusion. MD ordered for pt to receive 2 units PRBC's.  
 
1947 Primary Nurse Victor M Romeo and Mirta Gutierres, RN performed a dual skin assessment on this patient No impairment noted but we did see some blanchable redness on the sacrum, a scab on the left thigh, and a scar that goes from chest down below umbilicus which pt states is from CABG. Son score is as charted.

## 2019-03-19 NOTE — ED TRIAGE NOTES
Hx of multiple blood transfusion. Per daughter, just started coughing up blood. +1 edema in bilateral lower extremities

## 2019-03-19 NOTE — ED TRIAGE NOTES
Patient comes to the ER via EMS from [address] [place] [address] for GI bleed. Patient has a hgb of 7.1 with +fecal occult. Patient appears pale. Denies dizziness, CP or SOB at this time.

## 2019-03-19 NOTE — ED NOTES
Spoke with bed placement, Alessio Mayorga, and stated no ICU beds at this time; nursing supervisor made aware of transfer to [facility] ER instead of waiting for ICU bed at [facility] 1263.
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
## 2019-03-19 NOTE — ED TRIAGE NOTES
TRIAGE NOTES:  
Hx of HF, Renal failure, vomiting \"hasnt eaten since breakfast, vomit looks like mucus and blood\". After breakfast extremel;y weak, legs gave out, mitral valve leak, just had a bout of HF treated w diuretics. SOB, sudden weakness. No dizziness.
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
## 2019-03-19 NOTE — ED NOTES
1901: Paged Dr. Thang Alonso, Hospitalist to advise of HGB of 5.9. 
 
1708: TRANSFER - OUT REPORT: 
 
Verbal report given to JESENIA Oliver (name) on Lul Stringer  being transferred to [facility] (unit) for routine progression of care Report consisted of patients Situation, Background, Assessment and  
Recommendations(SBAR). Information from the following report(s) SBAR, Kardex, ED Summary, MAR, Recent Results and Cardiac Rhythm Paced was reviewed with the receiving nurse. Lines:    
 
Opportunity for questions and clarification was provided. Patient transported with: 
 Monitor Medic

## 2019-03-19 NOTE — PROGRESS NOTES
Admission Medication Reconciliation: 
 
Information obtained from: RN at facility: [phone] (Thor Schmid), daughter Allison Cuello [phone]), Domenic Ricketts Significant PMH/Disease States:  
Past Medical History:  
Diagnosis Date  Abdominal pain 12/6/2017  Acute gastric ulcer with hemorrhage 7/5/2018  Arteriosclerotic coronary artery disease 12/6/2017  Atrioventricular block, complete (HCC)  CAD (coronary artery disease)  Chronic kidney disease, stage IV (severe) ([facility] [address] 12/6/2017  CKD (chronic kidney disease) stage 3, GFR 30-59 ml/min ([facility] 9/7/2017  Diabetes mellitus ([facility] [address] 12/6/2017  Dizziness and giddiness  Fatigue 12/6/2017  GERD (gastroesophageal reflux disease)  GERD (gastroesophageal reflux disease) 9/7/2017  Glaucoma 12/6/2017  Hematuria 12/6/2017  Hyperlipidemia 12/6/2017  Hypertension  Mixed hyperlipidemia  Neuropathy 12/6/2017  Other acute and subacute form of ischemic heart disease  Pernicious anemia 12/6/2017  Sinoatrial node dysfunction (HCC)  Syncope and collapse [place] 12/6/2017  Thrush of mouth and esophagus ([facility] [address] 12/6/2017  Type 2 diabetes mellitus without complication, without long-term current use of insulin ([facility] [address] 9/7/2017 Chief Complaint for this Admission:  Extremity weakness Allergies:  Latex, natural rubber and Shellfish derived Prior to Admission Medications:  
Prior to Admission Medications Prescriptions Last Dose Informant Patient Reported? Taking?  
aspirin delayed-release 81 mg tablet 3/19/2019 at Unknown time  Yes Yes Sig: Take  by mouth daily. brinzolamide (AZOPT) 1 % ophthalmic suspension 3/19/2019 at Unknown time  No Yes Sig: Administer 1 Drop to both eyes two (2) times a day. carvedilol (COREG) 6.25 mg tablet 3/19/2019 at Unknown time  Yes Yes Sig: Take 6.25 mg by mouth two (2) times daily (with meals). furosemide (LASIX) 20 mg tablet 3/19/2019 at Unknown time  No Yes Si19-19 take 40mg daily. Starting 19 reduce to 20mg daily  
latanoprost (XALATAN) 0.005 % ophthalmic solution 3/19/2019 at Unknown time Self Yes Yes Sig: Administer 1 Drop to both eyes nightly. omeprazole (PRILOSEC) 40 mg capsule 3/19/2019 at Unknown time  No Yes Sig: Take 1 Cap by mouth daily. ticagrelor (BRILINTA) 90 mg tablet 3/19/2019 at Unknown time Self No Yes Sig: Take 1 Tab by mouth every twelve (12) hours every twelve (12) hours. vit A/vit C/vit E/zinc/copper (ICAPS AREDS PO) 3/19/2019 at Unknown time Self Yes Yes Sig: Take  by mouth. Facility-Administered Medications: None Comments/Recommendations: Daughter and RN at facility provided information. Note: 1. Carvedilol: recently increased to 6.25 mg 
2. Furosemide: recently decreased dose 3. ? Injection to \"increase blood levels\": daughter does not know name of medication but states patient gets injection every two weeks and dose varies \"according to his levels\" 1753: Added sodium bicarbonate with administration instructions, daughter states he uses nightly, 'the facility is aware but won't do it themselves. \" Thank you for allowing me to participate in the care of your patient. Mery Betancur PharmD, RN #[id number]

## 2019-03-19 NOTE — ED NOTES
TRANSFER - OUT REPORT: 
 
Verbal report given to Rick White RN (name) on Ratna Dear  being transferred to [facility] (unit) for routine progression of care Report consisted of patients Situation, Background, Assessment and  
Recommendations(SBAR). Information from the following report(s) SBAR, ED Summary, Intake/Output, MAR, Recent Results, Med Rec Status and Cardiac Rhythm AV paced was reviewed with the receiving nurse. Lines:  
Peripheral IV 03/19/19 Right Antecubital (Active) Opportunity for questions and clarification was provided.

## 2019-03-20 ENCOUNTER — ANESTHESIA EVENT (OUTPATIENT)
Dept: ENDOSCOPY | Age: 84
DRG: 378 | End: 2019-03-20
Payer: MEDICARE

## 2019-03-20 ENCOUNTER — ANESTHESIA (OUTPATIENT)
Dept: ENDOSCOPY | Age: 84
DRG: 378 | End: 2019-03-20
Payer: MEDICARE

## 2019-03-20 LAB
ANION GAP SERPL CALC-SCNC: 11 MMOL/L (ref 5–15)
BUN SERPL-MCNC: 90 MG/DL (ref 6–20)
BUN/CREAT SERPL: 32 (ref 12–20)
CALCIUM SERPL-MCNC: 8.4 MG/DL (ref 8.5–10.1)
CHLORIDE SERPL-SCNC: 122 MMOL/L (ref 97–108)
CO2 SERPL-SCNC: 19 MMOL/L (ref 21–32)
CREAT SERPL-MCNC: 2.78 MG/DL (ref 0.7–1.3)
ERYTHROCYTE [DISTWIDTH] IN BLOOD BY AUTOMATED COUNT: 21.1 % (ref 11.5–14.5)
EST. AVERAGE GLUCOSE BLD GHB EST-MCNC: 120 MG/DL
GLUCOSE BLD STRIP.AUTO-MCNC: 111 MG/DL (ref 65–100)
GLUCOSE BLD STRIP.AUTO-MCNC: 127 MG/DL (ref 65–100)
GLUCOSE BLD STRIP.AUTO-MCNC: 142 MG/DL (ref 65–100)
GLUCOSE BLD STRIP.AUTO-MCNC: 146 MG/DL (ref 65–100)
GLUCOSE SERPL-MCNC: 103 MG/DL (ref 65–100)
HBA1C MFR BLD: 5.8 % (ref 4.2–6.3)
HCT VFR BLD AUTO: 27 % (ref 36.6–50.3)
HCT VFR BLD AUTO: 28.6 % (ref 36.6–50.3)
HGB BLD-MCNC: 8.4 G/DL (ref 12.1–17)
HGB BLD-MCNC: 9.3 G/DL (ref 12.1–17)
MCH RBC QN AUTO: 33.7 PG (ref 26–34)
MCHC RBC AUTO-ENTMCNC: 32.5 G/DL (ref 30–36.5)
MCV RBC AUTO: 103.6 FL (ref 80–99)
NRBC # BLD: 0 K/UL (ref 0–0.01)
NRBC BLD-RTO: 0 PER 100 WBC
PLATELET # BLD AUTO: 181 K/UL (ref 150–400)
PMV BLD AUTO: 11.3 FL (ref 8.9–12.9)
POTASSIUM SERPL-SCNC: 4.1 MMOL/L (ref 3.5–5.1)
RBC # BLD AUTO: 2.76 M/UL (ref 4.1–5.7)
SERVICE CMNT-IMP: ABNORMAL
SODIUM SERPL-SCNC: 152 MMOL/L (ref 136–145)
WBC # BLD AUTO: 7.9 K/UL (ref 4.1–11.1)

## 2019-03-20 PROCEDURE — 36415 COLL VENOUS BLD VENIPUNCTURE: CPT

## 2019-03-20 PROCEDURE — 65660000000 HC RM CCU STEPDOWN

## 2019-03-20 PROCEDURE — 76060000031 HC ANESTHESIA FIRST 0.5 HR: Performed by: INTERNAL MEDICINE

## 2019-03-20 PROCEDURE — P9016 RBC LEUKOCYTES REDUCED: HCPCS

## 2019-03-20 PROCEDURE — 83036 HEMOGLOBIN GLYCOSYLATED A1C: CPT

## 2019-03-20 PROCEDURE — 80048 BASIC METABOLIC PNL TOTAL CA: CPT

## 2019-03-20 PROCEDURE — 74011250636 HC RX REV CODE- 250/636: Performed by: NURSE PRACTITIONER

## 2019-03-20 PROCEDURE — 74011000250 HC RX REV CODE- 250: Performed by: FAMILY MEDICINE

## 2019-03-20 PROCEDURE — 82962 GLUCOSE BLOOD TEST: CPT

## 2019-03-20 PROCEDURE — 85018 HEMOGLOBIN: CPT

## 2019-03-20 PROCEDURE — 74011636637 HC RX REV CODE- 636/637: Performed by: FAMILY MEDICINE

## 2019-03-20 PROCEDURE — 74011250636 HC RX REV CODE- 250/636

## 2019-03-20 PROCEDURE — 74011250636 HC RX REV CODE- 250/636: Performed by: FAMILY MEDICINE

## 2019-03-20 PROCEDURE — 76040000019: Performed by: INTERNAL MEDICINE

## 2019-03-20 PROCEDURE — 74011000250 HC RX REV CODE- 250: Performed by: NURSE PRACTITIONER

## 2019-03-20 PROCEDURE — 0DJ08ZZ INSPECTION OF UPPER INTESTINAL TRACT, VIA NATURAL OR ARTIFICIAL OPENING ENDOSCOPIC: ICD-10-PCS | Performed by: INTERNAL MEDICINE

## 2019-03-20 PROCEDURE — 85027 COMPLETE CBC AUTOMATED: CPT

## 2019-03-20 PROCEDURE — C9113 INJ PANTOPRAZOLE SODIUM, VIA: HCPCS | Performed by: NURSE PRACTITIONER

## 2019-03-20 RX ORDER — SODIUM CHLORIDE 0.9 % (FLUSH) 0.9 %
5-40 SYRINGE (ML) INJECTION AS NEEDED
Status: DISCONTINUED | OUTPATIENT
Start: 2019-03-20 | End: 2019-03-27 | Stop reason: HOSPADM

## 2019-03-20 RX ORDER — SODIUM CHLORIDE 0.9 % (FLUSH) 0.9 %
5-40 SYRINGE (ML) INJECTION EVERY 8 HOURS
Status: DISCONTINUED | OUTPATIENT
Start: 2019-03-20 | End: 2019-03-27 | Stop reason: HOSPADM

## 2019-03-20 RX ORDER — DIPHENHYDRAMINE HYDROCHLORIDE 50 MG/ML
50 INJECTION, SOLUTION INTRAMUSCULAR; INTRAVENOUS ONCE
Status: DISCONTINUED | OUTPATIENT
Start: 2019-03-20 | End: 2019-03-20 | Stop reason: HOSPADM

## 2019-03-20 RX ORDER — ATROPINE SULFATE 0.1 MG/ML
0.5 INJECTION INTRAVENOUS
Status: DISCONTINUED | OUTPATIENT
Start: 2019-03-20 | End: 2019-03-20 | Stop reason: HOSPADM

## 2019-03-20 RX ORDER — LIDOCAINE HYDROCHLORIDE 20 MG/ML
INJECTION, SOLUTION EPIDURAL; INFILTRATION; INTRACAUDAL; PERINEURAL AS NEEDED
Status: DISCONTINUED | OUTPATIENT
Start: 2019-03-20 | End: 2019-03-20 | Stop reason: HOSPADM

## 2019-03-20 RX ORDER — FLUMAZENIL 0.1 MG/ML
0.2 INJECTION INTRAVENOUS
Status: DISCONTINUED | OUTPATIENT
Start: 2019-03-20 | End: 2019-03-20 | Stop reason: HOSPADM

## 2019-03-20 RX ORDER — MIDAZOLAM HYDROCHLORIDE 1 MG/ML
.25-1 INJECTION, SOLUTION INTRAMUSCULAR; INTRAVENOUS
Status: DISCONTINUED | OUTPATIENT
Start: 2019-03-20 | End: 2019-03-20 | Stop reason: HOSPADM

## 2019-03-20 RX ORDER — PROPOFOL 10 MG/ML
INJECTION, EMULSION INTRAVENOUS AS NEEDED
Status: DISCONTINUED | OUTPATIENT
Start: 2019-03-20 | End: 2019-03-20 | Stop reason: HOSPADM

## 2019-03-20 RX ORDER — SODIUM CHLORIDE 9 MG/ML
INJECTION, SOLUTION INTRAVENOUS
Status: DISCONTINUED | OUTPATIENT
Start: 2019-03-20 | End: 2019-03-20 | Stop reason: HOSPADM

## 2019-03-20 RX ORDER — METOPROLOL TARTRATE 5 MG/5ML
1.25 INJECTION INTRAVENOUS EVERY 6 HOURS
Status: DISCONTINUED | OUTPATIENT
Start: 2019-03-20 | End: 2019-03-21

## 2019-03-20 RX ORDER — DEXTROMETHORPHAN/PSEUDOEPHED 2.5-7.5/.8
1.2 DROPS ORAL
Status: DISCONTINUED | OUTPATIENT
Start: 2019-03-20 | End: 2019-03-20 | Stop reason: HOSPADM

## 2019-03-20 RX ORDER — NALOXONE HYDROCHLORIDE 0.4 MG/ML
0.4 INJECTION, SOLUTION INTRAMUSCULAR; INTRAVENOUS; SUBCUTANEOUS
Status: DISCONTINUED | OUTPATIENT
Start: 2019-03-20 | End: 2019-03-20 | Stop reason: HOSPADM

## 2019-03-20 RX ORDER — SODIUM CHLORIDE 9 MG/ML
100 INJECTION, SOLUTION INTRAVENOUS CONTINUOUS
Status: DISPENSED | OUTPATIENT
Start: 2019-03-20 | End: 2019-03-20

## 2019-03-20 RX ORDER — EPINEPHRINE 0.1 MG/ML
1 INJECTION INTRACARDIAC; INTRAVENOUS
Status: DISCONTINUED | OUTPATIENT
Start: 2019-03-20 | End: 2019-03-20 | Stop reason: HOSPADM

## 2019-03-20 RX ORDER — FENTANYL CITRATE 50 UG/ML
100 INJECTION, SOLUTION INTRAMUSCULAR; INTRAVENOUS
Status: DISCONTINUED | OUTPATIENT
Start: 2019-03-20 | End: 2019-03-20 | Stop reason: HOSPADM

## 2019-03-20 RX ADMIN — Medication 10 ML: at 21:55

## 2019-03-20 RX ADMIN — METOPROLOL TARTRATE 1.25 MG: 5 INJECTION INTRAVENOUS at 23:20

## 2019-03-20 RX ADMIN — SODIUM CHLORIDE 50 ML/HR: 900 INJECTION, SOLUTION INTRAVENOUS at 05:18

## 2019-03-20 RX ADMIN — PANTOPRAZOLE SODIUM 40 MG: 40 INJECTION, POWDER, FOR SOLUTION INTRAVENOUS at 08:27

## 2019-03-20 RX ADMIN — INSULIN LISPRO 2 UNITS: 100 INJECTION, SOLUTION INTRAVENOUS; SUBCUTANEOUS at 16:51

## 2019-03-20 RX ADMIN — Medication 10 ML: at 13:30

## 2019-03-20 RX ADMIN — PANTOPRAZOLE SODIUM 40 MG: 40 INJECTION, POWDER, FOR SOLUTION INTRAVENOUS at 21:55

## 2019-03-20 RX ADMIN — Medication 10 ML: at 06:51

## 2019-03-20 RX ADMIN — PROPOFOL 50 MG: 10 INJECTION, EMULSION INTRAVENOUS at 12:06

## 2019-03-20 RX ADMIN — METOPROLOL TARTRATE 1.25 MG: 5 INJECTION INTRAVENOUS at 13:29

## 2019-03-20 RX ADMIN — PROPOFOL 50 MG: 10 INJECTION, EMULSION INTRAVENOUS at 12:03

## 2019-03-20 RX ADMIN — SODIUM CHLORIDE: 9 INJECTION, SOLUTION INTRAVENOUS at 11:56

## 2019-03-20 RX ADMIN — LIDOCAINE HYDROCHLORIDE 100 MG: 20 INJECTION, SOLUTION EPIDURAL; INFILTRATION; INTRACAUDAL; PERINEURAL at 12:03

## 2019-03-20 NOTE — ANESTHESIA POSTPROCEDURE EVALUATION
Post-Anesthesia Evaluation and Assessment Patient: Ratna Wilsonr MRN: [id number]  SSN: [id number] YOB: 1921  Age: 80 y.o. Sex: male I have evaluated the patient and they are stable and ready for discharge from the PACU. Cardiovascular Function/Vital Signs Visit Vitals /54 Pulse 62 Temp 36.6 °C (97.8 °F) Resp 17 Ht 5' 8\" (1.727 m) Wt 69.9 kg (154 lb 1.6 oz) SpO2 97% BMI 23.43 kg/m² Patient is status post MAC anesthesia for Procedure(s): ESOPHAGOGASTRODUODENOSCOPY (EGD). Nausea/Vomiting: None Postoperative hydration reviewed and adequate. Pain: 
Pain Scale 1: Visual (03/20/19 1221) Pain Intensity 1: 0 (03/20/19 1221) Managed Neurological Status:  
Neuro Neurologic State: Alert (03/20/19 0800) Orientation Level: Oriented X4 (03/20/19 0800) Cognition: Follows commands (03/20/19 0800) Speech: Clear (03/20/19 0800) At baseline Mental Status, Level of Consciousness: Alert and  oriented to person, place, and time Pulmonary Status:  
O2 Device: Room air (03/20/19 1221) Adequate oxygenation and airway patent Complications related to anesthesia: None Post-anesthesia assessment completed. No concerns Signed By: Ronald Jordan MD   
 March 20, 2019 Procedure(s): ESOPHAGOGASTRODUODENOSCOPY (EGD). total IV anesthesia, MAC 
 
<BSHSIANPOST> Vitals Value Taken Time /56 3/20/2019 12:28 PM  
Temp 36.6 °C (97.8 °F) 3/20/2019 12:21 PM  
Pulse 60 3/20/2019 12:30 PM  
Resp 18 3/20/2019 12:30 PM  
SpO2 99 % 3/20/2019 12:30 PM  
Vitals shown include unvalidated device data.

## 2019-03-20 NOTE — PROGRESS NOTES
0745 Bedside and Verbal shift change report given to Iram RN (oncoming nurse) by Daniela Longo RN (offgoing nurse). Report included the following information SBAR, Kardex, Intake/Output, MAR and Recent Results. Problem: Upper and Lower GI Bleed: Day 1 Goal: Off Pathway (Use only if patient is Off Pathway) Outcome: Progressing Towards Goal 
Monitor pt VS and labs. Pt came to unit with Hgb- 5.9. Called MD - pt received 2 units PRBC's. Will redraw H&H 2 H after transfusion completed. Pt remains safe and stable. No bloody stools or vomit as of now.

## 2019-03-20 NOTE — PROGRESS NOTES
Maribel Clipper 
4/23/1921 
[id number] Situation: 
 
Scheduled Procedure: Procedure(s): ESOPHAGOGASTRODUODENOSCOPY (EGD) Verbal report received from: [place], RN 
Preoperative diagnosis: EGD Background: 
 
Procedure: Procedure(s): ESOPHAGOGASTRODUODENOSCOPY (EGD) Physician performing procedure; Dr. Nesha Rowe SBAR QUESTIONS FLOOR TO ENDO RN 
 
NPO Status/Last PO Intake: Since midnight Pregnancy Test:Not applicable If yes, result: none Is the patient taking Blood Thinners: YES If yes, list: [place] and last taken 3/19 Is the patient diabetic:yes If yes, what was the last BS:  111  Time taken? 1664  Anything given? no          
Does the patient have a Pacemaker/Defibrillator in place?: yes, pacemaker only Does the patient need antibiotics before/during/after procedure: no   
Does the patient have SCD in place:no Is patient on CONTACT precautions:no Assessment: 
Are the vital signs stable prior to patient coming to ENDO?  yes Is the patient alert/oriented and able to sign consent for the procedures:yes Does the patient have a patient IV in place? yes Recommendation: 
Family or Friend present no Permission to share finding with Family or Friend n/a

## 2019-03-20 NOTE — TELEPHONE ENCOUNTER
Thanks Chaka Weinberg for the feedback. I suppose that was a reason why we thought he needed to be seen in ER.

## 2019-03-20 NOTE — ROUTINE PROCESS
Reza Clements 
4/23/1921 
[id number] Situation: 
Verbal report received from: [facility] RN 
Procedure: Procedure(s): ESOPHAGOGASTRODUODENOSCOPY (EGD) Background: 
 
Preoperative diagnosis: EGD Postoperative diagnosis: 1. Gastric AVM's  
2. Esophagitis :  Dr. Fatou Aguilar Assistant(s): Endoscopy Technician-1: Taffy Kocher 
Endoscopy RN-1: Wesley Su RN Specimens: * No specimens in log * H. Pylori  no Assessment: 
Intra-procedure medications Anesthesia gave intra-procedure sedation and medications, see anesthesia flow sheet yes Intravenous fluids: NS@ Prisca Ramal Vital signs stable Abdominal assessment: round and soft Recommendation: 
 
Return to floor Family or Friend Permission to share finding with family or friend yes

## 2019-03-20 NOTE — ANESTHESIA PREPROCEDURE EVALUATION
Anesthetic History No history of anesthetic complications Review of Systems / Medical History Patient summary reviewed, nursing notes reviewed and pertinent labs reviewed Pulmonary Within defined limits Neuro/Psych Comments: Neuropathy Syncope Cardiovascular Hypertension Dysrhythmias Pacemaker, CAD, CABG and hyperlipidemia Exercise tolerance: <4 METS Comments: TTE (8/9/17): Moderate MR, mild TI, mild pHTN, EF=60-65% Complete Heart Block GI/Hepatic/Renal 
  
GERD Renal disease: CRI Comments: Melena CRI, Stage IV Endo/Other Diabetes: well controlled, type 2 Anemia Comments: Pernicious Anemia Other Findings Comments: Glaucoma Physical Exam 
 
Airway Mallampati: III 
TM Distance: > 6 cm Neck ROM: normal range of motion Mouth opening: Normal 
 
 Cardiovascular Regular rate and rhythm,  S1 and S2 normal,  no murmur, click, rub, or gallop Dental 
 
Dentition: Poor dentition and Caps/crowns Comments: Multiple missing teeth, moderate decay, no loose teeth. Pulmonary Breath sounds clear to auscultation Abdominal 
GI exam deferred Other Findings Anesthetic Plan ASA: 4 Anesthesia type: MAC Induction: Intravenous Anesthetic plan and risks discussed with: Patient

## 2019-03-20 NOTE — PROGRESS NOTES
Hospitalist Progress Note Vi Griffin MD 
Answering service: [phone] OR [phone] from in house phone Date of Service:  3/20/2019 NAME:  Selina Varghese 
:  1921 MRN:  [id number] Admission Summary:  
C/c :rectal bleeding 
 80 y.o. Male with  PMH CADs/p CABG,ERICA 2018 on ASA, & brilinta ,CKD4,chronic anemia,CHF,AV block s/p pacemakerDM,HLD,GERD. Gastric ulcer Pt p/w dark stool,noted with hematemesis x1 ER 
S/p endoscopy 2018- gastritis,gastric ulcer oozing s/p hemoclips Interval history / Subjective:  
Overnight ,hgb dropped to 5.9, s/p 2 units PRBC, denies overnight bleeding no nausea,vomting 
abd pain Assessment & Plan:  
 
GIB,p/w rectal bleeding,hematemesis heme occult + 
-IV protonix bid PPI 
-no antiplatelet no AC 
-GI f/u, egd pending cardiology clearace -npo after midnight 
  
Anemia ,acute on chronic  
-due to above  
-s/p 2 units of PRBC on  
-improved hgb 
-monitor serial h/h 
-transfuse for hgb <7 or active bleeding 
  
  
CAD 
-s/p CABG, Stent with ERICA on asa & brilinta,held as above 
-trop 0.05 
-consult cardiology, awiat recc;s 
  
H/o AV block 
-s/p pacemaker  
  
Chronic CHF 
-echo from  with EF 50% 
-cxr with no acute process 
-holding lasix, to avoid dehydration 
  
SARAH superimposed on CKD4 baseline cr,2.7 
-IVF hydration 
-Scr. imporved  2.7 from 3.09  
-monitor kidney functions 
-avoid meds with renal toxicity 
  
Lactic acidosis,resolved 
-due to hypovolemia 
-IVF hydration, while npo 
  
DM2 
-SSI 
-pt reports ,has boderline DM ,not on meds  
- hgb a1c 5.8 
  
HTN 
-pta coreg on hold 
-start low iv lopressor,while npo 
 
  
Functional status,PTA,lives at [facility], use walker,sometimes scooter for ambuation Code status:full code Goals of long term care addressed,tom herbert  is mPOA Recommended DNR,status/conservative treatment recommended Pt/sherrell wish to continue with Full code,aggresive management including endoscopy,if cleared by cardiology DVT prophylaxis:SCD Care Plan discussed with: pt/nurse Disposition:tbd, pending card/GI Hospital Problems  Date Reviewed: 3/4/2019 Codes Class Noted POA  
 GI bleed ICD-10-CM: K92.2 ICD-9-CM: 578.9  7/3/2018 Unknown Review of Systems: A comprehensive review of systems was negative except for that written in the HPI. Vital Signs:  
 Last 24hrs VS reviewed since prior progress note. Most recent are: 
Visit Vitals BP (!) 125/32 (BP 1 Location: Left arm, BP Patient Position: At rest) Pulse 66 Temp 98.6 °F (37 °C) Resp 18 Ht 5' 8\" (1.727 m) Wt 69.9 kg (154 lb 1.6 oz) SpO2 96% BMI 23.43 kg/m² Intake/Output Summary (Last 24 hours) at 3/20/2019 1000 Last data filed at 3/20/2019 0830 Gross per 24 hour Intake 360 ml Output 150 ml Net 210 ml Physical Examination:  
 
 
     
Constitutional:  No acute distress, cooperative, pleasant   
ENT:  Oral mucous moist, oropharynx benign. Neck supple, Resp:  CTA bilaterally. No wheezing/rhonchi/rales. No accessory muscle use CV:  Regular rhythm, normal rate, no murmurs, gallops, rubs GI:  Soft, non distended, non tender. normoactive bowel sounds, no hepatosplenomegaly Musculoskeletal:  No edema, warm, 2+ pulses throughout Neurologic:  Moves all extremities. AAOx3, CN II-XII reviewed Psych:  mood,affect appropriate Data Review:  
 Review and/or order of clinical lab test 
 
 
Labs:  
 
Recent Labs  
  03/20/19 
0700 03/19/19 
1841 03/19/19 [address] WBC 7.9  --  11.3* HGB 9.3* 5.9* 7.1*  
HCT 28.6* 19.4* 22.3*  
  --  242 Recent Labs  
  03/20/19 
0700 03/19/19 [address] * 145  
K 4.1 4.9 * 113* CO2 19* 23 BUN 90* 81* CREA 2.78* 3.09* * 151* CA 8.4* 8.2* MG  --  2.1 Recent Labs  
  03/19/19 [address] SGOT 18 ALT 16  
AP 99 TBILI 0.4 TP 5.7* ALB 2.7*  
GLOB 3.0 Recent Labs  
  03/19/19 [address] INR 1.1 PTP 11.2* APTT 21.6* No results for input(s): FE, TIBC, PSAT, FERR in the last 72 hours. No results found for: FOL, RBCF No results for input(s): PH, PCO2, PO2 in the last 72 hours. Recent Labs  
  03/19/19 [address] TROIQ <0.05 Lab Results Component Value Date/Time Cholesterol, total 175 06/05/2018 04:08 AM  
 HDL Cholesterol 39 06/05/2018 04:08 AM  
 LDL, calculated 121 (H) 06/05/2018 04:08 AM  
 Triglyceride 75 06/05/2018 04:08 AM  
 CHOL/HDL Ratio 4.5 06/05/2018 04:08 AM  
 
Lab Results Component Value Date/Time Glucose (POC) 111 (H) 03/20/2019 06:24 AM  
 Glucose (POC) 130 (H) 03/19/2019 09:23 PM  
 Glucose (POC) 159 (H) 03/19/2019 02:14 PM  
 Glucose (POC) 154 (H) 01/18/2019 09:34 AM  
 Glucose (POC) 143 (H) 01/17/2019 08:21 PM  
 
Lab Results Component Value Date/Time Color YELLOW/STRAW 07/03/2018 07:17 PM  
 Appearance CLEAR 07/03/2018 07:17 PM  
 Specific gravity 1.017 07/03/2018 07:17 PM  
 pH (UA) 6.0 07/03/2018 07:17 PM  
 Protein TRACE (A) 07/03/2018 07:17 PM  
 Glucose NEGATIVE  07/03/2018 07:17 PM  
 Ketone NEGATIVE  07/03/2018 07:17 PM  
 Bilirubin NEGATIVE  07/03/2018 07:17 PM  
 Urobilinogen 0.2 07/03/2018 07:17 PM  
 Nitrites NEGATIVE  07/03/2018 07:17 PM  
 Leukocyte Esterase NEGATIVE  07/03/2018 07:17 PM  
 Epithelial cells FEW 07/03/2018 07:17 PM  
 Bacteria NEGATIVE  07/03/2018 07:17 PM  
 WBC 0-4 07/03/2018 07:17 PM  
 RBC 0-5 07/03/2018 07:17 PM  
 
 
 
Medications Reviewed:  
 
Current Facility-Administered Medications Medication Dose Route Frequency  sodium chloride (NS) flush 5-40 mL  5-40 mL IntraVENous Q8H  
 sodium chloride (NS) flush 5-40 mL  5-40 mL IntraVENous PRN  pantoprazole (PROTONIX) 40 mg in sodium chloride 0.9% 10 mL injection  40 mg IntraVENous Q12H  
 0.9% sodium chloride infusion  50 mL/hr IntraVENous CONTINUOUS  
  glucose chewable tablet 16 g  4 Tab Oral PRN  
 dextrose (D50W) injection syrg 12.5-25 g  25-50 mL IntraVENous PRN  
 glucagon (GLUCAGEN) injection 1 mg  1 mg IntraMUSCular PRN  
 insulin lispro (HUMALOG) injection   SubCUTAneous AC&HS  
 0.9% sodium chloride infusion 250 mL  250 mL IntraVENous PRN  
 
______________________________________________________________________ EXPECTED LENGTH OF STAY: 2d 14h ACTUAL LENGTH OF STAY:          1 Deanna Monson MD

## 2019-03-20 NOTE — PROGRESS NOTES
TRANSFER - OUT REPORT: 
 
Verbal report given to Monica(name) on Cheli Bañuelos  being transferred to [facility]) for routine progression of care Report consisted of patients Situation, Background, Assessment and  
Recommendations(SBAR). Information from the following report(s) SBAR, Procedure Summary, Intake/Output and MAR was reviewed with the receiving nurse. Lines:  
Peripheral IV 03/19/19 Right Antecubital (Active) Site Assessment Clean, dry, & intact 3/20/2019 11:40 AM  
Phlebitis Assessment 0 3/20/2019 11:40 AM  
Infiltration Assessment 0 3/20/2019 11:40 AM  
Dressing Status Clean, dry, & intact 3/20/2019 11:40 AM  
Dressing Type Tape;Transparent 3/20/2019 11:40 AM  
Hub Color/Line Status Pink;Flushed;Capped 3/20/2019 11:40 AM  
Action Taken Open ports on tubing capped 3/20/2019 11:40 AM  
Alcohol Cap Used Yes 3/20/2019 11:40 AM  
  
 
Opportunity for questions and clarification was provided.

## 2019-03-20 NOTE — ROUTINE PROCESS
Bedside and Verbal shift change report given to Mechelle Bauer RN (oncoming nurse) by Chalino Shah RN (offgoing nurse). Report included the following information SBAR, Kardex, Intake/Output, MAR, Recent Results and Cardiac Rhythm Paced.

## 2019-03-20 NOTE — PROGRESS NOTES
Charlee Gutierrez [facility] 
[address] [facility], [address] GI PROGRESS NOTE [facility] [phone] NP in-hospital cell phone M-F until 4:30 After 5pm or on weekends, please call  for physician on call NAME: Tara Jenkins  
:  1921 MRN:  [id number] Subjective:  
Hemoglobin dropped overnight to 5.9 with robust response to 2 units pRBCs, now 9.3. He denies abdominal pain or nausea. Discussed with RN no bowel movements. Objective: VITALS:  
Last 24hrs VS reviewed since prior progress note. Most recent are: 
Visit Vitals BP (!) 125/32 (BP 1 Location: Left arm, BP Patient Position: At rest) Pulse 66 Temp 98.6 °F (37 °C) Resp 18 Ht 5' 8\" (1.727 m) Wt 69.9 kg (154 lb 1.6 oz) SpO2 96% BMI 23.43 kg/m² PHYSICAL EXAM: 
General: Cooperative, no acute distress   
Neurologic:  Alert and oriented HEENT: EOMI, no scleral icterus, [place] Lungs:  CTA bilaterally. No wheezing Heart:  S1 S2 Abdomen: Soft, non-distended, no tenderness. +Bowel sounds Extremities: No edema Psych:   Good insight. Not anxious or agitated. Lab Data Reviewed:  
 
Recent Results (from the past 24 hour(s)) GLUCOSE, POC Collection Time: 19  2:14 PM  
Result Value Ref Range Glucose (POC) 159 (H) 65 - 100 mg/dL Performed by Rebecca Guevara SAMPLES BEING HELD Collection Time: 19  2:15 PM  
Result Value Ref Range SAMPLES BEING HELD 1RED, 1BLU, 1GRN, 1LAV   
 COMMENT Add-on orders for these samples will be processed based on acceptable specimen integrity and analyte stability, which may vary by analyte. TYPE & SCREEN Collection Time: 19  2:15 PM  
Result Value Ref Range Crossmatch Expiration 2019 ABO/Rh(D) O NEGATIVE Antibody screen POS  Comment    
  precviously identified anti D 
ASC previously reported as NEG result modified on [id number] at [facility] called to Janes [facility]on [address] 
 Antibody ID NO ADDITIONAL ANTIBODIES DETECTED Unit number [id number] Blood component type [facility] Unit division 00 Status of unit TRANSFUSED Crossmatch result Compatible Unit number [id number] Blood component type [facility] Unit division 00 Status of unit ISSUED Crossmatch result Compatible CBC WITH AUTOMATED DIFF Collection Time: 03/19/19  2:15 PM  
Result Value Ref Range WBC 11.3 (H) 4.1 - 11.1 K/uL  
 RBC 1.98 (L) 4.10 - 5.70 M/uL HGB 7.1 (L) 12.1 - 17.0 g/dL HCT 22.3 (L) 36.6 - 50.3 % .6 (H) 80.0 - 99.0 FL  
 MCH 35.9 (H) 26.0 - 34.0 PG  
 MCHC 31.8 30.0 - 36.5 g/dL  
 RDW 18.9 (H) 11.5 - 14.5 % PLATELET 517 447 - 444 K/uL MPV 11.5 8.9 - 12.9 FL  
 NRBC 0.0 0  WBC ABSOLUTE NRBC 0.00 0.00 - 0.01 K/uL NEUTROPHILS 56 32 - 75 % LYMPHOCYTES 32 12 - 49 % MONOCYTES 7 5 - 13 % EOSINOPHILS 4 0 - 7 % BASOPHILS 0 0 - 1 % IMMATURE GRANULOCYTES 1 (H) 0.0 - 0.5 % ABS. NEUTROPHILS 6.3 1.8 - 8.0 K/UL  
 ABS. LYMPHOCYTES 3.6 (H) 0.8 - 3.5 K/UL  
 ABS. MONOCYTES 0.8 0.0 - 1.0 K/UL  
 ABS. EOSINOPHILS 0.5 (H) 0.0 - 0.4 K/UL  
 ABS. BASOPHILS 0.0 0.0 - 0.1 K/UL  
 ABS. IMM. GRANS. 0.1 (H) 0.00 - 0.04 K/UL  
 DF SMEAR SCANNED    
 PLATELET COMMENTS Large Platelets RBC COMMENTS ANISOCYTOSIS 1+ 
    
 RBC COMMENTS MACROCYTOSIS 
1+ METABOLIC PANEL, COMPREHENSIVE Collection Time: 03/19/19  2:15 PM  
Result Value Ref Range Sodium 145 136 - 145 mmol/L Potassium 4.9 3.5 - 5.1 mmol/L Chloride 113 (H) 97 - 108 mmol/L  
 CO2 23 21 - 32 mmol/L Anion gap 9 5 - 15 mmol/L Glucose 151 (H) 65 - 100 mg/dL BUN 81 (H) 6 - 20 MG/DL Creatinine 3.09 (H) 0.70 - 1.30 MG/DL  
 BUN/Creatinine ratio 26 (H) 12 - 20 GFR est AA 23 (L) >60 ml/min/1.73m2 GFR est non-AA 19 (L) >60 ml/min/1.73m2 Calcium 8.2 (L) 8.5 - 10.1 MG/DL  Bilirubin, total 0.4 0.2 - 1.0 MG/DL  
 ALT (SGPT) 16 12 - 78 U/L  
 AST (SGOT) 18 15 - 37 U/L Alk. phosphatase 99 45 - 117 U/L Protein, total 5.7 (L) 6.4 - 8.2 g/dL Albumin 2.7 (L) 3.5 - 5.0 g/dL Globulin 3.0 2.0 - 4.0 g/dL A-G Ratio 0.9 (L) 1.1 - 2.2 PROTHROMBIN TIME + INR Collection Time: 03/19/19  2:15 PM  
Result Value Ref Range INR 1.1 0.9 - 1.1 Prothrombin time 11.2 (H) 9.0 - 11.1 sec TROPONIN I Collection Time: 03/19/19  2:15 PM  
Result Value Ref Range Troponin-I, Qt. <0.05 <0.05 ng/mL PTT Collection Time: 03/19/19  2:15 PM  
Result Value Ref Range aPTT 21.6 (L) 22.1 - 32.0 sec  
 aPTT, therapeutic range     58.0 - 77.0 SECS  
NT-PRO BNP Collection Time: 03/19/19  2:15 PM  
Result Value Ref Range NT pro-BNP 1,430 (H) 0 - 450 PG/ML  
MAGNESIUM Collection Time: 03/19/19  2:15 PM  
Result Value Ref Range Magnesium 2.1 1.6 - 2.4 mg/dL EKG, 12 LEAD, INITIAL Collection Time: 03/19/19  2:43 PM  
Result Value Ref Range Ventricular Rate 60 BPM  
 Atrial Rate 60 BPM  
 P-R Interval 190 ms QRS Duration 192 ms Q-T Interval 524 ms QTC Calculation (Bezet) 524 ms Calculated R Axis -73 degrees Calculated T Axis 93 degrees Diagnosis AV dual-paced rhythm Abnormal ECG When compared with ECG of 13-JAN-2019 23:44, 
Vent. rate has decreased BY   4 BPM 
Confirmed by Apolonia Madison M.D., [facility] ([id number]) on 3/19/2019 4:35:14 PM 
  
LACTIC ACID Collection Time: 03/19/19  2:46 PM  
Result Value Ref Range Lactic acid 2.9 (HH) 0.4 - 2.0 MMOL/L  
OCCULT BLOOD, STOOL Collection Time: 03/19/19  2:46 PM  
Result Value Ref Range Occult blood, stool POSITIVE (A) NEG    
LACTIC ACID Collection Time: 03/19/19  6:41 PM  
Result Value Ref Range Lactic acid 1.8 0.4 - 2.0 MMOL/L  
HGB & HCT Collection Time: 03/19/19  6:41 PM  
Result Value Ref Range HGB 5.9 (LL) 12.1 - 17.0 g/dL HCT 19.4 (L) 36.6 - 50.3 % HEMOGLOBIN A1C WITH EAG Collection Time: 03/19/19  6:41 PM  
Result Value Ref Range Hemoglobin A1c 5.8 4.2 - 6.3 % Est. average glucose 120 mg/dL GLUCOSE, POC Collection Time: 03/19/19  9:23 PM  
Result Value Ref Range Glucose (POC) 130 (H) 65 - 100 mg/dL Performed by Nena Ashley   PCT GLUCOSE, POC Collection Time: 03/20/19  6:24 AM  
Result Value Ref Range Glucose (POC) 111 (H) 65 - 100 mg/dL Performed by Nena [facility]   PCT   
CBC W/O DIFF Collection Time: 03/20/19  7:00 AM  
Result Value Ref Range WBC 7.9 4.1 - 11.1 K/uL  
 RBC 2.76 (L) 4.10 - 5.70 M/uL HGB 9.3 (L) 12.1 - 17.0 g/dL HCT 28.6 (L) 36.6 - 50.3 % .6 (H) 80.0 - 99.0 FL  
 MCH 33.7 26.0 - 34.0 PG  
 MCHC 32.5 30.0 - 36.5 g/dL RDW 21.1 (H) 11.5 - 14.5 % PLATELET 857 664 - 838 K/uL MPV 11.3 8.9 - 12.9 FL  
 NRBC 0.0 0  WBC ABSOLUTE NRBC 0.00 0.00 - 0.01 K/uL METABOLIC PANEL, BASIC Collection Time: 03/20/19  7:00 AM  
Result Value Ref Range Sodium 152 (H) 136 - 145 mmol/L Potassium 4.1 3.5 - 5.1 mmol/L Chloride 122 (H) 97 - 108 mmol/L  
 CO2 19 (L) 21 - 32 mmol/L Anion gap 11 5 - 15 mmol/L Glucose 103 (H) 65 - 100 mg/dL BUN 90 (H) 6 - 20 MG/DL Creatinine 2.78 (H) 0.70 - 1.30 MG/DL  
 BUN/Creatinine ratio 32 (H) 12 - 20 GFR est AA 26 (L) >60 ml/min/1.73m2 GFR est non-AA 21 (L) >60 ml/min/1.73m2 Calcium 8.4 (L) 8.5 - 10.1 MG/DL  
HEMOGLOBIN A1C WITH EAG Collection Time: 03/20/19  7:00 AM  
Result Value Ref Range Hemoglobin A1c 5.8 4.2 - 6.3 % Est. average glucose 120 mg/dL Assessment:  
· GI bleed: anemic with hemoccult positive stool; hgb 7.1-->5.9 with robust response to 2 units pRBC now 9.3; on ASA and Brilinta; melena · CAD status post stents · CHF · CKD Patient Active Problem List  
Diagnosis Code  Essential hypertension, benign I10  
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  Mixed hyperlipidemia E78.2  Coronary atherosclerosis of native coronary artery I25.10  Cardiac pacemaker in situ Z95.0  Sinoatrial node dysfunction (HCC) I49.5  Type 2 diabetes mellitus without complication, without long-term current use of insulin (HCC) E11.9  GERD (gastroesophageal reflux disease) K21.9  Fatigue R53.83  Chronic kidney disease, stage IV (severe) ([facility] [address] N18.4  ASCVD (arteriosclerotic cardiovascular disease) I25.10  Glaucoma H40.9  Neuropathy G62.9  Pernicious anemia D51.0  Hematuria R31.9  Abdominal pain R10.9  Pacemaker Z95.0  Type 2 diabetes mellitus with nephropathy (HCC) E11.21  
 S/P cardiac cath Z98.890  
 GI bleed K92.2  Anemia, blood loss D50.0  Acute gastric ulcer with hemorrhage K25.0  Non-rheumatic mitral regurgitation I34.0  Rectal bleeding K62.5  
 S/P PTCA (percutaneous transluminal coronary angioplasty) Z98.61 Plan:  
· NPO · Cardiology consult pending - discussed with NP 
· Discussed with daughter and she wishes to proceed with EGD if cardiology gives clearance · Hold anticoagulation as able · Continue BID PPI · Trend CBC, transfuse as necessary Signed By: Pancho Suarez NP   
 3/20/2019  9:34 AM  
 
GI Attending: Agree with plan as above for EGD today. Appreciate Cardiology team's input. Gera Pedroza MD

## 2019-03-20 NOTE — PROCEDURES
[address] [address] Vince Weston  [facility]- Gastroduodenoscopy (EGD) Procedure Note Bren Paulson 
4/23/1921 
[id number] Procedure: Endoscopic Gastroduodenoscopy - diagnostic Indication: melena and anemia Pre-operative Diagnosis: see indication above Post-operative Diagnosis: see findings below : Heaven Cano. Reyes Alexander MD 
 
Referring Provider:  Christ Carson MD 
 
 
Anesthesia/Sedation:  MAC anesthesia Propofol Procedure Details After informed consent was obtained for the procedure, with all risks and benefits of procedure explained the patient was taken to the endoscopy suite and placed in the left lateral decubitus position. Following sequential administration of sedation as per above, the endoscope was inserted into the mouth and advanced under direct vision to second portion of the duodenum. A careful inspection was made as the gastroscope was withdrawn, including a retroflexed view of the proximal stomach; findings and interventions are described below. Findings:  
Esophagus: LA Grade A esophagitis in the distal esophagus Stomach: multiple 2-3 mm non-bleeding AVM's distributed throughout gastric body and fundus. Two previously placed Resolution clips were seen in the gastric body (presumably placed during prior endoscopy) Duodenum: normal to second portion Therapies:  none Specimens: none EBL: None Complications:   None; patient tolerated the procedure well. Impression: 1. Multiple non-bleeding gastric AVM's - ablation deferred as these were non-bleeding and patient has been taking Brilinta 2. LA Grade A distal esophagitis Recommendations: 1. PPI for two months 2. Monitor CBC 3. Advance diet as tolerated 4. It appears that patient's Brilinta is not going to be restarted per Cardiology notes.  From a GI perspective, if patient needs to be on anti-platelet/anticoagulation therapy for cardiac purposes, these should be given and if bleeding recurs, we can treat it accordingly. 5. Will follow Signed By: Christopher Thapa.  Fatou Aguilar MD   
 3/20/2019  12:10 PM
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
## 2019-03-21 LAB
ABO + RH BLD: NORMAL
ANION GAP SERPL CALC-SCNC: 8 MMOL/L (ref 5–15)
BLD PROD TYP BPU: NORMAL
BLD PROD TYP BPU: NORMAL
BLOOD BANK CMNT PATIENT-IMP: NORMAL
BLOOD GROUP ANTIBODIES SERPL: NORMAL
BLOOD GROUP ANTIBODIES SERPL: NORMAL
BPU ID: NORMAL
BPU ID: NORMAL
BUN SERPL-MCNC: 76 MG/DL (ref 6–20)
BUN/CREAT SERPL: 30 (ref 12–20)
CALCIUM SERPL-MCNC: 8.2 MG/DL (ref 8.5–10.1)
CHLORIDE SERPL-SCNC: 126 MMOL/L (ref 97–108)
CO2 SERPL-SCNC: 19 MMOL/L (ref 21–32)
CREAT SERPL-MCNC: 2.52 MG/DL (ref 0.7–1.3)
CROSSMATCH RESULT,%XM: NORMAL
CROSSMATCH RESULT,%XM: NORMAL
GLUCOSE BLD STRIP.AUTO-MCNC: 117 MG/DL (ref 65–100)
GLUCOSE BLD STRIP.AUTO-MCNC: 134 MG/DL (ref 65–100)
GLUCOSE BLD STRIP.AUTO-MCNC: 139 MG/DL (ref 65–100)
GLUCOSE BLD STRIP.AUTO-MCNC: 206 MG/DL (ref 65–100)
GLUCOSE SERPL-MCNC: 103 MG/DL (ref 65–100)
HCT VFR BLD AUTO: 23.9 % (ref 36.6–50.3)
HCT VFR BLD AUTO: 24.5 % (ref 36.6–50.3)
HGB BLD-MCNC: 7.5 G/DL (ref 12.1–17)
HGB BLD-MCNC: 7.7 G/DL (ref 12.1–17)
POTASSIUM SERPL-SCNC: 4.2 MMOL/L (ref 3.5–5.1)
SERVICE CMNT-IMP: ABNORMAL
SODIUM SERPL-SCNC: 153 MMOL/L (ref 136–145)
SPECIMEN EXP DATE BLD: NORMAL
STATUS OF UNIT,%ST: NORMAL
STATUS OF UNIT,%ST: NORMAL
UNIT DIVISION, %UDIV: 0
UNIT DIVISION, %UDIV: 0

## 2019-03-21 PROCEDURE — 74011250636 HC RX REV CODE- 250/636: Performed by: FAMILY MEDICINE

## 2019-03-21 PROCEDURE — 36415 COLL VENOUS BLD VENIPUNCTURE: CPT

## 2019-03-21 PROCEDURE — 85018 HEMOGLOBIN: CPT

## 2019-03-21 PROCEDURE — 80048 BASIC METABOLIC PNL TOTAL CA: CPT

## 2019-03-21 PROCEDURE — 74011250636 HC RX REV CODE- 250/636: Performed by: INTERNAL MEDICINE

## 2019-03-21 PROCEDURE — C9113 INJ PANTOPRAZOLE SODIUM, VIA: HCPCS | Performed by: NURSE PRACTITIONER

## 2019-03-21 PROCEDURE — 74011636637 HC RX REV CODE- 636/637: Performed by: FAMILY MEDICINE

## 2019-03-21 PROCEDURE — 65660000000 HC RM CCU STEPDOWN

## 2019-03-21 PROCEDURE — 74011000258 HC RX REV CODE- 258: Performed by: INTERNAL MEDICINE

## 2019-03-21 PROCEDURE — 74011000250 HC RX REV CODE- 250: Performed by: NURSE PRACTITIONER

## 2019-03-21 PROCEDURE — 74011000250 HC RX REV CODE- 250: Performed by: FAMILY MEDICINE

## 2019-03-21 PROCEDURE — 74011250637 HC RX REV CODE- 250/637: Performed by: FAMILY MEDICINE

## 2019-03-21 PROCEDURE — 82962 GLUCOSE BLOOD TEST: CPT

## 2019-03-21 PROCEDURE — 74011250636 HC RX REV CODE- 250/636: Performed by: NURSE PRACTITIONER

## 2019-03-21 RX ORDER — GUAIFENESIN 100 MG/5ML
81 LIQUID (ML) ORAL DAILY
Status: DISCONTINUED | OUTPATIENT
Start: 2019-03-21 | End: 2019-03-21

## 2019-03-21 RX ORDER — CARVEDILOL 6.25 MG/1
6.25 TABLET ORAL 2 TIMES DAILY WITH MEALS
Status: DISCONTINUED | OUTPATIENT
Start: 2019-03-21 | End: 2019-03-27 | Stop reason: HOSPADM

## 2019-03-21 RX ORDER — HYDRALAZINE HYDROCHLORIDE 20 MG/ML
10 INJECTION INTRAMUSCULAR; INTRAVENOUS
Status: DISCONTINUED | OUTPATIENT
Start: 2019-03-21 | End: 2019-03-27 | Stop reason: HOSPADM

## 2019-03-21 RX ORDER — SODIUM BICARBONATE 650 MG/1
650 TABLET ORAL 2 TIMES DAILY
Status: DISCONTINUED | OUTPATIENT
Start: 2019-03-22 | End: 2019-03-27 | Stop reason: HOSPADM

## 2019-03-21 RX ADMIN — Medication 10 ML: at 21:05

## 2019-03-21 RX ADMIN — SODIUM CHLORIDE 50 ML/HR: 900 INJECTION, SOLUTION INTRAVENOUS at 06:59

## 2019-03-21 RX ADMIN — Medication 10 ML: at 06:53

## 2019-03-21 RX ADMIN — CARVEDILOL 6.25 MG: 6.25 TABLET, FILM COATED ORAL at 12:44

## 2019-03-21 RX ADMIN — METOPROLOL TARTRATE 1.25 MG: 5 INJECTION INTRAVENOUS at 06:51

## 2019-03-21 RX ADMIN — Medication 10 ML: at 13:38

## 2019-03-21 RX ADMIN — ASPIRIN 81 MG CHEWABLE TABLET 81 MG: 81 TABLET CHEWABLE at 11:14

## 2019-03-21 RX ADMIN — CARVEDILOL 6.25 MG: 6.25 TABLET, FILM COATED ORAL at 17:32

## 2019-03-21 RX ADMIN — PANTOPRAZOLE SODIUM 40 MG: 40 INJECTION, POWDER, FOR SOLUTION INTRAVENOUS at 08:08

## 2019-03-21 RX ADMIN — INSULIN LISPRO 2 UNITS: 100 INJECTION, SOLUTION INTRAVENOUS; SUBCUTANEOUS at 21:38

## 2019-03-21 RX ADMIN — HYDRALAZINE HYDROCHLORIDE 10 MG: 20 INJECTION INTRAMUSCULAR; INTRAVENOUS at 16:32

## 2019-03-21 RX ADMIN — DESMOPRESSIN ACETATE 21.04 MCG: 4 SOLUTION INTRAVENOUS at 16:23

## 2019-03-21 RX ADMIN — PANTOPRAZOLE SODIUM 40 MG: 40 INJECTION, POWDER, FOR SOLUTION INTRAVENOUS at 21:05

## 2019-03-21 NOTE — PROGRESS NOTES
0745 Bedside and Verbal shift change report given to Sanaz Beavers RN (oncoming nurse) by Laura Griffiths RN (offgoing nurse). Report included the following information SBAR, Kardex, Intake/Output, MAR and Recent Results. Monitor pt labs - has orders for q8h H&H - Hgb has shown improvement since admission. Pt had EGD done today. Pt receiving normal saline at 50 mL/hr. Pt has had no bowel movement since admission and no episodes of vomiting as well. IV protonix administered tonight, next lab draw at 11 p.m. Pt tolerates activity well with clear lung sounds and normal breathing pattern. Condom cath put on tonight due to need of urine sample and pt keeps wetting brief - pt is continent but needs help with urinal and is not calling for help. Diet was advanced today to GI lite - pt has low appetite so diet has not yet been advanced further than that.

## 2019-03-21 NOTE — PROGRESS NOTES
Hospitalist Progress Note Guillermina Small MD 
Answering service: [phone] OR [phone] from in house phone Date of Service:  3/21/2019 NAME:  Rhonda Callaway 
:  1921 MRN:  [id number] Admission Summary:  
C/c :rectal bleeding 
 80 y.o. Male with  PMH CADs/p CABG,ERICA 2018 on ASA, & brilinta ,CKD4,chronic anemia,CHF,AV block s/p pacemakerDM,HLD,GERD. Gastric ulcer Pt p/w dark stool,noted with hematemesis x1 ER 
S/p endoscopy 2018- gastritis,gastric ulcer oozing s/p hemoclips Interval history / Subjective:  
Nose bleeding, hgb 7.5 no rectal bleeding,no bm since yesterday S/p EGD 3/20/19 Assessment & Plan:  
 
GIB,p/w rectal bleeding,hematemesis heme occult + 
-EGD - no bleeding gastric AVM;s- ablation deferred as non bleeding gastric esophagitis 
-IV protonix bid PPI 
-off brilinta 
-resumed asa today  per card Anemia ,acute on chronic  
-due to above  
-- hgb low 5.9 s/p 2 units of PRBC hgb 7.5 
- serial h/h with drop 7.5 Epistaxis today 
-repeat hgb 7.5<-7.7 
 -hold asa SARAH superimposed on CKD4 baseline cr,2.7 
-improved cr. 2.5 from initial 3.09 with IVF hydration 
-held home lasix  
-renal consult appreciated,ddAVP x 1dose,to help reduce risk of uremic plt dysfunction 
 with reduce risk of bleeding CAD 
-s/p CABG, Stent with ERICA asa & brilinta,held as above 
-per card ok to remain off brilinta,given risks of recurrent GIB 
-f/u  with usual cardiologist, dr Satinder Christian at [facility]. 
-asa held today, d/t epistaxis H/o AV block 
-s/p pacemaker  
  
Chronic CHF 
-echo from  with EF 50% 
-cxr with no acute process 
-holding lasix, to avoid dehydration 
  
  
Lactic acidosis,resolved 
-due to hypovolemia 
-IVF hydration, while npo 
  
DM2 
-SSI 
-pt reports ,has boderline DM ,not on meds  
- hgb a1c 5.8 
  
HTN,uncotrolled 
-resume pta coreg 
-d/c iv lopressor 
-add iv hydralazine prn for accelerated bp   
Functional status,PTA,lives at [facility], use walker,sometimes scooter for ambuation Disposition Pt and daughter wish to go snf/rehab [facility], and than transition to assisted living facility, daughter does not want him to go back to independent living facility PT consult,  consult Code status:full code 03/20-Goals of long term care addressed,tom herbert  is mPOA Recommended DNR,status/conservative treatment recommended Pt/sherrell wish to continue with Full code,aggresive management DVT prophylaxis:SCD Care Plan discussed with: pt/nurse/daughter Hospital Problems  Date Reviewed: 3/4/2019 Codes Class Noted POA  
 GI bleed ICD-10-CM: K92.2 ICD-9-CM: 578.9  7/3/2018 Unknown Review of Systems: A comprehensive review of systems was negative except for that written in the HPI. Vital Signs:  
 Last 24hrs VS reviewed since prior progress note. Most recent are: 
Visit Vitals /60 (BP 1 Location: Left arm, BP Patient Position: At rest) Pulse 60 Temp 98.1 °F (36.7 °C) Resp 13 Ht 5' 8\" (1.727 m) Wt 70.1 kg (154 lb 8 oz) SpO2 100% BMI 23.49 kg/m² Intake/Output Summary (Last 24 hours) at 3/21/2019 1551 Last data filed at 3/21/2019 1425 Gross per 24 hour Intake 408.33 ml Output 1400 ml Net -991.67 ml Physical Examination:  
 
 
     
Constitutional:  No acute distress, cooperative, pleasant   
ENT:  Oral mucous moist, oropharynx benign. Neck supple, +bright red blood b/l ant nares Resp:  CTA bilaterally. No wheezing/rhonchi/rales. No accessory muscle use CV:  Regular rhythm, normal rate, no murmurs, gallops, rubs GI:  Soft, non distended, non tender. normoactive bowel sounds, no hepatosplenomegaly Musculoskeletal:  No edema, warm, 2+ pulses throughout Neurologic:  Moves all extremities. AAOx3, CN II-XII reviewed Psych:  mood,affect appropriate Data Review: Review and/or order of clinical lab test 
 
 
Labs:  
 
Recent Labs  
  03/21/19 
1443 03/21/19 
0243  03/20/19 
0700  03/19/19 [address] WBC  --   --   --  7.9  --  11.3* HGB 7.5* 7.7*   < > 9.3*   < > 7.1*  
HCT 23.9* 24.5*   < > 28.6*   < > 22.3*  
PLT  --   --   --  181  --  242  
 < > = values in this interval not displayed. Recent Labs  
  03/21/19 
0244 03/20/19 
0700 03/19/19 [address] * 152* 145  
K 4.2 4.1 4.9 * 122* 113* CO2 19* 19* 23 BUN 76* 90* 81* CREA 2.52* 2.78* 3.09* * 103* 151* CA 8.2* 8.4* 8.2* MG  --   --  2.1 Recent Labs  
  03/19/19 [address] SGOT 18 ALT 16  
AP 99 TBILI 0.4 TP 5.7* ALB 2.7*  
GLOB 3.0 Recent Labs  
  03/19/19 [address] INR 1.1 PTP 11.2* APTT 21.6* No results for input(s): FE, TIBC, PSAT, FERR in the last 72 hours. No results found for: FOL, RBCF No results for input(s): PH, PCO2, PO2 in the last 72 hours. Recent Labs  
  03/19/19 [address] TROIQ <0.05 Lab Results Component Value Date/Time Cholesterol, total 175 06/05/2018 04:08 AM  
 HDL Cholesterol 39 06/05/2018 04:08 AM  
 LDL, calculated 121 (H) 06/05/2018 04:08 AM  
 Triglyceride 75 06/05/2018 04:08 AM  
 CHOL/HDL Ratio 4.5 06/05/2018 04:08 AM  
 
Lab Results Component Value Date/Time Glucose (POC) 139 (H) 03/21/2019 12:35 PM  
 Glucose (POC) 117 (H) 03/21/2019 06:13 AM  
 Glucose (POC) 127 (H) 03/20/2019 09:26 PM  
 Glucose (POC) 146 (H) 03/20/2019 04:39 PM  
 Glucose (POC) 142 (H) 03/20/2019 11:31 AM  
 
Lab Results Component Value Date/Time  Color YELLOW/STRAW 07/03/2018 07:17 PM  
 Appearance CLEAR 07/03/2018 07:17 PM  
 Specific gravity 1.017 07/03/2018 07:17 PM  
 pH (UA) 6.0 07/03/2018 07:17 PM  
 Protein TRACE (A) 07/03/2018 07:17 PM  
 Glucose NEGATIVE  07/03/2018 07:17 PM  
 Ketone NEGATIVE  07/03/2018 07:17 PM  
 Bilirubin NEGATIVE  07/03/2018 07:17 PM  
 Urobilinogen 0.2 07/03/2018 07:17 PM  
 Nitrites NEGATIVE  07/03/2018 07:17 PM  
 Leukocyte Esterase NEGATIVE  07/03/2018 07:17 PM  
 Epithelial cells FEW 07/03/2018 07:17 PM  
 Bacteria NEGATIVE  07/03/2018 07:17 PM  
 WBC 0-4 07/03/2018 07:17 PM  
 RBC 0-5 07/03/2018 07:17 PM  
 
 
 
Medications Reviewed:  
 
Current Facility-Administered Medications Medication Dose Route Frequency  carvedilol (COREG) tablet 6.25 mg  6.25 mg Oral BID WITH MEALS  desmopressin (DDAVP) 21.03 mcg in 0.9% sodium chloride 42.06 mL IV syringe  0.3 mcg/kg IntraVENous ONCE  
 sodium chloride (NS) flush 5-40 mL  5-40 mL IntraVENous Q8H  
 sodium chloride (NS) flush 5-40 mL  5-40 mL IntraVENous PRN  
 sodium chloride (NS) flush 5-40 mL  5-40 mL IntraVENous Q8H  
 sodium chloride (NS) flush 5-40 mL  5-40 mL IntraVENous PRN  pantoprazole (PROTONIX) 40 mg in sodium chloride 0.9% 10 mL injection  40 mg IntraVENous Q12H  
 glucose chewable tablet 16 g  4 Tab Oral PRN  
 dextrose (D50W) injection syrg 12.5-25 g  25-50 mL IntraVENous PRN  
 glucagon (GLUCAGEN) injection 1 mg  1 mg IntraMUSCular PRN  
 insulin lispro (HUMALOG) injection   SubCUTAneous AC&HS  
 0.9% sodium chloride infusion 250 mL  250 mL IntraVENous PRN  
 
______________________________________________________________________ EXPECTED LENGTH OF STAY: 3d 0h 
ACTUAL LENGTH OF STAY:          2 Singh Christine MD

## 2019-03-21 NOTE — PROGRESS NOTES
Selam Gutierrez Mu-ism 
[address] [facility], [address] GI PROGRESS NOTE Will Bradley Trujillo, [phone] office [phone] NP/PA in-hospital cell phone M-F until 4:30PM 
After 5PM or on weekends, please call  for physician on call NAME: Maryam Ugalde  
:  1921 MRN:  [id number] Subjective:  
Patient denies nausea, vomiting, or abdominal pain. No bowel movement yesterday or today. Discussed with RN. Objective: VITALS:  
Last 24hrs VS reviewed since prior progress note. Most recent are: 
Visit Vitals /45 (BP 1 Location: Left arm, BP Patient Position: At rest) Pulse 65 Temp 98.6 °F (37 °C) Resp 16 Ht 5' 8\" (1.727 m) Wt 70.1 kg (154 lb 8 oz) SpO2 98% BMI 23.49 kg/m² PHYSICAL EXAM: 
General: Cooperative, no acute distress   
Neurologic:  Alert and oriented HEENT: EOMI, no scleral icterus Lungs:  CTA bilaterally anteriorly Heart:  S1 S2 Abdomen: Soft, non-distended, no tenderness, no guarding, no rebound. +Bowel sounds. Extremities: Warm Psych:   Not anxious or agitated Lab Data Reviewed:  
 
Recent Results (from the past 24 hour(s)) GLUCOSE, POC Collection Time: 19 11:31 AM  
Result Value Ref Range Glucose (POC) 142 (H) 65 - 100 mg/dL Performed by Douanbeckyhoumy Spaphay P   
HGB & HCT Collection Time: 19  2:47 PM  
Result Value Ref Range HGB 8.4 (L) 12.1 - 17.0 g/dL HCT 27.0 (L) 36.6 - 50.3 % GLUCOSE, POC Collection Time: 19  4:39 PM  
Result Value Ref Range Glucose (POC) 146 (H) 65 - 100 mg/dL Performed by Douangphoumy Spaphay P   
GLUCOSE, POC Collection Time: 19  9:26 PM  
Result Value Ref Range Glucose (POC) 127 (H) 65 - 100 mg/dL Performed by Rosa Maria Kumar   PCT   
HGB & HCT Collection Time: 19  2:43 AM  
Result Value Ref Range HGB 7.7 (L) 12.1 - 17.0 g/dL HCT 24.5 (L) 36.6 - 50.3 % METABOLIC PANEL, BASIC  
 Collection Time: 03/21/19  2:44 AM  
Result Value Ref Range Sodium 153 (H) 136 - 145 mmol/L Potassium 4.2 3.5 - 5.1 mmol/L Chloride 126 (H) 97 - 108 mmol/L  
 CO2 19 (L) 21 - 32 mmol/L Anion gap 8 5 - 15 mmol/L Glucose 103 (H) 65 - 100 mg/dL BUN 76 (H) 6 - 20 MG/DL Creatinine 2.52 (H) 0.70 - 1.30 MG/DL  
 BUN/Creatinine ratio 30 (H) 12 - 20 GFR est AA 29 (L) >60 ml/min/1.73m2 GFR est non-AA 24 (L) >60 ml/min/1.73m2 Calcium 8.2 (L) 8.5 - 10.1 MG/DL  
GLUCOSE, POC Collection Time: 03/21/19  6:13 AM  
Result Value Ref Range Glucose (POC) 117 (H) 65 - 100 mg/dL Performed by Megan Murcia   PCT Assessment:  
· GI bleed: anemia with hemoccult positive stool; melena; on ASA and Brilinta prior to admission. EGD (3/20/19): multiple non-bleeding gastric AVM's (ablation deferred as non-bleeding and patient has been taking Brilinta; LA Grade A distal esophagitis. Hgb 7.7 (8.4 yesterday). · Coronary artery disease status post stents: cardiology following and OK to remain off Brilinta. · Congestive heart failure · Chronic kidney disease Patient Active Problem List  
Diagnosis Code  Essential hypertension, benign I10  
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  Mixed hyperlipidemia E78.2  Coronary atherosclerosis of native coronary artery I25.10  Cardiac pacemaker in situ Z95.0  Sinoatrial node dysfunction (HCC) I49.5  Type 2 diabetes mellitus without complication, without long-term current use of insulin (HCC) E11.9  GERD (gastroesophageal reflux disease) K21.9  Fatigue R53.83  Chronic kidney disease, stage IV (severe) ([address] N18.4  ASCVD (arteriosclerotic cardiovascular disease) I25.10  Glaucoma H40.9  Neuropathy G62.9  Pernicious anemia D51.0  Hematuria R31.9  Abdominal pain R10.9  Pacemaker Z95.0  Type 2 diabetes mellitus with nephropathy (HCC) E11.21  
 S/P cardiac cath Z98.890  
 GI bleed K92.2  Anemia, blood loss D50.0  Acute gastric ulcer with hemorrhage K25.0  Non-rheumatic mitral regurgitation I34.0  Rectal bleeding K62.5  
 S/P PTCA (percutaneous transluminal coronary angioplasty) Z98.61 Plan: · Diet as tolerated · PPI x 2 months · Monitor CBC and transfuse as necessary. Monitor for signs of active GI bleeding. · Cardiology following Signed By: RITA Pickett   
 3/21/2019  10:55 AM 
  
 
GI Attending: Agree with above. Continue PPI. Will see on request. Please call with any questions. Gera Guerrero MD

## 2019-03-21 NOTE — PROGRESS NOTES
Cardiology Progress Note Admit Date: 3/19/2019 Admit Diagnosis: GI bleed [K92.2] GI bleed [K92.2] GI bleed [K92.2] Date: 3/21/2019     Time: 9:12 AM 
 
Subjective: 
Denies CP or SOB. Feeling well this am. 
 
 Assessment and Plan 1. GIB 
           - occult + 
           - HBG 9.3 post 2 units of blood, drifted down to 7.7 today. - Brilinta stopped 
           - EGD Findings:  
  Esophagus: LA Grade A esophagitis in the distal esophagus Stomach: multiple 2-3 mm non-bleeding AVM's distributed throughout gastric body and fundus. Two previously placed Resolution clips were seen in the gastric body (presumably placed during prior endoscopy) Duodenum: normal to second portion - PPI for 2 months 2. CAD 
           - ERICA placed in June 2018 
           - continue aspirin post procedure 
           - continue BB post procedure  
           - OK to remain off Brilinta 3. HF 
           - ECHO 2018 showed EF 50% creatinine 2.52. Continue to hold Lasix 4. CKD 
           - 2.52 
           - Moderate IV hydration 5. HTN 
           - controlled per primary team          
6. DM 
           - controlled per primary team  
 
Endo results as above. Ok to continue off Brilinta, Hgb lower, advanced age and only 2 more months until one year. Remains stable from cardiac standpoint, ok to discharge when ready. Will see again as needed. Cardiology attending: seen and examined. Agree with assess and plan No further obvious bleeding. Exam unchanged. Given advanced age and sig bleed I think risk of continuing brillinta exceeds benefit at this point. He should keep fup with usual cardiologist, dr Liliana Kohc at [facility]. 
  
  
ROS: 
 
 GENERAL Recent weight loss - no Fever -----------------   no 
 Chills -----------------   no 
 
 EYES, VISION  Visual Changes - no 
 
 EARS, NOSE, THROAT 
 Hearing loss ----------- no 
 Swallowing difficulties - no CARDIOVASCULAR Chest pain/pressure ---- no 
 Arrhythmia/palpitations - no RESPIRATORY Cough ------------------ no Shortness of breath - no Wheezing -------------- no  GASTROINTESTINAL Abdominal pain - no Heartburn -------- no 
 Bloody stool ----- no 
 
 GENITOURINARY Frequent urination - no Urgency -------------- no 
 MUSCULOSKELETAL Joint pain/swelling ---- no 
 Musculoskeletal pain - no 
 
 SKIN & INTEGUMENTARY Rashes - no Sores --- no 
 
 
   NEUROLOGICAL Numbness/tingling - no Sensation loss ------ no 
 
 PSYCHIATRIC Nervousness/anxiety - no Depression -------------- no 
 
 ENDOCRINE Heat/cold intolerance - no Excessive thirst -------- no 
 
 HEMATOLOGIC/LYMPHATIC Abnormal bleeding - no ALL/IMMUN Allergic reaction ------ no 
 Recurrent infections - no Objective: 
 
 Physical Exam: 
             
Visit Vitals /45 (BP 1 Location: Left arm, BP Patient Position: At rest) Pulse 65 Temp 98.6 °F (37 °C) Resp 16 Ht 5' 8\" (1.727 m) Wt 154 lb 8 oz (70.1 kg) SpO2 98% BMI 23.49 kg/m² General Appearance:   Alert and oriented x 3, and 
 individual in no acute distress. Ears/Nose/Mouth/Throat:    Hearing grossly normal. 
  
    Neck:  Supple. Chest:    Lungs clear to auscultation bilaterally. Cardiovascular:   Regular rate and rhythm, S1, S2 normal, no murmur. Abdomen:    Soft, non-tender, bowel sounds are active. Extremities:  No edema bilaterally. Skin:  Warm and dry. Telemetry: paced Data Review:  
 Labs:   
Recent Results (from the past 24 hour(s)) GLUCOSE, POC Collection Time: 03/20/19 11:31 AM  
Result Value Ref Range Glucose (POC) 142 (H) 65 - 100 mg/dL Performed by Brian BERNARD   
HGB & HCT Collection Time: 03/20/19  2:47 PM  
Result Value Ref Range HGB 8.4 (L) 12.1 - 17.0 g/dL HCT 27.0 (L) 36.6 - 50.3 % GLUCOSE, POC  
 Collection Time: 03/20/19  4:39 PM  
Result Value Ref Range Glucose (POC) 146 (H) 65 - 100 mg/dL Performed by Brian BERNARD   
GLUCOSE, POC Collection Time: 03/20/19  9:26 PM  
Result Value Ref Range Glucose (POC) 127 (H) 65 - 100 mg/dL Performed by Eduar Stark   PCT METABOLIC PANEL, BASIC Collection Time: 03/21/19  2:44 AM  
Result Value Ref Range Sodium 153 (H) 136 - 145 mmol/L Potassium 4.2 3.5 - 5.1 mmol/L Chloride 126 (H) 97 - 108 mmol/L  
 CO2 19 (L) 21 - 32 mmol/L Anion gap 8 5 - 15 mmol/L Glucose 103 (H) 65 - 100 mg/dL BUN 76 (H) 6 - 20 MG/DL Creatinine 2.52 (H) 0.70 - 1.30 MG/DL  
 BUN/Creatinine ratio 30 (H) 12 - 20 GFR est AA 29 (L) >60 ml/min/1.73m2 GFR est non-AA 24 (L) >60 ml/min/1.73m2 Calcium 8.2 (L) 8.5 - 10.1 MG/DL  
GLUCOSE, POC Collection Time: 03/21/19  6:13 AM  
Result Value Ref Range Glucose (POC) 117 (H) 65 - 100 mg/dL Performed by Eduar Stark   [facility]:  
 
  
Current Facility-Administered Medications Medication Dose Route Frequency  metoprolol (LOPRESSOR) injection 1.25 mg  1.25 mg IntraVENous Q6H  
 sodium chloride (NS) flush 5-40 mL  5-40 mL IntraVENous Q8H  
 sodium chloride (NS) flush 5-40 mL  5-40 mL IntraVENous PRN  
 sodium chloride (NS) flush 5-40 mL  5-40 mL IntraVENous Q8H  
 sodium chloride (NS) flush 5-40 mL  5-40 mL IntraVENous PRN  pantoprazole (PROTONIX) 40 mg in sodium chloride 0.9% 10 mL injection  40 mg IntraVENous Q12H  
 0.9% sodium chloride infusion  50 mL/hr IntraVENous CONTINUOUS  
 glucose chewable tablet 16 g  4 Tab Oral PRN  
 dextrose (D50W) injection syrg 12.5-25 g  25-50 mL IntraVENous PRN  
 glucagon (GLUCAGEN) injection 1 mg  1 mg IntraMUSCular PRN  
 insulin lispro (HUMALOG) injection   SubCUTAneous AC&HS  
 0.9% sodium chloride infusion 250 mL  250 mL IntraVENous PRN Viola Messina. AGGIE Vila M.D. 
 
 [facility] [address], [address] [place], [address] 
 [phone]

## 2019-03-21 NOTE — ROUTINE PROCESS
Bedside and Verbal shift change report given to Roselia Zambrano RN (oncoming nurse) by Claudia Sun RN (offgoing nurse). Report included the following information SBAR, Kardex, Intake/Output, MAR, Recent Results and Cardiac Rhythm Paced.

## 2019-03-21 NOTE — ADT AUTH CERT NOTES
Indicia by Ajay Delgado, RN  
 
   
Review Status Review Entered In Primary 3/19/2019 14:11  
   
Criteria Review 3/19/19 
  
· T99 P 64 RR 11 /60 SPO2 100%. · CXR>NEGATAIVE. EKG>PACED. · POC GLUCOSE 159. STOOL POSITIVE OCCULT BLOOD. BNP 1430. LACTIC 2.9. . GLUCOSE 151. BUN 81. CREAT 3.09. CA 8.2. TP 5.7. ALB 2.7. · FULL CODE. CONTINUOUS OXIMETRY. GLUCOSE MANAGEMENT PER PROTOCOL. CARDIOLOGY CONSULT. NPO. I+O. CONTINUOUS VS. A+P> 
GIB,rectal bleeding,heme occult + 
-IV protonix bid PPI 
-no antiplatelet no AC 
-GI consulted in the ER, appreciated recc's, clears for now  
-npo after midnight 
  
Anemia ,acute on chronic  
-due to above  
-hgb stable 11.3 
-monitor serial h/h 
-transfuse for hgb <7 or active bleeding 
  
  
CAD 
-s/p CABG, Stent with ERICA on asa & brilinta,held as above 
-trop 0.05 
-consult cardiology ,clearance for endoscopy  
  
H/o AV block 
-s/p pacemaker  
  
Chronic CHF 
-echo from nov/2018 with EF 50% 
-cxr with no acute process 
-holding lasix, due to dehydration 
  
CKD4 
- cer 3.09 up from baseline 2.7 
-gentle IV F hydration 
-monitor kidney functions 
  
Lactic acidosis 
-due to hypovolemia -IVF- 
  
DM2 
-SSI 
-pt reports ,has boderline DM ,not on meds  
-check hgb a1c 
  
HTN 
-controlled 
-BP controlled Indicia by Ajay Delgado, JESENIA  
 
   
Review Status Review Entered In Primary 3/20/2019 14:12  
   
Criteria Review 3/20/19 
  
  
· P66 TEMP 98.6 RR 18 SPO2 96%. · RBC 2.76. . . CO2 19. GLUCOSE 103. BUN 90. CREAT 2.78. CA 8.4. 
· FULL CODE. CONTINUOUS OXIMETRY. GLUCOSE MANAGEMENT PER PROTOCOL. NEPHROLOGY AND CARDIOLOGY CONSULT. I+O. CONTINUOUS VS. BMP IN AM. CARDIAC MONITOR. SPOT CHECK OXIMETRY. O2 2 LPM NC PRN. TRANSFUSE PRBCS. · IVNS @ 50ML/HOUR. CARDIOLOGY> 1. GIB                     -occult +                     -HBG 9.3 post 2 units of blood  
                    -EGD likely to be scheduled  
                    -ok to stop Brilinta 
  
 2. CAD 
                    -ERICA placed in June 2018 
                    -continue aspirin post procedure 
                    -continue BB post procedure  
                    -OK to remain off Brilinta 3. HF 
                    -ECHO 2018 showed EF 50%                     creatinine 2.78 hold lasix for now 4. CKD 
                    -2.78 
                    -Moderate IV hydration  
  
5. HTN 
                    -controlled per primary team                
6. DM 
                    -controlled per primary team

## 2019-03-21 NOTE — PROGRESS NOTES
Hospitalist Progress Note Rosita Eisenmenger, MD 
Answering service: [phone] OR [phone] from in house phone Date of Service:  3/21/2019 NAME:  Bren Paulson 
:  1921 MRN:  [id number] Admission Summary:  
C/c :rectal bleeding 
 80 y.o. Male with  PMH CADs/p CABG,ERICA 2018 on ASA, & brilinta ,CKD4,chronic anemia,CHF,AV block s/p pacemakerDM,HLD,GERD. Gastric ulcer Pt p/w dark stool,noted with hematemesis x1 ER 
S/p endoscopy 2018- gastritis,gastric ulcer oozing s/p hemoclips Interval history / Subjective:  
Overnight ,hgb dropped to 5.9, s/p 2 units PRBC, denies overnight bleeding no nausea,vomting 
abd pain Assessment & Plan:  
 
GIB,p/w rectal bleeding,hematemesis heme occult + 
-IV protonix bid PPI 
-no antiplatelet no AC 
-GI f/u, egd pending cardiology clearace -npo after midnight 
  
Anemia ,acute on chronic  
-due to above  
-s/p 2 units of PRBC on  
-improved hgb 
-monitor serial h/h 
-transfuse for hgb <7 or active bleeding 
  
  
CAD 
-s/p CABG, Stent with ERICA on asa & brilinta,held as above 
-trop 0.05 
-consult cardiology, awiat recc;s 
  
H/o AV block 
-s/p pacemaker  
  
Chronic CHF 
-echo from  with EF 50% 
-cxr with no acute process 
-holding lasix, to avoid dehydration 
  
SARAH superimposed on CKD4 baseline cr,2.7 
-IVF hydration 
-Scr. imporved  2.7 from 3.09  
-monitor kidney functions 
-avoid meds with renal toxicity 
  
Lactic acidosis,resolved 
-due to hypovolemia 
-IVF hydration, while npo 
  
DM2 
-SSI 
-pt reports ,has boderline DM ,not on meds  
- hgb a1c 5.8 
  
HTN 
-pta coreg on hold 
-start low iv lopressor,while npo 
 
  
Functional status,PTA,lives at [facility], use walker,sometimes scooter for ambuation Code status:full code Goals of long term care addressed,tom herbert  is mPOA Recommended DNR,status/conservative treatment recommended Pt/sherrell wish to continue with Full code,aggresive management including endoscopy,if cleared by cardiology DVT prophylaxis:SCD Care Plan discussed with: pt/nurse Disposition:tbd, pending card/GI Hospital Problems  Date Reviewed: 3/4/2019 Codes Class Noted POA  
 GI bleed ICD-10-CM: K92.2 ICD-9-CM: 578.9  7/3/2018 Unknown Review of Systems: A comprehensive review of systems was negative except for that written in the HPI. Vital Signs:  
 Last 24hrs VS reviewed since prior progress note. Most recent are: 
Visit Vitals /45 (BP 1 Location: Left arm, BP Patient Position: At rest) Pulse 65 Temp 98.6 °F (37 °C) Resp 16 Ht 5' 8\" (1.727 m) Wt 70.1 kg (154 lb 8 oz) SpO2 98% BMI 23.49 kg/m² Intake/Output Summary (Last 24 hours) at 3/21/2019 1149 Last data filed at 3/21/2019 1040 Gross per 24 hour Intake 375 ml Output 1200 ml Net -825 ml Physical Examination:  
 
 
     
Constitutional:  No acute distress, cooperative, pleasant   
ENT:  Oral mucous moist, oropharynx benign. Neck supple, Resp:  CTA bilaterally. No wheezing/rhonchi/rales. No accessory muscle use CV:  Regular rhythm, normal rate, no murmurs, gallops, rubs GI:  Soft, non distended, non tender. normoactive bowel sounds, no hepatosplenomegaly Musculoskeletal:  No edema, warm, 2+ pulses throughout Neurologic:  Moves all extremities. AAOx3, CN II-XII reviewed Psych:  mood,affect appropriate Data Review:  
 Review and/or order of clinical lab test 
 
 
Labs:  
 
Recent Labs  
  03/21/19 
0243 03/20/19 
1447 03/20/19 
0700  03/19/19 [id number] 68 71 79 WBC  --   --  7.9  --  11.3* HGB 7.7* 8.4* 9.3*   < > 7.1*  
HCT 24.5* 27.0* 28.6*   < > 22.3*  
PLT  --   --  181  --  242  
 < > = values in this interval not displayed. Recent Labs  
  03/21/19 
0244 03/20/19 
0700 03/19/19 [id number] 68 71 79 * 152* 145  
K 4.2 4.1 4.9 * 122* 113* CO2 19* 19* 23  
 BUN 76* 90* 81* CREA 2.52* 2.78* 3.09* * 103* 151* CA 8.2* 8.4* 8.2* MG  --   --  2.1 Recent Labs  
  03/19/19 [address] SGOT 18 ALT 16  
AP 99 TBILI 0.4 TP 5.7* ALB 2.7*  
GLOB 3.0 Recent Labs  
  03/19/19 [address] INR 1.1 PTP 11.2* APTT 21.6* No results for input(s): FE, TIBC, PSAT, FERR in the last 72 hours. No results found for: FOL, RBCF No results for input(s): PH, PCO2, PO2 in the last 72 hours. Recent Labs  
  03/19/19 [address] TROIQ <0.05 Lab Results Component Value Date/Time Cholesterol, total 175 06/05/2018 04:08 AM  
 HDL Cholesterol 39 06/05/2018 04:08 AM  
 LDL, calculated 121 (H) 06/05/2018 04:08 AM  
 Triglyceride 75 06/05/2018 04:08 AM  
 CHOL/HDL Ratio 4.5 06/05/2018 04:08 AM  
 
Lab Results Component Value Date/Time Glucose (POC) 117 (H) 03/21/2019 06:13 AM  
 Glucose (POC) 127 (H) 03/20/2019 09:26 PM  
 Glucose (POC) 146 (H) 03/20/2019 04:39 PM  
 Glucose (POC) 142 (H) 03/20/2019 11:31 AM  
 Glucose (POC) 111 (H) 03/20/2019 06:24 AM  
 
Lab Results Component Value Date/Time Color YELLOW/STRAW 07/03/2018 07:17 PM  
 Appearance CLEAR 07/03/2018 07:17 PM  
 Specific gravity 1.017 07/03/2018 07:17 PM  
 pH (UA) 6.0 07/03/2018 07:17 PM  
 Protein TRACE (A) 07/03/2018 07:17 PM  
 Glucose NEGATIVE  07/03/2018 07:17 PM  
 Ketone NEGATIVE  07/03/2018 07:17 PM  
 Bilirubin NEGATIVE  07/03/2018 07:17 PM  
 Urobilinogen 0.2 07/03/2018 07:17 PM  
 Nitrites NEGATIVE  07/03/2018 07:17 PM  
 Leukocyte Esterase NEGATIVE  07/03/2018 07:17 PM  
 Epithelial cells FEW 07/03/2018 07:17 PM  
 Bacteria NEGATIVE  07/03/2018 07:17 PM  
 WBC 0-4 07/03/2018 07:17 PM  
 RBC 0-5 07/03/2018 07:17 PM  
 
 
 
Medications Reviewed:  
 
Current Facility-Administered Medications Medication Dose Route Frequency  aspirin chewable tablet 81 mg  81 mg Oral DAILY  carvedilol (COREG) tablet 6.25 mg  6.25 mg Oral BID WITH MEALS  
  sodium chloride (NS) flush 5-40 mL  5-40 mL IntraVENous Q8H  
 sodium chloride (NS) flush 5-40 mL  5-40 mL IntraVENous PRN  
 sodium chloride (NS) flush 5-40 mL  5-40 mL IntraVENous Q8H  
 sodium chloride (NS) flush 5-40 mL  5-40 mL IntraVENous PRN  pantoprazole (PROTONIX) 40 mg in sodium chloride 0.9% 10 mL injection  40 mg IntraVENous Q12H  
 glucose chewable tablet 16 g  4 Tab Oral PRN  
 dextrose (D50W) injection syrg 12.5-25 g  25-50 mL IntraVENous PRN  
 glucagon (GLUCAGEN) injection 1 mg  1 mg IntraMUSCular PRN  
 insulin lispro (HUMALOG) injection   SubCUTAneous AC&HS  
 0.9% sodium chloride infusion 250 mL  250 mL IntraVENous PRN  
 
______________________________________________________________________ EXPECTED LENGTH OF STAY: 3d 0h 
ACTUAL LENGTH OF STAY:          2 Guillermina Small MD

## 2019-03-21 NOTE — CONSULTS
Pt seen. Consult to be dictated A: 
SARAH on CKD-4 Hypernatremia Acidosis Anemia: GI bleed + nose bleeds+ ACKD HTN SARAH improving. Got Aranesp shot at our office couple days ago PTA 
 
P: 
PRN Transfusion No need for CECELIA- just got it as outpt Ct current meds Add NaHCO3 Will give a dose of ddAVP to help reduce risks of uremic plts dysfunction and help reduce risk of bleeding Encourage oral hydration. If Na not better in am, then would give 1/2 NS or D5W Leslie Vargas MD 
[id number] Actual Experience

## 2019-03-21 NOTE — ROUTINE PROCESS
Patient's nose is bleeding. Notified Dr. Mani Pendleton. Orders received to discontinue Aspirin and check H&H at 1500.

## 2019-03-22 LAB
ANION GAP SERPL CALC-SCNC: 6 MMOL/L (ref 5–15)
BASOPHILS # BLD: 0 K/UL (ref 0–0.1)
BASOPHILS NFR BLD: 0 % (ref 0–1)
BUN SERPL-MCNC: 74 MG/DL (ref 6–20)
BUN/CREAT SERPL: 29 (ref 12–20)
CALCIUM SERPL-MCNC: 8.3 MG/DL (ref 8.5–10.1)
CHLORIDE SERPL-SCNC: 125 MMOL/L (ref 97–108)
CO2 SERPL-SCNC: 19 MMOL/L (ref 21–32)
CREAT SERPL-MCNC: 2.51 MG/DL (ref 0.7–1.3)
DIFFERENTIAL METHOD BLD: ABNORMAL
EOSINOPHIL # BLD: 0.5 K/UL (ref 0–0.4)
EOSINOPHIL NFR BLD: 7 % (ref 0–7)
ERYTHROCYTE [DISTWIDTH] IN BLOOD BY AUTOMATED COUNT: 21.2 % (ref 11.5–14.5)
GLUCOSE BLD STRIP.AUTO-MCNC: 137 MG/DL (ref 65–100)
GLUCOSE BLD STRIP.AUTO-MCNC: 164 MG/DL (ref 65–100)
GLUCOSE BLD STRIP.AUTO-MCNC: 205 MG/DL (ref 65–100)
GLUCOSE BLD STRIP.AUTO-MCNC: 214 MG/DL (ref 65–100)
GLUCOSE SERPL-MCNC: 89 MG/DL (ref 65–100)
HCT VFR BLD AUTO: 23.8 % (ref 36.6–50.3)
HGB BLD-MCNC: 7.2 G/DL (ref 12.1–17)
IMM GRANULOCYTES # BLD AUTO: 0.1 K/UL (ref 0–0.04)
IMM GRANULOCYTES NFR BLD AUTO: 1 % (ref 0–0.5)
LYMPHOCYTES # BLD: 2.1 K/UL (ref 0.8–3.5)
LYMPHOCYTES NFR BLD: 27 % (ref 12–49)
MCH RBC QN AUTO: 32.7 PG (ref 26–34)
MCHC RBC AUTO-ENTMCNC: 30.3 G/DL (ref 30–36.5)
MCV RBC AUTO: 108.2 FL (ref 80–99)
MONOCYTES # BLD: 0.7 K/UL (ref 0–1)
MONOCYTES NFR BLD: 9 % (ref 5–13)
NEUTS SEG # BLD: 4.2 K/UL (ref 1.8–8)
NEUTS SEG NFR BLD: 56 % (ref 32–75)
NRBC # BLD: 0 K/UL (ref 0–0.01)
NRBC BLD-RTO: 0 PER 100 WBC
PLATELET # BLD AUTO: 156 K/UL (ref 150–400)
PLATELET COMMENTS,PCOM: ABNORMAL
PMV BLD AUTO: 11.4 FL (ref 8.9–12.9)
POTASSIUM SERPL-SCNC: 4 MMOL/L (ref 3.5–5.1)
RBC # BLD AUTO: 2.2 M/UL (ref 4.1–5.7)
RBC MORPH BLD: ABNORMAL
SERVICE CMNT-IMP: ABNORMAL
SODIUM SERPL-SCNC: 150 MMOL/L (ref 136–145)
WBC # BLD AUTO: 7.6 K/UL (ref 4.1–11.1)

## 2019-03-22 PROCEDURE — 85025 COMPLETE CBC W/AUTO DIFF WBC: CPT

## 2019-03-22 PROCEDURE — 97530 THERAPEUTIC ACTIVITIES: CPT

## 2019-03-22 PROCEDURE — 74011250637 HC RX REV CODE- 250/637: Performed by: INTERNAL MEDICINE

## 2019-03-22 PROCEDURE — 74011250636 HC RX REV CODE- 250/636: Performed by: NURSE PRACTITIONER

## 2019-03-22 PROCEDURE — 74011000250 HC RX REV CODE- 250: Performed by: NURSE PRACTITIONER

## 2019-03-22 PROCEDURE — 80048 BASIC METABOLIC PNL TOTAL CA: CPT

## 2019-03-22 PROCEDURE — C9113 INJ PANTOPRAZOLE SODIUM, VIA: HCPCS | Performed by: NURSE PRACTITIONER

## 2019-03-22 PROCEDURE — 97161 PT EVAL LOW COMPLEX 20 MIN: CPT

## 2019-03-22 PROCEDURE — 74011636637 HC RX REV CODE- 636/637: Performed by: FAMILY MEDICINE

## 2019-03-22 PROCEDURE — 36415 COLL VENOUS BLD VENIPUNCTURE: CPT

## 2019-03-22 PROCEDURE — 74011250637 HC RX REV CODE- 250/637: Performed by: FAMILY MEDICINE

## 2019-03-22 PROCEDURE — 82962 GLUCOSE BLOOD TEST: CPT

## 2019-03-22 PROCEDURE — 65660000000 HC RM CCU STEPDOWN

## 2019-03-22 RX ORDER — GUAIFENESIN 100 MG/5ML
81 LIQUID (ML) ORAL DAILY
Status: DISCONTINUED | OUTPATIENT
Start: 2019-03-22 | End: 2019-03-27 | Stop reason: HOSPADM

## 2019-03-22 RX ADMIN — PANTOPRAZOLE SODIUM 40 MG: 40 INJECTION, POWDER, FOR SOLUTION INTRAVENOUS at 08:01

## 2019-03-22 RX ADMIN — SODIUM BICARBONATE 650 MG: 650 TABLET ORAL at 17:21

## 2019-03-22 RX ADMIN — SODIUM BICARBONATE 650 MG: 650 TABLET ORAL at 08:01

## 2019-03-22 RX ADMIN — INSULIN LISPRO 2 UNITS: 100 INJECTION, SOLUTION INTRAVENOUS; SUBCUTANEOUS at 21:28

## 2019-03-22 RX ADMIN — CARVEDILOL 6.25 MG: 6.25 TABLET, FILM COATED ORAL at 16:19

## 2019-03-22 RX ADMIN — Medication 10 ML: at 05:01

## 2019-03-22 RX ADMIN — Medication 10 ML: at 20:43

## 2019-03-22 RX ADMIN — PANTOPRAZOLE SODIUM 40 MG: 40 INJECTION, POWDER, FOR SOLUTION INTRAVENOUS at 20:37

## 2019-03-22 RX ADMIN — ASPIRIN 81 MG CHEWABLE TABLET 81 MG: 81 TABLET CHEWABLE at 17:59

## 2019-03-22 RX ADMIN — Medication 10 ML: at 13:50

## 2019-03-22 RX ADMIN — INSULIN LISPRO 2 UNITS: 100 INJECTION, SOLUTION INTRAVENOUS; SUBCUTANEOUS at 06:38

## 2019-03-22 RX ADMIN — PANTOPRAZOLE SODIUM 40 MG: 40 INJECTION, POWDER, FOR SOLUTION INTRAVENOUS at 20:38

## 2019-03-22 RX ADMIN — CARVEDILOL 6.25 MG: 6.25 TABLET, FILM COATED ORAL at 07:58

## 2019-03-22 RX ADMIN — Medication 10 ML: at 20:44

## 2019-03-22 RX ADMIN — INSULIN LISPRO 3 UNITS: 100 INJECTION, SOLUTION INTRAVENOUS; SUBCUTANEOUS at 11:33
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
## 2019-03-22 NOTE — PROGRESS NOTES
Spiritual Care Partner Volunteer visited patient in Rm 409 on 3/22/19. Documented by: Chaplain Bloom MDiv, MACE 
[id number]

## 2019-03-22 NOTE — PROGRESS NOTES
Hospitalist Progress Note Zaheer Garrido MD 
Answering service: [phone] OR [phone] from in house phone Date of Service:  3/22/2019 NAME:  Sheyla Hubbard 
:  1921 MRN:  [id number] Admission Summary:  
C/c :rectal bleeding 
 80 y.o. Male with  PMH CADs/p CABG,ERICA 2018 on ASA, & brilinta ,CKD4,chronic anemia,CHF,AV block s/p pacemakerDM,HLD,GERD. Gastric ulcer Pt p/w dark stool,noted with hematemesis x1 ER 
S/p endoscopy 2018- gastritis,gastric ulcer oozing s/p hemoclips Interval history / Subjective: No active bleeding, hgb 7.2 Assessment & Plan:  
 
GIB,p/w rectal bleeding,hematemesis heme occult + 
-EGD - no bleeding gastric AVM;s- ablation deferred as non bleeding gastric esophagitis 
-IV protonix bid PPI 
-off brilinta 
-resumed asa today  per card , held d/t to nose bleed Anemia ,acute on chronic  
-due to above  
-- hgb low 5.9 s/p 2 units of PRBC hgb 7.5 
- serial h/h with dropping   7.2,-7.5 Epistaxis resolved 
-repeat hgb 7.2,-7.5<-7.7 
 -off brilinta 
-resume asa monitor closely for active bleed SARAH superimposed on CKD4 baseline cr,2.7 
-improved /stable cr. 2.5 from initial 3.09 with IVF hydration 
-held home lasix  
-renal consult appreciated,ddAVP x 1dose (on ,to help reduce risk of uremic plt  dysfunction 
 with reduce risk of bleeding Hypernatremia -na trending down 
-encourage po hydration per renal 
 
Acidosis 
-po bicarb CAD 
-s/p CABG, Stent with ERICA asa & brilinta,held as above 
-per card ok to remain off brilinta,given risks of recurrent GIB 
-f/u  with usual cardiologist, dr Estuardo Marroquin at [facility]. 
-held asa d/t nose bleed,and ongoing anemia H/o AV block 
-s/p pacemaker  
  
Chronic CHF 
-echo from  with EF 50% 
-cxr with no acute process 
-holding lasix d/t sarah 
  
  
Lactic acidosis,resolved 
-due to hypovolemia 
-IVF hydration, while npo 
  
DM2 
-SSI -pt reports ,has boderline DM ,not on meds  
- hgb a1c 5.8 
  
HTN,uncotrolled/labile - pta coreg 
- iv hydralazine prn for accelerated bp 
 
 
 
 
  
Functional status,PTA,lives at [facility], use walker,sometimes scooter for ambuation Disposition Pt and daughter wish to go snf/rehab [facility], and than transition to assisted living facility, daughter does not want him to go back to independent living facility PT consult,  consuled Code status:full code 03/20-Goals of long term care addressed,tom jurgen herbert  is mPOA Recommended DNR,status/conservative treatment recommended Pt/sherrell wish to continue with Full code,aggresive management DVT prophylaxis:SCD Care Plan discussed with: pt/nurse/daughter Hospital Problems  Date Reviewed: 3/4/2019 Codes Class Noted POA  
 GI bleed ICD-10-CM: K92.2 ICD-9-CM: 578.9  7/3/2018 Unknown Review of Systems: A comprehensive review of systems was negative except for that written in the HPI. Vital Signs:  
 Last 24hrs VS reviewed since prior progress note. Most recent are: 
Visit Vitals /52 (BP 1 Location: Left arm, BP Patient Position: At rest;Supine) Pulse 60 Temp 97.3 °F (36.3 °C) Resp 16 Ht 5' 8\" (1.727 m) Wt 70.2 kg (154 lb 12.8 oz) SpO2 99% BMI 23.54 kg/m² Intake/Output Summary (Last 24 hours) at 3/22/2019 1722 Last data filed at 3/22/2019 1130 Gross per 24 hour Intake 120 ml Output 750 ml Net -630 ml Physical Examination:  
 
 
     
Constitutional:  No acute distress, cooperative, pleasant   
ENT:  Oral mucous moist, oropharynx benign. Neck supple, +bright red blood b/l ant nares Resp:  CTA bilaterally. No wheezing/rhonchi/rales. No accessory muscle use CV:  Regular rhythm, normal rate, no murmurs, gallops, rubs GI:  Soft, non distended, non tender. normoactive bowel sounds, no hepatosplenomegaly Musculoskeletal:  No edema, warm, 2+ pulses throughout Neurologic:  Moves all extremities. AAOx3, CN II-XII reviewed Psych:  mood,affect appropriate Data Review:  
 Review and/or order of clinical lab test 
 
 
Labs:  
 
Recent Labs  
  03/22/19 
0234 03/21/19 
1443  03/20/19 
0700 WBC 7.6  --   --  7.9 HGB 7.2* 7.5*   < > 9.3* HCT 23.8* 23.9*   < > 28.6*  
  --   --  181  
 < > = values in this interval not displayed. Recent Labs  
  03/22/19 
0234 03/21/19 
0244 03/20/19 
0700 * 153* 152* K 4.0 4.2 4.1 * 126* 122* CO2 19* 19* 19* BUN 74* 76* 90* CREA 2.51* 2.52* 2.78* GLU 89 103* 103* CA 8.3* 8.2* 8.4* No results for input(s): SGOT, GPT, ALT, AP, TBIL, TBILI, TP, ALB, GLOB, GGT, AML, LPSE in the last 72 hours. No lab exists for component: AMYP, HLPSE No results for input(s): INR, PTP, APTT in the last 72 hours. No lab exists for component: INREXT, INREXT No results for input(s): FE, TIBC, PSAT, FERR in the last 72 hours. No results found for: FOL, RBCF No results for input(s): PH, PCO2, PO2 in the last 72 hours. No results for input(s): CPK, CKNDX, TROIQ in the last 72 hours. No lab exists for component: CPKMB Lab Results Component Value Date/Time Cholesterol, total 175 06/05/2018 04:08 AM  
 HDL Cholesterol 39 06/05/2018 04:08 AM  
 LDL, calculated 121 (H) 06/05/2018 04:08 AM  
 Triglyceride 75 06/05/2018 04:08 AM  
 CHOL/HDL Ratio 4.5 06/05/2018 04:08 AM  
 
Lab Results Component Value Date/Time Glucose (POC) 137 (H) 03/22/2019 04:14 PM  
 Glucose (POC) 205 (H) 03/22/2019 11:23 AM  
 Glucose (POC) 164 (H) 03/22/2019 06:19 AM  
 Glucose (POC) 206 (H) 03/21/2019 09:30 PM  
 Glucose (POC) 134 (H) 03/21/2019 04:41 PM  
 
Lab Results Component Value Date/Time  Color YELLOW/STRAW 07/03/2018 07:17 PM  
 Appearance CLEAR 07/03/2018 07:17 PM  
 Specific gravity 1.017 07/03/2018 07:17 PM  
 pH (UA) 6.0 07/03/2018 07:17 PM  
 Protein TRACE (A) 07/03/2018 07:17 PM  
 Glucose NEGATIVE  07/03/2018 07:17 PM  
 Ketone NEGATIVE  07/03/2018 07:17 PM  
 Bilirubin NEGATIVE  07/03/2018 07:17 PM  
 Urobilinogen 0.2 07/03/2018 07:17 PM  
 Nitrites NEGATIVE  07/03/2018 07:17 PM  
 Leukocyte Esterase NEGATIVE  07/03/2018 07:17 PM  
 Epithelial cells FEW 07/03/2018 07:17 PM  
 Bacteria NEGATIVE  07/03/2018 07:17 PM  
 WBC 0-4 07/03/2018 07:17 PM  
 RBC 0-5 07/03/2018 07:17 PM  
 
 
 
Medications Reviewed:  
 
Current Facility-Administered Medications Medication Dose Route Frequency  carvedilol (COREG) tablet 6.25 mg  6.25 mg Oral BID WITH MEALS  
 hydrALAZINE (APRESOLINE) 20 mg/mL injection 10 mg  10 mg IntraVENous Q6H PRN  
 sodium bicarbonate tablet 650 mg  650 mg Oral BID  sodium chloride (NS) flush 5-40 mL  5-40 mL IntraVENous Q8H  
 sodium chloride (NS) flush 5-40 mL  5-40 mL IntraVENous PRN  
 sodium chloride (NS) flush 5-40 mL  5-40 mL IntraVENous Q8H  
 sodium chloride (NS) flush 5-40 mL  5-40 mL IntraVENous PRN  pantoprazole (PROTONIX) 40 mg in sodium chloride 0.9% 10 mL injection  40 mg IntraVENous Q12H  
 glucose chewable tablet 16 g  4 Tab Oral PRN  
 dextrose (D50W) injection syrg 12.5-25 g  25-50 mL IntraVENous PRN  
 glucagon (GLUCAGEN) injection 1 mg  1 mg IntraMUSCular PRN  
 insulin lispro (HUMALOG) injection   SubCUTAneous AC&HS  
 0.9% sodium chloride infusion 250 mL  250 mL IntraVENous PRN  
 
______________________________________________________________________ EXPECTED LENGTH OF STAY: 3d 0h 
ACTUAL LENGTH OF STAY:          3 Singh Christine MD

## 2019-03-22 NOTE — PROGRESS NOTES
Name: Maribel Ferris  
MRN: [id number] : 1921 Assessment: 
SARAH on CKD-4 Hypernatremia Acidosis Anemia: GI bleed + nose bleeds+ ACKD; s/p dose of Aranesp of 110 mcg at our office on 3/14 PTA 
HTN 
  
SARAH improving and Cr back to basln of 2.5. Hgb OK. S/p dose of ddAVP on 3/21 by me. Nose bleeds stopped 
  
Plan/Recommendations: 
Encourage oral hydration-Na trending down PRN Transfusion No need for CECELIA- just got it as outpt Ct current meds Add NaHCO3 Will check back on Monday, if pt still here. Call us with q's over the wknd Subjective: 
Up in room, going for PT. Nose bleeds stopped No acute c/o ROS:  
No nausea, no vomiting No chest pain, no shortness of breath Exam: 
Visit Vitals /46 (BP 1 Location: Left arm, BP Patient Position: At rest;Supine) Pulse 60 Temp 98.3 °F (36.8 °C) Resp 14 Ht 5' 8\" (1.727 m) Wt 70.2 kg (154 lb 12.8 oz) SpO2 99% BMI 23.54 kg/m² Frail, elderly male, in NAD Pallor+, no icterus No nasal bleed No edema AOx3 Current Facility-Administered Medications Medication Dose Route Frequency Last Dose  carvedilol (COREG) tablet 6.25 mg  6.25 mg Oral BID WITH MEALS 6.25 mg at 19 1306  hydrALAZINE (APRESOLINE) 20 mg/mL injection 10 mg  10 mg IntraVENous Q6H PRN 10 mg at 19 1632  sodium bicarbonate tablet 650 mg  650 mg Oral  mg at 19 0801  
 sodium chloride (NS) flush 5-40 mL  5-40 mL IntraVENous Q8H 10 mL at 19 0501  
 sodium chloride (NS) flush 5-40 mL  5-40 mL IntraVENous PRN    
 sodium chloride (NS) flush 5-40 mL  5-40 mL IntraVENous Q8H 10 mL at 19 0501  
 sodium chloride (NS) flush 5-40 mL  5-40 mL IntraVENous PRN  pantoprazole (PROTONIX) 40 mg in sodium chloride 0.9% 10 mL injection  40 mg IntraVENous Q12H 40 mg at 19 0801  glucose chewable tablet 16 g  4 Tab Oral PRN    
 dextrose (D50W) injection syrg 12.5-25 g  25-50 mL IntraVENous PRN    
 glucagon (GLUCAGEN) injection 1 mg  1 mg IntraMUSCular PRN    
 insulin lispro (HUMALOG) injection   SubCUTAneous AC&HS 3 Units at 03/22/19 1133  
 0.9% sodium chloride infusion 250 mL  250 mL IntraVENous PRN Labs/Data: 
 
Lab Results Component Value Date/Time WBC 7.6 03/22/2019 02:34 AM  
 HGB (POC) 10.4 (A) 08/06/2018 02:48 PM  
 HGB 7.2 (L) 03/22/2019 02:34 AM  
 Hematocrit, POC 37 04/09/2018 03:31 PM  
 HCT (POC) 31.0 (A) 08/06/2018 02:48 PM  
 HCT 23.8 (L) 03/22/2019 02:34 AM  
 PLATELET 835 34/48/7163 02:34 AM  
 .2 (H) 03/22/2019 02:34 AM  
 
 
Lab Results Component Value Date/Time Sodium 150 (H) 03/22/2019 02:34 AM  
 Potassium 4.0 03/22/2019 02:34 AM  
 Chloride 125 (H) 03/22/2019 02:34 AM  
 CO2 19 (L) 03/22/2019 02:34 AM  
 Anion gap 6 03/22/2019 02:34 AM  
 Glucose 89 03/22/2019 02:34 AM  
 BUN 74 (H) 03/22/2019 02:34 AM  
 Creatinine 2.51 (H) 03/22/2019 02:34 AM  
 BUN/Creatinine ratio 29 (H) 03/22/2019 02:34 AM  
 GFR est AA 29 (L) 03/22/2019 02:34 AM  
 GFR est non-AA 24 (L) 03/22/2019 02:34 AM  
 Calcium 8.3 (L) 03/22/2019 02:34 AM  
 
 
Wt Readings from Last 3 Encounters:  
03/22/19 70.2 kg (154 lb 12.8 oz) 03/04/19 72.1 kg (159 lb)  
02/14/19 74.1 kg (163 lb 6.4 oz) Intake/Output Summary (Last 24 hours) at 3/22/2019 1306 Last data filed at 3/22/2019 1130 Gross per 24 hour Intake 170 ml Output 1125 ml Net -955 ml Patient seen and examined. Chart reviewed. Labs, data and other pertinent notes reviewed in last 24 hrs. PMH/SH/FH reviewed and unchanged compared to H&P Discussed with pt Rosalina Carrillo MD

## 2019-03-23 LAB
ANION GAP SERPL CALC-SCNC: 9 MMOL/L (ref 5–15)
BUN SERPL-MCNC: 63 MG/DL (ref 6–20)
BUN/CREAT SERPL: 26 (ref 12–20)
CALCIUM SERPL-MCNC: 8.4 MG/DL (ref 8.5–10.1)
CHLORIDE SERPL-SCNC: 124 MMOL/L (ref 97–108)
CO2 SERPL-SCNC: 17 MMOL/L (ref 21–32)
CREAT SERPL-MCNC: 2.45 MG/DL (ref 0.7–1.3)
GLUCOSE BLD STRIP.AUTO-MCNC: 109 MG/DL (ref 65–100)
GLUCOSE BLD STRIP.AUTO-MCNC: 132 MG/DL (ref 65–100)
GLUCOSE BLD STRIP.AUTO-MCNC: 132 MG/DL (ref 65–100)
GLUCOSE BLD STRIP.AUTO-MCNC: 149 MG/DL (ref 65–100)
GLUCOSE SERPL-MCNC: 97 MG/DL (ref 65–100)
HCT VFR BLD AUTO: 23.4 % (ref 36.6–50.3)
HGB BLD-MCNC: 7.3 G/DL (ref 12.1–17)
POTASSIUM SERPL-SCNC: 4.1 MMOL/L (ref 3.5–5.1)
SERVICE CMNT-IMP: ABNORMAL
SODIUM SERPL-SCNC: 150 MMOL/L (ref 136–145)

## 2019-03-23 PROCEDURE — 80048 BASIC METABOLIC PNL TOTAL CA: CPT

## 2019-03-23 PROCEDURE — 85018 HEMOGLOBIN: CPT

## 2019-03-23 PROCEDURE — 74011000250 HC RX REV CODE- 250: Performed by: HOSPITALIST

## 2019-03-23 PROCEDURE — 74011636637 HC RX REV CODE- 636/637: Performed by: FAMILY MEDICINE

## 2019-03-23 PROCEDURE — C9113 INJ PANTOPRAZOLE SODIUM, VIA: HCPCS | Performed by: NURSE PRACTITIONER

## 2019-03-23 PROCEDURE — 65660000000 HC RM CCU STEPDOWN

## 2019-03-23 PROCEDURE — 74011000250 HC RX REV CODE- 250: Performed by: NURSE PRACTITIONER

## 2019-03-23 PROCEDURE — 74011250636 HC RX REV CODE- 250/636: Performed by: NURSE PRACTITIONER

## 2019-03-23 PROCEDURE — 74011250637 HC RX REV CODE- 250/637: Performed by: FAMILY MEDICINE

## 2019-03-23 PROCEDURE — 74011000258 HC RX REV CODE- 258: Performed by: HOSPITALIST

## 2019-03-23 PROCEDURE — 36415 COLL VENOUS BLD VENIPUNCTURE: CPT

## 2019-03-23 PROCEDURE — 74011250637 HC RX REV CODE- 250/637: Performed by: INTERNAL MEDICINE

## 2019-03-23 PROCEDURE — 82962 GLUCOSE BLOOD TEST: CPT

## 2019-03-23 RX ORDER — LATANOPROST 50 UG/ML
1 SOLUTION/ DROPS OPHTHALMIC EVERY EVENING
Status: DISCONTINUED | OUTPATIENT
Start: 2019-03-23 | End: 2019-03-27 | Stop reason: HOSPADM

## 2019-03-23 RX ORDER — DEXTROSE MONOHYDRATE AND SODIUM CHLORIDE 5; .45 G/100ML; G/100ML
25 INJECTION, SOLUTION INTRAVENOUS CONTINUOUS
Status: DISCONTINUED | OUTPATIENT
Start: 2019-03-23 | End: 2019-03-26

## 2019-03-23 RX ADMIN — INSULIN LISPRO 2 UNITS: 100 INJECTION, SOLUTION INTRAVENOUS; SUBCUTANEOUS at 12:22

## 2019-03-23 RX ADMIN — SODIUM BICARBONATE 650 MG: 650 TABLET ORAL at 08:16

## 2019-03-23 RX ADMIN — CARVEDILOL 6.25 MG: 6.25 TABLET, FILM COATED ORAL at 08:15

## 2019-03-23 RX ADMIN — PANTOPRAZOLE SODIUM 40 MG: 40 INJECTION, POWDER, FOR SOLUTION INTRAVENOUS at 22:38

## 2019-03-23 RX ADMIN — Medication 10 ML: at 22:39

## 2019-03-23 RX ADMIN — Medication 10 ML: at 07:02

## 2019-03-23 RX ADMIN — ASPIRIN 81 MG CHEWABLE TABLET 81 MG: 81 TABLET CHEWABLE at 08:16

## 2019-03-23 RX ADMIN — DEXTROSE MONOHYDRATE AND SODIUM CHLORIDE 50 ML/HR: 5; .45 INJECTION, SOLUTION INTRAVENOUS at 09:21

## 2019-03-23 RX ADMIN — Medication 10 ML: at 07:03

## 2019-03-23 RX ADMIN — PANTOPRAZOLE SODIUM 40 MG: 40 INJECTION, POWDER, FOR SOLUTION INTRAVENOUS at 08:17

## 2019-03-23 RX ADMIN — Medication 10 ML: at 14:00

## 2019-03-23 RX ADMIN — CARVEDILOL 6.25 MG: 6.25 TABLET, FILM COATED ORAL at 19:22

## 2019-03-23 RX ADMIN — SODIUM BICARBONATE 650 MG: 650 TABLET ORAL at 19:21

## 2019-03-23 RX ADMIN — LATANOPROST 1 DROP: 50 SOLUTION OPHTHALMIC at 22:37

## 2019-03-23 NOTE — ROUTINE PROCESS
TRANSFER - OUT REPORT: 
 
Verbal report given to [facility], rn(name) on Bren Paulson  being transferred to (unit) for routine progression of care Report consisted of patients Situation, Background, Assessment and  
Recommendations(SBAR). Information from the following report(s) SBAR, Kardex, Intake/Output, MAR, Recent Results and Cardiac Rhythm paced was reviewed with the receiving nurse. Lines:  
Peripheral IV 03/23/19 Posterior;Right Forearm (Active) Opportunity for questions and clarification was provided. Patient transported with: 
 Monitor Registered Nurse Tech

## 2019-03-23 NOTE — PROGRESS NOTES
TRANSFER - IN REPORT: 
 
Verbal report received from [place](name) on Sylvia Mccoy  being received from [facility](unit) for routine progression of care Report consisted of patients Situation, Background, Assessment and  
Recommendations(SBAR). Information from the following report(s) SBAR, Kardex, ED Summary, Procedure Summary, Intake/Output, MAR and Cardiac Rhythm paced was reviewed with the receiving nurse. Opportunity for questions and clarification was provided. Assessment completed upon patients arrival to unit and care assumed.

## 2019-03-23 NOTE — PROGRESS NOTES
Hospitalist Progress Note Keyon Chris MD 
Answering service: [phone] from in house phone Date of Service:  3/23/2019 NAME:  Randa Velasco 
:  1921 MRN:  [id number] Admission Summary:  
C/c :rectal bleeding 
 80 y.o. Male with  PMH CADs/p CABG,ERICA 2018 on ASA, & brilinta ,CKD4,chronic anemia,CHF,AV block s/p pacemakerDM,HLD,GERD. Gastric ulcer Pt p/w dark stool,noted with hematemesis x1 ER 
S/p endoscopy 2018- gastritis,gastric ulcer oozing s/p hemoclips Interval history / Subjective: No active bleeding in last 24 hours per RN. Hemoglobin is 7.3. Nurses say he is drinking fluids. Sodium is still 150 will start on hypotonic fluids slowly. Assessment & Plan:  
 
GIB,p/w rectal bleeding,hematemesis heme occult + 
-EGD - no bleeding gastric AVM;s- ablation deferred as non bleeding gastric esophagitis 
-IV protonix bid PPI 
-off brilinta 
-resumed asa today  per card , held d/t to nose bleed Anemia ,acute on chronic  
-due to above  
-- hgb low 5.9 s/p 2 units of PRBC hgb 7.5 
- serial h/h with dropping   7.2,-7.5 Epistaxis resolved 
-repeat hgb 7.2,-7.5<-7.7 
 -off brilinta 
-resume asa monitor closely for active bleed SARAH superimposed on CKD4 baseline cr,2.7 
-improved /stable cr. 2.5 from initial 3.09 with IVF hydration 
-held home lasix  
-renal consult appreciated,ddAVP x 1dose (on ,to help reduce risk of uremic plt  dysfunction 
 with reduce risk of bleeding Hypernatremia -na trending down 
-encourage po hydration per renal 
 
Acidosis 
-po bicarb CAD 
-s/p CABG, Stent with ERICA asa & brilinta,held as above 
-per card ok to remain off brilinta,given risks of recurrent GIB 
-f/u  with usual cardiologist, dr Stephanie Givens at [facility]. 
-held asa d/t nose bleed,and ongoing anemia H/o AV block 
-s/p pacemaker  
  
Chronic CHF 
-echo from  with EF 50% -cxr with no acute process 
-holding lasix d/t gretel 
  
  
Lactic acidosis,resolved 
-due to hypovolemia 
-IVF hydration, while npo 
  
DM2 
-SSI 
-pt reports ,has boderline DM ,not on meds  
- hgb a1c 5.8 
  
HTN,uncotrolled/labile - pta coreg 
- iv hydralazine prn for accelerated bp 
 
 
 
 
  
Functional status,PTA,lives at [facility], use walker,sometimes scooter for ambuation Disposition Pt and daughter wish to go snf/rehab prefers [facility], and than transition to assisted living facility, daughter does not want him to go back to independent living facility PT consult,  consuled Code status:full code 03/20-Goals of long term care addressed,tom jurgen herbert  is mPOA Recommended DNR,status/conservative treatment recommended Pt/sherrell wish to continue with Full code,aggresive management DVT prophylaxis:SCD Care Plan discussed with: pt/nurse/daughter Hospital Problems  Date Reviewed: 3/4/2019 Codes Class Noted POA  
 GI bleed ICD-10-CM: K92.2 ICD-9-CM: 578.9  7/3/2018 Unknown Review of Systems: A comprehensive review of systems was negative except for that written in the HPI. Vital Signs:  
 Last 24hrs VS reviewed since prior progress note. Most recent are: 
Visit Vitals /49 (BP 1 Location: Left arm, BP Patient Position: At rest) Pulse 65 Temp 99 °F (37.2 °C) Resp 13 Ht 5' 8\" (1.727 m) Wt 70.6 kg (155 lb 10.3 oz) SpO2 100% BMI 23.67 kg/m² Intake/Output Summary (Last 24 hours) at 3/23/2019 8388 Last data filed at 3/23/2019 6224 Gross per 24 hour Intake  Output 1115 ml Net -1115 ml Physical Examination:  
 
 
     
Constitutional:  No acute distress, cooperative, pleasant   
ENT:  Oral mucous moist, oropharynx benign. Neck supple, +bright red blood b/l ant nares Resp:  CTA bilaterally. No wheezing/rhonchi/rales. No accessory muscle use CV:  Regular rhythm, normal rate, no murmurs, gallops, rubs GI:  Soft, non distended, non tender. normoactive bowel sounds, no hepatosplenomegaly Musculoskeletal:  No edema, warm, 2+ pulses throughout Neurologic:  Moves all extremities. AAOx3, CN II-XII reviewed Psych:  mood,affect appropriate Data Review:  
 Review and/or order of clinical lab test 
 
 
Labs:  
 
Recent Labs  
  03/23/19 0423 03/22/19 
0234 WBC  --  7.6 HGB 7.3* 7.2* HCT 23.4* 23.8* PLT  --  156 Recent Labs  
  03/23/19 0423 03/22/19 
0234 03/21/19 
0244 * 150* 153* K 4.1 4.0 4.2 * 125* 126* CO2 17* 19* 19* BUN 63* 74* 76* CREA 2.45* 2.51* 2.52* GLU 97 89 103* CA 8.4* 8.3* 8.2* No results for input(s): SGOT, GPT, ALT, AP, TBIL, TBILI, TP, ALB, GLOB, GGT, AML, LPSE in the last 72 hours. No lab exists for component: AMYP, HLPSE No results for input(s): INR, PTP, APTT in the last 72 hours. No lab exists for component: INREXT, INREXT No results for input(s): FE, TIBC, PSAT, FERR in the last 72 hours. No results found for: FOL, RBCF No results for input(s): PH, PCO2, PO2 in the last 72 hours. No results for input(s): CPK, CKNDX, TROIQ in the last 72 hours. No lab exists for component: CPKMB Lab Results Component Value Date/Time Cholesterol, total 175 06/05/2018 04:08 AM  
 HDL Cholesterol 39 06/05/2018 04:08 AM  
 LDL, calculated 121 (H) 06/05/2018 04:08 AM  
 Triglyceride 75 06/05/2018 04:08 AM  
 CHOL/HDL Ratio 4.5 06/05/2018 04:08 AM  
 
Lab Results Component Value Date/Time Glucose (POC) 109 (H) 03/23/2019 07:00 AM  
 Glucose (POC) 214 (H) 03/22/2019 09:07 PM  
 Glucose (POC) 137 (H) 03/22/2019 04:14 PM  
 Glucose (POC) 205 (H) 03/22/2019 11:23 AM  
 Glucose (POC) 164 (H) 03/22/2019 06:19 AM  
 
Lab Results Component Value Date/Time  Color YELLOW/STRAW 07/03/2018 07:17 PM  
 Appearance CLEAR 07/03/2018 07:17 PM  
 Specific gravity 1.017 07/03/2018 07:17 PM  
 pH (UA) 6.0 07/03/2018 07:17 PM  
 Protein TRACE (A) 07/03/2018 07:17 PM  
 Glucose NEGATIVE  07/03/2018 07:17 PM  
 Ketone NEGATIVE  07/03/2018 07:17 PM  
 Bilirubin NEGATIVE  07/03/2018 07:17 PM  
 Urobilinogen 0.2 07/03/2018 07:17 PM  
 Nitrites NEGATIVE  07/03/2018 07:17 PM  
 Leukocyte Esterase NEGATIVE  07/03/2018 07:17 PM  
 Epithelial cells FEW 07/03/2018 07:17 PM  
 Bacteria NEGATIVE  07/03/2018 07:17 PM  
 WBC 0-4 07/03/2018 07:17 PM  
 RBC 0-5 07/03/2018 07:17 PM  
 
 
 
Medications Reviewed:  
 
Current Facility-Administered Medications Medication Dose Route Frequency  aspirin chewable tablet 81 mg  81 mg Oral DAILY  carvedilol (COREG) tablet 6.25 mg  6.25 mg Oral BID WITH MEALS  
 hydrALAZINE (APRESOLINE) 20 mg/mL injection 10 mg  10 mg IntraVENous Q6H PRN  
 sodium bicarbonate tablet 650 mg  650 mg Oral BID  sodium chloride (NS) flush 5-40 mL  5-40 mL IntraVENous Q8H  
 sodium chloride (NS) flush 5-40 mL  5-40 mL IntraVENous PRN  
 sodium chloride (NS) flush 5-40 mL  5-40 mL IntraVENous Q8H  
 sodium chloride (NS) flush 5-40 mL  5-40 mL IntraVENous PRN  pantoprazole (PROTONIX) 40 mg in sodium chloride 0.9% 10 mL injection  40 mg IntraVENous Q12H  
 glucose chewable tablet 16 g  4 Tab Oral PRN  
 dextrose (D50W) injection syrg 12.5-25 g  25-50 mL IntraVENous PRN  
 glucagon (GLUCAGEN) injection 1 mg  1 mg IntraMUSCular PRN  
 insulin lispro (HUMALOG) injection   SubCUTAneous AC&HS  
 0.9% sodium chloride infusion 250 mL  250 mL IntraVENous PRN  
 
______________________________________________________________________ EXPECTED LENGTH OF STAY: 3d 0h 
ACTUAL LENGTH OF STAY:          4 Joanne Tucker MD

## 2019-03-23 NOTE — ROUTINE PROCESS
Bedside and Verbal shift change report given to Agus Luis RN (oncoming nurse) by Deepti Cespedes RN (offgoing nurse). Report included the following information SBAR, Kardex, Intake/Output, MAR, Recent Results and Cardiac Rhythm Paced.

## 2019-03-24 LAB
ANION GAP SERPL CALC-SCNC: 8 MMOL/L (ref 5–15)
BUN SERPL-MCNC: 51 MG/DL (ref 6–20)
BUN/CREAT SERPL: 22 (ref 12–20)
CALCIUM SERPL-MCNC: 8 MG/DL (ref 8.5–10.1)
CHLORIDE SERPL-SCNC: 119 MMOL/L (ref 97–108)
CO2 SERPL-SCNC: 18 MMOL/L (ref 21–32)
CREAT SERPL-MCNC: 2.33 MG/DL (ref 0.7–1.3)
ERYTHROCYTE [DISTWIDTH] IN BLOOD BY AUTOMATED COUNT: 20.9 % (ref 11.5–14.5)
GLUCOSE BLD STRIP.AUTO-MCNC: 129 MG/DL (ref 65–100)
GLUCOSE BLD STRIP.AUTO-MCNC: 136 MG/DL (ref 65–100)
GLUCOSE SERPL-MCNC: 121 MG/DL (ref 65–100)
HCT VFR BLD AUTO: 24.3 % (ref 36.6–50.3)
HGB BLD-MCNC: 7.5 G/DL (ref 12.1–17)
MCH RBC QN AUTO: 33.6 PG (ref 26–34)
MCHC RBC AUTO-ENTMCNC: 30.9 G/DL (ref 30–36.5)
MCV RBC AUTO: 109 FL (ref 80–99)
NRBC # BLD: 0 K/UL (ref 0–0.01)
NRBC BLD-RTO: 0 PER 100 WBC
PLATELET # BLD AUTO: 160 K/UL (ref 150–400)
PMV BLD AUTO: 11.5 FL (ref 8.9–12.9)
POTASSIUM SERPL-SCNC: 4.1 MMOL/L (ref 3.5–5.1)
RBC # BLD AUTO: 2.23 M/UL (ref 4.1–5.7)
SERVICE CMNT-IMP: ABNORMAL
SERVICE CMNT-IMP: ABNORMAL
SODIUM SERPL-SCNC: 145 MMOL/L (ref 136–145)
WBC # BLD AUTO: 6.8 K/UL (ref 4.1–11.1)

## 2019-03-24 PROCEDURE — 74011250637 HC RX REV CODE- 250/637: Performed by: FAMILY MEDICINE

## 2019-03-24 PROCEDURE — 74011250637 HC RX REV CODE- 250/637: Performed by: INTERNAL MEDICINE

## 2019-03-24 PROCEDURE — 85027 COMPLETE CBC AUTOMATED: CPT

## 2019-03-24 PROCEDURE — C9113 INJ PANTOPRAZOLE SODIUM, VIA: HCPCS | Performed by: NURSE PRACTITIONER

## 2019-03-24 PROCEDURE — 36415 COLL VENOUS BLD VENIPUNCTURE: CPT

## 2019-03-24 PROCEDURE — 82962 GLUCOSE BLOOD TEST: CPT

## 2019-03-24 PROCEDURE — 74011250636 HC RX REV CODE- 250/636: Performed by: NURSE PRACTITIONER

## 2019-03-24 PROCEDURE — 74011000250 HC RX REV CODE- 250: Performed by: NURSE PRACTITIONER

## 2019-03-24 PROCEDURE — 80048 BASIC METABOLIC PNL TOTAL CA: CPT

## 2019-03-24 PROCEDURE — 65660000000 HC RM CCU STEPDOWN

## 2019-03-24 RX ORDER — BRINZOLAMIDE 10 MG/ML
1 SUSPENSION/ DROPS OPHTHALMIC 2 TIMES DAILY
Status: DISCONTINUED | OUTPATIENT
Start: 2019-03-24 | End: 2019-03-27 | Stop reason: HOSPADM

## 2019-03-24 RX ADMIN — PANTOPRAZOLE SODIUM 40 MG: 40 INJECTION, POWDER, FOR SOLUTION INTRAVENOUS at 21:41

## 2019-03-24 RX ADMIN — CARVEDILOL 6.25 MG: 6.25 TABLET, FILM COATED ORAL at 17:41

## 2019-03-24 RX ADMIN — PANTOPRAZOLE SODIUM 40 MG: 40 INJECTION, POWDER, FOR SOLUTION INTRAVENOUS at 08:43

## 2019-03-24 RX ADMIN — ASPIRIN 81 MG CHEWABLE TABLET 81 MG: 81 TABLET CHEWABLE at 08:43

## 2019-03-24 RX ADMIN — BRINZOLAMIDE 1 DROP: 10 SUSPENSION/ DROPS OPHTHALMIC at 23:51

## 2019-03-24 RX ADMIN — Medication 10 ML: at 14:33

## 2019-03-24 RX ADMIN — Medication 10 ML: at 14:32

## 2019-03-24 RX ADMIN — Medication 10 ML: at 06:00

## 2019-03-24 RX ADMIN — SODIUM BICARBONATE 650 MG: 650 TABLET ORAL at 17:41

## 2019-03-24 RX ADMIN — SODIUM BICARBONATE 650 MG: 650 TABLET ORAL at 08:43

## 2019-03-24 RX ADMIN — LATANOPROST 1 DROP: 50 SOLUTION OPHTHALMIC at 18:55

## 2019-03-24 RX ADMIN — Medication 10 ML: at 21:42

## 2019-03-24 RX ADMIN — CARVEDILOL 6.25 MG: 6.25 TABLET, FILM COATED ORAL at 08:43

## 2019-03-24 NOTE — PROGRESS NOTES
Problem: Falls - Risk of 
Goal: *Absence of Falls Description Document [place] Shows Fall Risk and appropriate interventions in the flowsheet. Outcome: Progressing Towards Goal 
  
Problem: Patient Education: Go to Patient Education Activity Goal: Patient/Family Education Outcome: Progressing Towards Goal 
  
Problem: Upper and Lower GI Bleed: Day 1 Goal: Nutrition/Diet Outcome: Progressing Towards Goal 
Goal: *Hemodynamically stable Outcome: Progressing Towards Goal 
  
Problem: Pressure Injury - Risk of 
Goal: *Prevention of pressure injury Description Document Son Scale and appropriate interventions in the flowsheet. Outcome: Progressing Towards Goal 
  
Problem: Patient Education: Go to Patient Education Activity Goal: Patient/Family Education Outcome: Progressing Towards Goal 
  
Problem: Upper and Lower GI Bleed: Day 2 Goal: *Demonstrates progressive activity Outcome: Progressing Towards Goal 
  
Problem: Patient Education: Go to Patient Education Activity Goal: Patient/Family Education Outcome: Progressing Towards Goal

## 2019-03-24 NOTE — PROGRESS NOTES
Hospitalist Progress Note Berenice Infante MD 
Answering service: [phone] from in house phone Date of Service:  3/24/2019 NAME:  Kenneth Can 
:  1921 MRN:  [id number] Admission Summary:  
C/c :rectal bleeding 
 80 y.o. Male with  PMH CADs/p CABG,ERICA 2018 on ASA, & brilinta ,CKD4,chronic anemia,CHF,AV block s/p pacemakerDM,HLD,GERD. Gastric ulcer Pt p/w dark stool,noted with hematemesis x1 ER 
S/p endoscopy 2018- gastritis,gastric ulcer oozing s/p hemoclips Interval history / Subjective: No active bleeding in last 24 hours per RN. Hemoglobin is 7.3. Nurses say he is drinking fluids. Sodium is still 150 will start on hypotonic fluids slowly. 3/24: 
Patient had un eventful night. RN says he is eating. Hypernatremia is better with Hypotonic fluids. Hemoglobin is stable. No more GI bleed. Renal function is stable. Waiting for placement. Assessment & Plan:  
 
GIB,p/w rectal bleeding,hematemesis heme occult + 
-EGD - no bleeding gastric AVM;s- ablation deferred as non bleeding gastric esophagitis 
-IV protonix bid PPI 
-off brilinta 
-resumed asa   per card , held d/t to nose bleed Anemia ,acute on chronic  
-due to above  
-- hgb low 5.9 s/p 2 units of PRBC hgb 7.5 Epistaxis resolved 
-repeat hgb 7.2,-7.5<-7.7 
 -off brilinta 
-resume asa monitor closely for active bleed SARAH superimposed on CKD4 baseline cr,2.7 
-improved /stable cr. 2.5 from initial 3.09 with IVF hydration 
-held home lasix  
-renal consult appreciated,ddAVP x 1dose (on ,to help reduce risk of uremic plt  dysfunction 
 with reduce risk of bleeding Hypernatremia -na trending down 
-encourage po hydration per renal 
 
Acidosis 
-po bicarb CAD 
-s/p CABG, Stent with ERICA asa & brilinta,held as above 
-per card ok to remain off brilinta,given risks of recurrent GIB 
 -f/u  with usual cardiologist, dr Fracisco Luna at [facility]. 
-held asa d/t nose bleed,and ongoing anemia H/o AV block 
-s/p pacemaker  
  
Chronic CHF 
-echo from nov/2018 with EF 50% 
-cxr with no acute process 
-holding lasix d/t gretel 
  
  
Lactic acidosis,resolved 
-due to hypovolemia 
-IVF hydration, while npo 
  
DM2 
-SSI 
-pt reports ,has boderline DM ,not on meds  
- hgb a1c 5.8 
  
HTN,uncotrolled/labile - pta coreg 
- iv hydralazine prn for accelerated bp 
 
 
 
 
  
Functional status,PTA,lives at [facility], use walker,sometimes scooter for ambuation Disposition Pt and daughter wish to go snf/rehab [facility], and than transition to assisted living facility, daughter does not want him to go back to independent living facility PT consult,  consuled Code status:full code 03/20-Goals of long term care addressed,tom jurgen piedad  is mPOA Recommended DNR,status/conservative treatment recommended Pt/sherrell wish to continue with Full code,aggresive management DVT prophylaxis:SCD Care Plan discussed with: pt/nurse/daughter Hospital Problems  Date Reviewed: 3/4/2019 Codes Class Noted POA  
 GI bleed ICD-10-CM: K92.2 ICD-9-CM: 578.9  7/3/2018 Unknown Review of Systems: A comprehensive review of systems was negative except for that written in the HPI. Vital Signs:  
 Last 24hrs VS reviewed since prior progress note. Most recent are: 
Visit Vitals BP (P) 144/68 (BP 1 Location: Left arm, BP Patient Position: At rest;Supine) Pulse (P) 60 Temp (P) 97.4 °F (36.3 °C) Resp (P) 16 Ht 5' 8\" (1.727 m) Wt 71.4 kg (157 lb 6.4 oz) SpO2 98% BMI 23.93 kg/m² Intake/Output Summary (Last 24 hours) at 3/24/2019 4065 Last data filed at 3/24/2019 5491 Gross per 24 hour Intake 840 ml Output 990 ml Net -150 ml Physical Examination:  
 
 
     
Constitutional:  No acute distress, cooperative, pleasant   
 ENT:  Oral mucous moist, oropharynx benign. Neck supple, +bright red blood b/l ant nares Resp:  CTA bilaterally. No wheezing/rhonchi/rales. No accessory muscle use CV:  Regular rhythm, normal rate, no murmurs, gallops, rubs GI:  Soft, non distended, non tender. normoactive bowel sounds, no hepatosplenomegaly Musculoskeletal:  No edema, warm, 2+ pulses throughout Neurologic:  Moves all extremities. AAOx3, CN II-XII reviewed Psych:  mood,affect appropriate Data Review:  
 Review and/or order of clinical lab test 
 
 
Labs:  
 
Recent Labs  
  03/24/19 0349 03/23/19 0423 03/22/19 
0234 WBC 6.8  --  7.6 HGB 7.5* 7.3* 7.2* HCT 24.3* 23.4* 23.8*  
  --  156 Recent Labs  
  03/24/19 0349 03/23/19 0423 03/22/19 
0234  150* 150*  
K 4.1 4.1 4.0  
* 124* 125* CO2 18* 17* 19* BUN 51* 63* 74* CREA 2.33* 2.45* 2.51* * 97 89  
CA 8.0* 8.4* 8.3* No results for input(s): SGOT, GPT, ALT, AP, TBIL, TBILI, TP, ALB, GLOB, GGT, AML, LPSE in the last 72 hours. No lab exists for component: AMYP, HLPSE No results for input(s): INR, PTP, APTT in the last 72 hours. No lab exists for component: INREXT, INREXT No results for input(s): FE, TIBC, PSAT, FERR in the last 72 hours. No results found for: FOL, RBCF No results for input(s): PH, PCO2, PO2 in the last 72 hours. No results for input(s): CPK, CKNDX, TROIQ in the last 72 hours. No lab exists for component: CPKMB Lab Results Component Value Date/Time Cholesterol, total 175 06/05/2018 04:08 AM  
 HDL Cholesterol 39 06/05/2018 04:08 AM  
 LDL, calculated 121 (H) 06/05/2018 04:08 AM  
 Triglyceride 75 06/05/2018 04:08 AM  
 CHOL/HDL Ratio 4.5 06/05/2018 04:08 AM  
 
Lab Results Component Value Date/Time  Glucose (POC) 136 (H) 03/24/2019 06:34 AM  
 Glucose (POC) 132 (H) 03/23/2019 10:36 PM  
 Glucose (POC) 132 (H) 03/23/2019 04:36 PM  
 Glucose (POC) 149 (H) 03/23/2019 12:08 PM  
 Glucose (POC) 109 (H) 03/23/2019 07:00 AM  
 
Lab Results Component Value Date/Time Color YELLOW/STRAW 07/03/2018 07:17 PM  
 Appearance CLEAR 07/03/2018 07:17 PM  
 Specific gravity 1.017 07/03/2018 07:17 PM  
 pH (UA) 6.0 07/03/2018 07:17 PM  
 Protein TRACE (A) 07/03/2018 07:17 PM  
 Glucose NEGATIVE  07/03/2018 07:17 PM  
 Ketone NEGATIVE  07/03/2018 07:17 PM  
 Bilirubin NEGATIVE  07/03/2018 07:17 PM  
 Urobilinogen 0.2 07/03/2018 07:17 PM  
 Nitrites NEGATIVE  07/03/2018 07:17 PM  
 Leukocyte Esterase NEGATIVE  07/03/2018 07:17 PM  
 Epithelial cells FEW 07/03/2018 07:17 PM  
 Bacteria NEGATIVE  07/03/2018 07:17 PM  
 WBC 0-4 07/03/2018 07:17 PM  
 RBC 0-5 07/03/2018 07:17 PM  
 
 
 
Medications Reviewed:  
 
Current Facility-Administered Medications Medication Dose Route Frequency  dextrose 5 % - 0.45% NaCl infusion  50 mL/hr IntraVENous CONTINUOUS  
 latanoprost (XALATAN) 0.005 % ophthalmic solution 1 Drop  1 Drop Both Eyes QPM  
 OTHER(NON-FORMULARY) 1 Drop  1 Drop Both Eyes BID  aspirin chewable tablet 81 mg  81 mg Oral DAILY  carvedilol (COREG) tablet 6.25 mg  6.25 mg Oral BID WITH MEALS  
 hydrALAZINE (APRESOLINE) 20 mg/mL injection 10 mg  10 mg IntraVENous Q6H PRN  
 sodium bicarbonate tablet 650 mg  650 mg Oral BID  sodium chloride (NS) flush 5-40 mL  5-40 mL IntraVENous Q8H  
 sodium chloride (NS) flush 5-40 mL  5-40 mL IntraVENous PRN  
 sodium chloride (NS) flush 5-40 mL  5-40 mL IntraVENous Q8H  
 sodium chloride (NS) flush 5-40 mL  5-40 mL IntraVENous PRN  pantoprazole (PROTONIX) 40 mg in sodium chloride 0.9% 10 mL injection  40 mg IntraVENous Q12H  
 glucose chewable tablet 16 g  4 Tab Oral PRN  
 dextrose (D50W) injection syrg 12.5-25 g  25-50 mL IntraVENous PRN  
 glucagon (GLUCAGEN) injection 1 mg  1 mg IntraMUSCular PRN  
 insulin lispro (HUMALOG) injection   SubCUTAneous AC&HS  
 0.9% sodium chloride infusion 250 mL  250 mL IntraVENous PRN  
 
 ______________________________________________________________________ EXPECTED LENGTH OF STAY: 3d 0h 
ACTUAL LENGTH OF STAY:          5 Nani Rios MD

## 2019-03-24 NOTE — PROGRESS NOTES
Patient assessment was completed by Shantel/JESENIA Juárez patient is resting in bed no complaint of pain or discomfort at this time. 2017 patient condom cath had come off patient bed was wet patient bathed, bed linen changed, gown changed and new condom cath was put on by Chipper Blizzard / [place]ak[place], RN. Patient is resting comfortable in bed 
 
2330 reassessment completed no changes JESENIA Funes/JESENIA Juárez   
 
0330 reassessment with JESENIA Funes was completed blood drawn and observed while Shantel completed proper procedure was followed. 0730 Bedside and Verbal shift change report given to Liliam Dobbs RN  (oncoming nurse) by JESENIA Mcfarland  (offgoing nurse). Report included the following information SBAR, MAR, Recent Results and Med Rec Status.

## 2019-03-24 NOTE — PROGRESS NOTES
1930-0400 Pt alert and oriented. Pt OOB with assistance, calls appropriately. Independent with bed mobility. No complaints this shift, pt appears to be resting comfortably. IVF infusing per order, pt tolerating PO intake. Voiding spontaneously, condom cath in place, clean and patent. Pt lluvia FERRARI. Call bell within reach, safety maintained, will ctm.

## 2019-03-25 LAB
ANION GAP SERPL CALC-SCNC: 7 MMOL/L (ref 5–15)
BUN SERPL-MCNC: 44 MG/DL (ref 6–20)
BUN/CREAT SERPL: 20 (ref 12–20)
CALCIUM SERPL-MCNC: 7.9 MG/DL (ref 8.5–10.1)
CHLORIDE SERPL-SCNC: 118 MMOL/L (ref 97–108)
CO2 SERPL-SCNC: 18 MMOL/L (ref 21–32)
CREAT SERPL-MCNC: 2.16 MG/DL (ref 0.7–1.3)
ERYTHROCYTE [DISTWIDTH] IN BLOOD BY AUTOMATED COUNT: 20.6 % (ref 11.5–14.5)
GLUCOSE SERPL-MCNC: 103 MG/DL (ref 65–100)
HCT VFR BLD AUTO: 23.5 % (ref 36.6–50.3)
HGB BLD-MCNC: 7.3 G/DL (ref 12.1–17)
MCH RBC QN AUTO: 33.8 PG (ref 26–34)
MCHC RBC AUTO-ENTMCNC: 31.1 G/DL (ref 30–36.5)
MCV RBC AUTO: 108.8 FL (ref 80–99)
NRBC # BLD: 0 K/UL (ref 0–0.01)
NRBC BLD-RTO: 0 PER 100 WBC
PLATELET # BLD AUTO: 159 K/UL (ref 150–400)
PMV BLD AUTO: 11.3 FL (ref 8.9–12.9)
POTASSIUM SERPL-SCNC: 4.3 MMOL/L (ref 3.5–5.1)
RBC # BLD AUTO: 2.16 M/UL (ref 4.1–5.7)
SODIUM SERPL-SCNC: 143 MMOL/L (ref 136–145)
WBC # BLD AUTO: 6.8 K/UL (ref 4.1–11.1)

## 2019-03-25 PROCEDURE — 74011250637 HC RX REV CODE- 250/637: Performed by: FAMILY MEDICINE

## 2019-03-25 PROCEDURE — 74011000250 HC RX REV CODE- 250: Performed by: NURSE PRACTITIONER

## 2019-03-25 PROCEDURE — 74011000258 HC RX REV CODE- 258: Performed by: HOSPITALIST

## 2019-03-25 PROCEDURE — 94760 N-INVAS EAR/PLS OXIMETRY 1: CPT

## 2019-03-25 PROCEDURE — 74011250637 HC RX REV CODE- 250/637: Performed by: INTERNAL MEDICINE

## 2019-03-25 PROCEDURE — 65660000000 HC RM CCU STEPDOWN

## 2019-03-25 PROCEDURE — 97116 GAIT TRAINING THERAPY: CPT

## 2019-03-25 PROCEDURE — C9113 INJ PANTOPRAZOLE SODIUM, VIA: HCPCS | Performed by: NURSE PRACTITIONER

## 2019-03-25 PROCEDURE — 74011000250 HC RX REV CODE- 250: Performed by: HOSPITALIST

## 2019-03-25 PROCEDURE — 80048 BASIC METABOLIC PNL TOTAL CA: CPT

## 2019-03-25 PROCEDURE — 36415 COLL VENOUS BLD VENIPUNCTURE: CPT

## 2019-03-25 PROCEDURE — 97530 THERAPEUTIC ACTIVITIES: CPT

## 2019-03-25 PROCEDURE — 74011250636 HC RX REV CODE- 250/636: Performed by: NURSE PRACTITIONER

## 2019-03-25 PROCEDURE — 85027 COMPLETE CBC AUTOMATED: CPT

## 2019-03-25 RX ADMIN — ASPIRIN 81 MG CHEWABLE TABLET 81 MG: 81 TABLET CHEWABLE at 09:02

## 2019-03-25 RX ADMIN — LATANOPROST 1 DROP: 50 SOLUTION OPHTHALMIC at 21:41

## 2019-03-25 RX ADMIN — DEXTROSE MONOHYDRATE AND SODIUM CHLORIDE 25 ML/HR: 5; .45 INJECTION, SOLUTION INTRAVENOUS at 22:00

## 2019-03-25 RX ADMIN — SODIUM BICARBONATE 650 MG: 650 TABLET ORAL at 17:14

## 2019-03-25 RX ADMIN — CARVEDILOL 6.25 MG: 6.25 TABLET, FILM COATED ORAL at 09:02

## 2019-03-25 RX ADMIN — PANTOPRAZOLE SODIUM 40 MG: 40 INJECTION, POWDER, FOR SOLUTION INTRAVENOUS at 21:37

## 2019-03-25 RX ADMIN — LATANOPROST 1 DROP: 50 SOLUTION OPHTHALMIC at 17:14

## 2019-03-25 RX ADMIN — Medication 5 ML: at 22:00

## 2019-03-25 RX ADMIN — PANTOPRAZOLE SODIUM 40 MG: 40 INJECTION, POWDER, FOR SOLUTION INTRAVENOUS at 09:02

## 2019-03-25 RX ADMIN — SODIUM BICARBONATE 650 MG: 650 TABLET ORAL at 09:02

## 2019-03-25 RX ADMIN — Medication 10 ML: at 14:42

## 2019-03-25 RX ADMIN — CARVEDILOL 6.25 MG: 6.25 TABLET, FILM COATED ORAL at 17:14

## 2019-03-25 RX ADMIN — BRINZOLAMIDE 1 DROP: 10 SUSPENSION/ DROPS OPHTHALMIC at 09:01

## 2019-03-25 NOTE — PROGRESS NOTES
Hospitalist Progress Note Rosita Castro MD 
Answering service: [phone] OR [phone] from in house phone Date of Service:  3/25/2019 NAME:  Cheyenne Wyatt 
:  1921 MRN:  [id number] Admission Summary:  
C/c :rectal bleeding 
 80 y.o. Male with  PMH CADs/p CABG,ERICA 2018 on ASA, & brilinta ,CKD4,chronic anemia,CHF,AV block s/p pacemakerDM,HLD,GERD. Gastric ulcer Pt p/w dark stool,noted with hematemesis x1 ER 
S/p endoscopy 2018- gastritis,gastric ulcer oozing s/p hemoclips Interval history / Subjective: No active bleeding in last 24 hours per RN. Hemoglobin is 7.3. Nurses say he is drinking fluids. Sodium is still 150 will start on hypotonic fluids slowly. 3/24: 
Patient had un eventful night. RN says he is eating. Hypernatremia is better with Hypotonic fluids. Hemoglobin is stable. No more GI bleed. Renal function is stable. Waiting for placement. 3/25: 
Patient feeling fine,will like to be discharged and be with his wife. Waiting for placement. Assessment & Plan:  
 
GIB,p/w rectal bleeding,hematemesis heme occult + 
-EGD - no bleeding gastric AVM;s- ablation deferred as non bleeding gastric esophagitis 
-IV protonix bid PPI 
-off brilinta 
-resumed asa   per card , held d/t to nose bleed Anemia ,acute on chronic  
-due to above  
-- hgb low 5.9 s/p 2 units of PRBC hgb 7.5 
-Hgb 7.3 on 3/25 Epistaxis resolved 
-repeat hgb 7.2,-7.5<-7.7 
 -off brilinta 
-resume asa monitor closely for active bleed SARAH superimposed on CKD4 baseline cr,2.7 
-improved /stable cr. 2.5 from initial 3.09 with IVF hydration 
-held home lasix  
-renal consult appreciated,ddAVP x 1dose (on ,to help reduce risk of uremic plt  dysfunction 
 with reduce risk of bleeding Hypernatremia -na trending down 
-encourage po hydration per renal 
 
Acidosis 
-po bicarb CAD 
 -s/p CABG, Stent with ERICA asa & brilinta,held as above 
-per card ok to remain off brilinta,given risks of recurrent GIB 
-f/u  with usual cardiologist, dr Seth Llamas at [facility]. 
-held asa d/t nose bleed,and ongoing anemia H/o AV block 
-s/p pacemaker  
  
Chronic CHF 
-echo from nov/2018 with EF 50% 
-cxr with no acute process 
-holding lasix d/t gretel 
  
  
Lactic acidosis,resolved 
-due to hypovolemia 
-IVF hydration, while npo 
  
DM2 
-SSI 
-pt reports ,has boderline DM ,not on meds  
- hgb a1c 5.8 
  
HTN,uncotrolled/labile - pta coreg 
- iv hydralazine prn for accelerated bp 
 
 
 
 
  
Functional status,PTA,lives at [facility], use walker,sometimes scooter for ambuation Disposition Pt and daughter wish to go snf/rehab [facility], and than transition to assisted living facility, daughter does not want him to go back to independent living facility PT consult,  consuled Code status:full code 03/20-Goals of long term care addressed,tom jurgen herbert  is mPOA Recommended DNR,status/conservative treatment recommended Pt/sherrell wish to continue with Full code,aggresive management DVT prophylaxis:SCD Care Plan discussed with: pt/nurse/daughter Hospital Problems  Date Reviewed: 3/4/2019 Codes Class Noted POA  
 GI bleed ICD-10-CM: K92.2 ICD-9-CM: 578.9  7/3/2018 Unknown Review of Systems: A comprehensive review of systems was negative except for that written in the HPI. Vital Signs:  
 Last 24hrs VS reviewed since prior progress note. Most recent are: 
Visit Vitals /65 (BP 1 Location: Left arm, BP Patient Position: At rest) Pulse 61 Temp 97.9 °F (36.6 °C) Resp 18 Ht 5' 8\" (1.727 m) Wt 71.8 kg (158 lb 4.8 oz) SpO2 98% BMI 24.07 kg/m² Intake/Output Summary (Last 24 hours) at 3/25/2019 1338 Last data filed at 3/25/2019 1223 Gross per 24 hour Intake 1020 ml Output 1550 ml Net -530 ml  
  
 
 Physical Examination:  
     
Constitutional:  No acute distress, cooperative, pleasant. Sitting comfortably in the chair. ENT:  Oral mucous moist, oropharynx benign. Neck supple, +bright red blood b/l ant nares Resp:  CTA bilaterally. No wheezing/rhonchi/rales. No accessory muscle use CV:  Regular rhythm, normal rate, no murmurs, gallops, rubs GI:  Soft, non distended, non tender. normoactive bowel sounds, no hepatosplenomegaly Musculoskeletal:  No edema, warm, 2+ pulses throughout Neurologic:  Moves all extremities. AAOx3, CN II-XII reviewed Psych:  mood,affect appropriate Data Review:  
 Review and/or order of clinical lab test 
 
 
Labs:  
 
Recent Labs  
  03/25/19 0449 03/24/19 0349 WBC 6.8 6.8 HGB 7.3* 7.5* HCT 23.5* 24.3*  
 160 Recent Labs  
  03/25/19 0449 03/24/19 0349 03/23/19 
0423  145 150*  
K 4.3 4.1 4.1 * 119* 124* CO2 18* 18* 17* BUN 44* 51* 63* CREA 2.16* 2.33* 2.45* * 121* 97  
CA 7.9* 8.0* 8.4* No results for input(s): SGOT, GPT, ALT, AP, TBIL, TBILI, TP, ALB, GLOB, GGT, AML, LPSE in the last 72 hours. No lab exists for component: AMYP, HLPSE No results for input(s): INR, PTP, APTT in the last 72 hours. No lab exists for component: INREXT, INREXT No results for input(s): FE, TIBC, PSAT, FERR in the last 72 hours. No results found for: FOL, RBCF No results for input(s): PH, PCO2, PO2 in the last 72 hours. No results for input(s): CPK, CKNDX, TROIQ in the last 72 hours. No lab exists for component: CPKMB Lab Results Component Value Date/Time Cholesterol, total 175 06/05/2018 04:08 AM  
 HDL Cholesterol 39 06/05/2018 04:08 AM  
 LDL, calculated 121 (H) 06/05/2018 04:08 AM  
 Triglyceride 75 06/05/2018 04:08 AM  
 CHOL/HDL Ratio 4.5 06/05/2018 04:08 AM  
 
Lab Results Component Value Date/Time  Glucose (POC) 129 (H) 03/24/2019 04:18 PM  
 Glucose (POC) 136 (H) 03/24/2019 06:34 AM  
 Glucose (POC) 132 (H) 03/23/2019 10:36 PM  
 Glucose (POC) 132 (H) 03/23/2019 04:36 PM  
 Glucose (POC) 149 (H) 03/23/2019 12:08 PM  
 
Lab Results Component Value Date/Time Color YELLOW/STRAW 07/03/2018 07:17 PM  
 Appearance CLEAR 07/03/2018 07:17 PM  
 Specific gravity 1.017 07/03/2018 07:17 PM  
 pH (UA) 6.0 07/03/2018 07:17 PM  
 Protein TRACE (A) 07/03/2018 07:17 PM  
 Glucose NEGATIVE  07/03/2018 07:17 PM  
 Ketone NEGATIVE  07/03/2018 07:17 PM  
 Bilirubin NEGATIVE  07/03/2018 07:17 PM  
 Urobilinogen 0.2 07/03/2018 07:17 PM  
 Nitrites NEGATIVE  07/03/2018 07:17 PM  
 Leukocyte Esterase NEGATIVE  07/03/2018 07:17 PM  
 Epithelial cells FEW 07/03/2018 07:17 PM  
 Bacteria NEGATIVE  07/03/2018 07:17 PM  
 WBC 0-4 07/03/2018 07:17 PM  
 RBC 0-5 07/03/2018 07:17 PM  
 
 
 
Medications Reviewed:  
 
Current Facility-Administered Medications Medication Dose Route Frequency  brinzolamide (AZOPT) 1 % ophthalmic suspension 1 Drop (Patient Supplied)  1 Drop Both Eyes BID  dextrose 5 % - 0.45% NaCl infusion  25 mL/hr IntraVENous CONTINUOUS  
 latanoprost (XALATAN) 0.005 % ophthalmic solution 1 Drop  1 Drop Both Eyes QPM  
 aspirin chewable tablet 81 mg  81 mg Oral DAILY  carvedilol (COREG) tablet 6.25 mg  6.25 mg Oral BID WITH MEALS  
 hydrALAZINE (APRESOLINE) 20 mg/mL injection 10 mg  10 mg IntraVENous Q6H PRN  
 sodium bicarbonate tablet 650 mg  650 mg Oral BID  sodium chloride (NS) flush 5-40 mL  5-40 mL IntraVENous Q8H  
 sodium chloride (NS) flush 5-40 mL  5-40 mL IntraVENous PRN  
 sodium chloride (NS) flush 5-40 mL  5-40 mL IntraVENous Q8H  
 sodium chloride (NS) flush 5-40 mL  5-40 mL IntraVENous PRN  pantoprazole (PROTONIX) 40 mg in sodium chloride 0.9% 10 mL injection  40 mg IntraVENous Q12H  
 0.9% sodium chloride infusion 250 mL  250 mL IntraVENous PRN  
 
______________________________________________________________________ EXPECTED LENGTH OF STAY: 3d 0h 
 ACTUAL LENGTH OF STAY:          6 Janneth Duncan MD

## 2019-03-25 NOTE — PROGRESS NOTES
CM received call from Alexey Saucedo, admissions staff who confirmed they cannot accept today but can accept tomorrow at 11am.  Did fax chest xray and PT note directly to her fax [phone] at her request. She has communicated with pt's daughter also regarding acceptance tomorrow. Updated RN and MD. SAVAGE Taylor

## 2019-03-25 NOTE — PROGRESS NOTES
[facility] SNF referral has been made. Spoke with daughter who wants confirmation if pt can be discharged today. She has been under the impression he will be and she wants to provide transportation. Will request from Md and ask them to call daughter directly [phone]. Have also left message for [facility] admissions colleen hassan phone [phone]. SAVAGE Ordoñez 3pm  After multiple attempts this afternoon to reach [facility] staff as well as nurse for [facility], have been unable to reach them directly at facility although  staff confirmed they were present. Have left multiple messages. Received allscripts communication at 3pm stating that they couldn't locate chest Xray and this CM had printed as attachment to referral.  Also this communication electronically stated they \"CANNOT ACCEPT PT UNTIL AFTER 11AM TOMORROW. \" 
 
Have called back and requested call back in person to discuss. Updated nurse on unit. Also will update physician.    
 
SAVAGE Ordoñez

## 2019-03-25 NOTE — PROGRESS NOTES
Care Management Interventions PCP Verified by CM: Yes 
Palliative Care Criteria Met (RRAT>21 & CHF Dx)?: No 
Mode of Transport at Discharge: Other (see comment) Transition of Care Consult (CM Consult): Discharge Planning MyChart Signup: No 
Discharge Durable Medical Equipment: No 
Health Maintenance Reviewed: Yes Occupational Therapy Consult: Yes Speech Therapy Consult: No 
Current Support Network: Assisted Living, Lives with Spouse Confirm Follow Up Transport: Family Plan discussed with Pt/Family/Caregiver: Yes [employer] Information Provided?: No 
Discharge Location Discharge Placement: Home with family assistance CM contacted daughter, Claude Ammons to discuss discharge plan. She would like him to go to healthcare unit, cm send referral to [facility] unit. Her and her  provide transportation for him and his wife. He is currently living in assisted living at [facility] with his wife. Patient daughter would like to provide transportation. Patient is alert and oriented x4, and confirmed demographics. Cm to follow.  
 
Amol Pastrana, [facility]

## 2019-03-25 NOTE — PROGRESS NOTES
Spiritual Care Assessment/Progress Note [address] 
 
 
NAME: Roc Workman      MRN: [id number] AGE: 80 y.o. SEX: male Jewish Affiliation: Non Rastafarian  
Language: English  
 
3/25/2019     Total Time (in minutes): 5 Spiritual Assessment begun in [address] [place] [address] TELEMETRY through conversation with: 
  
    [x]Patient        [] Family    [] Friend(s) Reason for Consult: Initial/Spiritual assessment, patient floor Spiritual beliefs: (Please include comment if needed) [x] Identifies with a jamie tradition:  [employer]  
   [] Supported by a jamie community:        
   [] Claims no spiritual orientation:       
   [] Seeking spiritual identity:            
   [] Adheres to an individual form of spirituality:       
   [] Not able to assess:                   
 
    
Identified resources for coping:  
   [] Prayer                           
   [] Music                  [] Guided Imagery [x] Family/friends                 [] Pet visits [] Devotional reading                         [] Unknown 
   [] Other:                                          
 
 
Interventions offered during this visit: (See comments for more details) Patient Interventions: Affirmation of emotions/emotional suffering, Catharsis/review of pertinent events in supportive environment, Initial/Spiritual assessment, patient floor, Prayer (assurance of) Plan of Care: 
 
 [] Support spiritual and/or cultural needs  
 [] Support AMD and/or advance care planning process    
 [] Support grieving process 
 [] Coordinate Rites and/or Rituals  
 [] Coordination with community clergy 
 [x] No spiritual needs identified at this time 
 [] Detailed Plan of Care below (See Comments)  [] Make referral to Music Therapy 
[] Make referral to Pet Therapy    
[] Make referral to Addiction services 
[] Make referral to [facility] 
[] Make referral to Spiritual Care Partner 
[] No future visits requested [x] Follow up visits as needed Comments: Visited Mr Bharat Salamanca in room 355 for initial spiritual assessment. Mr Bharat Salamanca was sitting up in a chair at the bedside; he appeared to be in good spirits. Provided active listening as patient shared that he was really wanting to go home. He shared that he and his wife had been  67 years and she was not used to him being gone. He offered no other concerns at that time. Mr Bharat Salamanca said that he had mostly attended [employer] in the past but more recently had been watching [employer] of [address]. Visit was interrupted when  was paged for a crisis situation. Assured Mr Bharat Salamanca of ongoing  availability for support. : Rev. Zonia Briscoe. Isaiah Gary; [facility], to contact [phone] Esau Guardado call: [phone]

## 2019-03-25 NOTE — PROGRESS NOTES
Problem: Mobility Impaired (Adult and Pediatric) Goal: *Acute Goals and Plan of Care (Insert Text) Description Physical Therapy Goals Initiated 3/22/2019 1. Patient will move from supine to sit and sit to supine  in bed with independence within 7 day(s). 2.  Patient will transfer from bed to chair and chair to bed with independence using the least restrictive device within 7 day(s). 3.  Patient will perform sit to stand with independence within 7 day(s). 4.  Patient will ambulate with independence for > 100 feet with the least restrictive device within 7 day(s). 5.  Patient will ascend/descend 4 stairs with one handrail(s) with minimal assistance/contact guard assist within 7 day(s). Outcome: Progressing Towards Goal 
  
PHYSICAL THERAPY TREATMENT Patient: Roc Workman (36 y.o. male) Date: 3/25/2019 Diagnosis: GI bleed [K92.2] GI bleed [K92.2] GI bleed [K92.2] <principal problem not specified> Procedure(s) (LRB): ESOPHAGOGASTRODUODENOSCOPY (EGD) (N/A) 5 Days Post-Op Precautions: Fall Chart, physical therapy assessment, plan of care and goals were reviewed. ASSESSMENT: 
Pt agreeable with slight encouragement. Pt required min to CGA to mobilize safely. Pt was able to increase gait tolerance with rolling walker Pt reporting feet pain due to neuropathy. Pt could benefit from healthcare at [facility]. To progress mobility and independence. Pt   
Progression toward goals: 
?    Improving appropriately and progressing toward goals ? Improving slowly and progressing toward goals ? Not making progress toward goals and plan of care will be adjusted PLAN: 
Patient continues to benefit from skilled intervention to address the above impairments. Continue treatment per established plan of care. Discharge Recommendations:  Rojas Gonzalez Further Equipment Recommendations for Discharge:  TBD SUBJECTIVE:  
Patient stated ? At my age I don't sit up much? OBJECTIVE DATA SUMMARY:  
Critical Behavior: 
Neurologic State: Alert Orientation Level: Oriented X4 Cognition: Follows commands Functional Mobility Training: 
Bed Mobility: 
  
Supine to Sit: Minimum assistance Transfers: 
Sit to Stand: Minimum assistance Stand to Sit: Minimum assistance Balance: 
Sitting: Intact Standing: Impaired Standing - Static: Good Standing - Dynamic : Fair Ambulation/Gait Training: 
Distance (ft): 40 Feet (ft) Assistive Device: Gait belt;Walker, rolling Ambulation - Level of Assistance: Contact guard assistance Gait Abnormalities: Antalgic;Decreased step clearance Base of Support: Center of gravity altered Speed/Crystal: Slow Step Length: Left shortened;Right shortened Stairs: 
  
  
   
 
Neuro Re-Education: 
Therapeutic Exercises:  
 
Pain: 
Pain Scale 1: Numeric (0 - 10) Pain Intensity 1: 0 Activity Tolerance:  
Limited Please refer to the flowsheet for vital signs taken during this treatment. After treatment:  
?    Patient left in no apparent distress sitting up in chair ? Patient left in no apparent distress in bed 
? Call bell left within reach ? Nursing notified ? Caregiver present ? Bed alarm activated COMMUNICATION/COLLABORATION:  
The patient?s plan of care was discussed with: Registered Nurse Marco Mckeon PTA Time Calculation: 24 mins

## 2019-03-25 NOTE — ROUTINE PROCESS
Bedside shift change report given to Sandro Culver (oncoming nurse) by Mahad Erwin (offgoing nurse). Report included the following information SBAR, Kardex, Procedure Summary, Intake/Output, MAR, Accordion, Recent Results, Med Rec Status and Cardiac Rhythm AV Paced.

## 2019-03-25 NOTE — PROGRESS NOTES
Name: Manuel Cali  
MRN: [id number] : 1921 Assessment: 
SARAH on CKD-4: Cr better-> 2.1mg/dl today Hypernatremia:improving Acidosis: persistent Anemia: GI bleed + nose bleeds+ ACKD; Hgb remains low 7's. S/p dose of ddAVP on 3/21. Nose bleeds stopped . S/p dose of Aranesp of 110 mcg at our office on 3/14 PTA 
HTN 
  
 
  
Plan/Recommendations: 
Encourage oral hydration-Na trending down PRN Transfusion No need for CECELIA- just got it as outpt Ct current meds Ct NaHCO3 Discharge planning-> back to SNF Subjective: 
\" I don't want to die here\". Wishes to go home. ROS:  
No nausea, no vomiting No chest pain, no shortness of breath Exam: 
Visit Vitals /65 (BP 1 Location: Left arm, BP Patient Position: At rest) Pulse 61 Temp 97.9 °F (36.6 °C) Resp 18 Ht 5' 8\" (1.727 m) Wt 71.8 kg (158 lb 4.8 oz) SpO2 98% BMI 24.07 kg/m² Frail, elderly male, in NAD Pallor+, no icterus No nasal bleed No edema AOx3 Current Facility-Administered Medications Medication Dose Route Frequency Last Dose  brinzolamide (AZOPT) 1 % ophthalmic suspension 1 Drop (Patient Supplied)  1 Drop Both Eyes BID 1 Drop at 19 0901  
 dextrose 5 % - 0.45% NaCl infusion  25 mL/hr IntraVENous CONTINUOUS 25 mL/hr at 19 0730  latanoprost (XALATAN) 0.005 % ophthalmic solution 1 Drop  1 Drop Both Eyes QPM 1 Drop at 19 1855  aspirin chewable tablet 81 mg  81 mg Oral DAILY 81 mg at 19 2422  carvedilol (COREG) tablet 6.25 mg  6.25 mg Oral BID WITH MEALS 6.25 mg at 19 1878  hydrALAZINE (APRESOLINE) 20 mg/mL injection 10 mg  10 mg IntraVENous Q6H PRN 10 mg at 19 1632  sodium bicarbonate tablet 650 mg  650 mg Oral  mg at 19 8185  sodium chloride (NS) flush 5-40 mL  5-40 mL IntraVENous Q8H 10 mL at 03/25/19 1442  sodium chloride (NS) flush 5-40 mL  5-40 mL IntraVENous PRN    
 sodium chloride (NS) flush 5-40 mL  5-40 mL IntraVENous Q8H 10 mL at 03/25/19 1442  sodium chloride (NS) flush 5-40 mL  5-40 mL IntraVENous PRN  pantoprazole (PROTONIX) 40 mg in sodium chloride 0.9% 10 mL injection  40 mg IntraVENous Q12H 40 mg at 03/25/19 0902  
 0.9% sodium chloride infusion 250 mL  250 mL IntraVENous PRN Labs/Data: 
 
Lab Results Component Value Date/Time WBC 6.8 03/25/2019 04:49 AM  
 HGB (POC) 10.4 (A) 08/06/2018 02:48 PM  
 HGB 7.3 (L) 03/25/2019 04:49 AM  
 Hematocrit, POC 37 04/09/2018 03:31 PM  
 HCT (POC) 31.0 (A) 08/06/2018 02:48 PM  
 HCT 23.5 (L) 03/25/2019 04:49 AM  
 PLATELET 042 16/47/4364 04:49 AM  
 .8 (H) 03/25/2019 04:49 AM  
 
 
Lab Results Component Value Date/Time Sodium 143 03/25/2019 04:49 AM  
 Potassium 4.3 03/25/2019 04:49 AM  
 Chloride 118 (H) 03/25/2019 04:49 AM  
 CO2 18 (L) 03/25/2019 04:49 AM  
 Anion gap 7 03/25/2019 04:49 AM  
 Glucose 103 (H) 03/25/2019 04:49 AM  
 BUN 44 (H) 03/25/2019 04:49 AM  
 Creatinine 2.16 (H) 03/25/2019 04:49 AM  
 BUN/Creatinine ratio 20 03/25/2019 04:49 AM  
 GFR est AA 34 (L) 03/25/2019 04:49 AM  
 GFR est non-AA 28 (L) 03/25/2019 04:49 AM  
 Calcium 7.9 (L) 03/25/2019 04:49 AM  
 
 
Wt Readings from Last 3 Encounters:  
03/25/19 71.8 kg (158 lb 4.8 oz) 03/04/19 72.1 kg (159 lb)  
02/14/19 74.1 kg (163 lb 6.4 oz) Intake/Output Summary (Last 24 hours) at 3/25/2019 1449 Last data filed at 3/25/2019 1223 Gross per 24 hour Intake 1020 ml Output 1550 ml Net -530 ml Patient seen and examined. Chart reviewed. Labs, data and other pertinent notes reviewed in last 24 hrs. PMH/SH/FH reviewed and unchanged compared to H&P Discussed with pt Melanie Palomino MD

## 2019-03-26 LAB
GLUCOSE BLD STRIP.AUTO-MCNC: 171 MG/DL (ref 65–100)
HCT VFR BLD AUTO: 23.5 % (ref 36.6–50.3)
HGB BLD-MCNC: 7.2 G/DL (ref 12.1–17)
SERVICE CMNT-IMP: ABNORMAL

## 2019-03-26 PROCEDURE — 86870 RBC ANTIBODY IDENTIFICATION: CPT

## 2019-03-26 PROCEDURE — 36415 COLL VENOUS BLD VENIPUNCTURE: CPT

## 2019-03-26 PROCEDURE — 65270000032 HC RM SEMIPRIVATE

## 2019-03-26 PROCEDURE — 74011000250 HC RX REV CODE- 250: Performed by: NURSE PRACTITIONER

## 2019-03-26 PROCEDURE — 74011250637 HC RX REV CODE- 250/637: Performed by: FAMILY MEDICINE

## 2019-03-26 PROCEDURE — 97535 SELF CARE MNGMENT TRAINING: CPT

## 2019-03-26 PROCEDURE — 82962 GLUCOSE BLOOD TEST: CPT

## 2019-03-26 PROCEDURE — 97165 OT EVAL LOW COMPLEX 30 MIN: CPT

## 2019-03-26 PROCEDURE — P9016 RBC LEUKOCYTES REDUCED: HCPCS

## 2019-03-26 PROCEDURE — 85018 HEMOGLOBIN: CPT

## 2019-03-26 PROCEDURE — 86920 COMPATIBILITY TEST SPIN: CPT

## 2019-03-26 PROCEDURE — 36430 TRANSFUSION BLD/BLD COMPNT: CPT

## 2019-03-26 PROCEDURE — C9113 INJ PANTOPRAZOLE SODIUM, VIA: HCPCS | Performed by: NURSE PRACTITIONER

## 2019-03-26 PROCEDURE — 86900 BLOOD TYPING SEROLOGIC ABO: CPT

## 2019-03-26 PROCEDURE — 74011250637 HC RX REV CODE- 250/637: Performed by: INTERNAL MEDICINE

## 2019-03-26 PROCEDURE — 74011250636 HC RX REV CODE- 250/636: Performed by: NURSE PRACTITIONER

## 2019-03-26 RX ORDER — SODIUM CHLORIDE 9 MG/ML
250 INJECTION, SOLUTION INTRAVENOUS AS NEEDED
Status: DISCONTINUED | OUTPATIENT
Start: 2019-03-26 | End: 2019-03-27 | Stop reason: HOSPADM

## 2019-03-26 RX ADMIN — Medication 10 ML: at 22:43

## 2019-03-26 RX ADMIN — SODIUM BICARBONATE 650 MG: 650 TABLET ORAL at 09:11

## 2019-03-26 RX ADMIN — BRINZOLAMIDE 1 DROP: 10 SUSPENSION/ DROPS OPHTHALMIC at 21:00

## 2019-03-26 RX ADMIN — PANTOPRAZOLE SODIUM 40 MG: 40 INJECTION, POWDER, FOR SOLUTION INTRAVENOUS at 09:11

## 2019-03-26 RX ADMIN — CARVEDILOL 6.25 MG: 6.25 TABLET, FILM COATED ORAL at 17:12

## 2019-03-26 RX ADMIN — LATANOPROST 1 DROP: 50 SOLUTION OPHTHALMIC at 17:17

## 2019-03-26 RX ADMIN — CARVEDILOL 6.25 MG: 6.25 TABLET, FILM COATED ORAL at 09:11

## 2019-03-26 RX ADMIN — BRINZOLAMIDE 1 DROP: 10 SUSPENSION/ DROPS OPHTHALMIC at 08:00

## 2019-03-26 RX ADMIN — ASPIRIN 81 MG CHEWABLE TABLET 81 MG: 81 TABLET CHEWABLE at 09:11

## 2019-03-26 NOTE — PROGRESS NOTES
PANDA contacted Alexey Saucedo with admissions at [facility] healthcare unit to let her know that patient hgb results are pending. CM spoke with patient daughter about the above information, and will call her once discharge is placed. 1153:  Patient will not discharge today due to HGB level, he will receive transfusion and transfer to medical floor. CM has contacted [facility] and provided his daughter with updates. Patient daughter concern is the regiment for the epigen shot. She stated he has his next shot for 4/1, but in the past the bleed has been longer. CM advised daughter that cm will escalate GARY Ferrara, [facility]

## 2019-03-26 NOTE — PROGRESS NOTES
Reviewed chart for transitions of care, and discussed in rounds. Cm noted of previous cm note that [facility] patient today after 11:00 a.m., attending aware. Cm to follow for discharge needs.  
 
[facility]

## 2019-03-26 NOTE — PROGRESS NOTES
Occupational Therapy Seen for OT evaluation, formal note to follow. Recommend SNF, healthcare visit. Brian De La Rosa MS OTR/L

## 2019-03-26 NOTE — PROGRESS NOTES
TRANSFER - OUT REPORT: 
 
Verbal report given to Saima(name) on Barron Po  being transferred to (unit) for ordered procedure Report consisted of patients Situation, Background, Assessment and  
Recommendations(SBAR). Information from the following report(s) SBAR was reviewed with the receiving nurse. Lines:  
Peripheral IV 03/23/19 Posterior;Right Forearm (Active) Site Assessment Clean, dry, & intact 3/26/2019  4:40 AM  
Phlebitis Assessment 0 3/26/2019  4:40 AM  
Infiltration Assessment 0 3/26/2019  4:40 AM  
Dressing Status Clean, dry, & intact 3/26/2019  4:40 AM  
Dressing Type Tape;Transparent 3/26/2019  4:40 AM  
Hub Color/Line Status Blue; Infusing 3/26/2019  4:40 AM  
Action Taken Open ports on tubing capped 3/26/2019  4:40 AM  
Alcohol Cap Used Yes 3/26/2019  4:40 AM  
  
 
Opportunity for questions and clarification was provided. Patient transported with: 
 [facility] Attempted to notify family of transfer, number on demographics not in service, pt stated he will notify daughter

## 2019-03-26 NOTE — PROGRESS NOTES
Hospitalist Progress Note Sayra Amaya MD 
Answering service: [phone] OR [phone] from in house phone Date of Service:  3/26/2019 NAME:  Barron Rhodes 
:  1921 MRN:  [id number] Admission Summary:  
C/c :rectal bleeding 
 80 y.o. Male with  PMH CADs/p CABG,ERICA 2018 on ASA, & brilinta ,CKD4,chronic anemia,CHF,AV block s/p pacemakerDM,HLD,GERD. Gastric ulcer Pt p/w dark stool,noted with hematemesis x1 ER 
S/p endoscopy 2018- gastritis,gastric ulcer oozing s/p hemoclips Interval history / Subjective:  
  
3/26- reports being lightheaded and weak Denies external bleeding Assessment & Plan:  
 
GIB,p/w rectal bleeding,hematemesis heme occult + 
-EGD - no bleeding gastric AVM;s- ablation deferred as non bleeding gastric esophagitis 
-IV protonix bid PPI 
-off brilinta 
-resumed asa - per card , held d/t to nose bleed Anemia ,acute on chronic  
-due to above  
-- hgb low 5.9 s/p 2 units of PRBC hgb 7.5 
-Hgb 7.3 on 3/25 
-Hg Dropping pt feeling Dizzy in bed- Would benefit 1 unit Blood 3/26 Epistaxis resolved 
-repeat hgb 7.2,-7.5<-7.7 
 -off brilinta 
-resume asa monitor closely for active bleed SARAH superimposed on CKD4 baseline cr,2.7 
-improved /stable cr. 2.5 from initial 3.09 with IVF hydration 
-held home lasix  
-renal consult appreciated,ddAVP x 1dose (on ,to help reduce risk of uremic plt  dysfunction 
 with reduce risk of bleeding Hypernatremia -na trending down 
-encourage po hydration per renal 
 
Acidosis 
-po bicarb CAD 
-s/p CABG, Stent with ERICA asa & brilinta,held as above 
-per card ok to remain off brilinta,given risks of recurrent GIB 
-f/u  with usual cardiologist, dr Shannan Terry at [facility]. 
-held asa d/t nose bleed,and ongoing anemia H/o AV block 
-s/p pacemaker  
  
Chronic CHF 
-echo from  with EF 50% 
-cxr with no acute process 
-holding lasix d/t sarah 
  
  
 Lactic acidosis,resolved 
-due to hypovolemia 
-IVF hydration, while npo 
  
DM2 
-SSI 
-pt reports ,has boderline DM ,not on meds  
- hgb a1c 5.8 
  
HTN,uncotrolled/labile - pta coreg 
- iv hydralazine prn for accelerated bp 
  
Functional status,PTA,lives at half-way, use walker,sometimes scooter for ambuation Disposition Pt and daughter wish to go snf/rehab [facility], and than transition to assisted living facility, daughter does not want him to go back to independent living facility PT consult,  consulted Code status:full code 03/20-Goals of long term care addressed by prior Hospitalisttom jurgen herbert  is mPOA Recommended DNR,status/conservative treatment recommended Pt/sherrell wish to continue with Full code,aggresive management DVT prophylaxis:SCD Care Plan discussed with: pt/nurse/daughter No active bleeding in last 24 hours per RN. Hemoglobin is 7.3. Nurses say he is drinking fluids. Sodium is still 150 will start on hypotonic fluids slowly. 3/24: 
Patient had un eventful night. RN says he is eating. Hypernatremia is better with Hypotonic fluids. Hemoglobin is stable. No more GI bleed. Renal function is stable. Waiting for placement. Hospital Problems  Date Reviewed: 3/4/2019 Codes Class Noted POA  
 GI bleed ICD-10-CM: K92.2 ICD-9-CM: 578.9  7/3/2018 Unknown Review of Systems: A comprehensive review of systems was negative except for that written in the HPI. Vital Signs:  
 Last 24hrs VS reviewed since prior progress note. Most recent are: 
Visit Vitals /69 (BP 1 Location: Right arm, BP Patient Position: At rest) Pulse 61 Temp 97.6 °F (36.4 °C) Resp 18 Ht 5' 8\" (1.727 m) Wt 68.2 kg (150 lb 5.7 oz) SpO2 97% BMI 22.86 kg/m² Intake/Output Summary (Last 24 hours) at 3/26/2019 1132 Last data filed at 3/26/2019 5626 Gross per 24 hour Intake 1562.92 ml Output 1000 ml Net 562.92 ml Physical Examination:  
     
Constitutional:  cooperative, pleasant, Feels Weak ENT:  Oral mucous moist, oropharynx benign. Neck supple, +bright red blood b/l ant nares Resp:  CTA bilaterally. No wheezing/rhonchi/rales. No accessory muscle use CV:  Regular rhythm, normal rate, no murmurs, gallops, rubs GI:  Soft, non distended, non tender. normoactive bowel sounds, no hepatosplenomegaly Musculoskeletal:  No edema, warm, 2+ pulses throughout Neurologic:  Moves all extremities. AAOx3, CN II-XII reviewed Psych:  mood,affect appropriate Data Review:  
 Review and/or order of clinical lab test 
 
 
Labs:  
 
Recent Labs  
  03/26/19 
0910 03/25/19 
0449 03/24/19 
0349 WBC  --  6.8 6.8 HGB 7.2* 7.3* 7.5* HCT 23.5* 23.5* 24.3*  
PLT  --  159 160 Recent Labs  
  03/25/19 
0449 03/24/19 
0349  145  
K 4.3 4.1 * 119* CO2 18* 18* BUN 44* 51* CREA 2.16* 2.33* * 121* CA 7.9* 8.0* No results for input(s): SGOT, GPT, ALT, AP, TBIL, TBILI, TP, ALB, GLOB, GGT, AML, LPSE in the last 72 hours. No lab exists for component: AMYP, HLPSE No results for input(s): INR, PTP, APTT in the last 72 hours. No lab exists for component: INREXT, INREXT No results for input(s): FE, TIBC, PSAT, FERR in the last 72 hours. No results found for: FOL, RBCF No results for input(s): PH, PCO2, PO2 in the last 72 hours. No results for input(s): CPK, CKNDX, TROIQ in the last 72 hours. No lab exists for component: CPKMB Lab Results Component Value Date/Time Cholesterol, total 175 06/05/2018 04:08 AM  
 HDL Cholesterol 39 06/05/2018 04:08 AM  
 LDL, calculated 121 (H) 06/05/2018 04:08 AM  
 Triglyceride 75 06/05/2018 04:08 AM  
 CHOL/HDL Ratio 4.5 06/05/2018 04:08 AM  
 
Lab Results Component Value Date/Time  Glucose (POC) 129 (H) 03/24/2019 04:18 PM  
 Glucose (POC) 136 (H) 03/24/2019 06:34 AM  
 Glucose (POC) 132 (H) 03/23/2019 10:36 PM  
 Glucose (POC) 132 (H) 03/23/2019 04:36 PM  
 Glucose (POC) 149 (H) 03/23/2019 12:08 PM  
 
Lab Results Component Value Date/Time Color YELLOW/STRAW 07/03/2018 07:17 PM  
 Appearance CLEAR 07/03/2018 07:17 PM  
 Specific gravity 1.017 07/03/2018 07:17 PM  
 pH (UA) 6.0 07/03/2018 07:17 PM  
 Protein TRACE (A) 07/03/2018 07:17 PM  
 Glucose NEGATIVE  07/03/2018 07:17 PM  
 Ketone NEGATIVE  07/03/2018 07:17 PM  
 Bilirubin NEGATIVE  07/03/2018 07:17 PM  
 Urobilinogen 0.2 07/03/2018 07:17 PM  
 Nitrites NEGATIVE  07/03/2018 07:17 PM  
 Leukocyte Esterase NEGATIVE  07/03/2018 07:17 PM  
 Epithelial cells FEW 07/03/2018 07:17 PM  
 Bacteria NEGATIVE  07/03/2018 07:17 PM  
 WBC 0-4 07/03/2018 07:17 PM  
 RBC 0-5 07/03/2018 07:17 PM  
 
 
 
Medications Reviewed:  
 
Current Facility-Administered Medications Medication Dose Route Frequency  0.9% sodium chloride infusion 250 mL  250 mL IntraVENous PRN  
 brinzolamide (AZOPT) 1 % ophthalmic suspension 1 Drop (Patient Supplied)  1 Drop Both Eyes BID  dextrose 5 % - 0.45% NaCl infusion  25 mL/hr IntraVENous CONTINUOUS  
 latanoprost (XALATAN) 0.005 % ophthalmic solution 1 Drop  1 Drop Both Eyes QPM  
 aspirin chewable tablet 81 mg  81 mg Oral DAILY  carvedilol (COREG) tablet 6.25 mg  6.25 mg Oral BID WITH MEALS  
 hydrALAZINE (APRESOLINE) 20 mg/mL injection 10 mg  10 mg IntraVENous Q6H PRN  
 sodium bicarbonate tablet 650 mg  650 mg Oral BID  sodium chloride (NS) flush 5-40 mL  5-40 mL IntraVENous Q8H  
 sodium chloride (NS) flush 5-40 mL  5-40 mL IntraVENous PRN  
 sodium chloride (NS) flush 5-40 mL  5-40 mL IntraVENous Q8H  
 sodium chloride (NS) flush 5-40 mL  5-40 mL IntraVENous PRN  pantoprazole (PROTONIX) 40 mg in sodium chloride 0.9% 10 mL injection  40 mg IntraVENous Q12H  
 0.9% sodium chloride infusion 250 mL  250 mL IntraVENous PRN  
 
______________________________________________________________________ EXPECTED LENGTH OF STAY: 3d 0h 
ACTUAL LENGTH OF STAY:          7 
 
            
Evens Fernandez MD

## 2019-03-26 NOTE — PROGRESS NOTES
Name: Anita Tinsley  
MRN: [id number] : 1921 Assessment: 
SARAH on CKD-4: Cr better-> 2.1mg/dl yesterday Hypernatremia:improving Acidosis: persistent Anemia: GI bleed + nose bleeds+ ACKD; Hgb remains low 7's. S/p dose of ddAVP on 3/21. Nose bleeds stopped . S/p dose of Aranesp of 110 mcg at our office on 3/14 PTA 
HTN 
  
 
  
Plan/Recommendations: 
Encourage oral hydration-Na trending down 1 unit PRBC today Redose CECELIA tomorrow prior to discharge Ct current meds Ct NaHCO3 Discharge planning-> back to SNF-> maybe tomorrow Subjective: No events overnight. Wishes to go home. ROS:  
No nausea, no vomiting No chest pain, no shortness of breath Exam: 
Visit Vitals /68 (BP 1 Location: Left arm) Pulse (!) 59 Temp 98.4 °F (36.9 °C) Resp 18 Ht 5' 8\" (1.727 m) Wt 68.2 kg (150 lb 5.7 oz) SpO2 100% BMI 22.86 kg/m² Frail, elderly male, in NAD Pallor+, no icterus No nasal bleed No edema AOx3 Current Facility-Administered Medications Medication Dose Route Frequency Last Dose  
 0.9% sodium chloride infusion 250 mL  250 mL IntraVENous PRN    
 brinzolamide (AZOPT) 1 % ophthalmic suspension 1 Drop (Patient Supplied)  1 Drop Both Eyes BID 1 Drop at 19 0800  
 dextrose 5 % - 0.45% NaCl infusion  25 mL/hr IntraVENous CONTINUOUS 25 mL/hr at 19 2200  latanoprost (XALATAN) 0.005 % ophthalmic solution 1 Drop  1 Drop Both Eyes QPM 1 Drop at 19 2141  aspirin chewable tablet 81 mg  81 mg Oral DAILY 81 mg at 19 5465  carvedilol (COREG) tablet 6.25 mg  6.25 mg Oral BID WITH MEALS 6.25 mg at 19 5995  hydrALAZINE (APRESOLINE) 20 mg/mL injection 10 mg  10 mg IntraVENous Q6H PRN 10 mg at 19 1632  sodium bicarbonate tablet 650 mg  650 mg Oral  mg at 19 9212  sodium chloride (NS) flush 5-40 mL  5-40 mL IntraVENous Q8H Stopped at 03/26/19 0600  
 sodium chloride (NS) flush 5-40 mL  5-40 mL IntraVENous PRN    
 sodium chloride (NS) flush 5-40 mL  5-40 mL IntraVENous Q8H Stopped at 03/26/19 0600  
 sodium chloride (NS) flush 5-40 mL  5-40 mL IntraVENous PRN  pantoprazole (PROTONIX) 40 mg in sodium chloride 0.9% 10 mL injection  40 mg IntraVENous Q12H 40 mg at 03/26/19 0911  
 0.9% sodium chloride infusion 250 mL  250 mL IntraVENous PRN Labs/Data: 
 
Lab Results Component Value Date/Time WBC 6.8 03/25/2019 04:49 AM  
 HGB (POC) 10.4 (A) 08/06/2018 02:48 PM  
 HGB 7.2 (L) 03/26/2019 09:10 AM  
 Hematocrit, POC 37 04/09/2018 03:31 PM  
 HCT (POC) 31.0 (A) 08/06/2018 02:48 PM  
 HCT 23.5 (L) 03/26/2019 09:10 AM  
 PLATELET 998 08/16/5418 04:49 AM  
 .8 (H) 03/25/2019 04:49 AM  
 
 
Lab Results Component Value Date/Time Sodium 143 03/25/2019 04:49 AM  
 Potassium 4.3 03/25/2019 04:49 AM  
 Chloride 118 (H) 03/25/2019 04:49 AM  
 CO2 18 (L) 03/25/2019 04:49 AM  
 Anion gap 7 03/25/2019 04:49 AM  
 Glucose 103 (H) 03/25/2019 04:49 AM  
 BUN 44 (H) 03/25/2019 04:49 AM  
 Creatinine 2.16 (H) 03/25/2019 04:49 AM  
 BUN/Creatinine ratio 20 03/25/2019 04:49 AM  
 GFR est AA 34 (L) 03/25/2019 04:49 AM  
 GFR est non-AA 28 (L) 03/25/2019 04:49 AM  
 Calcium 7.9 (L) 03/25/2019 04:49 AM  
 
 
Wt Readings from Last 3 Encounters:  
03/26/19 68.2 kg (150 lb 5.7 oz) 03/04/19 72.1 kg (159 lb)  
02/14/19 74.1 kg (163 lb 6.4 oz) Intake/Output Summary (Last 24 hours) at 3/26/2019 1709 Last data filed at 3/26/2019 1520 Gross per 24 hour Intake 1322.92 ml Output 1425 ml Net -102.08 ml Patient seen and examined. Chart reviewed. Labs, data and other pertinent notes reviewed in last 24 hrs. PMH/SH/FH reviewed and unchanged compared to H&P Discussed with pt and RN Luisa Pizarro MD
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
## 2019-03-27 VITALS
DIASTOLIC BLOOD PRESSURE: 66 MMHG | SYSTOLIC BLOOD PRESSURE: 133 MMHG | HEART RATE: 60 BPM | RESPIRATION RATE: 16 BRPM | HEIGHT: 68 IN | WEIGHT: 150.35 LBS | OXYGEN SATURATION: 98 % | BODY MASS INDEX: 22.79 KG/M2 | TEMPERATURE: 97.2 F

## 2019-03-27 LAB
ABO + RH BLD: NORMAL
ALBUMIN SERPL-MCNC: 2.3 G/DL (ref 3.5–5)
ALBUMIN/GLOB SERPL: 0.8 {RATIO} (ref 1.1–2.2)
ALP SERPL-CCNC: 107 U/L (ref 45–117)
ALT SERPL-CCNC: 13 U/L (ref 12–78)
ANION GAP SERPL CALC-SCNC: 8 MMOL/L (ref 5–15)
AST SERPL-CCNC: 21 U/L (ref 15–37)
BILIRUB SERPL-MCNC: 0.5 MG/DL (ref 0.2–1)
BLD PROD TYP BPU: NORMAL
BLOOD BANK CMNT PATIENT-IMP: NORMAL
BLOOD GROUP ANTIBODIES SERPL: NORMAL
BLOOD GROUP ANTIBODIES SERPL: NORMAL
BPU ID: NORMAL
BUN SERPL-MCNC: 40 MG/DL (ref 6–20)
BUN/CREAT SERPL: 18 (ref 12–20)
CALCIUM SERPL-MCNC: 7.9 MG/DL (ref 8.5–10.1)
CHLORIDE SERPL-SCNC: 117 MMOL/L (ref 97–108)
CO2 SERPL-SCNC: 18 MMOL/L (ref 21–32)
CREAT SERPL-MCNC: 2.26 MG/DL (ref 0.7–1.3)
CROSSMATCH RESULT,%XM: NORMAL
ERYTHROCYTE [DISTWIDTH] IN BLOOD BY AUTOMATED COUNT: 19.5 % (ref 11.5–14.5)
GLOBULIN SER CALC-MCNC: 2.9 G/DL (ref 2–4)
GLUCOSE SERPL-MCNC: 105 MG/DL (ref 65–100)
HCT VFR BLD AUTO: 25.9 % (ref 36.6–50.3)
HGB BLD-MCNC: 8.3 G/DL (ref 12.1–17)
MAGNESIUM SERPL-MCNC: 2.2 MG/DL (ref 1.6–2.4)
MCH RBC QN AUTO: 33.7 PG (ref 26–34)
MCHC RBC AUTO-ENTMCNC: 32 G/DL (ref 30–36.5)
MCV RBC AUTO: 105.3 FL (ref 80–99)
NRBC # BLD: 0 K/UL (ref 0–0.01)
NRBC BLD-RTO: 0 PER 100 WBC
PHOSPHATE SERPL-MCNC: 3.8 MG/DL (ref 2.6–4.7)
PLATELET # BLD AUTO: 168 K/UL (ref 150–400)
PMV BLD AUTO: 11.4 FL (ref 8.9–12.9)
POTASSIUM SERPL-SCNC: 4.4 MMOL/L (ref 3.5–5.1)
PROT SERPL-MCNC: 5.2 G/DL (ref 6.4–8.2)
RBC # BLD AUTO: 2.46 M/UL (ref 4.1–5.7)
SODIUM SERPL-SCNC: 143 MMOL/L (ref 136–145)
SPECIMEN EXP DATE BLD: NORMAL
STATUS OF UNIT,%ST: NORMAL
UNIT DIVISION, %UDIV: 0
WBC # BLD AUTO: 6.3 K/UL (ref 4.1–11.1)

## 2019-03-27 PROCEDURE — 74011250637 HC RX REV CODE- 250/637: Performed by: FAMILY MEDICINE

## 2019-03-27 PROCEDURE — C9113 INJ PANTOPRAZOLE SODIUM, VIA: HCPCS | Performed by: NURSE PRACTITIONER

## 2019-03-27 PROCEDURE — 74011250636 HC RX REV CODE- 250/636: Performed by: NURSE PRACTITIONER

## 2019-03-27 PROCEDURE — 36415 COLL VENOUS BLD VENIPUNCTURE: CPT

## 2019-03-27 PROCEDURE — 84100 ASSAY OF PHOSPHORUS: CPT

## 2019-03-27 PROCEDURE — 83735 ASSAY OF MAGNESIUM: CPT

## 2019-03-27 PROCEDURE — 74011250636 HC RX REV CODE- 250/636: Performed by: INTERNAL MEDICINE

## 2019-03-27 PROCEDURE — 74011250637 HC RX REV CODE- 250/637: Performed by: INTERNAL MEDICINE

## 2019-03-27 PROCEDURE — 85027 COMPLETE CBC AUTOMATED: CPT

## 2019-03-27 PROCEDURE — 74011000250 HC RX REV CODE- 250: Performed by: NURSE PRACTITIONER

## 2019-03-27 PROCEDURE — 80053 COMPREHEN METABOLIC PANEL: CPT

## 2019-03-27 RX ORDER — FACIAL-BODY WIPES
10 EACH TOPICAL
Status: SHIPPED | COMMUNITY
Start: 2019-03-27 | End: 2019-04-23

## 2019-03-27 RX ORDER — PANTOPRAZOLE SODIUM 40 MG/1
40 TABLET, DELAYED RELEASE ORAL 2 TIMES DAILY
Status: SHIPPED | COMMUNITY
Start: 2019-03-27 | End: 2019-04-23

## 2019-03-27 RX ORDER — CARVEDILOL 6.25 MG/1
3.12 TABLET ORAL 2 TIMES DAILY WITH MEALS
Status: SHIPPED | COMMUNITY
Start: 2019-03-27 | End: 2019-04-23

## 2019-03-27 RX ORDER — AMOXICILLIN 250 MG
1 CAPSULE ORAL DAILY
Status: SHIPPED | COMMUNITY
Start: 2019-03-27 | End: 2019-04-23

## 2019-03-27 RX ORDER — FUROSEMIDE 20 MG/1
20 TABLET ORAL EVERY OTHER DAY
Qty: 15 TAB | Refills: 0 | Status: SHIPPED
Start: 2019-03-27 | End: 2019-04-23

## 2019-03-27 RX ORDER — FUROSEMIDE 20 MG/1
TABLET ORAL
Qty: 15 TAB | Refills: 0 | Status: SHIPPED
Start: 2019-03-27 | End: 2019-03-27 | Stop reason: SDUPTHER

## 2019-03-27 RX ADMIN — BRINZOLAMIDE 1 DROP: 10 SUSPENSION/ DROPS OPHTHALMIC at 08:00

## 2019-03-27 RX ADMIN — EPOETIN ALFA-EPBX 20000 UNITS: 10000 INJECTION, SOLUTION INTRAVENOUS; SUBCUTANEOUS at 14:37

## 2019-03-27 RX ADMIN — PANTOPRAZOLE SODIUM 40 MG: 40 INJECTION, POWDER, FOR SOLUTION INTRAVENOUS at 08:40

## 2019-03-27 RX ADMIN — SODIUM BICARBONATE 650 MG: 650 TABLET ORAL at 08:40

## 2019-03-27 RX ADMIN — ASPIRIN 81 MG CHEWABLE TABLET 81 MG: 81 TABLET CHEWABLE at 08:40

## 2019-03-27 RX ADMIN — CARVEDILOL 6.25 MG: 6.25 TABLET, FILM COATED ORAL at 06:42

## 2019-03-27 RX ADMIN — Medication 10 ML: at 06:43

## 2019-03-27 RX ADMIN — Medication 10 ML: at 14:37

## 2019-03-27 NOTE — DISCHARGE SUMMARY
Discharge Summary PATIENT ID: Anita Tinsley 
MRN: [id number] YOB: 1921 DATE OF ADMISSION: 3/19/2019  2:01 PM   
DATE OF DISCHARGE: 03/27/19 PRIMARY CARE PROVIDER: Viola Bertrand MD  
 
 
DISCHARGING PROVIDER: Muriel Fierro MD   
To contact this individual call [phone] and ask the  to page. If unavailable ask to be transferred the Adult Hospitalist Department. CONSULTATIONS: IP CONSULT TO CARDIOLOGY 
IP CONSULT TO NEPHROLOGY PROCEDURES/SURGERIES: Procedure(s): ESOPHAGOGASTRODUODENOSCOPY (EGD) IMAGING Xr Chest [facility] Result Date: 3/19/2019 IMPRESSION: No acute process. ADMITTING DIAGNOSES 
GI bleed Anemia Renal failure Per admitting HISTORY OF PRESENT ILLNESS:    
Mr. Jazmín Alan is a 80 y.o. Male with  PMH CADs/p CABG,ERICA 06/2018 on ASA, & brilinta ,CKD4,chronic anemia,CHF,AV block s/p pacemakerDM,HLD,GERD. Pt has recently evaluated for rectal bleeding requiring blood transfusion with 2 units seen by DR Lelo Omer in [address] [place] [address] EGD 07/2018, showed diffuse mild friable gastritis,shallow gastric  Ulcer treated with clipping . Pt has been feeling weak for the past 2 weeks, noticing dark stools since yesterday 03/18. reports nearly passed out,Pt contacted PCP/cardiologist referred to ER.pt presented to [facility] ER,had an episode of vomiting x 1 with mucus and blood. Pt hgb is stable. GI has been consulted HOSPITAL COURSE:  
GIB,p/w rectal bleeding,hematemesis heme occult + 
-EGD 03/20- no bleeding gastric AVM;s 
- ablation deferred as non bleeding gastric esophagitis 
-IV protonix bid PPI - GI recommends 2 months of PPI for healing 
-off brilinta - ok with cards 
-resumed baby asa - per card  
-Received Epogen prior to DC 
  
Anemia ,acute on chronic  
-due to above  
-03/19- hgb low 5.9 s/p 2 units of PRBC  
-One more unit 3/26- now hg is 8.3 
  
Epistaxis resolved -repeat hgb 7.2,-7.5<-7.7 
 -off brilinta 
-resume asa monitor closely for active bleed - so far none 
  
SARAH superimposed on CKD4 baseline cr,2.7 
-improved /stable cr. 2.5 from initial 3.09 with IVF hydration 
-renal consult appreciated,ddAVP x 1dose (on 03/210,to help reduce risk of uremic plt dysfunction 
-creatinine Improving, diuretics Doses adjusted 
  
Hypernatremia 
-Na normalizing 
-encourage po hydration per renal 
  
Acidosis 
-C/W po bicarb  
  
CAD 
-s/p CABG, Stent with ERICA on June 2018  
- brilinta,held as above 
-per cards ok to remain off brilinta,given risks of recurrent GIB 
-f/u  with usual cardiologist, dr Alvino Ren at [facility]. 
-held asa d/t nose bleed,and ongoing anemia - now resumed 
-Cleared By cardiology for D/C 
  
H/o AV block 
-s/p pacemaker  
  
Chronic Diastolic CHF, NYHA class IV on admission - now Class III 
-echo from nov/2018 with EF 50% 
-cxr with no acute process 
-Low Dose lasix every other day for maintenance, Monitor volume status and adjust appropriately 
 
   
Lactic acidosis,resolved 
-due to hypovolemia 
-IVF hydration 
  
DM2 
-pt reports ,has boderline DM ,not on meds  
- hgb a1c 5.8 
-D/C Finger checks 
  
HTN,uncotrolled/labile 
-Better controlled - C/W coreg 
- iv hydralazine prn for accelerated bp 
  
Functional status,PTA,lives at [facility], uses walker,sometimes scooter for ambuation 
  
Disposition Pt and daughter wish to go snf/rehab prefers [facility], and than transition to assisted living facility 3/27- called pts daughter and not able to leave message DISCHARGE DIAGNOSES / PLAN:   
 
Patient Active Problem List  
Diagnosis Code  Essential hypertension, benign I10  
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  Mixed hyperlipidemia E78.2  Coronary atherosclerosis of native coronary artery I25.10  Cardiac pacemaker in situ Z95.0  Sinoatrial node dysfunction (HCC) I49.5  Type 2 diabetes mellitus without complication, without long-term current use of insulin (HCC) E11.9  GERD (gastroesophageal reflux disease) K21.9  Fatigue R53.83  Chronic kidney disease, stage IV (severe) ([facility] [address] N18.4  ASCVD (arteriosclerotic cardiovascular disease) I25.10  Glaucoma H40.9  Neuropathy G62.9  Pernicious anemia D51.0  Hematuria R31.9  Abdominal pain R10.9  Pacemaker Z95.0  Type 2 diabetes mellitus with nephropathy (HCC) E11.21  
 S/P cardiac cath Z98.890  
 GI bleed K92.2  Anemia, blood loss D50.0  Acute gastric ulcer with hemorrhage K25.0  Non-rheumatic mitral regurgitation I34.0  Rectal bleeding K62.5  
 S/P PTCA (percutaneous transluminal coronary angioplasty) Z98.61 FOLLOW UP APPOINTMENTS:   
Follow-up Information Follow up With Specialties Details Why Contact Info Ld Rojas MD Internal Medicine In 1 week Follow Up [address] [facility] 
[phone] Fani Taveras MD Nephrology In 2 weeks Follow Up [address] [place] [address] 
[phone] ADDITIONAL CARE RECOMMENDATIONS:  
· It is important that you take the medication exactly as they are prescribed. · Keep your medication in the bottles provided by the pharmacist and keep a list of the medication names, dosages, and times to be taken in your wallet. · Do not take other medications without consulting your doctor. · No drinking alcohol or driving car or operating machinery if you are on narcotic pain medications. Donot take sedating mediations if you are sleepy or confused. · Fall Precautions · Keep Well Hydrated · Report to your medical provider if you feel you have  developed allergies to medications · Follow up with your PCP or Consultant for medication adjustments and refills · Monitor for signs of fevers,chills,bleeding,chest pain and seek medical attention if you do so. · Stop Penne Saner · Keep Nares Clean and moist 
· Followup BMP CBC in 1-2 days DIET: Cardiac Diet ACTIVITY: PT/OT Eval and Treat EQUIPMENT needed: [place] DISCHARGE MEDICATIONS: 
Current Discharge Medication List  
  
CONTINUE these medications which have CHANGED Details  
furosemide (LASIX) 20 mg tablet Take 1 Tab by mouth every other day. Take 20mg daily 
Qty: 15 Tab, Refills: 0  
  
carvedilol (COREG) 6.25 mg tablet Take 0.5 Tabs by mouth two (2) times daily (with meals). CONTINUE these medications which have NOT CHANGED Details  
pantoprazole (PROTONIX) 40 mg tablet Take 1 Tab by mouth two (2) times a day. senna-docusate (DOUGLAS-COLACE) 8.6-50 mg per tablet Take 1 Tab by mouth daily. bisacodyl (DULCOLAX, BISACODYL,) 10 mg suppository Insert 10 mg into rectum daily as needed (constipation). sodium bicarbonate 650 mg tablet Take 650 mg by mouth every evening. Crush one tablet and mix with cranberry juice after evening meal  
  
aspirin delayed-release 81 mg tablet Take  by mouth daily. brinzolamide (AZOPT) 1 % ophthalmic suspension Administer 1 Drop to both eyes two (2) times a day. Qty: 15 mL, Refills: 3  
  
omeprazole (PRILOSEC) 40 mg capsule Take 1 Cap by mouth daily. Qty: 90 Cap, Refills: prn  
  
vit A/vit C/vit E/zinc/copper (ICAPS AREDS PO) Take 1 Cap by mouth daily. latanoprost (XALATAN) 0.005 % ophthalmic solution Administer 1 Drop to both eyes nightly. STOP taking these medications  
  
 ticagrelor (BRILINTA) 90 mg tablet Comments:  
Reason for Stopping:   
   
  
 
 
All Micro Results Procedure Component Value Units Date/Time URINE CULTURE HOLD SAMPLE [[id number]] Collected:  03/19/19 1430 Order Status:  Canceled Specimen:  Urine Recent Results (from the past 24 hour(s)) TYPE + CROSSMATCH Collection Time: 03/26/19  2:33 PM  
Result Value Ref Range Crossmatch Expiration 03/29/2019 ABO/Rh(D) O NEGATIVE Antibody screen POS Comment PREVIOUSLY IDENTIFIED ANTI D Antibody ID NO ADDITIONAL ANTIBODIES DETECTED Unit number [id number] Blood component type RC LR Unit division 00 Status of unit TRANSFUSED Crossmatch result Compatible CBC W/O DIFF Collection Time: 03/27/19  3:50 AM  
Result Value Ref Range WBC 6.3 4.1 - 11.1 K/uL  
 RBC 2.46 (L) 4.10 - 5.70 M/uL HGB 8.3 (L) 12.1 - 17.0 g/dL HCT 25.9 (L) 36.6 - 50.3 % .3 (H) 80.0 - 99.0 FL  
 MCH 33.7 26.0 - 34.0 PG  
 MCHC 32.0 30.0 - 36.5 g/dL  
 RDW 19.5 (H) 11.5 - 14.5 % PLATELET 506 719 - 518 K/uL MPV 11.4 8.9 - 12.9 FL  
 NRBC 0.0 0  WBC ABSOLUTE NRBC 0.00 0.00 - 0.01 K/uL MAGNESIUM Collection Time: 03/27/19  3:50 AM  
Result Value Ref Range Magnesium 2.2 1.6 - 2.4 mg/dL PHOSPHORUS Collection Time: 03/27/19  3:50 AM  
Result Value Ref Range Phosphorus 3.8 2.6 - 4.7 MG/DL  
METABOLIC PANEL, COMPREHENSIVE Collection Time: 03/27/19  3:50 AM  
Result Value Ref Range Sodium 143 136 - 145 mmol/L Potassium 4.4 3.5 - 5.1 mmol/L Chloride 117 (H) 97 - 108 mmol/L  
 CO2 18 (L) 21 - 32 mmol/L Anion gap 8 5 - 15 mmol/L Glucose 105 (H) 65 - 100 mg/dL BUN 40 (H) 6 - 20 MG/DL Creatinine 2.26 (H) 0.70 - 1.30 MG/DL  
 BUN/Creatinine ratio 18 12 - 20 GFR est AA 33 (L) >60 ml/min/1.73m2 GFR est non-AA 27 (L) >60 ml/min/1.73m2 Calcium 7.9 (L) 8.5 - 10.1 MG/DL Bilirubin, total 0.5 0.2 - 1.0 MG/DL  
 ALT (SGPT) 13 12 - 78 U/L  
 AST (SGOT) 21 15 - 37 U/L Alk. phosphatase 107 45 - 117 U/L Protein, total 5.2 (L) 6.4 - 8.2 g/dL Albumin 2.3 (L) 3.5 - 5.0 g/dL Globulin 2.9 2.0 - 4.0 g/dL A-G Ratio 0.8 (L) 1.1 - 2.2 NOTIFY YOUR PHYSICIAN FOR ANY OF THE FOLLOWING:  
Fever over 101 degrees for 24 hours.   
Chest pain, shortness of breath, fever, chills, nausea, vomiting, diarrhea, change in mentation, falling, weakness, bleeding. Severe pain or pain not relieved by medications. Or, any other signs or symptoms that you may have questions about. DISPOSITION: 
  Home With: 
 OT  PT  HH  RN  
  
X Long term SNF/Inpatient Rehab Independent/assisted living Hospice Other:  
 
 
PATIENT CONDITION AT DISCHARGE:  
 
Functional status Poor X Deconditioned Independent Cognition X Lucid Forgetful Dementia Catheters/lines (plus indication) Rodriguez PICC   
 PEG   
X None Code status X  Full code DNR   
 
PHYSICAL EXAMINATION AT DISCHARGE: 
Gen:  No distress, alert HEENT:  Normal cephalic atraumatic, extra-occular movements are intact. Neck:  Supple, No JVD Lungs:  Clear bilaterally, no wheeze, no rales, normal effort Heart:  Regular Rate and Rhythm, normal S1 and S2, no edema Abdomen:  Soft, non tender, normal bowel sounds, no guarding. Extremities:  Well perfused, no cyanosis or edema Neurological:  Awake and alert, CN's are intact, normal strength throughout extremities Skin:  No rashes or moles Mental Status:  Normal thought process, does not appear anxious CHRONIC MEDICAL DIAGNOSES: 
Problem List as of 3/27/2019 Date Reviewed: 3/4/2019 Codes Class Noted - Resolved Rectal bleeding ICD-10-CM: K62.5 ICD-9-CM: 569.3  1/14/2019 - Present S/P PTCA (percutaneous transluminal coronary angioplasty) ICD-10-CM: Z98.61 ICD-9-CM: V45.82  1/14/2019 - Present Non-rheumatic mitral regurgitation ICD-10-CM: I34.0 ICD-9-CM: 424.0  11/15/2018 - Present Acute gastric ulcer with hemorrhage ICD-10-CM: K25.0 ICD-9-CM: 531.00  7/5/2018 - Present GI bleed ICD-10-CM: K92.2 ICD-9-CM: 578.9  7/3/2018 - Present Anemia, blood loss ICD-10-CM: D50.0 ICD-9-CM: 280.0  7/3/2018 - Present S/P cardiac cath ICD-10-CM: Z98.890 ICD-9-CM: V45.89  6/4/2018 - Present Overview Signed 6/4/2018  1:30 PM by Antonia Cloud NP  
  6/4/18:  PCI to LAD and RCA Type 2 diabetes mellitus with nephropathy ([facility] [address] ICD-10-CM: E11.21 
ICD-9-CM: 250.40, 583.81  1/23/2018 - Present Fatigue ICD-10-CM: R53.83 ICD-9-CM: 780.79  12/6/2017 - Present Chronic kidney disease, stage IV (severe) (HCC) ICD-10-CM: N18.4 ICD-9-CM: 585.4  12/6/2017 - Present ASCVD (arteriosclerotic cardiovascular disease) ICD-10-CM: I25.10 ICD-9-CM: 429.2, 440.9  12/6/2017 - Present Glaucoma ICD-10-CM: H40.9 ICD-9-CM: 365.9  12/6/2017 - Present Neuropathy ICD-10-CM: G62.9 ICD-9-CM: 355.9  12/6/2017 - Present Pernicious anemia ICD-10-CM: D51.0 ICD-9-CM: 281.0  12/6/2017 - Present Hematuria ICD-10-CM: R31.9 ICD-9-CM: 599.70  12/6/2017 - Present Abdominal pain ICD-10-CM: R10.9 ICD-9-CM: 789.00  12/6/2017 - Present Pacemaker ICD-10-CM: Z95.0 ICD-9-CM: V45.01  12/6/2017 - Present Type 2 diabetes mellitus without complication, without long-term current use of insulin (HCC) ICD-10-CM: E11.9 ICD-9-CM: 250.00  9/7/2017 - Present Overview Signed 9/7/2017 10:19 AM by Akanksha Tolbert MD  
  Hgb A1C 6.8% 2/21/17 GERD (gastroesophageal reflux disease) ICD-10-CM: K21.9 ICD-9-CM: 530.81  9/7/2017 - Present Sinoatrial node dysfunction (HCC) ICD-10-CM: I49.5 ICD-9-CM: 427.81  4/23/2012 - Present Essential hypertension, benign ICD-10-CM: I10 
ICD-9-CM: 401.1  4/16/2012 - Present Postsurgical aortocoronary bypass status ICD-10-CM: Z95.1 ICD-9-CM: V45.81  4/16/2012 - Present Mixed hyperlipidemia ICD-10-CM: E78.2 ICD-9-CM: 272.2  4/16/2012 - Present Coronary atherosclerosis of native coronary artery ICD-10-CM: I25.10 ICD-9-CM: 414.01  4/16/2012 - Present Cardiac pacemaker in situ ICD-10-CM: Z95.0 ICD-9-CM: V45.01  4/16/2012 - Present RESOLVED: Acute gastritis without hemorrhage ICD-10-CM: K29.00 ICD-9-CM: 535.00  7/5/2018 - 7/10/2018 RESOLVED: Chest pain ICD-10-CM: R07.9 ICD-9-CM: 786.50  6/3/2018 - 7/10/2018 RESOLVED: Acute non-recurrent maxillary sinusitis ICD-10-CM: J01.00 ICD-9-CM: 461.0  4/6/2018 - 7/10/2018 RESOLVED: Diabetes mellitus ([facility] ICD-10-CM: E11.9 ICD-9-CM: 250.00  12/6/2017 - 7/10/2018 RESOLVED: Thrush of mouth and esophagus ([facility] ICD-10-CM: B37.81, B37.0 ICD-9-CM: 112.84, 112.0  12/6/2017 - 7/10/2018 RESOLVED: Thrush ICD-10-CM: B37.0 ICD-9-CM: 112.0  12/6/2017 - 7/10/2018 RESOLVED: CKD (chronic kidney disease) stage 3, GFR 30-59 ml/min ([facility]) ICD-10-CM: N18.3 ICD-9-CM: 585.3  9/7/2017 - 7/10/2018 Discussed with patient and family. Explained the importance of following up, Compliance with medications and recommendations on discharge,Side effect profile of medications were explained. Safety precautions at home and while taking pain medications also explained. All questions answered to the satisfaction of the patient/family. Discussed with consultant(s) who are agreeable to the discharge. Verbal and written instructions on discharge given. Explained about Discharge medications and side effect profile. Advised patient/family to followup with their pcp for medication refills and preauthorization of medications, Home health orders. checkups,screenign programs as appropriate for age.  
  
 
Thank you Dashawn Zheng MD for taking care of your patient, Please call with any questions. Greater than 37 minutes were spent with the patient on counseling and coordination of care Signed:  
George Queen MD 
3/27/2019 10:35 AM

## 2019-03-27 NOTE — PROGRESS NOTES
1509: Attempted to call report to Bhupinder Carr ([phone]) on [address] with Pedro Oneill who forwarded me to Edison Carrillo. Ediosn Carrillo did not . Left a voicemail. Will attempt again later. 1600: Attempted to call report to Bhupinder Carr on [employer]. Left a voicemail. Will attempt again later. 1644: Attempted to call report to Bhupinder Carr on [employer] for the third time. Left a voicemail. Will attempt again later.

## 2019-03-27 NOTE — PROGRESS NOTES
Patient discharge planned for today to return to [facility]. CM spoke with Samantha Sinha ([phone]) (at [facility]) and faxed the d/c summary to her at [phone]. CM spoke with patient's daughter Tasia ) who will arrive around 2:30p today to transport patient to the facility.  
 
SPRING Willis, CRM

## 2019-03-27 NOTE — DISCHARGE INSTRUCTIONS
· It is important that you take the medication exactly as they are prescribed. · Keep your medication in the bottles provided by the pharmacist and keep a list of the medication names, dosages, and times to be taken in your wallet. · Do not take other medications without consulting your doctor. · No drinking alcohol or driving car or operating machinery if you are on narcotic pain medications. Donot take sedating mediations if you are sleepy or confused. · Fall Precautions  · Keep Well Hydrated  · Report to your medical provider if you feel you have  developed allergies to medications  · Follow up with your PCP or Consultant for medication adjustments and refills  · Monitor for signs of fevers,chills,bleeding,chest pain and seek medical attention if you do so.    · Stop brillanta  · Keep Nares Clean and moist  · Followup BMP CBC in 1-2 days

## 2019-03-28 ENCOUNTER — PATIENT OUTREACH (OUTPATIENT)
Dept: INTERNAL MEDICINE CLINIC | Age: 84
End: 2019-03-28

## 2019-03-28 NOTE — PROGRESS NOTES
Transition of Care Coordination/Hospital to Post Acute Facility: 
  
Date/Time:  3/28/2019 3:23 PM 
 
Patient was admitted to [facility] on 3/19/19 for treatment of GI bleed. Patient was discharged 3/27/19 to [facility] for continuation of care. Inpatient RRAT score: 50 Top Challenges reviewed Method of communication with care team :phone Nurse Navigator(NN) spoke with staff to provide introduction to self and explanation of the Nurse Navigator Role. Verified name and  as patient identifiers. Discussed and reviewed  anticipated length of stay, medication reconciliation ACP:  
Does the patient have a current ACP (including DDNR):  yes Does the post acute facility have a copy of the patients ACP:  yes Medication(s):  
New Medications at Discharge: n/a Changed Medications at Discharge: furosemide, coreg Discontinued Medications at Discharge: Ken Bond PCP/Specialist follow up:  
Future Appointments Date Time Provider Cristian Alvarez 2019  9:45 AM PACEMAKER, [facility] ANISA SCHED  
2019 10:00 AM uLis E Arredondo MD [facility] ANISA SCHED  
2019 11:00 AM Poornima Pierson MD [address] [facility] Opportunity to ask questions was provided. Contact information was provided for future reference or further questions. Will continue to monitor.

## 2019-04-03 ENCOUNTER — ANESTHESIA EVENT (OUTPATIENT)
Dept: ENDOSCOPY | Age: 84
End: 2019-04-03
Payer: MEDICARE

## 2019-04-03 NOTE — ANESTHESIA PREPROCEDURE EVALUATION
Anesthetic History No history of anesthetic complications Review of Systems / Medical History Patient summary reviewed, nursing notes reviewed and pertinent labs reviewed Pulmonary Within defined limits Neuro/Psych Comments: Neuropathy Syncope Glaucoma Cardiovascular Hypertension: well controlled CHF: dyspnea on exertion Dysrhythmias Pacemaker, CAD, cardiac stents, CABG and hyperlipidemia Exercise tolerance: <4 METS Comments: TTE (11/20/18): Moderate MR, EF=55-60% Complete Heart Block GI/Hepatic/Renal 
  
GERD: well controlled Renal disease: CRI 
PUD Comments: Rectal Bleeding CRI, Stage IV Endo/Other Diabetes: well controlled, type 2 Anemia Comments: Pernicious Anemia Other Findings Comments: Glaucoma GI bleed Physical Exam 
 
Airway Mallampati: III 
TM Distance: > 6 cm Neck ROM: decreased range of motion Mouth opening: Normal 
 
 Cardiovascular Regular rate and rhythm,  S1 and S2 normal,  no murmur, click, rub, or gallop Rhythm: regular Rate: normal 
 
 
 
 Dental 
 
Dentition: Poor dentition and Caps/crowns Comments: Multiple missing teeth, moderate decay, no loose teeth. Pulmonary Breath sounds clear to auscultation Abdominal 
GI exam deferred Other Findings Anesthetic Plan ASA: 4 Anesthesia type: general and total IV anesthesia Induction: Intravenous Anesthetic plan and risks discussed with: Patient and Family Had 100mg lidocaine and 100mg propofol for an EGD at [facility] a couple of weeks ago.

## 2019-04-04 ENCOUNTER — ANESTHESIA (OUTPATIENT)
Dept: ENDOSCOPY | Age: 84
End: 2019-04-04
Payer: MEDICARE

## 2019-04-04 ENCOUNTER — HOSPITAL ENCOUNTER (OUTPATIENT)
Age: 84
Setting detail: OUTPATIENT SURGERY
Discharge: HOME | End: 2019-04-04
Attending: INTERNAL MEDICINE | Admitting: INTERNAL MEDICINE
Payer: MEDICARE

## 2019-04-04 VITALS
OXYGEN SATURATION: 99 % | DIASTOLIC BLOOD PRESSURE: 69 MMHG | WEIGHT: 153.44 LBS | HEIGHT: 68 IN | TEMPERATURE: 97.9 F | SYSTOLIC BLOOD PRESSURE: 156 MMHG | RESPIRATION RATE: 9 BRPM | BODY MASS INDEX: 23.26 KG/M2 | HEART RATE: 60 BPM

## 2019-04-04 PROCEDURE — 74011250636 HC RX REV CODE- 250/636

## 2019-04-04 PROCEDURE — 74011250636 HC RX REV CODE- 250/636: Performed by: INTERNAL MEDICINE

## 2019-04-04 PROCEDURE — 76040000019: Performed by: INTERNAL MEDICINE

## 2019-04-04 PROCEDURE — 76060000031 HC ANESTHESIA FIRST 0.5 HR: Performed by: INTERNAL MEDICINE

## 2019-04-04 RX ORDER — LIDOCAINE HYDROCHLORIDE 20 MG/ML
INJECTION, SOLUTION EPIDURAL; INFILTRATION; INTRACAUDAL; PERINEURAL AS NEEDED
Status: DISCONTINUED | OUTPATIENT
Start: 2019-04-04 | End: 2019-04-04 | Stop reason: HOSPADM

## 2019-04-04 RX ORDER — SODIUM CHLORIDE 0.9 % (FLUSH) 0.9 %
5-40 SYRINGE (ML) INJECTION AS NEEDED
Status: DISCONTINUED | OUTPATIENT
Start: 2019-04-04 | End: 2019-04-04 | Stop reason: SDUPTHER

## 2019-04-04 RX ORDER — FLUMAZENIL 0.1 MG/ML
0.2 INJECTION INTRAVENOUS
Status: DISCONTINUED | OUTPATIENT
Start: 2019-04-04 | End: 2019-04-04 | Stop reason: SDUPTHER

## 2019-04-04 RX ORDER — NALOXONE HYDROCHLORIDE 0.4 MG/ML
0.4 INJECTION, SOLUTION INTRAMUSCULAR; INTRAVENOUS; SUBCUTANEOUS
Status: DISCONTINUED | OUTPATIENT
Start: 2019-04-04 | End: 2019-04-04 | Stop reason: HOSPADM

## 2019-04-04 RX ORDER — SODIUM CHLORIDE 0.9 % (FLUSH) 0.9 %
5-40 SYRINGE (ML) INJECTION EVERY 8 HOURS
Status: DISCONTINUED | OUTPATIENT
Start: 2019-04-04 | End: 2019-04-04 | Stop reason: SDUPTHER

## 2019-04-04 RX ORDER — ATROPINE SULFATE 0.1 MG/ML
0.5 INJECTION INTRAVENOUS
Status: DISCONTINUED | OUTPATIENT
Start: 2019-04-04 | End: 2019-04-04 | Stop reason: SDUPTHER

## 2019-04-04 RX ORDER — EPINEPHRINE 0.1 MG/ML
1 INJECTION INTRACARDIAC; INTRAVENOUS
Status: DISCONTINUED | OUTPATIENT
Start: 2019-04-04 | End: 2019-04-04 | Stop reason: HOSPADM

## 2019-04-04 RX ORDER — EPINEPHRINE 0.1 MG/ML
1 INJECTION INTRACARDIAC; INTRAVENOUS
Status: DISCONTINUED | OUTPATIENT
Start: 2019-04-04 | End: 2019-04-04 | Stop reason: SDUPTHER

## 2019-04-04 RX ORDER — FLUMAZENIL 0.1 MG/ML
0.2 INJECTION INTRAVENOUS
Status: DISCONTINUED | OUTPATIENT
Start: 2019-04-04 | End: 2019-04-04 | Stop reason: HOSPADM

## 2019-04-04 RX ORDER — SODIUM CHLORIDE 9 MG/ML
75 INJECTION, SOLUTION INTRAVENOUS CONTINUOUS
Status: DISCONTINUED | OUTPATIENT
Start: 2019-04-04 | End: 2019-04-04 | Stop reason: HOSPADM

## 2019-04-04 RX ORDER — MIDAZOLAM HYDROCHLORIDE 1 MG/ML
.25-5 INJECTION, SOLUTION INTRAMUSCULAR; INTRAVENOUS
Status: DISCONTINUED | OUTPATIENT
Start: 2019-04-04 | End: 2019-04-04 | Stop reason: HOSPADM

## 2019-04-04 RX ORDER — ATROPINE SULFATE 0.1 MG/ML
0.5 INJECTION INTRAVENOUS
Status: DISCONTINUED | OUTPATIENT
Start: 2019-04-04 | End: 2019-04-04 | Stop reason: HOSPADM

## 2019-04-04 RX ORDER — DEXTROMETHORPHAN/PSEUDOEPHED 2.5-7.5/.8
1.2 DROPS ORAL
Status: DISCONTINUED | OUTPATIENT
Start: 2019-04-04 | End: 2019-04-04 | Stop reason: HOSPADM

## 2019-04-04 RX ORDER — NALOXONE HYDROCHLORIDE 0.4 MG/ML
0.4 INJECTION, SOLUTION INTRAMUSCULAR; INTRAVENOUS; SUBCUTANEOUS
Status: DISCONTINUED | OUTPATIENT
Start: 2019-04-04 | End: 2019-04-04 | Stop reason: SDUPTHER

## 2019-04-04 RX ORDER — DEXTROMETHORPHAN/PSEUDOEPHED 2.5-7.5/.8
1.2 DROPS ORAL
Status: DISCONTINUED | OUTPATIENT
Start: 2019-04-04 | End: 2019-04-04 | Stop reason: SDUPTHER

## 2019-04-04 RX ORDER — SODIUM CHLORIDE 0.9 % (FLUSH) 0.9 %
5-40 SYRINGE (ML) INJECTION EVERY 8 HOURS
Status: DISCONTINUED | OUTPATIENT
Start: 2019-04-04 | End: 2019-04-04 | Stop reason: HOSPADM

## 2019-04-04 RX ORDER — SODIUM CHLORIDE 0.9 % (FLUSH) 0.9 %
5-40 SYRINGE (ML) INJECTION AS NEEDED
Status: DISCONTINUED | OUTPATIENT
Start: 2019-04-04 | End: 2019-04-04 | Stop reason: HOSPADM

## 2019-04-04 RX ORDER — FENTANYL CITRATE 50 UG/ML
25 INJECTION, SOLUTION INTRAMUSCULAR; INTRAVENOUS
Status: DISCONTINUED | OUTPATIENT
Start: 2019-04-04 | End: 2019-04-04 | Stop reason: HOSPADM

## 2019-04-04 RX ORDER — MIDAZOLAM HYDROCHLORIDE 1 MG/ML
1-3 INJECTION, SOLUTION INTRAMUSCULAR; INTRAVENOUS
Status: DISCONTINUED | OUTPATIENT
Start: 2019-04-04 | End: 2019-04-04 | Stop reason: HOSPADM

## 2019-04-04 RX ORDER — PROPOFOL 10 MG/ML
INJECTION, EMULSION INTRAVENOUS AS NEEDED
Status: DISCONTINUED | OUTPATIENT
Start: 2019-04-04 | End: 2019-04-04 | Stop reason: HOSPADM

## 2019-04-04 RX ADMIN — SODIUM CHLORIDE 75 ML/HR: 900 INJECTION, SOLUTION INTRAVENOUS at 11:04

## 2019-04-04 RX ADMIN — LIDOCAINE HYDROCHLORIDE 40 MG: 20 INJECTION, SOLUTION EPIDURAL; INFILTRATION; INTRACAUDAL; PERINEURAL at 11:36

## 2019-04-04 RX ADMIN — PROPOFOL 50 MG: 10 INJECTION, EMULSION INTRAVENOUS at 11:36

## 2019-04-04 RX ADMIN — PROPOFOL 60 MG: 10 INJECTION, EMULSION INTRAVENOUS at 11:48

## 2019-04-04 NOTE — ANESTHESIA POSTPROCEDURE EVALUATION
Procedure(s): 
COLONOSCOPY. general, total IV anesthesia Anesthesia Post Evaluation Patient location during evaluation: PACU Note status: Adequate. Level of consciousness: responsive to verbal stimuli and sleepy but conscious Pain management: satisfactory to patient Airway patency: patent Anesthetic complications: no 
Cardiovascular status: acceptable Respiratory status: acceptable Hydration status: acceptable Comments: +Post-Anesthesia Evaluation and Assessment Patient: Aubree Benitez MRN: [id number]  SSN: [id number] YOB: 1921  Age: 80 y.o. Sex: male Cardiovascular Function/Vital Signs /63   Pulse 60   Temp 36.6 °C (97.9 °F)   Resp 14   Ht 5' 8\" (1.727 m)   Wt 69.6 kg (153 lb 7 oz)   SpO2 99%   BMI 23.33 kg/m² Patient is status post Procedure(s): 
COLONOSCOPY. Nausea/Vomiting: Controlled. Postoperative hydration reviewed and adequate. Pain: 
Pain Scale 1: Numeric (0 - 10) (04/04/19 1206) Pain Intensity 1: 0 (04/04/19 1206) Managed. Neurological Status: At baseline. Mental Status and Level of Consciousness: Arousable. Pulmonary Status:  
O2 Device: Room air (04/04/19 1206) Adequate oxygenation and airway patent. Complications related to anesthesia: None Post-anesthesia assessment completed. No concerns. Signed By: Manfred Ortez MD  
 4/4/2019 Post anesthesia nausea and vomiting:  controlled Vitals Value Taken Time /70 4/4/2019 12:18 PM  
Temp 36.6 °C (97.9 °F) 4/4/2019 12:06 PM  
Pulse 60 4/4/2019 12:20 PM  
Resp 15 4/4/2019 12:20 PM  
SpO2 98 % 4/4/2019 12:20 PM  
Vitals shown include unvalidated device data.

## 2019-04-04 NOTE — ROUTINE PROCESS
Roger Anupam 
4/23/1921 
[id number] Situation: 
Verbal report received from: Marcel Huang Procedure: Procedure(s): 
COLONOSCOPY Background: 
 
Preoperative diagnosis: RECTAL BLEEDING Postoperative diagnosis: diverticulosis and internal hemorrhoids :  Dr. Gokul Garrett Assistant(s): Endoscopy Technician-1: Sakina Frazier Endoscopy RN-1: Fay Ragland RN Specimens: * No specimens in log * H. Pylori  no Assessment: 
Intra-procedure medications Anesthesia gave intra-procedure sedation and medications, see anesthesia flow sheet yes Intravenous fluids: NS@ Horris Harms Vital signs stable Abdominal assessment: round and soft Recommendation: 
Discharge patient per MD order. Family or Camemarybela Barbone, daughter Permission to share finding with family or friend yes

## 2019-04-04 NOTE — PROCEDURES
NAME:  Silvina Sloan  
:   1921 MRN:   [id number] Date/Time:  2019 12:09 PMColonoscopy Operative Report Procedure Type:   Colonoscopy --diagnostic Indications:     Lower rectal bleeding Pre-operative Diagnosis: see indication above Post-operative Diagnosis:  See findings below :  Audra Lennox, MD 
Referring Provider: --Mauricio Christine MD 
 
Exam: Airway: clear, no airway problems anticipated Heart: RRR, without gallops or rubs Lungs: clear bilaterally without wheezes, crackles, or rhonchi Abdomen: soft, nontender, nondistended, bowel sounds present Mental Status: awake, alert and oriented to person, place and time Sedation:  MAC anesthesia Propofol 110mg IV Procedure Details:  After informed consent was obtained with all risks and benefits of procedure explained and preoperative exam completed, the patient was taken to the endoscopy suite and placed in the left lateral decubitus position. Upon sequential sedation as per above, a digital rectal exam was performed demonstrating internal hemorrhoids. The Olympus videocolonoscope  was inserted in the rectum and carefully advanced to the cecum, which was identified by the ileocecal valve and appendiceal orifice. The quality of preparation was adequate. The colonoscope was slowly withdrawn with careful evaluation between folds. Retroflexion in the rectum was completed demonstrating internal hemorrhoids. Findings:    
-Extensive diverticulosis, most prominent in the sigmoid colon 
-Internal hemorrhoids 
-No colon, masses, polyps, or inflammation noted Specimen Removed:  None. Complications: None. EBL:  None. Impression:   
-Extensive diverticulosis, most prominent in the sigmoid colon 
-Internal hemorrhoids 
-No colon, masses, polyps, or inflammation noted Recommendations: --No further colonoscopy indicated. High fiber diet. Resume normal medication(s). Discharge Disposition:  Home in the company of a  when able to ambulate.  
 
Dana Varghese MD

## 2019-04-04 NOTE — DISCHARGE INSTRUCTIONS
Carlos Rivero  [id number]  4/23/1921    COLON DISCHARGE INSTRUCTIONS  Discomfort:  Redness at IV site- apply warm compress to area; if redness or soreness persist- contact your physician  There may be a slight amount of blood passed from the rectum  Gaseous discomfort- walking, belching will help relieve any discomfort  You may not operate a vehicle for 12 hours  You may not engage in an occupation involving machinery or appliances for rest of today  You may not drink alcoholic beverages for at least 12 hours  Avoid making any critical decisions for at least 24 hour  DIET:   High fiber diet. - however -  remember your colon is empty and a heavy meal will produce gas. Avoid these foods:  vegetables, fried / greasy foods, carbonated drinks for today  MEDICATION:         ACTIVITY:  You may not resume your normal daily activities until tomorrow AM; it is recommended that you spend the remainder of the day resting -  avoid any strenuous activity. CALL M.D.   ANY SIGN OF:   Increasing pain, nausea, vomiting  Abdominal distension (swelling)  New increased bleeding (oral or rectal)  Fever (chills)  IMPRESSION:  -Extensive diverticulosis, most prominent in the sigmoid colon  -Internal hemorrhoids  -No colon, masses, polyps, or inflammation noted    Follow-up Instructions:   Call Dr. Marycarmen Hernandez if questions arise regarding your procedure  Telephone # [phone]  No repeat colonoscopy indicated    Faustina Christine MD

## 2019-04-04 NOTE — PROGRESS NOTES
..Anesthesia reports 110mg Propofol, 40mg Lidocaine and 600mL NS given during procedure. Received report from anesthesia staff on vital signs and status of patient.

## 2019-04-19 ENCOUNTER — OFFICE VISIT (OUTPATIENT)
Dept: INTERNAL MEDICINE CLINIC | Age: 84
End: 2019-04-19

## 2019-04-19 VITALS
OXYGEN SATURATION: 98 % | WEIGHT: 158.8 LBS | BODY MASS INDEX: 24.07 KG/M2 | HEIGHT: 68 IN | HEART RATE: 60 BPM | SYSTOLIC BLOOD PRESSURE: 155 MMHG | TEMPERATURE: 97.8 F | DIASTOLIC BLOOD PRESSURE: 65 MMHG | RESPIRATION RATE: 18 BRPM

## 2019-04-19 DIAGNOSIS — E11.21 TYPE 2 DIABETES MELLITUS WITH NEPHROPATHY (HCC): ICD-10-CM

## 2019-04-19 DIAGNOSIS — I10 ESSENTIAL HYPERTENSION, BENIGN: Primary | ICD-10-CM

## 2019-04-19 DIAGNOSIS — I25.10 ATHEROSCLEROSIS OF NATIVE CORONARY ARTERY OF NATIVE HEART WITHOUT ANGINA PECTORIS: ICD-10-CM

## 2019-04-19 DIAGNOSIS — N18.4 CHRONIC KIDNEY DISEASE, STAGE IV (SEVERE) (HCC): ICD-10-CM

## 2019-04-19 DIAGNOSIS — K21.9 GASTROESOPHAGEAL REFLUX DISEASE WITHOUT ESOPHAGITIS: ICD-10-CM

## 2019-04-19 DIAGNOSIS — Z95.0 CARDIAC PACEMAKER IN SITU: ICD-10-CM

## 2019-04-19 DIAGNOSIS — I34.0 NON-RHEUMATIC MITRAL REGURGITATION: ICD-10-CM

## 2019-04-19 DIAGNOSIS — K92.2 GASTROINTESTINAL HEMORRHAGE, UNSPECIFIED GASTROINTESTINAL HEMORRHAGE TYPE: ICD-10-CM

## 2019-04-19 DIAGNOSIS — I44.2 CHB (COMPLETE HEART BLOCK) (HCC): ICD-10-CM

## 2019-04-19 PROBLEM — E11.40 TYPE 2 DIABETES MELLITUS WITH DIABETIC NEUROPATHY (HCC): Status: ACTIVE | Noted: 2019-04-19
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
## 2019-04-19 NOTE — PROGRESS NOTES
Reviewed record in preparation for visit and have obtained necessary documentation. Identified pt with two pt identifiers(name and ). Chief Complaint   Patient presents with    Extremity Weakness        Coordination of Care Questionnaire:  :     1) Have you been to an emergency room, urgent care clinic since your last visit? No   Hospitalized since your last visit? No               2) Have you seen or consulted any other health care providers outside of [address] [place] [address] since your last visit?  Yes Dr Karime Forrest and Dr Zully Ramos

## 2019-04-23 ENCOUNTER — OFFICE VISIT (OUTPATIENT)
Dept: CARDIOLOGY CLINIC | Age: 84
End: 2019-04-23

## 2019-04-23 ENCOUNTER — CLINICAL SUPPORT (OUTPATIENT)
Dept: CARDIOLOGY CLINIC | Age: 84
End: 2019-04-23

## 2019-04-23 ENCOUNTER — HOSPITAL ENCOUNTER (OUTPATIENT)
Dept: GENERAL RADIOLOGY | Age: 84
Discharge: HOME | End: 2019-04-23
Payer: MEDICARE

## 2019-04-23 VITALS
DIASTOLIC BLOOD PRESSURE: 62 MMHG | HEIGHT: 68 IN | BODY MASS INDEX: 23.95 KG/M2 | OXYGEN SATURATION: 98 % | RESPIRATION RATE: 18 BRPM | SYSTOLIC BLOOD PRESSURE: 120 MMHG | HEART RATE: 60 BPM | WEIGHT: 158 LBS

## 2019-04-23 DIAGNOSIS — I49.5 SSS (SICK SINUS SYNDROME) (HCC): ICD-10-CM

## 2019-04-23 DIAGNOSIS — N18.4 CKD (CHRONIC KIDNEY DISEASE), STAGE IV (HCC): ICD-10-CM

## 2019-04-23 DIAGNOSIS — Z95.0 CARDIAC PACEMAKER IN SITU: Primary | ICD-10-CM

## 2019-04-23 DIAGNOSIS — Z95.0 PACEMAKER: ICD-10-CM

## 2019-04-23 DIAGNOSIS — I44.2 CHB (COMPLETE HEART BLOCK) (HCC): ICD-10-CM

## 2019-04-23 DIAGNOSIS — E11.9 TYPE 2 DIABETES MELLITUS WITHOUT COMPLICATION, WITHOUT LONG-TERM CURRENT USE OF INSULIN (HCC): ICD-10-CM

## 2019-04-23 DIAGNOSIS — I10 ESSENTIAL HYPERTENSION, BENIGN: ICD-10-CM

## 2019-04-23 DIAGNOSIS — Z45.010 PACEMAKER AT END OF BATTERY LIFE: ICD-10-CM

## 2019-04-23 DIAGNOSIS — I25.10 ASCVD (ARTERIOSCLEROTIC CARDIOVASCULAR DISEASE): ICD-10-CM

## 2019-04-23 DIAGNOSIS — I25.10 ASCVD (ARTERIOSCLEROTIC CARDIOVASCULAR DISEASE): Primary | ICD-10-CM

## 2019-04-23 DIAGNOSIS — I49.5 SINOATRIAL NODE DYSFUNCTION (HCC): ICD-10-CM

## 2019-04-23 PROCEDURE — 71046 X-RAY EXAM CHEST 2 VIEWS: CPT
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
## 2019-04-23 NOTE — PROGRESS NOTES
1. Have you been to the ER, urgent care clinic since your last visit? Hospitalized since your last visit? [facility] admitted rectal bleed March 2019.    2. Have you seen or consulted any other health care providers outside of the [address] [place] [address] [place] since your last visit? Include any pap smears or colon screening. Colonoscopy 4-4-19. Chief Complaint   Patient presents with    Pacemaker Check     Battery end of life, discuss gen change. Denied cardiac symptoms. Unsure of all meds.

## 2019-04-24 LAB
ALBUMIN SERPL-MCNC: 3.9 G/DL (ref 3.2–4.6)
ALBUMIN/GLOB SERPL: 1.5 {RATIO} (ref 1.2–2.2)
ALP SERPL-CCNC: 128 IU/L (ref 39–117)
ALT SERPL-CCNC: 12 IU/L (ref 0–44)
AST SERPL-CCNC: 19 IU/L (ref 0–40)
BILIRUB SERPL-MCNC: 0.4 MG/DL (ref 0–1.2)
BUN SERPL-MCNC: 60 MG/DL (ref 10–36)
BUN/CREAT SERPL: 21 (ref 10–24)
CALCIUM SERPL-MCNC: 8.9 MG/DL (ref 8.6–10.2)
CHLORIDE SERPL-SCNC: 110 MMOL/L (ref 96–106)
CO2 SERPL-SCNC: 21 MMOL/L (ref 20–29)
CREAT SERPL-MCNC: 2.81 MG/DL (ref 0.76–1.27)
ERYTHROCYTE [DISTWIDTH] IN BLOOD BY AUTOMATED COUNT: 20.1 % (ref 12.3–15.4)
GLOBULIN SER CALC-MCNC: 2.6 G/DL (ref 1.5–4.5)
GLUCOSE SERPL-MCNC: 125 MG/DL (ref 65–99)
HCT VFR BLD AUTO: 31.1 % (ref 37.5–51)
HGB BLD-MCNC: 9.4 G/DL (ref 13–17.7)
INR PPP: 1.1 (ref 0.8–1.2)
INTERPRETATION: NORMAL
Lab: NORMAL
MCH RBC QN AUTO: 32.2 PG (ref 26.6–33)
MCHC RBC AUTO-ENTMCNC: 30.2 G/DL (ref 31.5–35.7)
MCV RBC AUTO: 107 FL (ref 79–97)
PLATELET # BLD AUTO: 255 X10E3/UL (ref 150–379)
POTASSIUM SERPL-SCNC: 5.5 MMOL/L (ref 3.5–5.2)
PROT SERPL-MCNC: 6.5 G/DL (ref 6–8.5)
PROTHROMBIN TIME: 11.2 SEC (ref 9.1–12)
RBC # BLD AUTO: 2.92 X10E6/UL (ref 4.14–5.8)
SODIUM SERPL-SCNC: 144 MMOL/L (ref 134–144)
WBC # BLD AUTO: 6.1 X10E3/UL (ref 3.4–10.8)

## 2019-04-29 ENCOUNTER — HOSPITAL ENCOUNTER (OUTPATIENT)
Age: 84
Discharge: HOME | End: 2019-04-29
Attending: INTERNAL MEDICINE | Admitting: INTERNAL MEDICINE
Payer: MEDICARE

## 2019-04-29 VITALS
RESPIRATION RATE: 16 BRPM | HEART RATE: 69 BPM | SYSTOLIC BLOOD PRESSURE: 167 MMHG | HEIGHT: 68 IN | DIASTOLIC BLOOD PRESSURE: 57 MMHG | TEMPERATURE: 97.7 F | WEIGHT: 165 LBS | BODY MASS INDEX: 25.01 KG/M2 | OXYGEN SATURATION: 98 %

## 2019-04-29 DIAGNOSIS — Z45.010 PACEMAKER AT END OF BATTERY LIFE: ICD-10-CM

## 2019-04-29 PROCEDURE — 77030037713 HC CLOSR DEV INCIS ZIP STRY -B: Performed by: INTERNAL MEDICINE

## 2019-04-29 PROCEDURE — 77030002933 HC SUT MCRYL J&J -A: Performed by: INTERNAL MEDICINE

## 2019-04-29 PROCEDURE — 99152 MOD SED SAME PHYS/QHP 5/>YRS: CPT | Performed by: INTERNAL MEDICINE

## 2019-04-29 PROCEDURE — 77030018729 HC ELECTRD DEFIB PAD CARD -B: Performed by: INTERNAL MEDICINE

## 2019-04-29 PROCEDURE — C1785 PMKR, DUAL, RATE-RESP: HCPCS | Performed by: INTERNAL MEDICINE

## 2019-04-29 PROCEDURE — 99153 MOD SED SAME PHYS/QHP EA: CPT | Performed by: INTERNAL MEDICINE

## 2019-04-29 PROCEDURE — 74011250636 HC RX REV CODE- 250/636

## 2019-04-29 PROCEDURE — 33228 REMV&REPLC PM GEN DUAL LEAD: CPT | Performed by: INTERNAL MEDICINE

## 2019-04-29 PROCEDURE — 77030002996 HC SUT SLK J&J -A: Performed by: INTERNAL MEDICINE

## 2019-04-29 PROCEDURE — 74011250636 HC RX REV CODE- 250/636: Performed by: INTERNAL MEDICINE

## 2019-04-29 PROCEDURE — 74011000250 HC RX REV CODE- 250: Performed by: INTERNAL MEDICINE

## 2019-04-29 PROCEDURE — 77030028698 HC BLD TISS PLSM MEDT -D: Performed by: INTERNAL MEDICINE

## 2019-04-29 PROCEDURE — 77030037029 HC IMPL ENV ICD ANTIBACT ABSRB TYRX MEDT -G: Performed by: INTERNAL MEDICINE

## 2019-04-29 PROCEDURE — 77030018673: Performed by: INTERNAL MEDICINE

## 2019-04-29 PROCEDURE — 77030031139 HC SUT VCRL2 J&J -A: Performed by: INTERNAL MEDICINE

## 2019-04-29 DEVICE — PACEMAKER
Type: IMPLANTABLE DEVICE | Status: FUNCTIONAL
Brand: ACCOLADE™ MRI EL DR

## 2019-04-29 DEVICE — ENVELOPE CMRM6133 ABSORB LRG US
Type: IMPLANTABLE DEVICE | Status: FUNCTIONAL
Brand: TYRX™

## 2019-04-29 RX ORDER — CARVEDILOL 6.25 MG/1
6.25 TABLET ORAL 2 TIMES DAILY WITH MEALS
COMMUNITY
End: 2019-08-16 | Stop reason: SDUPTHER

## 2019-04-29 RX ORDER — HYDRALAZINE HYDROCHLORIDE 20 MG/ML
10 INJECTION INTRAMUSCULAR; INTRAVENOUS ONCE
Status: COMPLETED | OUTPATIENT
Start: 2019-04-29 | End: 2019-04-29

## 2019-04-29 RX ORDER — CEFAZOLIN SODIUM/WATER 2 G/20 ML
SYRINGE (ML) INTRAVENOUS AS NEEDED
Status: DISCONTINUED | OUTPATIENT
Start: 2019-04-29 | End: 2019-04-29 | Stop reason: HOSPADM

## 2019-04-29 RX ORDER — MIDAZOLAM HYDROCHLORIDE 1 MG/ML
INJECTION, SOLUTION INTRAMUSCULAR; INTRAVENOUS AS NEEDED
Status: DISCONTINUED | OUTPATIENT
Start: 2019-04-29 | End: 2019-04-29 | Stop reason: HOSPADM

## 2019-04-29 RX ORDER — FENTANYL CITRATE 50 UG/ML
INJECTION, SOLUTION INTRAMUSCULAR; INTRAVENOUS AS NEEDED
Status: DISCONTINUED | OUTPATIENT
Start: 2019-04-29 | End: 2019-04-29 | Stop reason: HOSPADM

## 2019-04-29 RX ORDER — NALOXONE HYDROCHLORIDE 0.4 MG/ML
0.4 INJECTION, SOLUTION INTRAMUSCULAR; INTRAVENOUS; SUBCUTANEOUS AS NEEDED
Status: DISCONTINUED | OUTPATIENT
Start: 2019-04-29 | End: 2019-04-29 | Stop reason: HOSPADM

## 2019-04-29 RX ORDER — ACETAMINOPHEN 325 MG/1
650 TABLET ORAL
Status: DISCONTINUED | OUTPATIENT
Start: 2019-04-29 | End: 2019-04-29 | Stop reason: HOSPADM

## 2019-04-29 RX ORDER — AMOXICILLIN 250 MG
1 CAPSULE ORAL DAILY
COMMUNITY
End: 2020-01-01

## 2019-04-29 RX ORDER — SODIUM CHLORIDE 0.9 % (FLUSH) 0.9 %
5-40 SYRINGE (ML) INJECTION EVERY 8 HOURS
Status: DISCONTINUED | OUTPATIENT
Start: 2019-04-29 | End: 2019-04-29 | Stop reason: HOSPADM

## 2019-04-29 RX ORDER — SODIUM CHLORIDE 0.9 % (FLUSH) 0.9 %
5-40 SYRINGE (ML) INJECTION AS NEEDED
Status: DISCONTINUED | OUTPATIENT
Start: 2019-04-29 | End: 2019-04-29 | Stop reason: HOSPADM

## 2019-04-29 RX ORDER — FUROSEMIDE 20 MG/1
20 TABLET ORAL DAILY
COMMUNITY
End: 2020-01-01 | Stop reason: SDUPTHER

## 2019-04-29 RX ORDER — HEPARIN SODIUM 200 [USP'U]/100ML
INJECTION, SOLUTION INTRAVENOUS
Status: COMPLETED | OUTPATIENT
Start: 2019-04-29 | End: 2019-04-29

## 2019-04-29 RX ORDER — BACITRACIN 50000 [IU]/1
INJECTION, POWDER, FOR SOLUTION INTRAMUSCULAR AS NEEDED
Status: DISCONTINUED | OUTPATIENT
Start: 2019-04-29 | End: 2019-04-29 | Stop reason: HOSPADM

## 2019-04-29 RX ORDER — LIDOCAINE HYDROCHLORIDE 10 MG/ML
INJECTION INFILTRATION; PERINEURAL AS NEEDED
Status: DISCONTINUED | OUTPATIENT
Start: 2019-04-29 | End: 2019-04-29 | Stop reason: HOSPADM

## 2019-04-29 RX ADMIN — HYDRALAZINE HYDROCHLORIDE 10 MG: 20 INJECTION INTRAMUSCULAR; INTRAVENOUS at 10:42

## 2019-04-29 NOTE — PROGRESS NOTES
Reviewed discharge instructions with patient and family. Both verbalize understanding. Patient had no post procedure complications. Patient able to ambulate with minimal assistance. Patient transferred to care of family.

## 2019-04-29 NOTE — DISCHARGE INSTRUCTIONS
[address]  [phone]        NEW PACEMAKER IMPLANT DISCHARGE INSTRUCTIONS    Patient ID:  Cyrus Barry  [id number]  89 y.o.  4/23/1921    Admit Date: 4/29/2019    Discharge Date: 4/29/2019     Admitting Physician: Jonathan Anne MD     Discharge Physician: Jonathan Anne MD    Admission Diagnoses:   Pacemaker at end of battery life [Z45.010]    Discharge Diagnoses: Active Problems:    * No active hospital problems. *      Discharge Condition: Good    Cardiology Procedures this Admission:  Pacemaker insertion. Disposition: home    Reference discharge instructions provided by nursing for diet and activity. Follow-up with device clinic in three weeks. Call [phone] to make an appointment. Signed:  CHRISTIANO Pat  4/29/2019  8:58 AM      DISCHARGE INSTRUCTIONS FOR PATIENTS WITH PACEMAKERS    1. Remember to call for an appointment for 3 weeks [phone] to check healing and implant programming. 2. Medic Alert Bracelets are available from your pharmacist to wear at all times if you choose to wear one. 3. Carry your ID card for pacemaker with you at all times. This card will be given to you in the hospital or mailed to you. 4. The pacemaker will bulge slightly under your skin. The bulge will decrease in size over the next few weeks. Please notify the doctor's office if you notice any of the following around your site:   A.  A bruise that does not go away. B.  Soreness or yellow, green, or brown drainage from the site. C. Any swelling from the site. D. If you have a fever of 100 degrees or higher that lasts for a few days. INCISION CARE       1.  Leave the dressing over your site until your follow up visit. 2.  You may not shower until after follow up visit. 3.  For comfort, wear loose fitting clothing. 1. 4.  Ice pack to affected shoulder for first 24 hours, wear your sling for 2 days.   2. 5.  Report any signs of infection, fever, pain, swelling, redness, oozing, or heat at site especially if these symptoms increase after the first 3 to 4 days. ACTIVITY PRECAUTIONS     1. Avoid rough contact with the implant site. 2. No driving for 14 days. 3. Avoid lifting your arm over your head, carrying anything on the affected side, or lifting over 10 pounds for 30 days. For the first 2 days only bend your arm at the elbow. 4. Any extreme activity such as golf, weight lifting or exercise biking should be restricted for 60 days. 5. Do not carry objects by holding them against your implant site. 6.  No shooting rifles or any type of gun with the affected shoulder permanently. SPECIAL PRECAUTIONS     1. You should avoid all strong magnetic fields, such as arc welding, large transformers, large motors. 2.  You may or may not (depending on your device) have an MRI which uses a strong magnet to take pictures. 3.  Treatments or surgery that requires diathermy or electrocautery should be discussed with your doctor before scheduled. 4. Avoid radio frequency transmitters, including radar. 5. Advise dentist or other medical personnel you see that you have a pacemaker. 6.  Cell phones and microwave oven use is okay. 7.  If you plan to move or take a trip to a new area, the doctor's office will give you a name of a doctor to contact for any problems. ANTIBIOTIC THERAPY    During the first 8 weeks after your pacemaker insertion, you may need antibiotics before any dental work or certain tests or operations. Let the dentist or doctor who is caring for you know that you have had an implanted device.

## 2019-04-29 NOTE — PROGRESS NOTES
Cardiac Cath Lab Recovery Arrival Note: 
 
 
French Khan arrived to Cardiac Cath Lab, Recovery Area. Staff introduced to patient. Patient identifiers verified with NAME and DATE OF BIRTH. Procedure verified with patient. Consent forms reviewed and signed by patient or authorized representative and verified. Allergies verified. Patient and family oriented to department. Patient and family informed of procedure and plan of care. Questions answered with review. Patient prepped for procedure, per orders from physician, prior to arrival. 
 
Patient on cardiac monitor, non-invasive blood pressure, SPO2 monitor. On room air. Patient is A&Ox 3. Patient reports no c/o. Patient in stretcher, in low position, with side rails up, call bell within reach, patient instructed to call if assistance as needed. Patient prep in: [address], [place] [address]. Patient family has pager # none Family in: CCL waiting area.   
Prep by: Adriana Llamas RN and Deepali Pond RN

## 2019-04-29 NOTE — PROGRESS NOTES
TRANSFER - IN REPORT: 
 
Verbal report received from Eric Carroll on Katelyn Castellanos  being received from EP lab for routine progression of care. Report consisted of patients Situation, Background, Assessment and Recommendations(SBAR). Information from the following report(s) SBAR, Procedure Summary, MAR, Recent Results and Cardiac Rhythm paced was reviewed with the receiving clinician. Opportunity for questions and clarification was provided. Assessment completed upon patients arrival to [address] [place] [address] and care assumed. Cardiac Cath Lab Recovery Arrival Note: 
 
Katelyn Castellanos arrived to [facility] recovery area. Patient procedure= generator change. Patient on cardiac monitor, non-invasive blood pressure, SPO2 monitor. On O2 @ 2 lpm via N/C. IV  of NS on pump at 20 ml/hr. Patient status doing well without problems. Patient is A&Ox 3. Patient reports no complaints. PROCEDURE SITE CHECK: 
 
Procedure site:without any bleeding and no swelling, denies pain/discomfort reported at procedure site. No change in patient status. Continue to monitor patient and status.

## 2019-04-29 NOTE — INTERVAL H&P NOTE
H&P Update: 
Howard Alberts was seen and examined. History and physical has been reviewed. The patient has been examined.  There have been no significant clinical changes since the completion of the originally dated History and Physical.

## 2019-05-08 ENCOUNTER — OFFICE VISIT (OUTPATIENT)
Dept: CARDIOLOGY CLINIC | Age: 84
End: 2019-05-08

## 2019-05-08 VITALS
BODY MASS INDEX: 24.19 KG/M2 | RESPIRATION RATE: 16 BRPM | OXYGEN SATURATION: 98 % | HEART RATE: 60 BPM | HEIGHT: 68 IN | SYSTOLIC BLOOD PRESSURE: 140 MMHG | DIASTOLIC BLOOD PRESSURE: 80 MMHG | WEIGHT: 159.6 LBS

## 2019-05-08 DIAGNOSIS — Z95.0 CARDIAC PACEMAKER IN SITU: Primary | ICD-10-CM

## 2019-05-08 DIAGNOSIS — I10 ESSENTIAL HYPERTENSION, BENIGN: ICD-10-CM

## 2019-05-08 DIAGNOSIS — E78.2 MIXED HYPERLIPIDEMIA: ICD-10-CM

## 2019-05-08 DIAGNOSIS — Z95.1 POSTSURGICAL AORTOCORONARY BYPASS STATUS: ICD-10-CM

## 2019-05-08 DIAGNOSIS — I25.10 ATHEROSCLEROSIS OF NATIVE CORONARY ARTERY OF NATIVE HEART WITHOUT ANGINA PECTORIS: ICD-10-CM

## 2019-05-08 NOTE — PROGRESS NOTES
1. Have you been to the ER, urgent care clinic since your last visit? Hospitalized since your last visit? Yes 4/2019 Pacemaker generator change. 2. Have you seen or consulted any other health care providers outside of the [address] [place] [address] since your last visit? Include any pap smears or colon screening. No    Chief Complaint   Patient presents with   [facility] Follow Up     Pacemaker Generator change & CAD     Patient states he is to have his site checked.
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
## 2019-05-14 ENCOUNTER — CLINICAL SUPPORT (OUTPATIENT)
Dept: CARDIOLOGY CLINIC | Age: 84
End: 2019-05-14

## 2019-05-14 DIAGNOSIS — I44.2 CHB (COMPLETE HEART BLOCK) (HCC): ICD-10-CM

## 2019-05-14 DIAGNOSIS — Z95.0 CARDIAC PACEMAKER IN SITU: Primary | ICD-10-CM

## 2019-05-14 DIAGNOSIS — I49.5 SSS (SICK SINUS SYNDROME) (HCC): ICD-10-CM

## 2019-07-16 RX ORDER — SODIUM CHLORIDE 9 MG/ML
25 INJECTION, SOLUTION INTRAVENOUS CONTINUOUS
Status: DISCONTINUED | OUTPATIENT
Start: 2019-07-18 | End: 2019-07-19 | Stop reason: HOSPADM

## 2019-07-17 ENCOUNTER — ANESTHESIA EVENT (OUTPATIENT)
Dept: ENDOSCOPY | Age: 84
End: 2019-07-17
Payer: MEDICARE

## 2019-07-17 NOTE — PERIOP NOTES
Preprocedural call. Spoke with daughter, Zaria Crowe.  Pt to have iron infusion on 7/17/19 at 10:00 am.  Then EGD with Dr. Waldo Felder with arrival time at 11:30am.  Pt lives at [facility]. Called and requested medication list and medical history to be faxed from [facility].

## 2019-07-18 ENCOUNTER — HOSPITAL ENCOUNTER (OUTPATIENT)
Age: 84
Setting detail: OUTPATIENT SURGERY
Discharge: HOME | End: 2019-07-18
Attending: INTERNAL MEDICINE | Admitting: INTERNAL MEDICINE
Payer: MEDICARE

## 2019-07-18 ENCOUNTER — ANESTHESIA (OUTPATIENT)
Dept: ENDOSCOPY | Age: 84
End: 2019-07-18
Payer: MEDICARE

## 2019-07-18 ENCOUNTER — HOSPITAL ENCOUNTER (OUTPATIENT)
Dept: INFUSION THERAPY | Age: 84
Discharge: HOME | End: 2019-07-18
Payer: MEDICARE

## 2019-07-18 VITALS
SYSTOLIC BLOOD PRESSURE: 160 MMHG | DIASTOLIC BLOOD PRESSURE: 66 MMHG | OXYGEN SATURATION: 100 % | HEART RATE: 60 BPM | TEMPERATURE: 97.4 F | RESPIRATION RATE: 18 BRPM

## 2019-07-18 VITALS
TEMPERATURE: 97.4 F | HEIGHT: 67 IN | SYSTOLIC BLOOD PRESSURE: 161 MMHG | HEART RATE: 60 BPM | OXYGEN SATURATION: 100 % | DIASTOLIC BLOOD PRESSURE: 69 MMHG | WEIGHT: 160 LBS | RESPIRATION RATE: 23 BRPM | BODY MASS INDEX: 25.11 KG/M2

## 2019-07-18 PROCEDURE — 74011250636 HC RX REV CODE- 250/636

## 2019-07-18 PROCEDURE — 77030004927 HC CATH ELECHEMSTAS BSC -C: Performed by: INTERNAL MEDICINE

## 2019-07-18 PROCEDURE — 74011000258 HC RX REV CODE- 258: Performed by: INTERNAL MEDICINE

## 2019-07-18 PROCEDURE — 74011250636 HC RX REV CODE- 250/636: Performed by: INTERNAL MEDICINE

## 2019-07-18 PROCEDURE — 96374 THER/PROPH/DIAG INJ IV PUSH: CPT

## 2019-07-18 PROCEDURE — 76060000031 HC ANESTHESIA FIRST 0.5 HR: Performed by: INTERNAL MEDICINE

## 2019-07-18 PROCEDURE — 88305 TISSUE EXAM BY PATHOLOGIST: CPT

## 2019-07-18 PROCEDURE — 77030019988 HC FCPS ENDOSC DISP BSC -B: Performed by: INTERNAL MEDICINE

## 2019-07-18 PROCEDURE — 76040000019: Performed by: INTERNAL MEDICINE

## 2019-07-18 RX ORDER — MIDAZOLAM HYDROCHLORIDE 1 MG/ML
1-2 INJECTION, SOLUTION INTRAMUSCULAR; INTRAVENOUS
Status: DISCONTINUED | OUTPATIENT
Start: 2019-07-18 | End: 2019-07-18 | Stop reason: HOSPADM

## 2019-07-18 RX ORDER — FENTANYL CITRATE 50 UG/ML
25 INJECTION, SOLUTION INTRAMUSCULAR; INTRAVENOUS
Status: DISCONTINUED | OUTPATIENT
Start: 2019-07-18 | End: 2019-07-18 | Stop reason: HOSPADM

## 2019-07-18 RX ORDER — PROPOFOL 10 MG/ML
INJECTION, EMULSION INTRAVENOUS AS NEEDED
Status: DISCONTINUED | OUTPATIENT
Start: 2019-07-18 | End: 2019-07-18 | Stop reason: HOSPADM

## 2019-07-18 RX ORDER — LIDOCAINE HYDROCHLORIDE 20 MG/ML
INJECTION, SOLUTION EPIDURAL; INFILTRATION; INTRACAUDAL; PERINEURAL AS NEEDED
Status: DISCONTINUED | OUTPATIENT
Start: 2019-07-18 | End: 2019-07-18 | Stop reason: HOSPADM

## 2019-07-18 RX ORDER — SODIUM CHLORIDE 0.9 % (FLUSH) 0.9 %
10-40 SYRINGE (ML) INJECTION AS NEEDED
Status: DISCONTINUED | OUTPATIENT
Start: 2019-07-18 | End: 2019-07-19 | Stop reason: HOSPADM

## 2019-07-18 RX ORDER — SODIUM CHLORIDE 0.9 % (FLUSH) 0.9 %
5-40 SYRINGE (ML) INJECTION EVERY 8 HOURS
Status: DISCONTINUED | OUTPATIENT
Start: 2019-07-18 | End: 2019-07-18 | Stop reason: HOSPADM

## 2019-07-18 RX ORDER — SODIUM CHLORIDE 0.9 % (FLUSH) 0.9 %
5-40 SYRINGE (ML) INJECTION AS NEEDED
Status: DISCONTINUED | OUTPATIENT
Start: 2019-07-18 | End: 2019-07-18 | Stop reason: HOSPADM

## 2019-07-18 RX ORDER — SODIUM CHLORIDE 9 MG/ML
150 INJECTION, SOLUTION INTRAVENOUS CONTINUOUS
Status: DISCONTINUED | OUTPATIENT
Start: 2019-07-18 | End: 2019-07-18 | Stop reason: HOSPADM

## 2019-07-18 RX ADMIN — Medication 10 ML: at 10:39

## 2019-07-18 RX ADMIN — FERUMOXYTOL 510 MG: 510 INJECTION INTRAVENOUS at 10:55

## 2019-07-18 RX ADMIN — SODIUM CHLORIDE 150 ML/HR: 900 INJECTION, SOLUTION INTRAVENOUS at 12:30

## 2019-07-18 RX ADMIN — PROPOFOL 60 MG: 10 INJECTION, EMULSION INTRAVENOUS at 13:05

## 2019-07-18 RX ADMIN — SODIUM CHLORIDE 25 ML/HR: 900 INJECTION, SOLUTION INTRAVENOUS at 10:39

## 2019-07-18 RX ADMIN — LIDOCAINE HYDROCHLORIDE 100 MG: 20 INJECTION, SOLUTION EPIDURAL; INFILTRATION; INTRACAUDAL; PERINEURAL at 12:48

## 2019-07-18 NOTE — ANESTHESIA POSTPROCEDURE EVALUATION
Procedure(s):  ESOPHAGOGASTRODUODENOSCOPY (EGD)  BICAP  ESOPHAGOGASTRODUODENAL (EGD) BIOPSY. total IV anesthesia    Anesthesia Post Evaluation        Patient location during evaluation: PACU  Note status: Adequate. Level of consciousness: responsive to verbal stimuli and sleepy but conscious  Pain management: satisfactory to patient  Airway patency: patent  Anesthetic complications: no  Cardiovascular status: acceptable  Respiratory status: acceptable  Hydration status: acceptable  Comments: +Post-Anesthesia Evaluation and Assessment    Patient: Tung Bustamante MRN: [id number]  SSN: [id number]   YOB: 1921  Age: 80 y.o. Sex: male      Cardiovascular Function/Vital Signs    /69   Pulse 60   Temp 36.3 °C (97.4 °F)   Resp 23   Ht 5' 7\" (1.702 m)   Wt 72.6 kg (160 lb)   SpO2 100%   BMI 25.06 kg/m²     Patient is status post Procedure(s):  ESOPHAGOGASTRODUODENOSCOPY (EGD)  BICAP  ESOPHAGOGASTRODUODENAL (EGD) BIOPSY. Nausea/Vomiting: Controlled. Postoperative hydration reviewed and adequate. Pain:  Pain Scale 1: Numeric (0 - 10) (07/18/19 1320)  Pain Intensity 1: 0 (07/18/19 1320)   Managed. Neurological Status: At baseline. Mental Status and Level of Consciousness: Arousable. Pulmonary Status:   O2 Device: Room air (07/18/19 1323)   Adequate oxygenation and airway patent. Complications related to anesthesia: None    Post-anesthesia assessment completed. No concerns. Signed By: Ronald Ojeda DO    7/18/2019  Post anesthesia nausea and vomiting:  controlled      Vitals Value Taken Time   /69 7/18/2019  1:35 PM   Temp 36.3 °C (97.4 °F) 7/18/2019  1:20 PM   Pulse 60 7/18/2019  1:36 PM   Resp 61 7/18/2019  1:36 PM   SpO2 100 % 7/18/2019  1:35 PM   Vitals shown include unvalidated device data.

## 2019-07-18 NOTE — PROCEDURES
NAME:  Verena Hinojosa   :   1921   MRN:   [id number]     Date/Time:  2019 1:07 PM    Esophagogastroduodenoscopy (EGD) Procedure Note    Procedure: Esophagogastroduodenoscopy with biopsy, control of bleeding    Indication:       Melena/hematochezia  Pre-operative Diagnosis: see indication above  Post-operative Diagnosis: see findings below  :  Harjinder Lomas MD  Referring Provider:   Mihir Alarcon MD     Exam:  Airway: clear, no airway problems anticipated  Heart: RRR, without gallops or rubs  Lungs: clear bilaterally without wheezes, crackles, or rhonchi  Abdomen: soft, nontender, nondistended, bowel sounds present  Mental Status: awake, alert and oriented to person, place and time      Anethesia/Sedation:  MAC anesthesia Propofol 60mg IV  Procedure Details   After informed consent was obtained for the procedure, with all risks and benefits of procedure explained the patient was taken to the endoscopy suite and placed in the left lateral decubitus position. Following sequential administration of sedation as per above, the [id number] gastroscope was inserted into the mouth and advanced under direct vision to second portion of the duodenum. A careful inspection was made as the gastroscope was withdrawn, including a retroflexed view of the proximal stomach; findings and interventions are described below.                                                                                                                                                                    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 Complications:   None; patient tolerated the procedure well. 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 ,   -Await pathology. ,   -Follow symptoms. ,   -Resume diet  -Resume ASA in 3 days    Dmitriy Mandujano MD

## 2019-07-18 NOTE — ROUTINE PROCESS
Brittaney Ped  4/23/1921  [id number]    Situation:  Verbal report received from: [address], RN  Procedure: Procedure(s):  ESOPHAGOGASTRODUODENOSCOPY (EGD)  BICAP  ESOPHAGOGASTRODUODENAL (EGD) BIOPSY    Background:    Preoperative diagnosis: melena  Postoperative diagnosis:  Gastric AVM's, gastritis, esophagitis    :  Dr. Purnima Jo  Assistant(s): Endoscopy Technician-1: Twila Sanderson  Endoscopy RN-1: Estrellita Sue RN    Specimens:   ID Type Source Tests Collected by Time Destination   1 : Gastric biopsy Preservative Gastric  Sangita Miles MD 7/18/2019 1302 Pathology     H. Pylori  no    Assessment:  Intra-procedure medications   Anesthesia gave intra-procedure sedation and medications, see anesthesia flow sheet yes    Intravenous fluids: NS@ KVO     Vital signs stable     Abdominal assessment: round and soft     Recommendation:  Discharge patient per MD order.   Family or Rebekah Coy, daughter  Permission to share finding with family or friend yes

## 2019-07-18 NOTE — PROGRESS NOTES
Outpatient Infusion Center Progress Note    1005  Pt admit to [facility] for Feraheme 1/2 via wheelchair in stable condition. Assessment completed. Pt reports black/soft stools and rectal blood loss since Saturday, pt saw GI yesterday and is scheduled for EGD this morning. Also complains of dizziness/lightheadedness and weakness. 24 gauge PIV placed in right forearm with positive blood return. Carenotes for feraheme provided for and reviewed with pt and daughter. Medications:  NS   Feraheme IV over 15 minutes    Patient Vitals for the past 12 hrs:   Temp Pulse Resp BP SpO2   07/18/19 1116  60 18 160/66 100 %   07/18/19 1008 97.4 °F (36.3 °C) 60 18 156/67 100 %       Due to EGD scheduled at 11:30 am, pt declined full 30 monitoring post iron infusion. Pt remained for 10 minutes post infusion with no adverse reaction noted. Pt aware to notify EGD staff of recent iron infusion and report to ER for any SOB or chest pain. Pt and family verbalized understanding. 1120  Pt tolerated treatment well. PIV flushed and removed per [facility] policy, gauze and tegaderm applied. D/c to EGD via wheelchair with fmaily in no distress.  Pt aware of next appointment scheduled for 7/26/19 at 9 am.

## 2019-07-18 NOTE — PERIOP NOTES
Anesthesia reports 60mg Propofol, 100mg Lidocaine and 600mL NS given during procedure. Received report from anesthesia staff on vital signs and status of patient.

## 2019-07-18 NOTE — ANESTHESIA PREPROCEDURE EVALUATION
Anesthetic History   No history of anesthetic complications            Review of Systems / Medical History  Patient summary reviewed, nursing notes reviewed and pertinent labs reviewed    Pulmonary  Within defined limits                 Neuro/Psych             Comments: Neuropathy  Syncope  Glaucoma Cardiovascular    Hypertension: well controlled      CHF: dyspnea on exertion  Dysrhythmias   Pacemaker, CAD, cardiac stents, CABG and hyperlipidemia    Exercise tolerance: <4 METS  Comments: TTE (11/20/18): Moderate MR, EF=55-60%     Complete Heart Block   GI/Hepatic/Renal     GERD: well controlled    Renal disease: CRI  PUD    Comments: Rectal Bleeding  CRI, Stage IV Endo/Other    Diabetes: well controlled, type 2    Anemia    Comments: Pernicious Anemia Other Findings   Comments: Glaucoma  GI bleed           Physical Exam    Airway  Mallampati: III  TM Distance: > 6 cm  Neck ROM: decreased range of motion   Mouth opening: Normal     Cardiovascular  Regular rate and rhythm,  S1 and S2 normal,  no murmur, click, rub, or gallop  Rhythm: regular  Rate: normal         Dental    Dentition: Poor dentition and Caps/crowns  Comments: Multiple missing teeth, moderate decay, no loose teeth.    Pulmonary  Breath sounds clear to auscultation               Abdominal  GI exam deferred       Other Findings            Anesthetic Plan    ASA: 4  Anesthesia type: MAC          Induction: Intravenous  Anesthetic plan and risks discussed with: Patient

## 2019-07-18 NOTE — DISCHARGE INSTRUCTIONS
Devan Petite  [id number]  4/23/1921    EGD DISCHARGE INSTRUCTIONS  Discomfort:  Sore throat- throat lozenges or warm salt water gargle  redness at IV site- apply warm compress to area; if redness or soreness persist- contact your physician  Gaseous discomfort- walking, belching will help relieve any discomfort  You may not operate a vehicle for 12 hours  You may not engage in an occupation involving machinery or appliances for rest of today  You may not drink alcoholic beverages for at least 12 hours  Avoid making any critical decisions for at least 24 hour  DIET  You may have minimal sips at this time-- do not eat or drink for two hours. You may eat and drink after 130pm today  You may resume your regular diet - however -  remember your colon is empty and a heavy meal will produce gas. Avoid these foods:  vegetables, fried / greasy foods, carbonated drinks    MEDICATIONS:        ACTIVITY  You may resume your normal daily activities until tomorrow AM;  Spend the remainder of the day resting -  avoid any strenuous activity. CALL M.D. ANY SIGN OF   Increasing pain, nausea, vomiting  Abdominal distension (swelling)  New increased bleeding (oral or rectal)  Fever (chills)  Pain in chest area  Bloody discharge from nose or mouth  Shortness of breath    You may not take any Advil, Aspirin, Ibuprofen, Motrin, Aleve, or Goodys for 3 days, ONLY  Tylenol as needed for pain. 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 Zaria Marroquin for the results of procedure / biopsy in 7-10 days  -Telephone # [phone]  -Continue acid suppression. , -Await pathology. ,   -Follow symptoms. ,   -Resume diet  -Resume ASA in 3 days    Brionna Sofia MD

## 2019-07-19 ENCOUNTER — OFFICE VISIT (OUTPATIENT)
Dept: INTERNAL MEDICINE CLINIC | Age: 84
End: 2019-07-19

## 2019-07-19 VITALS
TEMPERATURE: 97.9 F | HEART RATE: 62 BPM | BODY MASS INDEX: 24.14 KG/M2 | DIASTOLIC BLOOD PRESSURE: 68 MMHG | SYSTOLIC BLOOD PRESSURE: 148 MMHG | WEIGHT: 153.8 LBS | RESPIRATION RATE: 16 BRPM | OXYGEN SATURATION: 98 % | HEIGHT: 67 IN

## 2019-07-19 DIAGNOSIS — I25.10 ASCVD (ARTERIOSCLEROTIC CARDIOVASCULAR DISEASE): ICD-10-CM

## 2019-07-19 DIAGNOSIS — N18.4 CHRONIC KIDNEY DISEASE, STAGE IV (SEVERE) (HCC): ICD-10-CM

## 2019-07-19 DIAGNOSIS — E78.2 MIXED HYPERLIPIDEMIA: ICD-10-CM

## 2019-07-19 DIAGNOSIS — D50.0 ANEMIA, BLOOD LOSS: ICD-10-CM

## 2019-07-19 DIAGNOSIS — Z95.0 CARDIAC PACEMAKER IN SITU: ICD-10-CM

## 2019-07-19 DIAGNOSIS — E11.21 TYPE 2 DIABETES MELLITUS WITH NEPHROPATHY (HCC): ICD-10-CM

## 2019-07-19 DIAGNOSIS — E73.9 LACTOSE INTOLERANCE: ICD-10-CM

## 2019-07-19 DIAGNOSIS — I10 ESSENTIAL HYPERTENSION, BENIGN: Primary | ICD-10-CM

## 2019-07-19 LAB
BILIRUB UR QL: NEGATIVE
CLARITY: CLEAR
COLOR UR: ABNORMAL
ERYTHROCYTE [DISTWIDTH] IN BLOOD BY AUTOMATED COUNT: 15 %
GLUCOSE 24H UR-MRATE: NEGATIVE G/(24.H)
HCT VFR BLD AUTO: 30.3 % (ref 37–51)
HGB BLD-MCNC: 9.4 G/DL (ref 12–18)
HGB UR QL STRIP: NEGATIVE
KETONES UR QL STRIP.AUTO: NEGATIVE
LEUKOCYTE ESTERASE: NEGATIVE
LYMPHOCYTES ABSOLUTE: 2.4 K/UL (ref 0.6–4.1)
LYMPHOCYTES NFR BLD: 26.9 % (ref 10–58.5)
MCH RBC QN AUTO: 34.4 PG (ref 26–32)
MCHC RBC AUTO-ENTMCNC: 31 G/DL (ref 30–36)
MCV RBC AUTO: 111 FL (ref 80–97)
MICROALBUMIN, URINE: 20 MG/L (ref 0–20)
MONOCYTES ABS-DIF,2141: 0.9 K/UL (ref 0–1.8)
MONOCYTES NFR BLD: 9.7 % (ref 0.1–24)
NEUTROPHILS # BLD: 63.4 % (ref 37–92)
NEUTROPHILS ABS,2156: 5.6 K/UL (ref 2–7.8)
NITRITE UR QL STRIP.AUTO: NEGATIVE
PH UR STRIP: 5 [PH] (ref 5–7)
PLATELET # BLD AUTO: 276 K/UL (ref 140–440)
PMV BLD AUTO: 8.2 FL
PROT UR STRIP-MCNC: ABNORMAL MG/DL
RBC # BLD AUTO: 2.73 M/UL (ref 4.2–6.3)
RBC #/AREA URNS HPF: 0 #/HPF
SP GR UR REFRACTOMETRY: 1.02 (ref 1–1.03)
UROBILINOGEN UR QL STRIP.AUTO: NEGATIVE
WBC # BLD AUTO: 8.8 K/UL (ref 4.1–10.9)
WBC URNS QL MICRO: ABNORMAL #/HPF

## 2019-07-19 RX ORDER — GUAIFENESIN 100 MG/5ML
81 LIQUID (ML) ORAL DAILY
COMMUNITY
End: 2020-01-01 | Stop reason: SDUPTHER

## 2019-07-19 RX ORDER — MULTIVITAMIN WITH IRON
1 TABLET ORAL DAILY
COMMUNITY
End: 2020-01-01

## 2019-07-19 RX ORDER — FACIAL-BODY WIPES
10 EACH TOPICAL AS NEEDED
COMMUNITY
End: 2020-01-01

## 2019-07-19 RX ORDER — CHOLECALCIFEROL (VITAMIN D3) 125 MCG
1 CAPSULE ORAL
Qty: 90 TAB | Refills: 3 | Status: SHIPPED | OUTPATIENT
Start: 2019-07-19 | End: 2020-01-01 | Stop reason: SDUPTHER

## 2019-07-19 NOTE — PROGRESS NOTES
Reviewed record in preparation for visit and have obtained necessary documentation. Identified pt with two pt identifiers(name and ). Chief Complaint   Patient presents with    Diabetes    Hypertension    Cholesterol Problem        Coordination of Care Questionnaire:  :     1) Have you been to an emergency room, urgent care clinic since your last visit? Yes [address]    Hospitalized since your last visit? No               2) Have you seen or consulted any other health care providers outside of [address] [place] [address] since your last visit?  Yes Dr Stubbs Show
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
## 2019-07-20 LAB
ALBUMIN SERPL-MCNC: 4 G/DL (ref 3.2–4.6)
ALBUMIN/GLOB SERPL: 1.7 {RATIO} (ref 1.2–2.2)
ALP SERPL-CCNC: 107 IU/L (ref 39–117)
ALT SERPL-CCNC: 16 IU/L (ref 0–44)
AST SERPL-CCNC: 23 IU/L (ref 0–40)
BILIRUB SERPL-MCNC: 0.3 MG/DL (ref 0–1.2)
BUN SERPL-MCNC: 75 MG/DL (ref 10–36)
BUN/CREAT SERPL: 27 (ref 10–24)
CALCIUM SERPL-MCNC: 9.1 MG/DL (ref 8.6–10.2)
CHLORIDE SERPL-SCNC: 114 MMOL/L (ref 96–106)
CHOLEST SERPL-MCNC: 143 MG/DL (ref 100–199)
CO2 SERPL-SCNC: 18 MMOL/L (ref 20–29)
CREAT SERPL-MCNC: 2.74 MG/DL (ref 0.76–1.27)
GLOBULIN SER CALC-MCNC: 2.3 G/DL (ref 1.5–4.5)
GLUCOSE SERPL-MCNC: 130 MG/DL (ref 65–99)
HDLC SERPL-MCNC: 42 MG/DL
LDLC SERPL CALC-MCNC: 85 MG/DL (ref 0–99)
POTASSIUM SERPL-SCNC: 5.5 MMOL/L (ref 3.5–5.2)
PROT SERPL-MCNC: 6.3 G/DL (ref 6–8.5)
SODIUM SERPL-SCNC: 147 MMOL/L (ref 134–144)
TRIGL SERPL-MCNC: 78 MG/DL (ref 0–149)
VLDLC SERPL CALC-MCNC: 16 MG/DL (ref 5–40)

## 2019-07-23 RX ORDER — SODIUM CHLORIDE 9 MG/ML
25 INJECTION, SOLUTION INTRAVENOUS CONTINUOUS
Status: DISCONTINUED | OUTPATIENT
Start: 2019-07-26 | End: 2019-07-27 | Stop reason: HOSPADM

## 2019-07-24 ENCOUNTER — TELEPHONE (OUTPATIENT)
Dept: INTERNAL MEDICINE CLINIC | Age: 84
End: 2019-07-24

## 2019-07-24 LAB — HBA1C MFR BLD HPLC: 5.2 % (ref 4–5.7)

## 2019-07-26 ENCOUNTER — HOSPITAL ENCOUNTER (OUTPATIENT)
Dept: INFUSION THERAPY | Age: 84
Discharge: HOME | End: 2019-07-26
Payer: MEDICARE

## 2019-07-26 VITALS
DIASTOLIC BLOOD PRESSURE: 73 MMHG | TEMPERATURE: 97.6 F | SYSTOLIC BLOOD PRESSURE: 162 MMHG | OXYGEN SATURATION: 99 % | HEART RATE: 61 BPM | RESPIRATION RATE: 18 BRPM

## 2019-07-26 PROCEDURE — 74011250636 HC RX REV CODE- 250/636

## 2019-07-26 PROCEDURE — 96365 THER/PROPH/DIAG IV INF INIT: CPT

## 2019-07-26 PROCEDURE — 74011000258 HC RX REV CODE- 258

## 2019-07-26 RX ORDER — SODIUM CHLORIDE 0.9 % (FLUSH) 0.9 %
10-40 SYRINGE (ML) INJECTION AS NEEDED
Status: DISCONTINUED | OUTPATIENT
Start: 2019-07-26 | End: 2019-07-30 | Stop reason: HOSPADM

## 2019-07-26 RX ADMIN — Medication 10 ML: at 09:44

## 2019-07-26 RX ADMIN — FERUMOXYTOL 510 MG: 510 INJECTION INTRAVENOUS at 09:44

## 2019-07-26 NOTE — PROGRESS NOTES
[facility] Visit Note    0900 Pt arrived at [facility] via wheelchair and in no distress for Feraheme day 2 of 2. Assessment completed, pt has  no new complaints voiced. IV started right AC with 24 gauge. Medications received:  Feraheme 510 mg IV over 15 min- IV site swelled at start of infusion, IV removed and restarted left forearm by Mark Her RN. Pt observed x 30 min post infusion, no reaction noted. Patient Vitals for the past 8 hrs:   Temp Pulse Resp BP SpO2   07/26/19 1054  61 18 162/73    07/26/19 0924 97.6 °F (36.4 °C) 61 20 163/70 99 %       1100 Tolerated treatment well, no adverse reaction noted. D/Cd from [facility] via wheelchair and in no distress accompanied by daughter.   No more appts scheduled, pt will follow up with physician

## 2019-07-30 NOTE — TELEPHONE ENCOUNTER
Unable to reach patient's daughter. Per Dr. Jessika Gil, a letter concerning lab results was mailed to patient last week.

## 2019-08-14 ENCOUNTER — OFFICE VISIT (OUTPATIENT)
Dept: CARDIOLOGY CLINIC | Age: 84
End: 2019-08-14

## 2019-08-14 DIAGNOSIS — I44.2 CHB (COMPLETE HEART BLOCK) (HCC): ICD-10-CM

## 2019-08-14 DIAGNOSIS — Z95.0 CARDIAC PACEMAKER IN SITU: Primary | ICD-10-CM

## 2019-08-16 ENCOUNTER — OFFICE VISIT (OUTPATIENT)
Dept: CARDIOLOGY CLINIC | Age: 84
End: 2019-08-16

## 2019-08-16 VITALS
OXYGEN SATURATION: 98 % | WEIGHT: 158.8 LBS | HEIGHT: 67 IN | RESPIRATION RATE: 16 BRPM | BODY MASS INDEX: 24.92 KG/M2 | DIASTOLIC BLOOD PRESSURE: 56 MMHG | HEART RATE: 60 BPM | SYSTOLIC BLOOD PRESSURE: 88 MMHG

## 2019-08-16 DIAGNOSIS — Z95.0 CARDIAC PACEMAKER IN SITU: ICD-10-CM

## 2019-08-16 DIAGNOSIS — I25.10 ATHEROSCLEROSIS OF NATIVE CORONARY ARTERY OF NATIVE HEART WITHOUT ANGINA PECTORIS: Primary | ICD-10-CM

## 2019-08-16 DIAGNOSIS — I10 ESSENTIAL HYPERTENSION, BENIGN: ICD-10-CM

## 2019-08-16 DIAGNOSIS — Z95.1 POSTSURGICAL AORTOCORONARY BYPASS STATUS: ICD-10-CM

## 2019-08-16 DIAGNOSIS — E78.2 MIXED HYPERLIPIDEMIA: ICD-10-CM

## 2019-08-16 RX ORDER — CARVEDILOL 3.12 MG/1
3.12 TABLET ORAL 2 TIMES DAILY WITH MEALS
Qty: 180 TAB | Refills: 3
Start: 2019-08-16 | End: 2020-01-01 | Stop reason: SDUPTHER

## 2019-08-16 NOTE — PROGRESS NOTES
Reviewed record in preparation for visit and obtained necessary documentation. Verified patient with 2 identifiers   Chief Complaint   Patient presents with    Hypertension     3 month follow up     Cholesterol Problem    Palpitations    Shortness of Breath    Dizziness     1. Have you been to the ER, urgent care clinic since your last visit? Hospitalized since your last visit? No     2. Have you seen or consulted any other health care providers outside of the [address] [place] [address] since your last visit? Include any pap smears or colon screening.   Yes Dr Ng Phlegm - GI - endoscopy to dx GI bleed - did not see anything     Had iron infusions on 7/18 and 7/26/19 7/1;7/15;7/29/19 hgb injections -
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
## 2019-09-11 ENCOUNTER — TELEPHONE (OUTPATIENT)
Dept: CARDIOLOGY CLINIC | Age: 84
End: 2019-09-11

## 2019-09-11 NOTE — TELEPHONE ENCOUNTER
Patient dentist office is calling making sure that patient have cardiac clearance for his procedure that is coming up on September 24, 2019.     Please fax over the information to [phone]        Thanks

## 2019-09-13 NOTE — TELEPHONE ENCOUNTER
Spoke with Dr. Carolann Duvall office to advise pt is low operative risk for procedure, no contraindications for local anesthetic.       JOVANNY Hand LPN   Caller: Unspecified (2 days ago,  1:39 PM)             Local anesthetic has no contraindications for procedure   Low operative risk from cardiac standpoint for dental procedure     Thanks

## 2019-09-13 NOTE — TELEPHONE ENCOUNTER
Spoke with Dr. Suad Bañuelos (dentist), pt needs to have a cavity filled, would like to use carbicane to numb if he can. Please advise.

## 2019-09-16 DIAGNOSIS — I25.10 ASCVD (ARTERIOSCLEROTIC CARDIOVASCULAR DISEASE): Primary | ICD-10-CM

## 2019-09-25 ENCOUNTER — HOSPITAL ENCOUNTER (OUTPATIENT)
Age: 84
Setting detail: OBSERVATION
Discharge: HOME HEALTH | End: 2019-09-27
Attending: EMERGENCY MEDICINE | Admitting: INTERNAL MEDICINE
Payer: MEDICARE

## 2019-09-25 ENCOUNTER — APPOINTMENT (OUTPATIENT)
Dept: GENERAL RADIOLOGY | Age: 84
End: 2019-09-25
Attending: EMERGENCY MEDICINE
Payer: MEDICARE

## 2019-09-25 DIAGNOSIS — R10.13 ABDOMINAL PAIN, EPIGASTRIC: ICD-10-CM

## 2019-09-25 DIAGNOSIS — K92.1 MELENA: Primary | ICD-10-CM

## 2019-09-25 LAB
ALBUMIN SERPL-MCNC: 3.4 G/DL (ref 3.5–5)
ALBUMIN/GLOB SERPL: 0.9 {RATIO} (ref 1.1–2.2)
ALP SERPL-CCNC: 157 U/L (ref 45–117)
ALT SERPL-CCNC: 35 U/L (ref 12–78)
ANION GAP SERPL CALC-SCNC: 6 MMOL/L (ref 5–15)
APTT PPP: 26.7 SEC (ref 22.1–32)
AST SERPL-CCNC: 30 U/L (ref 15–37)
BASOPHILS # BLD: 0 K/UL (ref 0–0.1)
BASOPHILS NFR BLD: 0 % (ref 0–1)
BILIRUB SERPL-MCNC: 0.5 MG/DL (ref 0.2–1)
BUN SERPL-MCNC: 68 MG/DL (ref 6–20)
BUN/CREAT SERPL: 27 (ref 12–20)
CALCIUM SERPL-MCNC: 8.8 MG/DL (ref 8.5–10.1)
CHLORIDE SERPL-SCNC: 113 MMOL/L (ref 97–108)
CO2 SERPL-SCNC: 24 MMOL/L (ref 21–32)
CREAT SERPL-MCNC: 2.54 MG/DL (ref 0.7–1.3)
DIFFERENTIAL METHOD BLD: ABNORMAL
EOSINOPHIL # BLD: 0.4 K/UL (ref 0–0.4)
EOSINOPHIL NFR BLD: 4 % (ref 0–7)
ERYTHROCYTE [DISTWIDTH] IN BLOOD BY AUTOMATED COUNT: 13.6 % (ref 11.5–14.5)
GLOBULIN SER CALC-MCNC: 3.6 G/DL (ref 2–4)
GLUCOSE SERPL-MCNC: 134 MG/DL (ref 65–100)
HCT VFR BLD AUTO: 38.8 % (ref 36.6–50.3)
HCT VFR BLD AUTO: 39.9 % (ref 36.6–50.3)
HEMOCCULT STL QL: POSITIVE
HGB BLD-MCNC: 12.3 G/DL (ref 12.1–17)
HGB BLD-MCNC: 12.7 G/DL (ref 12.1–17)
IMM GRANULOCYTES # BLD AUTO: 0 K/UL (ref 0–0.04)
IMM GRANULOCYTES NFR BLD AUTO: 0 % (ref 0–0.5)
INR PPP: 1 (ref 0.9–1.1)
LACTATE BLD-SCNC: 1.63 MMOL/L (ref 0.4–2)
LYMPHOCYTES # BLD: 1.7 K/UL (ref 0.8–3.5)
LYMPHOCYTES NFR BLD: 18 % (ref 12–49)
MCH RBC QN AUTO: 36 PG (ref 26–34)
MCHC RBC AUTO-ENTMCNC: 31.7 G/DL (ref 30–36.5)
MCV RBC AUTO: 113.5 FL (ref 80–99)
MONOCYTES # BLD: 0.9 K/UL (ref 0–1)
MONOCYTES NFR BLD: 9 % (ref 5–13)
NEUTS SEG # BLD: 6.6 K/UL (ref 1.8–8)
NEUTS SEG NFR BLD: 69 % (ref 32–75)
NRBC # BLD: 0 K/UL (ref 0–0.01)
NRBC BLD-RTO: 0 PER 100 WBC
PLATELET # BLD AUTO: 189 K/UL (ref 150–400)
PMV BLD AUTO: 11.6 FL (ref 8.9–12.9)
POTASSIUM SERPL-SCNC: 4.6 MMOL/L (ref 3.5–5.1)
PROT SERPL-MCNC: 7 G/DL (ref 6.4–8.2)
PROTHROMBIN TIME: 10.7 SEC (ref 9–11.1)
RBC # BLD AUTO: 3.42 M/UL (ref 4.1–5.7)
RBC MORPH BLD: ABNORMAL
SODIUM SERPL-SCNC: 143 MMOL/L (ref 136–145)
THERAPEUTIC RANGE,PTTT: NORMAL SECS (ref 58–77)
WBC # BLD AUTO: 9.6 K/UL (ref 4.1–11.1)

## 2019-09-25 PROCEDURE — 74011250636 HC RX REV CODE- 250/636: Performed by: EMERGENCY MEDICINE

## 2019-09-25 PROCEDURE — 85025 COMPLETE CBC W/AUTO DIFF WBC: CPT

## 2019-09-25 PROCEDURE — 74011000250 HC RX REV CODE- 250: Performed by: INTERNAL MEDICINE

## 2019-09-25 PROCEDURE — 85018 HEMOGLOBIN: CPT

## 2019-09-25 PROCEDURE — 65660000000 HC RM CCU STEPDOWN

## 2019-09-25 PROCEDURE — 85730 THROMBOPLASTIN TIME PARTIAL: CPT

## 2019-09-25 PROCEDURE — 65390000012 HC CONDITION CODE 44 OBSERVATION

## 2019-09-25 PROCEDURE — C9113 INJ PANTOPRAZOLE SODIUM, VIA: HCPCS | Performed by: INTERNAL MEDICINE

## 2019-09-25 PROCEDURE — 82272 OCCULT BLD FECES 1-3 TESTS: CPT

## 2019-09-25 PROCEDURE — 99285 EMERGENCY DEPT VISIT HI MDM: CPT

## 2019-09-25 PROCEDURE — 71045 X-RAY EXAM CHEST 1 VIEW: CPT

## 2019-09-25 PROCEDURE — 85610 PROTHROMBIN TIME: CPT

## 2019-09-25 PROCEDURE — 83605 ASSAY OF LACTIC ACID: CPT

## 2019-09-25 PROCEDURE — 96376 TX/PRO/DX INJ SAME DRUG ADON: CPT

## 2019-09-25 PROCEDURE — C9113 INJ PANTOPRAZOLE SODIUM, VIA: HCPCS | Performed by: EMERGENCY MEDICINE

## 2019-09-25 PROCEDURE — 74011250636 HC RX REV CODE- 250/636: Performed by: INTERNAL MEDICINE

## 2019-09-25 PROCEDURE — 96374 THER/PROPH/DIAG INJ IV PUSH: CPT

## 2019-09-25 PROCEDURE — 74011250637 HC RX REV CODE- 250/637: Performed by: NURSE PRACTITIONER

## 2019-09-25 PROCEDURE — 74011250637 HC RX REV CODE- 250/637: Performed by: INTERNAL MEDICINE

## 2019-09-25 PROCEDURE — 36415 COLL VENOUS BLD VENIPUNCTURE: CPT

## 2019-09-25 PROCEDURE — 80053 COMPREHEN METABOLIC PANEL: CPT

## 2019-09-25 RX ORDER — LATANOPROST 50 UG/ML
1 SOLUTION/ DROPS OPHTHALMIC
Status: DISCONTINUED | OUTPATIENT
Start: 2019-09-25 | End: 2019-09-27 | Stop reason: HOSPADM

## 2019-09-25 RX ORDER — SUCRALFATE 1 G/1
1 TABLET ORAL
Status: DISCONTINUED | OUTPATIENT
Start: 2019-09-25 | End: 2019-09-27 | Stop reason: HOSPADM

## 2019-09-25 RX ORDER — PANTOPRAZOLE SODIUM 40 MG/10ML
40 INJECTION, POWDER, LYOPHILIZED, FOR SOLUTION INTRAVENOUS
Status: COMPLETED | OUTPATIENT
Start: 2019-09-25 | End: 2019-09-25

## 2019-09-25 RX ORDER — CARVEDILOL 3.12 MG/1
3.12 TABLET ORAL 2 TIMES DAILY WITH MEALS
Status: DISCONTINUED | OUTPATIENT
Start: 2019-09-25 | End: 2019-09-27 | Stop reason: HOSPADM

## 2019-09-25 RX ORDER — OMEPRAZOLE 20 MG/1
20 CAPSULE, DELAYED RELEASE ORAL DAILY
Status: ON HOLD | COMMUNITY
End: 2019-09-27 | Stop reason: SDUPTHER

## 2019-09-25 RX ORDER — ONDANSETRON 2 MG/ML
4 INJECTION INTRAMUSCULAR; INTRAVENOUS
Status: DISCONTINUED | OUTPATIENT
Start: 2019-09-25 | End: 2019-09-27 | Stop reason: HOSPADM

## 2019-09-25 RX ORDER — DORZOLAMIDE HCL 20 MG/ML
1 SOLUTION/ DROPS OPHTHALMIC 2 TIMES DAILY
Status: DISCONTINUED | OUTPATIENT
Start: 2019-09-25 | End: 2019-09-27 | Stop reason: HOSPADM

## 2019-09-25 RX ORDER — ACETAMINOPHEN 325 MG/1
650 TABLET ORAL
Status: DISCONTINUED | OUTPATIENT
Start: 2019-09-25 | End: 2019-09-27 | Stop reason: HOSPADM

## 2019-09-25 RX ORDER — SODIUM BICARBONATE 650 MG/1
650 TABLET ORAL EVERY EVENING
Status: DISCONTINUED | OUTPATIENT
Start: 2019-09-25 | End: 2019-09-27 | Stop reason: HOSPADM

## 2019-09-25 RX ORDER — SODIUM CHLORIDE 9 MG/ML
75 INJECTION, SOLUTION INTRAVENOUS CONTINUOUS
Status: DISCONTINUED | OUTPATIENT
Start: 2019-09-25 | End: 2019-09-26

## 2019-09-25 RX ORDER — SODIUM CHLORIDE 0.9 % (FLUSH) 0.9 %
5-40 SYRINGE (ML) INJECTION AS NEEDED
Status: DISCONTINUED | OUTPATIENT
Start: 2019-09-25 | End: 2019-09-27 | Stop reason: HOSPADM

## 2019-09-25 RX ORDER — SODIUM CHLORIDE 0.9 % (FLUSH) 0.9 %
5-40 SYRINGE (ML) INJECTION EVERY 8 HOURS
Status: DISCONTINUED | OUTPATIENT
Start: 2019-09-25 | End: 2019-09-27 | Stop reason: HOSPADM

## 2019-09-25 RX ADMIN — SODIUM CHLORIDE 40 MG: 9 INJECTION, SOLUTION INTRAMUSCULAR; INTRAVENOUS; SUBCUTANEOUS at 21:12

## 2019-09-25 RX ADMIN — SODIUM CHLORIDE 75 ML/HR: 900 INJECTION, SOLUTION INTRAVENOUS at 12:52

## 2019-09-25 RX ADMIN — SODIUM BICARBONATE 650 MG: 650 TABLET ORAL at 18:00

## 2019-09-25 RX ADMIN — SUCRALFATE 1 G: 1 TABLET ORAL at 21:11

## 2019-09-25 RX ADMIN — PANTOPRAZOLE SODIUM 40 MG: 40 INJECTION, POWDER, FOR SOLUTION INTRAVENOUS at 10:44

## 2019-09-25 RX ADMIN — CARVEDILOL 3.12 MG: 3.12 TABLET, FILM COATED ORAL at 16:00

## 2019-09-25 RX ADMIN — Medication 10 ML: at 21:12

## 2019-09-25 RX ADMIN — Medication 10 ML: at 10:44

## 2019-09-25 RX ADMIN — DORZOLAMIDE HYDROCHLORIDE 1 DROP: 20 SOLUTION/ DROPS OPHTHALMIC at 18:00

## 2019-09-25 RX ADMIN — SUCRALFATE 1 G: 1 TABLET ORAL at 18:00

## 2019-09-25 RX ADMIN — Medication 10 ML: at 15:57

## 2019-09-25 RX ADMIN — LATANOPROST 1 DROP: 50 SOLUTION OPHTHALMIC at 21:09

## 2019-09-25 NOTE — PROGRESS NOTES
Visit Vitals BP (!) 195/94 Pulse 71 Temp 98 °F (36.7 °C) Resp 18 Ht 5' 7\" (1.702 m) Wt 73.9 kg (163 lb) SpO2 98% BMI 25.53 kg/m² Patient arrived from ER. Patient BP elevated. Notified Tuan Ahumada, Primary RN. Will administer scheduled BP medication.

## 2019-09-25 NOTE — PROGRESS NOTES
General Surgery End of Shift Nursing Note Bedside shift change report given to [facility] (oncoming nurse). Report included the following information SBAR, Kardex, ED Summary, Intake/Output and Recent Results. Shift worked:   7a to 7p Summary of shift:    Pt arrived from ED ~6607 Settled him into room Started fluids  Placed on Cardiac monitor Box ST5 Pt rested in room quietly rest of shift. Issues for physician to address:   none Number times ambulated in hallway past shift: 0 Number of times OOB to chair past shift: 0 Pain Management: 
Current medication: none given on shift Patient states pain is manageable on current pain medication: YES 
 
GI: 
 
Current diet:  DIET CLEAR LIQUID 
DIET NPO Tolerating current diet: YES Passing flatus: YES Last Bowel Movement: several days ago Appearance: black tarry Respiratory: 
 
Incentive Spirometer at bedside: YES Patient instructed on use: YES Patient Safety: 
 
Falls Score: 3 Bed Alarm On? No 
Sitter?  No 
 
[place] JESENIA Martino
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
## 2019-09-25 NOTE — PROGRESS NOTES
Pharmacy Medication Reconciliation Patient was sleeping, nurse felt assisted living transfer papers as the best resource for medication list.  
 
Source: MySupportAssistant Transfer Papers Allergy Update: Latex, natural rubber and Shellfish derived Recommendations/Findings: The following amendments were made to the patient's active medication list on file at [facility]:  
1) Additions:  
None 2) Deletions: Other (hemoglobin shot) Other (ferumoxytol infusion) 3) Changes:  
Omeprazole from 40 mg daily to 20 mg daily Prostat PO from 30mL daily to 30mL BID 4) Pertinent Pharmacy Findings: 
-per Nanci French Sayer: [facility] ([phone]) handles their prescriptions for assisted living residents. 
 
 
 
-Clarified PTA med list with transfer papers. PTA medication list was corrected to the following:  
 
Prior to Admission Medications Prescriptions Last Dose Informant Patient Reported? Taking?  
aspirin 81 mg chewable tablet 9/24/2019 Transfer Papers Yes Yes Sig: Take 81 mg by mouth daily. bisacodyl (DULCOLAX) 10 mg suppository Unknown at Unknown time Transfer Papers Yes No  
Sig: Insert 10 mg into rectum daily. brinzolamide (AZOPT) 1 % ophthalmic suspension 9/24/2019 at Unknown time Transfer Papers No Yes Sig: Administer 1 Drop to both eyes two (2) times a day. carvedilol (COREG) 3.125 mg tablet 9/24/2019 Transfer Papers No Yes Sig: Take 1 Tab by mouth two (2) times daily (with meals). furosemide (LASIX) 20 mg tablet 9/24/2019 Transfer Papers Yes No  
Sig: Take 20 mg by mouth daily. lactase (LACTAID) 3,000 unit tablet 9/24/2019 Transfer Papers No Yes Sig: Take 1 Tab by mouth three (3) times daily (with meals). latanoprost (XALATAN) 0.005 % ophthalmic solution 9/24/2019 Transfer Papers Yes Yes Sig: Administer 1 Drop to both eyes nightly. multivitamin with iron tablet 9/24/2019 Transfer Papers Yes Yes Sig: Take 1 Tab by mouth daily. omeprazole (PRILOSEC) 20 mg capsule 9/24/2019 Transfer Papers Yes Yes Sig: Take 20 mg by mouth daily. pollen extracts (PROSTAT PO) 9/24/2019 Transfer Papers Yes Yes Sig: Take 30 mL by mouth two (2) times a day. senna-docusate (SENNA WITH DOCUSATE SODIUM) 8.6-50 mg per tablet 9/24/2019 Transfer Papers Yes Yes Sig: Take 1 Tab by mouth daily. sodium bicarbonate 650 mg tablet 9/24/2019 Transfer Papers Yes Yes Sig: Take 650 mg by mouth every evening. Crush one tablet and mix with cranberry juice after evening meal  
vit A/vit C/vit E/zinc/copper (ICAPS AREDS PO) 9/24/2019 Transfer Papers Yes Yes Sig: Take 1 Cap by mouth daily. Facility-Administered Medications: None Thank you, 
Derrek Bedoya [facility] pharmacy student

## 2019-09-25 NOTE — PROGRESS NOTES
Reason for Admission:   Melena, abdominal pain RRAT Score:     54 high risk Resources/supports as identified by patient/family:   Lives at [facility] Top Challenges facing patient (as identified by patient/family and CM): Finances/Medication cost?      No challenges reported Transportation? [facility] provides transportation Support system or lack thereof?  family Living arrangements? Lives at [facility] Self-care/ADLs/Cognition? Staff assist with ADLs, patient currently alert and oriented Current Advanced Directive/Advance Care Plan: On file 1/2019 Plan for utilizing home health:    none Transition of Care Plan: 1.  Patient in ED bed waiting for inpatient admission 2. Patient will need 2nd IM letter at discharge 3. Patient prefers to discharge back to [facility] with follow up appointments. Patient is a 80year old male admitted 9/25 for melena and abdominal pain. Patient alert and oriented, resting in bed, no visitors present in room. Demographic information verified and correct. Insurance information verified and correct. Patient lives at [facility], no home oxygen, uses walker and scooter for mobility and staff assist him with ADLs and provide transportation. Care Management Interventions PCP Verified by CM: Yes(physician at [facility]) Mode of Transport at Discharge: Other (see comment)(Nanci Willis provides transportation) Transition of Care Consult (CM Consult): Discharge Planning Discharge Durable Medical Equipment: No 
Physical Therapy Consult: No 
Occupational Therapy Consult: No 
Speech Therapy Consult: No 
Current Support Network: Assisted Living Confirm Follow Up Transport: Other (see comment) Plan discussed with Pt/Family/Caregiver: Yes Discharge Location Discharge Placement: Assisted Living Eduardo Rollins RN, BSN [facility] [phone]
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
## 2019-09-26 LAB
ANION GAP SERPL CALC-SCNC: 7 MMOL/L (ref 5–15)
BUN SERPL-MCNC: 58 MG/DL (ref 6–20)
BUN/CREAT SERPL: 23 (ref 12–20)
CALCIUM SERPL-MCNC: 7.9 MG/DL (ref 8.5–10.1)
CHLORIDE SERPL-SCNC: 118 MMOL/L (ref 97–108)
CO2 SERPL-SCNC: 21 MMOL/L (ref 21–32)
CREAT SERPL-MCNC: 2.5 MG/DL (ref 0.7–1.3)
GLUCOSE BLD STRIP.AUTO-MCNC: 114 MG/DL (ref 65–100)
GLUCOSE SERPL-MCNC: 94 MG/DL (ref 65–100)
HCT VFR BLD AUTO: 34.3 % (ref 36.6–50.3)
HCT VFR BLD AUTO: 35.2 % (ref 36.6–50.3)
HGB BLD-MCNC: 11.3 G/DL (ref 12.1–17)
HGB BLD-MCNC: 11.8 G/DL (ref 12.1–17)
POTASSIUM SERPL-SCNC: 4.2 MMOL/L (ref 3.5–5.1)
SERVICE CMNT-IMP: ABNORMAL
SODIUM SERPL-SCNC: 146 MMOL/L (ref 136–145)

## 2019-09-26 PROCEDURE — 74011250636 HC RX REV CODE- 250/636: Performed by: INTERNAL MEDICINE

## 2019-09-26 PROCEDURE — 36415 COLL VENOUS BLD VENIPUNCTURE: CPT

## 2019-09-26 PROCEDURE — 82962 GLUCOSE BLOOD TEST: CPT

## 2019-09-26 PROCEDURE — 74011000250 HC RX REV CODE- 250: Performed by: INTERNAL MEDICINE

## 2019-09-26 PROCEDURE — 97161 PT EVAL LOW COMPLEX 20 MIN: CPT

## 2019-09-26 PROCEDURE — 74011250637 HC RX REV CODE- 250/637: Performed by: INTERNAL MEDICINE

## 2019-09-26 PROCEDURE — 99218 HC RM OBSERVATION: CPT

## 2019-09-26 PROCEDURE — 74011250637 HC RX REV CODE- 250/637: Performed by: NURSE PRACTITIONER

## 2019-09-26 PROCEDURE — 65390000012 HC CONDITION CODE 44 OBSERVATION

## 2019-09-26 PROCEDURE — 97116 GAIT TRAINING THERAPY: CPT

## 2019-09-26 PROCEDURE — 96376 TX/PRO/DX INJ SAME DRUG ADON: CPT

## 2019-09-26 PROCEDURE — 85018 HEMOGLOBIN: CPT

## 2019-09-26 PROCEDURE — C9113 INJ PANTOPRAZOLE SODIUM, VIA: HCPCS | Performed by: INTERNAL MEDICINE

## 2019-09-26 PROCEDURE — 80048 BASIC METABOLIC PNL TOTAL CA: CPT

## 2019-09-26 RX ADMIN — LATANOPROST 1 DROP: 50 SOLUTION OPHTHALMIC at 21:20

## 2019-09-26 RX ADMIN — DORZOLAMIDE HYDROCHLORIDE 1 DROP: 20 SOLUTION/ DROPS OPHTHALMIC at 17:40

## 2019-09-26 RX ADMIN — CARVEDILOL 3.12 MG: 3.12 TABLET, FILM COATED ORAL at 17:30

## 2019-09-26 RX ADMIN — SODIUM CHLORIDE 40 MG: 9 INJECTION, SOLUTION INTRAMUSCULAR; INTRAVENOUS; SUBCUTANEOUS at 09:08

## 2019-09-26 RX ADMIN — SODIUM CHLORIDE 75 ML/HR: 900 INJECTION, SOLUTION INTRAVENOUS at 00:27

## 2019-09-26 RX ADMIN — CARVEDILOL 3.12 MG: 3.12 TABLET, FILM COATED ORAL at 09:08

## 2019-09-26 RX ADMIN — SUCRALFATE 1 G: 1 TABLET ORAL at 11:29

## 2019-09-26 RX ADMIN — SUCRALFATE 1 G: 1 TABLET ORAL at 21:16

## 2019-09-26 RX ADMIN — Medication 10 ML: at 21:16

## 2019-09-26 RX ADMIN — SODIUM BICARBONATE 650 MG: 650 TABLET ORAL at 17:30

## 2019-09-26 RX ADMIN — SODIUM CHLORIDE 40 MG: 9 INJECTION, SOLUTION INTRAMUSCULAR; INTRAVENOUS; SUBCUTANEOUS at 21:16

## 2019-09-26 RX ADMIN — DORZOLAMIDE HYDROCHLORIDE 1 DROP: 20 SOLUTION/ DROPS OPHTHALMIC at 09:08

## 2019-09-26 RX ADMIN — Medication 10 ML: at 05:16

## 2019-09-26 RX ADMIN — SUCRALFATE 1 G: 1 TABLET ORAL at 17:30

## 2019-09-26 NOTE — PROGRESS NOTES
Hospitalist Progress Note NAME: Mili Paredes  
:  1921 MRN:  [id number] Interim Hospital Summary: 80 y.o. male whom presented on 2019 with Assessment / Plan: 
Melena POA Hx Gastritis and gastric AVMs (EGD 19) Hx Diverticulosis (Colonoscopy 19) 
- no melena over night Appreciate GI input; no plan for procedure at this time. GI aware heme (+) stool Hgb stable at 11.8 (was 12.3 on admission) Continue with PPI and carafate Tolerating cardiac diet without difficulty 
  
CAD, s/p CABG and stents S/P PPM for CHB HTN 
- hold ASA per GI 
  continue coreg. Hold lasix  
  
CKD4 
- Pt was on NS at 75ml/hr since the admission. Creat 2.5 which seemed to his baseline. D/c IVF, will resume lasix tomorrow. DM2 
- continue with cardiac diet; A1C 5.8 (3/2019) 
  
Glaucoma 
- continue drops 
  
Code Status:  Full Surrogate Decision Maker: Daughter is mPOA 
  
DVT Prophylaxis:  SCD 
  
Baseline:  . Lives in [place]. Uses walker and scooter prn Recommended Disposition: plan to return to [facility] in am. We will ask PT to evaluate today Daughter requested to get biweekly blood work to be done at [facility] instead of going to [facility]. Request made. CM following Subjective: Chief Complaint / Reason for Physician Visit \"I feel ok now. \". Discussed with RN events overnight. Review of Systems: 
Symptom Y/N Comments  Symptom Y/N Comments Fever/Chills n   Chest Pain n   
Poor Appetite n   Edema Cough    Abdominal Pain Sputum    Joint Pain SOB/HAMILTON n   Pruritis/Rash Nausea/vomit n   Tolerating PT/OT Diarrhea n   Tolerating Diet Constipation n   Other Could NOT obtain due to:   
 
Objective: VITALS:  
Last 24hrs VS reviewed since prior progress note. Most recent are: 
Patient Vitals for the past 24 hrs: 
 Temp Pulse Resp BP SpO2  
19 0358 98 °F (36.7 °C) 60 17 125/66 100 % 09/25/19 2300 97.7 °F (36.5 °C) 72 16 124/51 100 % 09/25/19 2010 98.2 °F (36.8 °C) (!) 55 16 161/58 100 % 09/25/19 1548 97.6 °F (36.4 °C) 71 18 (!) 195/94 98 %  
09/25/19 1330  68 18 133/56 96 %  
09/25/19 1230  65 18 146/57 96 %  
09/25/19 1200  66 9 149/43 99 % 09/25/19 1100  66 18 155/64 96 %  
09/25/19 0930 98 °F (36.7 °C) 68 16 170/63 100 % Intake/Output Summary (Last 24 hours) at 9/26/2019 1817 Last data filed at 9/26/2019 8949 Gross per 24 hour Intake 1556.25 ml Output 400 ml Net 1156.25 ml PHYSICAL EXAM: 
General: Ill appearing, Alert, cooperative, no acute distress   
EENT:  EOMI. Anicteric sclerae. MMM Resp:  CTA bilaterally, no wheezing or rales. No accessory muscle use CV:  Regular  rhythm,  No edema GI:  Soft, Non distended, Non tender.  +Bowel sounds Neurologic:  Alert and oriented X 3, normal speech, Psych:   Good insight. Not anxious nor agitated Skin:  No rashes. No jaundice Reviewed most current lab test results and cultures  YES Reviewed most current radiology test results   YES Review and summation of old records today    NO Reviewed patient's current orders and MAR    YES 
PMH/ reviewed - no change compared to H&P 
________________________________________________________________________ Care Plan discussed with: 
  Comments Patient y Family  y Daughter at bedside RN y   
Care Manager y Consultant Multidiciplinary team rounds were held today with , nursing, pharmacist and clinical coordinator. Patient's plan of care was discussed; medications were reviewed and discharge planning was addressed. ________________________________________________________________________ 
________________________________________________________________________ Siobhan Swann, NP Procedures: see electronic medical records for all procedures/Xrays and details which were not copied into this note but were reviewed prior to creation of Plan. LABS: 
I reviewed today's most current labs and imaging studies. Pertinent labs include: 
Recent Labs  
  09/26/19 0130 09/25/19 
1814 09/25/19 
0932 WBC  --   --  9.6 HGB 11.3* 12.7 12.3 HCT 34.3* 39.9 38.8 PLT  --   --  189 Recent Labs  
  09/26/19 0130 09/25/19 0932 * 143  
K 4.2 4.6 * 113* CO2 21 24 GLU 94 134* BUN 58* 68* CREA 2.50* 2.54* CA 7.9* 8.8 ALB  --  3.4* TBILI  --  0.5 SGOT  --  30 ALT  --  35 INR  --  1.0 Signed: )Pennie Small, JOVANNY

## 2019-09-26 NOTE — ROUTINE PROCESS
The following appointments have been successfully scheduled: 
 
Date/time Wednesday, October 09, 2019 03:30 PM 
Patient  Jose Jaimes 04/23/1921 (02LU M) #[id number] S#[id number] Department [facility]ICE Appointment type Transitional Care Provider Chloé Cross

## 2019-09-26 NOTE — PROGRESS NOTES
PCT had incorrectly filed patient's heart rate at 123. Nurse went into room to check on patient and found patient asleep, heart rate of 60. Corrected the entry which dropped patient's MEWS score from 3 to 1.

## 2019-09-26 NOTE — PROGRESS NOTES
Attempted to get blood draw for H&H twice, was unsuccessful. Charge nurse also unsuccessful.  Contacted PICC team.
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
## 2019-09-26 NOTE — PROGRESS NOTES
General Surgery End of Shift Nursing Note Bedside shift change report given to Zac Ferrer RN (oncoming nurse) by Fidelia Cabrera RN (offgoing nurse). Report included the following information SBAR, Kardex, Intake/Output, MAR, Med Rec Status and Quality Measures. Shift worked:   7p-7a Summary of shift:    Patient has rested w/o pain or nausea. He has been NPO, Telemetry Paced rhythm Issues for physician to address: To be scheduled for EGD Number times ambulated in hallway past shift: 0 Number of times OOB to chair past shift2: 2 Pain Management: 
Current medication: none given Patient states pain is manageable on current pain medication: YES 
 
GI: 
 
Current diet:  DIET NPO Tolerating current diet: YES Passing flatus: NO 
Last Bowel Movement: unk Appearance: unk Respiratory: 
 
Incentive Spirometer at bedside: YES Patient instructed on use: YES Patient Safety: 
 
Falls Score: 2 Bed Alarm On? No 
Sitter?  No 
 
Jenna Francisco RN

## 2019-09-26 NOTE — PROGRESS NOTES
Virtual reviewer communicated change to CM which reflect outpatient observation order written prior to discharge order being written. Code 44 delivered to patient. CM met with the patient and provided to the patient the printed information about her outpatient observation status. The patient was given the flyer entitled, \"Medicare Outpatient Observation: Information & notification. \" All questions were answered, no additional discharge needs identified at this time and patient expects to return to their (home, assisted living facility, relatives home, etc.) after discharge today. Oral and Written notification given to patient and/or caregiver informing them that they are currently an Outpatient receiving care in our facility. Outpatient services include Observation Services under On license of [facility] requirements. CM reviewed Code 40 with EFREN ROD Emiliano Mandril over the phone. Copy left in Pt room. Shawna Royal CM Ext [phone]

## 2019-09-26 NOTE — PROGRESS NOTES
This patient was seen and examined by me in a face-to-face visit today. I reviewed the medical record including lab work, imaging and other provider notes. I confirmed the interval history as described above. I spoke to the patient, discussing our findings and plans. I discussed this case in detail with SULMA Friend , JOVANNY and pt's RN. I formulated an updated  assessment of this patient and guided our treatment plan. I agree with the above progress note. I agree with the history, exam and assessment and plan as outlined in the note. I would like to add the following: Resolved melena with minimal change in Hgb noted. No plan for EGD. Plan: 
1) BID PPPI x 2wks, then 1 PO QAM 
2) Carafate 1 gm PO QID x 10d total 
3) Continue regular diet 4) Avoid NSAIDs Consider for d/c home as no plan for Endoscopy. Will sign off. Dmitriy Mandujano MD 
  
 
GI PROGRESS NOTE Kamille Mckeon NP 
[phone] NP in-hospital cell phone M-F until 4:30 After 5pm or on weekends, please call  for physician on call NAME:Armen Jerome JTX:[phone] PBQ:[phone] ATTG: Dr. Alen Snyder PCP: Savage Edouard MD 
Date/Time:  9/26/2019 10:48 AM  
Primary GI: Dr. Enoc Pichardo Reason for following: melena Assessment:  
-Upper GI bleed, melena- resolved with soft brown stool today, no episodes overnight · H&H stable, still WNL · Stool is hemocullt + 
-H/o gastric ulcer 
-CAD, s/p pacemaker placement Plan:  
-Given patients age and weighing risks/benefits of procedure and anesthesia, recommend continued conservative management at this time. Recommend restart diet and watch for S&S of re-bleed. Would continue to monitor hemoglobin. Can adjust plan as clinically indicated. -Supportive measures as clinically indicated. Monitor for S&S of GI bleed 
-Serial CBC as clinically indicated. Goal hemoglobin >7 
-PPI 40mg BID, carafate QID> would continue these medications for now for gastric protection 
-Started on Cardiac diet 
-Avoid NSAIDs -Will continue to follow Plan discussed with Dr. Maldonado Nunn Subjective:  
Patient resting comfortably in bed during visit. Denies any abdominal pain. No nausea or vomiting. No further bowel movements. Concerned that he has not been able to eat yet. Concerned wants EGD to take care of his suspected bleed. No CP, SOB, or lightheadedness/dizziness. Discussed with RN events overnight. Hemoglobin stable. No melena or overt signs of bleeding overnight. Trying to redraw hemoglobin soon for recheck. Complaint Y/N Description Abdominal Pain N Hematemesis N Hematochezia N Melena N Constipation Diarrhea Dyspepsia Dysphagia Jaundiced Nausea/vomiting N Review of Systems: 
Symptom Y/N Comments  Symptom Y/N Comments Fever/Chills    Chest Pain Cough    Headaches Sputum    Joint Pain SOB/HAMILTON    Pruritis/Rash Tolerating Diet N NPO  Other Could NOT obtain due to:   
 
Objective: VITALS:  
Last 24hrs VS reviewed since prior progress note. Most recent are: 
Visit Vitals /72 Pulse 63 Temp 97.4 °F (36.3 °C) Resp 18 Ht 5' 7\" (1.702 m) Wt 73.9 kg (163 lb) SpO2 98% BMI 25.53 kg/m² Intake/Output Summary (Last 24 hours) at 9/26/2019 1048 Last data filed at 9/26/2019 7016 Gross per 24 hour Intake 1556.25 ml Output 600 ml Net 956.25 ml PHYSICAL EXAM: 
General: WD, WN. Alert, cooperative, no acute distress   
HEENT: NC, Atraumatic. Anicteric sclerae. Lungs:  CTA Bilaterally. No Wheezing/Rhonchi/Rales. Heart:  Regular  rhythm,  No murmur (-), No Rubs, No Gallops Abdomen: Soft, Non distended, Non tender.  +Bowel sounds, no HSM Extremities: No c/c/e Neurologic:  Alert and oriented X 3. No acute neurological distress Psych:   Good insight. Not anxious nor agitated. Lab and Radiology Data Reviewed: (see below) Medications Reviewed: (see below) PMH/SH reviewed - no change compared to H&P 
 ________________________________________________________________________ Total time spent with patient: 30 minutes ________________________________________________________________________ Care Plan discussed with: 
Patient Y Family RN Marga Khan Consultant:    
 
Jg Kincaid NP Procedures: see electronic medical records for all procedures/Xrays and details which were not copied into this note but were reviewed prior to creation of Plan. LABS: 
Recent Labs  
  09/26/19 
0130 09/25/19 
1814 09/25/19 
0932 WBC  --   --  9.6 HGB 11.3* 12.7 12.3 HCT 34.3* 39.9 38.8 PLT  --   --  189 Recent Labs  
  09/26/19 
0130 09/25/19 
0932 * 143  
K 4.2 4.6 * 113* CO2 21 24 BUN 58* 68* CREA 2.50* 2.54* GLU 94 134* CA 7.9* 8.8 Recent Labs  
  09/25/19 
0932 SGOT 30 * TP 7.0 ALB 3.4*  
GLOB 3.6 Recent Labs  
  09/25/19 
0932 INR 1.0 PTP 10.7 APTT 26.7 No results for input(s): FE, TIBC, PSAT, FERR in the last 72 hours. No results found for: FOL, RBCF No results for input(s): PH, PCO2, PO2 in the last 72 hours. No results for input(s): CPK, CKMB in the last 72 hours. No lab exists for component: TROPONINI Lab Results Component Value Date/Time  Color Pale Yellow 07/19/2019 11:33 AM  
 Appearance CLEAR 07/03/2018 07:17 PM  
 Specific gravity 1.020 07/19/2019 11:33 AM  
 Specific gravity 1.017 07/03/2018 07:17 PM  
 pH (UA) 5 07/19/2019 11:33 AM  
 Protein 1+ (A) 07/19/2019 11:33 AM  
 Glucose NEGATIVE  07/03/2018 07:17 PM  
 Ketone Negative 07/19/2019 11:33 AM  
 Bilirubin Negative 07/19/2019 11:33 AM  
 Urobilinogen Negative 07/19/2019 11:33 AM  
 Nitrites Negative 07/19/2019 11:33 AM  
 Leukocyte Esterase Negative 07/19/2019 11:33 AM  
 Epithelial cells FEW 07/03/2018 07:17 PM  
 Bacteria NEGATIVE  07/03/2018 07:17 PM  
 WBC 0-3 (A) 07/19/2019 11:33 AM  
 RBC 0 07/19/2019 11:33 AM  
 
 
MEDICATIONS: 
 Current Facility-Administered Medications Medication Dose Route Frequency  sodium chloride (NS) flush 5-40 mL  5-40 mL IntraVENous Q8H  
 sodium chloride (NS) flush 5-40 mL  5-40 mL IntraVENous PRN  
 acetaminophen (TYLENOL) tablet 650 mg  650 mg Oral Q6H PRN  
 ondansetron (ZOFRAN) injection 4 mg  4 mg IntraVENous Q4H PRN  
 0.9% sodium chloride infusion  75 mL/hr IntraVENous CONTINUOUS  
 pantoprazole (PROTONIX) 40 mg in sodium chloride 0.9% 10 mL injection  40 mg IntraVENous Q12H  carvedilol (COREG) tablet 3.125 mg  3.125 mg Oral BID WITH MEALS  latanoprost (XALATAN) 0.005 % ophthalmic solution 1 Drop  1 Drop Both Eyes QHS  sodium bicarbonate tablet 650 mg  650 mg Oral QPM  
 dorzolamide (TRUSOPT) 2 % ophthalmic solution 1 Drop  1 Drop Both Eyes BID  sucralfate (CARAFATE) tablet 1 g  1 g Oral AC&HS

## 2019-09-26 NOTE — PROGRESS NOTES
JOSEFINA PLAN:  
 
DC back to [facility]. DTR can transport back home in car. Second IM letter delivered to Pt.     
 
2:34 PM: CM met with Pt and explained that MD is considering DC him tomorrow. Pt was very worried that CW is going to make him go to Healthcare before going back to [facility]. CM explained to him that I would call to discuss his options at his [facility] community. Physical Therapy was coming in to evaluate him as I was leaving. CM called [facility]: [phone] and left a  for [facility] in Admissions to discuss coming back to [facility] apt. CM noted consult from Hospitalist: \"Set up fup visit with Dr. Gill Hill in 1-2 weeks, check H & H, BMP as at the [facility] every other week (twice a month) and send the blood work to DR. Kiesha Martinez, and Josse\". CM sent email to CM Specialist to make PCP appt. Appt scheduled on AVS for 10/9/19. PANDA called Dr. Gill Hill office to schedule follow up appt. CM placed appt information on AVS. Medicare pt has received, reviewed, and signed 2nd IM letter informing them of their right to appeal the discharge. Signed copy has been placed on pt bedside chart. PANDA called his Kenyatta Burns:  [phone]. She has been talking to folks at [facility] and is in agreement that Pt should be able to go back to [facility]. [facility] has a Lab facility that he can get his labs drawn as needed. CM will talk community about going back to [facility]. They will be here in the morning to transport Pt home in car. PANDA called Dr. Erum Tamayo office [phone] to see when they will follow up with Pt. Appt scheduled for 10/16/19 at 2 PM for injection. CM spoke to Lucy Garza RN and made her aware of plan. CM placed appt on AVS. CM will continue to monitor discharge plan. George Reese, PANDA Ext [phone]

## 2019-09-27 VITALS
WEIGHT: 163 LBS | SYSTOLIC BLOOD PRESSURE: 168 MMHG | RESPIRATION RATE: 16 BRPM | TEMPERATURE: 97.6 F | OXYGEN SATURATION: 97 % | HEART RATE: 63 BPM | HEIGHT: 67 IN | DIASTOLIC BLOOD PRESSURE: 69 MMHG | BODY MASS INDEX: 25.58 KG/M2

## 2019-09-27 LAB
ANION GAP SERPL CALC-SCNC: 8 MMOL/L (ref 5–15)
BASOPHILS # BLD: 0 K/UL (ref 0–0.1)
BASOPHILS NFR BLD: 0 % (ref 0–1)
BUN SERPL-MCNC: 52 MG/DL (ref 6–20)
BUN/CREAT SERPL: 22 (ref 12–20)
CALCIUM SERPL-MCNC: 8.1 MG/DL (ref 8.5–10.1)
CHLORIDE SERPL-SCNC: 119 MMOL/L (ref 97–108)
CO2 SERPL-SCNC: 19 MMOL/L (ref 21–32)
CREAT SERPL-MCNC: 2.39 MG/DL (ref 0.7–1.3)
DIFFERENTIAL METHOD BLD: ABNORMAL
EOSINOPHIL # BLD: 0.6 K/UL (ref 0–0.4)
EOSINOPHIL NFR BLD: 9 % (ref 0–7)
ERYTHROCYTE [DISTWIDTH] IN BLOOD BY AUTOMATED COUNT: 13.3 % (ref 11.5–14.5)
GLUCOSE SERPL-MCNC: 95 MG/DL (ref 65–100)
HCT VFR BLD AUTO: 32.5 % (ref 36.6–50.3)
HGB BLD-MCNC: 10.6 G/DL (ref 12.1–17)
IMM GRANULOCYTES # BLD AUTO: 0 K/UL (ref 0–0.04)
IMM GRANULOCYTES NFR BLD AUTO: 0 % (ref 0–0.5)
LYMPHOCYTES # BLD: 1.9 K/UL (ref 0.8–3.5)
LYMPHOCYTES NFR BLD: 30 % (ref 12–49)
MAGNESIUM SERPL-MCNC: 2.2 MG/DL (ref 1.6–2.4)
MCH RBC QN AUTO: 36.8 PG (ref 26–34)
MCHC RBC AUTO-ENTMCNC: 32.6 G/DL (ref 30–36.5)
MCV RBC AUTO: 112.8 FL (ref 80–99)
MONOCYTES # BLD: 0.6 K/UL (ref 0–1)
MONOCYTES NFR BLD: 9 % (ref 5–13)
NEUTS SEG # BLD: 3.1 K/UL (ref 1.8–8)
NEUTS SEG NFR BLD: 52 % (ref 32–75)
NRBC # BLD: 0 K/UL (ref 0–0.01)
NRBC BLD-RTO: 0 PER 100 WBC
PLATELET # BLD AUTO: 160 K/UL (ref 150–400)
PMV BLD AUTO: 11.7 FL (ref 8.9–12.9)
POTASSIUM SERPL-SCNC: 4.2 MMOL/L (ref 3.5–5.1)
RBC # BLD AUTO: 2.88 M/UL (ref 4.1–5.7)
RBC MORPH BLD: ABNORMAL
SODIUM SERPL-SCNC: 146 MMOL/L (ref 136–145)
WBC # BLD AUTO: 6.2 K/UL (ref 4.1–11.1)

## 2019-09-27 PROCEDURE — 74011250637 HC RX REV CODE- 250/637: Performed by: NURSE PRACTITIONER

## 2019-09-27 PROCEDURE — 74011250636 HC RX REV CODE- 250/636: Performed by: INTERNAL MEDICINE

## 2019-09-27 PROCEDURE — C9113 INJ PANTOPRAZOLE SODIUM, VIA: HCPCS | Performed by: INTERNAL MEDICINE

## 2019-09-27 PROCEDURE — 83735 ASSAY OF MAGNESIUM: CPT

## 2019-09-27 PROCEDURE — 74011000250 HC RX REV CODE- 250: Performed by: INTERNAL MEDICINE

## 2019-09-27 PROCEDURE — 36415 COLL VENOUS BLD VENIPUNCTURE: CPT

## 2019-09-27 PROCEDURE — 74011250637 HC RX REV CODE- 250/637: Performed by: INTERNAL MEDICINE

## 2019-09-27 PROCEDURE — 85025 COMPLETE CBC W/AUTO DIFF WBC: CPT

## 2019-09-27 PROCEDURE — 80048 BASIC METABOLIC PNL TOTAL CA: CPT

## 2019-09-27 PROCEDURE — 96376 TX/PRO/DX INJ SAME DRUG ADON: CPT

## 2019-09-27 PROCEDURE — 99218 HC RM OBSERVATION: CPT

## 2019-09-27 RX ORDER — OMEPRAZOLE 20 MG/1
20 CAPSULE, DELAYED RELEASE ORAL
Qty: 60 CAP | Refills: 0 | Status: SHIPPED | OUTPATIENT
Start: 2019-09-27 | End: 2020-01-01 | Stop reason: SDUPTHER

## 2019-09-27 RX ORDER — SUCRALFATE 1 G/1
1 TABLET ORAL
Qty: 52 TAB | Refills: 0 | Status: SHIPPED | OUTPATIENT
Start: 2019-09-27 | End: 2019-10-10

## 2019-09-27 RX ADMIN — SUCRALFATE 1 G: 1 TABLET ORAL at 06:33

## 2019-09-27 RX ADMIN — Medication 10 ML: at 05:28

## 2019-09-27 RX ADMIN — DORZOLAMIDE HYDROCHLORIDE 1 DROP: 20 SOLUTION/ DROPS OPHTHALMIC at 08:20

## 2019-09-27 RX ADMIN — SODIUM CHLORIDE 40 MG: 9 INJECTION, SOLUTION INTRAMUSCULAR; INTRAVENOUS; SUBCUTANEOUS at 08:19

## 2019-09-27 RX ADMIN — CARVEDILOL 3.12 MG: 3.12 TABLET, FILM COATED ORAL at 08:19

## 2019-09-27 NOTE — DISCHARGE INSTRUCTIONS
Patient Discharge Instructions     Pt Name  Sandra Larsen   Date of Birth 4/23/1921   Age  80 y.o. Medical Record Number  [id number]   PCP Keyana Solano MD    Admit date:  9/25/2019 @    [place] [address]    Room Number  2112/01   Date of Discharge 9/27/2019     Admission Diagnoses:     Melena          Allergies   Allergen Reactions    Latex, Natural Rubber Other (comments)    Shellfish Derived Other (comments)     Crab meat        You were admitted to [address] for  [facility] (BUT NOT LIMITED TO ):  Present on Admission:   Essential hypertension, benign   Chronic kidney disease, stage IV (severe) ([facility] [address]   Cardiac pacemaker in situ   GERD (gastroesophageal reflux disease)   Mixed hyperlipidemia      DIET:  Regular Diet       Recommended activity: Activity as tolerated  Follow up : Follow-up Information     Follow up With Specialties Details Why Ayad Boo MD Internal Medicine Go on 10/9/2019 for PCP post hospital follow up appt at 3:30PM  [address]      Olegario Duvall MD Gastroenterology Go on 10/11/2019 at 2 PM for GI MD follow up appt. [address]      Victor M Dao MD Nephrology Go on 10/16/2019 at 2 PM for nephrology follow up appt [address] [place] [address] [place] [address] [phone]              · It is important that you take the medication exactly as they are prescribed. · Keep your medication in the bottles provided by the pharmacist and keep a list of the medication names, dosages, and times to be taken in your wallet. · Do not take other medications without consulting your doctor.        ADDITIONAL INFORMATION: If you experience any of the following symptoms or have any health problem not listed below, then please call your primary care physician or return to the emergency room if you cannot get hold of your doctor: Fever, chills, nausea, vomiting, diarrhea, change in mentation, falling, bleeding, shortness of breath. I understand that if any problems occur once I am discharged, I am supposed to call my Primary care physician for further care or seek help in the Emergency Department at the nearest Healthcare facility. I have had an opportunity to discuss my clinical issues with my doctor and nursing staff. I understand and acknowledge receipt of the above instructions.                                                                                                                                            Physician's or R.N.'s Signature                                                            Date/Time                                                                                                                                              Patient or Representative Signature                                                 Date/Time

## 2019-09-27 NOTE — ACP (ADVANCE CARE PLANNING)
Advance Care Planning Note NAME: Mili Paredes  
:  1921 MRN:  [id number] Date/Time:  2019 1:52 PM 
 
Active Diagnoses: 
Hospital Problems  Date Reviewed: 2019 Codes Class Noted POA * (Principal) Melena ICD-10-CM: K92.1 ICD-9-CM: 578.1  2019 Unknown Chronic kidney disease, stage IV (severe) (HCC) ICD-10-CM: N18.4 ICD-9-CM: 585.4  2017 Yes GERD (gastroesophageal reflux disease) ICD-10-CM: K21.9 ICD-9-CM: 530.81  2017 Yes Essential hypertension, benign ICD-10-CM: I10 
ICD-9-CM: 401.1  2012 Yes Mixed hyperlipidemia ICD-10-CM: E78.2 ICD-9-CM: 272.2  2012 Yes Cardiac pacemaker in situ ICD-10-CM: Z95.0 ICD-9-CM: V45.01  2012 Yes These active diagnoses are of sufficient risk that focused discussion on advance care planning is indicated in order to allow the patient to thoughtfully consider personal goals of care, and if situations arise that prevent the ability to personally give input, to ensure appropriate representation of their personal desires for different levels and aggressiveness of care. Discussion:  
Code status addressed and wants to be a Full Code. Patient wants central line and vasopressors if needed. Patient would also want a feeding tube, if needed, for nutritional support. Patient  would like to assign pt's daughter, Jane Nick as the surrogate decision maker. Pt's wife is currently under hospice care. The daughter expressed how difficult it is for the pt and both pt and pt's daughter would like the pt to be in full code for now. Persons present and participating in discussion: Mili Paredes, Siobhan Puentes NP, pt's daughter. Time Spent:  
Total time spent face-to-face in education and discussion:   10  minutes. Siobhan Puentes NP Hospitalist

## 2019-09-27 NOTE — DISCHARGE SUMMARY
Hospitalist Discharge Summary Patient ID: Brenda Denney 
[id number] 
87 y.o. 
4/23/1921 PCP on record: Carol Solano MD 
 
Admit date: 9/25/2019 Discharge date and time: 9/27/2019 DISCHARGE DIAGNOSIS: 
Melena POA Hx Gastritis and gastric AVMs (EGD 7/18/19) Hx Diverticulosis (Colonoscopy 4/4/19) 
CAD, s/p CABG and stents S/P PPM for CHB 
HTN 
CKD4 DM2 Glaucoma CONSULTATIONS: 
IP CONSULT TO GASTROENTEROLOGY 
IP CONSULT TO HOSPITALIST Excerpted HPI from H&P of Katalina Godinez MD: 
Pattie Underwood is a 80 y.o.  male with a history of CAD, HTN, DM, CKD and GIB from gastric AVMs / gastritis who presents with recurrent dark stools. He describes 3 episodes yesterday associated with some abdominal discomfort for which he took tums. He informed the staff at [address] [place] [address] yesterday and it was decided to continue observing him. This morning he had another bout of dark stools but this time he felt lightheaded and it was decided to transfer him to the ER for evaluation. Hg 12, BP stable. 
 
______________________________________________________________________ DISCHARGE SUMMARY/HOSPITAL COURSE:  for full details see H&P, daily progress notes, labs, consult notes. Melena POA Hx Gastritis and gastric AVMs (EGD 7/18/19) Hx Diverticulosis (Colonoscopy 4/4/19) 
- no melena over night Appreciate GI input; no plan for procedure at this time. GI aware heme (+) stool Hgb stable at 10.6 (was 12.3 on admission) Continue with PPI and carafate Tolerating cardiac diet without difficulty 
  
CAD, s/p CABG and stents S/P PPM for CHB HTN 
- resume ASA 
  continue coreg and lasix  
  
CKD4 
- Creat 2.39 which seemed to his baseline. BMP and H & H check twice a month; blood work to be done at the [address] [place] [address], the blood work to be send to Germain Henley, and Ada DM2 
- continue with cardiac diet; A1C 5.8 (3/2019) 
  
Glaucoma 
- continue drops 
  
 
 _______________________________________________________________________ Patient seen and examined by me on discharge day. Pertinent Findings: 
Gen:    Not in distress Chest: Clear lungs CVS:   Regular rhythm. No edema Abd:  Soft, not distended, not tender Neuro:  Alert, orient x4 
_______________________________________________________________________ DISCHARGE MEDICATIONS:  
Current Discharge Medication List  
  
START taking these medications Details  
sucralfate (CARAFATE) 1 gram tablet Take 1 Tab by mouth Before breakfast, lunch, dinner and at bedtime for 13 days. Qty: 52 Tab, Refills: 0 CONTINUE these medications which have CHANGED Details  
omeprazole (PRILOSEC) 20 mg capsule Take 1 Cap by mouth ACB/HS. Qty: 60 Cap, Refills: 0 CONTINUE these medications which have NOT CHANGED Details  
carvedilol (COREG) 3.125 mg tablet Take 1 Tab by mouth two (2) times daily (with meals). Qty: 180 Tab, Refills: 3  
  
aspirin 81 mg chewable tablet Take 81 mg by mouth daily. multivitamin with iron tablet Take 1 Tab by mouth daily. lactase (LACTAID) 3,000 unit tablet Take 1 Tab by mouth three (3) times daily (with meals). Qty: 90 Tab, Refills: 3 Associated Diagnoses: Lactose intolerance  
  
pollen extracts (PROSTAT PO) Take 30 mL by mouth two (2) times a day. senna-docusate (SENNA WITH DOCUSATE SODIUM) 8.6-50 mg per tablet Take 1 Tab by mouth daily. sodium bicarbonate 650 mg tablet Take 650 mg by mouth every evening. Crush one tablet and mix with cranberry juice after evening meal  
  
brinzolamide (AZOPT) 1 % ophthalmic suspension Administer 1 Drop to both eyes two (2) times a day. Qty: 15 mL, Refills: 3  
  
vit A/vit C/vit E/zinc/copper (ICAPS AREDS PO) Take 1 Cap by mouth daily. latanoprost (XALATAN) 0.005 % ophthalmic solution Administer 1 Drop to both eyes nightly. bisacodyl (DULCOLAX) 10 mg suppository Insert 10 mg into rectum daily. furosemide (LASIX) 20 mg tablet Take 20 mg by mouth daily. My Recommended Diet, Activity, Wound Care, and follow-up labs are listed in the patient's Discharge Insturctions which I have personally completed and reviewed. _______________________________________________________________________ DISPOSITION:   
Home with Family: y  
Home with HH/PT/OT/RN:   
SNF/LTC:   
KRISTINE:   
OTHER:   
 
 
Condition at Discharge:  Stable 
_______________________________________________________________________ Follow up with: PCP : Katarina Vásquez MD 
Follow-up Information Follow up With Specialties Details Why Contact Info Katarina Vásquez MD Internal Medicine Go on 10/9/2019 for PCP post hospital follow up appt at 3:30PM  [address] [facility] 
[phone] Lance Gibson MD Gastroenterology Go on 10/11/2019 at 2 PM for GI MD follow up appt. [address] [facility] 
[phone] Trudy Alegre MD Nephrology Go on 10/16/2019 at 2 PM for nephrology follow up appt [address] [facility] 
[phone] Total time in minutes spent coordinating this discharge (includes going over instructions, follow-up, prescriptions, and preparing report for sign off to her PCP) :  40 minutes Signed: 
Ted Garsia NP

## 2019-09-27 NOTE — PROGRESS NOTES
Bedside shift change report given to Carolyn Forde RN (oncoming nurse) by [facility] Ronel RN (offgoing nurse). Report included the following information SBAR, Kardex, ED Summary, Procedure Summary, Intake/Output, MAR, Recent Results, Cardiac Rhythm Paced, Alarm Parameters  and Quality Measures.

## 2019-09-27 NOTE — PROGRESS NOTES
JOSEFINA PLAN 
 
- Plan is to return to [facility] [employer] detention today.   
- DTR, Priscila Wild can transport Pt back to detention. - Appts are scheduled. -OBS letter and IM letters were given. 2:19 PM  
PANDA faxed DC Summary to Unit Manager at [facility] [phone] 
 
10:07 AM  
CM received a call from [address] [place] [address], Priscila Wild and let her know that I am waiting for [employer] to call me back to confirm Pt can come back to [facility]. 9:51 AM - PANDA received a call from Rojas Nieto, Unit Manager for [employer] she will call me back in 5 minutes after talking to staff about returning to [facility]. 9:32 AM - PANDA called and left a VM for Deloris in Admissions to confirm Pt could come back to detention today. CM called and left a VM for Unit Manager in detention to coordinate care and make sure Pt could return to [facility] today. PANDA will continue to monitor discharge plan. Scotty Hernandez CM Ext [phone]

## 2019-09-27 NOTE — PROGRESS NOTES
Spiritual Care Partner Volunteer visited patient in Gen Surg on September 27, 2019. Documented by: 
 
MARCEL Aquino, [facility],  [facility]  Paging Service  [phone])

## 2019-09-27 NOTE — PROGRESS NOTES
I have reviewed discharge instructions with the patient and caregiver. The patient and caregiver verbalized  understanding. Patient was given 2 prescriptions and information on follow-up appointments.

## 2019-10-01 ENCOUNTER — PATIENT OUTREACH (OUTPATIENT)
Dept: INTERNAL MEDICINE CLINIC | Age: 84
End: 2019-10-01

## 2019-10-02 NOTE — PROGRESS NOTES
Hospital Discharge Follow-Up Date/Time:  10/2/2019 8:55 AM 
 
Patient was admitted to [facility] on 19 and discharged on 19 for melena. The physician discharge summary was available at the time of outreach. Patient was contacted within three business days of discharge. Top Challenges reviewed with the provider Patient's wife is currently in hospice at [facility] - daughter and son-in-law supportive Method of communication with provider :chart routing Inpatient RRAT score: 54 Was this a readmission? no  
Patient stated reason for the readmission: n/a Nurse Navigator (NN) contacted the family by telephone to perform post hospital discharge assessment. Verified name and  with family as identifiers. Provided introduction to self, and explanation of the Nurse Navigator role. Reviewed discharge instructions and red flags with family who verbalized understanding. Family given an opportunity to ask questions and does not have any further questions or concerns at this time. The family agrees to contact the PCP office for questions related to their healthcare. NN provided contact information for future reference. Disease Specific:   N/A Summary of patient's top problems: 1. Patient is a 80year old male who presented to ER after having 3 episodes of dark stools, abdominal discomfort and lightheadedness. 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 No procedures elected to be done during hospital stay. Treated with PPI and carafate. Serial Hgb checked, with d/c Hgb being 10.6. Will have BMP and H&H checked twice monthly. 2. Patient's wife currently in hospice. Home Health orders at discharge: none [address]: n/a Date of initial visit: n/a Durable Medical Equipment ordered/company: n/a Durable Medical Equipment received: patient uses cane Barriers to care? stages of grief Advance Care Planning:  
Does patient have an Advance Directive:  reviewed and current Medication(s):  
New Medications at Discharge: sucralfate Changed Medications at Discharge: omeprazole Discontinued Medications at Discharge: n/a Medication reconciliation was performed with family, who verbalizes understanding of administration of home medications. There were no barriers to obtaining medications identified at this time. Referral to Pharm D needed: no  
 
Current Outpatient Medications Medication Sig  
 omeprazole (PRILOSEC) 20 mg capsule Take 1 Cap by mouth ACB/HS.  sucralfate (CARAFATE) 1 gram tablet Take 1 Tab by mouth Before breakfast, lunch, dinner and at bedtime for 13 days.  carvedilol (COREG) 3.125 mg tablet Take 1 Tab by mouth two (2) times daily (with meals).  aspirin 81 mg chewable tablet Take 81 mg by mouth daily.  lactase (LACTAID) 3,000 unit tablet Take 1 Tab by mouth three (3) times daily (with meals).  furosemide (LASIX) 20 mg tablet Take 20 mg by mouth daily.  brinzolamide (AZOPT) 1 % ophthalmic suspension Administer 1 Drop to both eyes two (2) times a day.  latanoprost (XALATAN) 0.005 % ophthalmic solution Administer 1 Drop to both eyes nightly.  multivitamin with iron tablet Take 1 Tab by mouth daily.  bisacodyl (DULCOLAX) 10 mg suppository Insert 10 mg into rectum daily.  pollen extracts (PROSTAT PO) Take 30 mL by mouth two (2) times a day.  senna-docusate (SENNA WITH DOCUSATE SODIUM) 8.6-50 mg per tablet Take 1 Tab by mouth daily.  sodium bicarbonate 650 mg tablet Take 650 mg by mouth every evening. Crush one tablet and mix with cranberry juice after evening meal  
 vit A/vit C/vit E/zinc/copper (ICAPS AREDS PO) Take 1 Cap by mouth daily. No current facility-administered medications for this visit. There are no discontinued medications. [facility] follow up appointment(s):  
Future Appointments Date Time Provider Cristian Alvarez 10/9/2019  3:30 PM Delmis Saldaña MD [address]  
10/21/2019 10:45 AM Delmis Saldaña MD [facility] ANISA SCHED  
11/13/2019  8:00 [facility]  
11/19/2019 11:15 AM Casimiro Nunez MD [facility] ANISA [address]  
6/22/2020  2:00 PM PACEMAKER, RCAM [facility] ANISA SCHED  
6/22/2020  2:20 PM Carol Mcgee, NP [address] [facility] [facility] follow up appointment(s): Dr. Daniel Luu, GI - 10/11/19, Dr. Won Lock, neprologist 10/16/19 [facility]:  n/a  
 
 
Goals  monitor for further bleeding/check hemoglobin twice / month   
  10/1/19-NN spoke to patient's daughter regarding his recent hospital stay. Discussed importance of patient watching for any additional dark stools that might indicate loss of blood due to gastric AVMs. Patient is well aware of being vigilant for these. In addition patient should report any lightheadedness, dizziness, shortness of breath that might indicate drop in Hgb. Patient will have Hgb and BMP checked twice / month at assisted living where he lives. Daughter voiced understanding.   NN will check on patient in 3 weeks to see how he is progressing. / vs

## 2019-10-09 ENCOUNTER — OFFICE VISIT (OUTPATIENT)
Dept: INTERNAL MEDICINE CLINIC | Age: 84
End: 2019-10-09

## 2019-10-09 VITALS
HEIGHT: 67 IN | OXYGEN SATURATION: 98 % | HEART RATE: 68 BPM | BODY MASS INDEX: 26.37 KG/M2 | SYSTOLIC BLOOD PRESSURE: 140 MMHG | WEIGHT: 168 LBS | DIASTOLIC BLOOD PRESSURE: 76 MMHG | TEMPERATURE: 97.9 F | RESPIRATION RATE: 16 BRPM

## 2019-10-09 DIAGNOSIS — K92.2 GASTROINTESTINAL HEMORRHAGE, UNSPECIFIED GASTROINTESTINAL HEMORRHAGE TYPE: ICD-10-CM

## 2019-10-09 DIAGNOSIS — E11.9 TYPE 2 DIABETES MELLITUS WITHOUT COMPLICATION, WITHOUT LONG-TERM CURRENT USE OF INSULIN (HCC): ICD-10-CM

## 2019-10-09 DIAGNOSIS — Z95.0 CARDIAC PACEMAKER IN SITU: ICD-10-CM

## 2019-10-09 DIAGNOSIS — N18.4 CHRONIC KIDNEY DISEASE, STAGE IV (SEVERE) (HCC): ICD-10-CM

## 2019-10-09 DIAGNOSIS — I25.10 ATHEROSCLEROSIS OF NATIVE CORONARY ARTERY OF NATIVE HEART WITHOUT ANGINA PECTORIS: ICD-10-CM

## 2019-10-09 DIAGNOSIS — K92.1 MELENA: ICD-10-CM

## 2019-10-09 DIAGNOSIS — Z23 ENCOUNTER FOR IMMUNIZATION: Primary | ICD-10-CM

## 2019-10-09 NOTE — PATIENT INSTRUCTIONS
Vaccine Information Statement    Influenza (Flu) Vaccine (Inactivated or Recombinant): What You Need to Know    Many Vaccine Information Statements are available in Bengali and other languages. See www.immunize.org/vis  Hojas de información sobre vacunas están disponibles en español y en muchos otros idiomas. Visite www.immunize.org/vis    1. Why get vaccinated? Influenza vaccine can prevent influenza (flu). Flu is a contagious disease that spreads around the [facility] every year, usually between October and May. Anyone can get the flu, but it is more dangerous for some people. Infants and young children, people 72years of age and older, pregnant women, and people with certain health conditions or a weakened immune system are at greatest risk of flu complications. Pneumonia, bronchitis, sinus infections and ear infections are examples of flu-related complications. If you have a medical condition, such as heart disease, cancer or diabetes, flu can make it worse. Flu can cause fever and chills, sore throat, muscle aches, fatigue, cough, headache, and runny or stuffy nose. Some people may have vomiting and diarrhea, though this is more common in children than adults. Each year thousands of people in the [facility] die from flu, and many more are hospitalized. Flu vaccine prevents millions of illnesses and flu-related visits to the doctor each year. 2. Influenza vaccines     CDC recommends everyone 10months of age and older get vaccinated every flu season. Children 6 months through 6years of age may need 2 doses during a single flu season. Everyone else needs only 1 dose each flu season. It takes about 2 weeks for protection to develop after vaccination. There are many flu viruses, and they are always changing. Each year a new flu vaccine is made to protect against three or four viruses that are likely to cause disease in the upcoming flu season.  Even when the vaccine doesnt exactly match these viruses, it may still provide some protection. Influenza vaccine does not cause flu. Influenza vaccine may be given at the same time as other vaccines. 3. Talk with your health care provider    Tell your vaccine provider if the person getting the vaccine:   Has had an allergic reaction after a previous dose of influenza vaccine, or has any severe, life-threatening allergies.  Has ever had Guillain-Barré Syndrome (also called GBS). In some cases, your health care provider may decide to postpone influenza vaccination to a future visit. People with minor illnesses, such as a cold, may be vaccinated. People who are moderately or severely ill should usually wait until they recover before getting influenza vaccine. Your health care provider can give you more information. 4. Risks of a reaction     Soreness, redness, and swelling where shot is given, fever, muscle aches, and headache can happen after influenza vaccine.  There may be a very small increased risk of Guillain-Barré Syndrome (GBS) after inactivated influenza vaccine (the flu shot). Eliu Ready children who get the flu shot along with pneumococcal vaccine (PCV13), and/or DTaP vaccine at the same time might be slightly more likely to have a seizure caused by fever. Tell your health care provider if a child who is getting flu vaccine has ever had a seizure. People sometimes faint after medical procedures, including vaccination. Tell your provider if you feel dizzy or have vision changes or ringing in the ears. As with any medicine, there is a very remote chance of a vaccine causing a severe allergic reaction, other serious injury, or death. 5. What if there is a serious problem? An allergic reaction could occur after the vaccinated person leaves the clinic.  If you see signs of a severe allergic reaction (hives, swelling of the face and throat, difficulty breathing, a fast heartbeat, dizziness, or weakness), call 9-1-1 and get the person to the nearest hospital.    For other signs that concern you, call your health care provider. Adverse reactions should be reported to the Vaccine Adverse Event Reporting System (VAERS). Your health care provider will usually file this report, or you can do it yourself. Visit the VAERS website at www.vaers. [facility].gov or call 6-196.983.3399. VAERS is only for reporting reactions, and VAERS staff do not give medical advice. 6. The National Vaccine Injury Compensation Program    The [facility] Vaccine Injury Compensation Program (VICP) is a federal program that was created to compensate people who may have been injured by certain vaccines. Visit the VICP website at www.[facility].gov/vaccinecompensation or call 3-696.881.2692 to learn about the program and about filing a claim. There is a time limit to file a claim for compensation. 7. How can I learn more?  Ask your health care provider.  Call your local or state health department.  Contact the Centers for Disease Control and Prevention (CDC):  - Call 8-367.175.4085 (1-800-CDC-INFO) or  - Visit CDCs influenza website at www.cdc.gov/flu    Vaccine Information Statement (Interim)  Inactivated Influenza Vaccine   8/15/2019  42 LEANN Burns [id number]   Department of Health and Human Services  Centers for Disease Control and Prevention    Office Use Only

## 2019-10-09 NOTE — PROGRESS NOTES
Milana Menendez presents today at the clinic for    Chief Complaint   Patient presents with   Geo Rodriguez     Patient was admitted to [facility] on 9/25/19 and discharged on 9/27/19 for melena.         Wt Readings from Last 3 Encounters:   10/09/19 168 lb (76.2 kg)   09/25/19 163 lb (73.9 kg)   08/16/19 158 lb 12.8 oz (72 kg)     Temp Readings from Last 3 Encounters:   10/09/19 97.9 °F (36.6 °C) (Oral)   09/27/19 97.6 °F (36.4 °C)   07/26/19 97.6 °F (36.4 °C)     BP Readings from Last 3 Encounters:   10/09/19 140/76   09/27/19 168/69   08/16/19 (!) 88/56     Pulse Readings from Last 3 Encounters:   10/09/19 68   09/27/19 63   08/16/19 60       Health Maintenance Due   Topic    FOOT EXAM Q1     EYE EXAM RETINAL OR DILATED     DTaP/Tdap/Td series (1 - Tdap)    Shingrix Vaccine Age 50> (1 of 2)    GLAUCOMA SCREENING Q2Y     Pneumococcal 65+ years (1 of 2 - PCV13)    MEDICARE YEARLY EXAM     Influenza Age 5 to Adult          Learning Assessment:  :     Learning Assessment 9/7/2017 2/25/2014   PRIMARY LEARNER Patient Patient   HIGHEST LEVEL OF EDUCATION - PRIMARY LEARNER  - GRADUATED HIGH SCHOOL OR GED   BARRIERS PRIMARY LEARNER - HEARING   CO-LEARNER CAREGIVER - Yes   CO-LEARNER NAME - [employer] LEVEL OF EDUCATION - GRADUATED HIGH SCHOOL OR GED   [address] - ENGLISH    NEED - No   LEARNER PREFERENCE PRIMARY READING OTHER (COMMENT)   LEARNER PREFERENCE CO-LEARNER - OTHER (COMMENT)   LEARNING SPECIAL TOPICS - DNR papers   ANSWERED BY self patient   RELATIONSHIP SELF SELF       Depression Screening:  :     3 most recent PHQ Screens 8/16/2019   Little interest or pleasure in doing things Not at all   Feeling down, depressed, irritable, or hopeless Not at all   Total Score PHQ 2 0       Fall Risk Assessment:  :     Fall Risk Assessment, last 12 mths 8/16/2019   Able to walk? Yes   Fall in past 12 months? No   Fall with injury? -   Number of falls in past 12 months -   Fall Risk Score -       Abuse Screening:  :     Abuse Screening Questionnaire 7/19/2019 4/23/2019 9/18/2018 9/7/2017   Do you ever feel afraid of your partner? N N N N   Are you in a relationship with someone who physically or mentally threatens you? N N N N   Is it safe for you to go home? Carmen Martinez       Coordination of Care Questionnaire:  :     1. Have you been to the ER, urgent care clinic since your last visit? Hospitalized since your last visit? Patient was admitted to [facility] on 9/25/19 and discharged on 9/27/19 for melena. 2. Have you seen or consulted any other health care providers outside of the [address] [place] [address] since your last visit? Include any pap smears or colon screening. No      After obtaining written consent and per orders of Dr. Eulalio Barraza, injection of flu shot given by Romero Torres LPN. Order and injection/medication verified by second nurse/ma review by Kenneth Rai LPN. Patient tolerated procedure well. VIS was given to them. No reactions noted.
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
## 2019-11-13 ENCOUNTER — TELEPHONE (OUTPATIENT)
Dept: CARDIOLOGY CLINIC | Age: 84
End: 2019-11-13

## 2019-11-13 ENCOUNTER — OFFICE VISIT (OUTPATIENT)
Dept: CARDIOLOGY CLINIC | Age: 84
End: 2019-11-13

## 2019-11-13 DIAGNOSIS — Z95.0 CARDIAC PACEMAKER IN SITU: Primary | ICD-10-CM

## 2019-11-13 DIAGNOSIS — I44.2 CHB (COMPLETE HEART BLOCK) (HCC): ICD-10-CM

## 2019-11-13 NOTE — TELEPHONE ENCOUNTER
Please give Temitope Dee a call at [facility] regarding the pts remote transmission regarding today and it has lights flashing on it      thanks

## 2019-11-19 ENCOUNTER — OFFICE VISIT (OUTPATIENT)
Dept: CARDIOLOGY CLINIC | Age: 84
End: 2019-11-19

## 2019-11-19 VITALS
BODY MASS INDEX: 26.65 KG/M2 | DIASTOLIC BLOOD PRESSURE: 66 MMHG | WEIGHT: 169.8 LBS | SYSTOLIC BLOOD PRESSURE: 112 MMHG | HEART RATE: 60 BPM | RESPIRATION RATE: 16 BRPM | OXYGEN SATURATION: 96 % | HEIGHT: 67 IN

## 2019-11-19 DIAGNOSIS — E11.21 TYPE 2 DIABETES MELLITUS WITH NEPHROPATHY (HCC): ICD-10-CM

## 2019-11-19 DIAGNOSIS — I25.10 ATHEROSCLEROSIS OF NATIVE CORONARY ARTERY OF NATIVE HEART WITHOUT ANGINA PECTORIS: Primary | ICD-10-CM

## 2019-11-19 DIAGNOSIS — Z95.0 PACEMAKER: ICD-10-CM

## 2019-11-19 DIAGNOSIS — N18.4 CKD (CHRONIC KIDNEY DISEASE), STAGE IV (HCC): ICD-10-CM

## 2019-11-19 DIAGNOSIS — E78.2 MIXED HYPERLIPIDEMIA: ICD-10-CM

## 2019-11-19 DIAGNOSIS — I10 ESSENTIAL HYPERTENSION, BENIGN: ICD-10-CM

## 2019-11-19 RX ORDER — SUCRALFATE 1 G/1
1 TABLET ORAL 4 TIMES DAILY
COMMUNITY
Start: 2019-10-16 | End: 2020-03-27
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
## 2019-11-19 NOTE — PROGRESS NOTES
1. Have you been to the ER, urgent care clinic since your last visit? Hospitalized since your last visit? NO    2. Have you seen or consulted any other health care providers outside of the [address] [place] [address] since your last visit? Include any pap smears or colon screening. YES, NEPHROLOGIST.    3 MONTH FOLLOW UP. C/O SOB.

## 2019-11-20 DIAGNOSIS — M54.50 ACUTE RIGHT-SIDED LOW BACK PAIN WITHOUT SCIATICA: Primary | ICD-10-CM

## 2019-11-20 RX ORDER — ACETAMINOPHEN 500 MG
500 TABLET ORAL
Qty: 60 TAB | Refills: 0 | Status: SHIPPED | OUTPATIENT
Start: 2019-11-20

## 2019-11-20 RX ORDER — LIDOCAINE 50 MG/G
PATCH TOPICAL
Qty: 7 EACH | Refills: 1 | Status: SHIPPED | OUTPATIENT
Start: 2019-11-20 | End: 2020-01-01

## 2019-11-27 ENCOUNTER — APPOINTMENT (OUTPATIENT)
Dept: CT IMAGING | Age: 84
End: 2019-11-27
Attending: NURSE PRACTITIONER
Payer: MEDICARE

## 2019-11-27 ENCOUNTER — APPOINTMENT (OUTPATIENT)
Dept: GENERAL RADIOLOGY | Age: 84
End: 2019-11-27
Attending: NURSE PRACTITIONER
Payer: MEDICARE

## 2019-11-27 ENCOUNTER — HOSPITAL ENCOUNTER (EMERGENCY)
Age: 84
Discharge: NURSING, REHAB OR LONG-TERM CARE | End: 2019-11-27
Attending: EMERGENCY MEDICINE
Payer: MEDICARE

## 2019-11-27 VITALS
RESPIRATION RATE: 18 BRPM | DIASTOLIC BLOOD PRESSURE: 72 MMHG | SYSTOLIC BLOOD PRESSURE: 176 MMHG | OXYGEN SATURATION: 97 % | HEART RATE: 60 BPM | TEMPERATURE: 97.6 F

## 2019-11-27 DIAGNOSIS — M79.10 MYALGIA: ICD-10-CM

## 2019-11-27 DIAGNOSIS — M54.50 ACUTE RIGHT-SIDED LOW BACK PAIN WITHOUT SCIATICA: Primary | ICD-10-CM

## 2019-11-27 DIAGNOSIS — M54.50 ACUTE MIDLINE LOW BACK PAIN WITHOUT SCIATICA: Primary | ICD-10-CM

## 2019-11-27 LAB
ALBUMIN SERPL-MCNC: 3.5 G/DL (ref 3.5–5)
ALBUMIN/GLOB SERPL: 1 {RATIO} (ref 1.1–2.2)
ALP SERPL-CCNC: 109 U/L (ref 45–117)
ALT SERPL-CCNC: 22 U/L (ref 12–78)
ANION GAP SERPL CALC-SCNC: 9 MMOL/L (ref 5–15)
APPEARANCE UR: CLEAR
AST SERPL-CCNC: 20 U/L (ref 15–37)
BACTERIA URNS QL MICRO: NEGATIVE /HPF
BASOPHILS # BLD: 0.1 K/UL (ref 0–0.1)
BASOPHILS NFR BLD: 1 % (ref 0–1)
BILIRUB SERPL-MCNC: 0.4 MG/DL (ref 0.2–1)
BILIRUB UR QL: NEGATIVE
BUN SERPL-MCNC: 60 MG/DL (ref 6–20)
BUN/CREAT SERPL: 22 (ref 12–20)
CALCIUM SERPL-MCNC: 8.4 MG/DL (ref 8.5–10.1)
CHLORIDE SERPL-SCNC: 116 MMOL/L (ref 97–108)
CO2 SERPL-SCNC: 22 MMOL/L (ref 21–32)
COLOR UR: ABNORMAL
CREAT SERPL-MCNC: 2.72 MG/DL (ref 0.7–1.3)
DIFFERENTIAL METHOD BLD: ABNORMAL
EOSINOPHIL # BLD: 0.4 K/UL (ref 0–0.4)
EOSINOPHIL NFR BLD: 6 % (ref 0–7)
EPITH CASTS URNS QL MICRO: ABNORMAL /LPF
ERYTHROCYTE [DISTWIDTH] IN BLOOD BY AUTOMATED COUNT: 13.8 % (ref 11.5–14.5)
GLOBULIN SER CALC-MCNC: 3.5 G/DL (ref 2–4)
GLUCOSE SERPL-MCNC: 109 MG/DL (ref 65–100)
GLUCOSE UR STRIP.AUTO-MCNC: NEGATIVE MG/DL
HCT VFR BLD AUTO: 37.8 % (ref 36.6–50.3)
HGB BLD-MCNC: 12.2 G/DL (ref 12.1–17)
HGB UR QL STRIP: NEGATIVE
HYALINE CASTS URNS QL MICRO: ABNORMAL /LPF (ref 0–5)
IMM GRANULOCYTES # BLD AUTO: 0 K/UL (ref 0–0.04)
IMM GRANULOCYTES NFR BLD AUTO: 0 % (ref 0–0.5)
KETONES UR QL STRIP.AUTO: NEGATIVE MG/DL
LEUKOCYTE ESTERASE UR QL STRIP.AUTO: NEGATIVE
LIPASE SERPL-CCNC: 323 U/L (ref 73–393)
LYMPHOCYTES # BLD: 2 K/UL (ref 0.8–3.5)
LYMPHOCYTES NFR BLD: 28 % (ref 12–49)
MCH RBC QN AUTO: 35.9 PG (ref 26–34)
MCHC RBC AUTO-ENTMCNC: 32.3 G/DL (ref 30–36.5)
MCV RBC AUTO: 111.2 FL (ref 80–99)
MONOCYTES # BLD: 0.6 K/UL (ref 0–1)
MONOCYTES NFR BLD: 8 % (ref 5–13)
NEUTS SEG # BLD: 4.2 K/UL (ref 1.8–8)
NEUTS SEG NFR BLD: 57 % (ref 32–75)
NITRITE UR QL STRIP.AUTO: NEGATIVE
NRBC # BLD: 0 K/UL (ref 0–0.01)
NRBC BLD-RTO: 0 PER 100 WBC
PH UR STRIP: 6 [PH] (ref 5–8)
PLATELET # BLD AUTO: 189 K/UL (ref 150–400)
PMV BLD AUTO: 11.8 FL (ref 8.9–12.9)
POTASSIUM SERPL-SCNC: 4.8 MMOL/L (ref 3.5–5.1)
PROT SERPL-MCNC: 7 G/DL (ref 6.4–8.2)
PROT UR STRIP-MCNC: ABNORMAL MG/DL
RBC # BLD AUTO: 3.4 M/UL (ref 4.1–5.7)
RBC #/AREA URNS HPF: ABNORMAL /HPF (ref 0–5)
RBC MORPH BLD: ABNORMAL
SODIUM SERPL-SCNC: 147 MMOL/L (ref 136–145)
SP GR UR REFRACTOMETRY: 1.01 (ref 1–1.03)
UA: UC IF INDICATED,UAUC: ABNORMAL
UROBILINOGEN UR QL STRIP.AUTO: 0.2 EU/DL (ref 0.2–1)
WBC # BLD AUTO: 7.3 K/UL (ref 4.1–11.1)
WBC URNS QL MICRO: ABNORMAL /HPF (ref 0–4)

## 2019-11-27 PROCEDURE — 72100 X-RAY EXAM L-S SPINE 2/3 VWS: CPT

## 2019-11-27 PROCEDURE — 74176 CT ABD & PELVIS W/O CONTRAST: CPT

## 2019-11-27 PROCEDURE — 51798 US URINE CAPACITY MEASURE: CPT

## 2019-11-27 PROCEDURE — 81001 URINALYSIS AUTO W/SCOPE: CPT

## 2019-11-27 PROCEDURE — 36415 COLL VENOUS BLD VENIPUNCTURE: CPT

## 2019-11-27 PROCEDURE — 85025 COMPLETE CBC W/AUTO DIFF WBC: CPT

## 2019-11-27 PROCEDURE — 99284 EMERGENCY DEPT VISIT MOD MDM: CPT

## 2019-11-27 PROCEDURE — 80053 COMPREHEN METABOLIC PANEL: CPT

## 2019-11-27 PROCEDURE — 83690 ASSAY OF LIPASE: CPT

## 2019-11-27 RX ORDER — ACETAMINOPHEN 325 MG/1
650 TABLET ORAL
Qty: 20 TAB | Refills: 0 | Status: SHIPPED | OUTPATIENT
Start: 2019-11-27 | End: 2020-05-20 | Stop reason: SDUPTHER

## 2019-11-27 RX ORDER — TRAMADOL HYDROCHLORIDE 50 MG/1
50 TABLET ORAL
Qty: 20 TAB | Refills: 0 | Status: SHIPPED | OUTPATIENT
Start: 2019-11-27 | End: 2019-12-02

## 2019-11-27 NOTE — LETTER
NOTIFICATION RETURN TO WORK / SCHOOL 
 
11/27/2019 7:58 PM 
 
Mr. Aiden Mccrary 
[address] [place] [id number] To Whom It May Concern: 
 
Aiden Mccrary is currently under the care of [facility] EMERGENCY DEPT. He will return to work/school on: 11/27/2019. Patient needs an assistant tool for the toilet seat to help enable better body mechanics and rising from the seated position. If there are questions or concerns please have the patient contact our office.  
 
 
 
Sincerely, 
 
 
Fawn MIMS

## 2019-11-27 NOTE — ED NOTES
Pt daughter is POA and is heading out, contact info is: Anila Sena, primary phone number is [phone]. Alternate number is [phone]. She is to be called with any changes in condition or patient status.

## 2019-11-27 NOTE — ED TRIAGE NOTES
Pt presents from [facility] for back pain that began 4 days ago. Pt was using lidocaine patches and doing fine until last night when he began having pain that made it difficult to walk. The pain wraps around his right side into where his appendix was previously removed. Pt has an extensive cardiac history but denies chest pain at this time. Daughter who is POA is at bedside at this time and states he has a known mitral valve leak. Pt is on a diuretic that prevents him from going into CHF. Pt has also been receiving regular treatment for a low hemoglobin but last it was reported to be 11.2. Pt has a recent history of bloody bowel movements that he had an upper and lower endoscopy done that was inconclusive.

## 2019-11-28 NOTE — ED NOTES
Report called to Lynsey Banegas RN at [facility]. Pt will be trasnported by EMS back to facility. Daughter Luke Llamas was contacted and updated on plan of care.

## 2019-11-28 NOTE — DISCHARGE INSTRUCTIONS
Patient Education        Back Pain: Care Instructions  Your Care Instructions    Back pain has many possible causes. It is often related to problems with muscles and ligaments of the back. It may also be related to problems with the nerves, discs, or bones of the back. Moving, lifting, standing, sitting, or sleeping in an awkward way can strain the back. Sometimes you don't notice the injury until later. Arthritis is another common cause of back pain. Although it may hurt a lot, back pain usually improves on its own within several weeks. Most people recover in 12 weeks or less. Using good home treatment and being careful not to stress your back can help you feel better sooner. Follow-up care is a key part of your treatment and safety. Be sure to make and go to all appointments, and call your doctor if you are having problems. It's also a good idea to know your test results and keep a list of the medicines you take. How can you care for yourself at home? · Sit or lie in positions that are most comfortable and reduce your pain. Try one of these positions when you lie down:  ? Lie on your back with your knees bent and supported by large pillows. ? Lie on the floor with your legs on the seat of a sofa or chair. ? Lie on your side with your knees and hips bent and a pillow between your legs. ? Lie on your stomach if it does not make pain worse. · Do not sit up in bed, and avoid soft couches and twisted positions. Bed rest can help relieve pain at first, but it delays healing. Avoid bed rest after the first day of back pain. · Change positions every 30 minutes. If you must sit for long periods of time, take breaks from sitting. Get up and walk around, or lie in a comfortable position. · Try using a heating pad on a low or medium setting for 15 to 20 minutes every 2 or 3 hours. Try a warm shower in place of one session with the heating pad. · You can also try an ice pack for 10 to 15 minutes every 2 to 3 hours. Put a thin cloth between the ice pack and your skin. · Take pain medicines exactly as directed. ? If the doctor gave you a prescription medicine for pain, take it as prescribed. ? If you are not taking a prescription pain medicine, ask your doctor if you can take an over-the-counter medicine. · Take short walks several times a day. You can start with 5 to 10 minutes, 3 or 4 times a day, and work up to longer walks. Walk on level surfaces and avoid hills and stairs until your back is better. · Return to work and other activities as soon as you can. Continued rest without activity is usually not good for your back. · To prevent future back pain, do exercises to stretch and strengthen your back and stomach. Learn how to use good posture, safe lifting techniques, and proper body mechanics. When should you call for help? Call your doctor now or seek immediate medical care if:    · You have new or worsening numbness in your legs.     · You have new or worsening weakness in your legs. (This could make it hard to stand up.)     · You lose control of your bladder or bowels.    Watch closely for changes in your health, and be sure to contact your doctor if:    · You have a fever, lose weight, or don't feel well.     · You do not get better as expected. Where can you learn more? Go to http://vivek-talisha.info/. Enter J785 in the search box to learn more about \"Back Pain: Care Instructions. \"  Current as of: June 26, 2019  Content Version: 12.2  © 3664-0527 [employer]. Care instructions adapted under license by [employer] (which disclaims liability or warranty for this information). If you have questions about a medical condition or this instruction, always ask your healthcare professional. [place] [address] any warranty or liability for your use of this information.          Patient Education        Low Back Pain: Exercises  Introduction  Here are some examples of exercises for you to try. The exercises may be suggested for a condition or for rehabilitation. Start each exercise slowly. Ease off the exercises if you start to have pain. You will be told when to start these exercises and which ones will work best for you. How to do the exercises  Press-up    1. Lie on your stomach, supporting your body with your forearms. 2. Press your elbows down into the floor to raise your upper back. As you do this, relax your stomach muscles and allow your back to arch without using your back muscles. As your press up, do not let your hips or pelvis come off the floor. 3. Hold for 15 to 30 seconds, then relax. 4. Repeat 2 to 4 times. Alternate arm and leg (bird dog) exercise    1. Start on the floor, on your hands and knees. 2. Tighten your belly muscles. 3. Raise one leg off the floor, and hold it straight out behind you. Be careful not to let your hip drop down, because that will twist your trunk. 4. Hold for about 6 seconds, then lower your leg and switch to the other leg. 5. Repeat 8 to 12 times on each leg. 6. Over time, work up to holding for 10 to 30 seconds each time. 7. If you feel stable and secure with your leg raised, try raising the opposite arm straight out in front of you at the same time. Knee-to-chest exercise    1. Lie on your back with your knees bent and your feet flat on the floor. 2. Bring one knee to your chest, keeping the other foot flat on the floor (or keeping the other leg straight, whichever feels better on your lower back). 3. Keep your lower back pressed to the floor. Hold for at least 15 to 30 seconds. 4. Relax, and lower the knee to the starting position. 5. Repeat with the other leg. Repeat 2 to 4 times with each leg. 6. To get more stretch, put your other leg flat on the floor while pulling your knee to your chest.    Curl-ups    1. Lie on the floor on your back with your knees bent at a 90-degree angle.  Your feet should be flat on the floor, about 12 inches from your buttocks. 2. Cross your arms over your chest. If this bothers your neck, try putting your hands behind your neck (not your head), with your elbows spread apart. 3. Slowly tighten your belly muscles and raise your shoulder blades off the floor. 4. Keep your head in line with your body, and do not press your chin to your chest.  5. Hold this position for 1 or 2 seconds, then slowly lower yourself back down to the floor. 6. Repeat 8 to 12 times. Pelvic tilt exercise    1. Lie on your back with your knees bent. 2. \"Brace\" your stomach. This means to tighten your muscles by pulling in and imagining your belly button moving toward your spine. You should feel like your back is pressing to the floor and your hips and pelvis are rocking back. 3. Hold for about 6 seconds while you breathe smoothly. 4. Repeat 8 to 12 times. Heel dig bridging    1. Lie on your back with both knees bent and your ankles bent so that only your heels are digging into the floor. Your knees should be bent about 90 degrees. 2. Then push your heels into the floor, squeeze your buttocks, and lift your hips off the floor until your shoulders, hips, and knees are all in a straight line. 3. Hold for about 6 seconds as you continue to breathe normally, and then slowly lower your hips back down to the floor and rest for up to 10 seconds. 4. Do 8 to 12 repetitions. Hamstring stretch in doorway    1. Lie on your back in a doorway, with one leg through the open door. 2. Slide your leg up the wall to straighten your knee. You should feel a gentle stretch down the back of your leg. 3. Hold the stretch for at least 15 to 30 seconds. Do not arch your back, point your toes, or bend either knee. Keep one heel touching the floor and the other heel touching the wall. 4. Repeat with your other leg. 5. Do 2 to 4 times for each leg. Hip flexor stretch    1.  Kneel on the floor with one knee bent and one leg behind you. Place your forward knee over your foot. Keep your other knee touching the floor. 2. Slowly push your hips forward until you feel a stretch in the upper thigh of your rear leg. 3. Hold the stretch for at least 15 to 30 seconds. Repeat with your other leg. 4. Do 2 to 4 times on each side. Wall sit    1. Stand with your back 10 to 12 inches away from a wall. 2. Lean into the wall until your back is flat against it. 3. Slowly slide down until your knees are slightly bent, pressing your lower back into the wall. 4. Hold for about 6 seconds, then slide back up the wall. 5. Repeat 8 to 12 times. Follow-up care is a key part of your treatment and safety. Be sure to make and go to all appointments, and call your doctor if you are having problems. It's also a good idea to know your test results and keep a list of the medicines you take. Where can you learn more? Go to http://vivek-talisha.info/. Enter Z209 in the search box to learn more about \"Low Back Pain: Exercises. \"  Current as of: June 26, 2019  Content Version: 12.2  © 3320-8439 [employer]. Care instructions adapted under license by [employer] (which disclaims liability or warranty for this information). If you have questions about a medical condition or this instruction, always ask your healthcare professional. [place] [address] any warranty or liability for your use of this information. Patient Education        Acute Low Back Pain: Exercises  Introduction  Here are some examples of typical rehabilitation exercises for your condition. Start each exercise slowly. Ease off the exercise if you start to have pain. Your doctor or physical therapist will tell you when you can start these exercises and which ones will work best for you.   When you are not being active, find a comfortable position for rest. Some people are comfortable on the floor or a medium-firm bed with a small pillow under their head and another under their knees. Some people prefer to lie on their side with a pillow between their knees. Don't stay in one position for too long. Take short walks (10 to 20 minutes) every 2 to 3 hours. Avoid slopes, hills, and stairs until you feel better. Walk only distances you can manage without pain, especially leg pain. How to do the exercises  Back stretches    5. Get down on your hands and knees on the floor. 6. Relax your head and allow it to droop. Round your back up toward the ceiling until you feel a nice stretch in your upper, middle, and lower back. Hold this stretch for as long as it feels comfortable, or about 15 to 30 seconds. 7. Return to the starting position with a flat back while you are on your hands and knees. 8. Let your back sway by pressing your stomach toward the floor. Lift your buttocks toward the ceiling. 9. Hold this position for 15 to 30 seconds. 10. Repeat 2 to 4 times. Follow-up care is a key part of your treatment and safety. Be sure to make and go to all appointments, and call your doctor if you are having problems. It's also a good idea to know your test results and keep a list of the medicines you take. Where can you learn more? Go to http://vivek-talisha.info/. Enter E259 in the search box to learn more about \"Acute Low Back Pain: Exercises. \"  Current as of: June 26, 2019  Content Version: 12.2  © 9996-4566 [employer], [employer]. Care instructions adapted under license by [employer] (which disclaims liability or warranty for this information). If you have questions about a medical condition or this instruction, always ask your healthcare professional. [place] [address] any warranty or liability for your use of this information.

## 2019-11-28 NOTE — ED NOTES
I have reviewed discharge instructions with the patient, daughter, and Colleen Phillips. The patient, daughter, and Reji Clarke RN verbalized understanding. Pt was transported by EMS back to facility in no distress. VSS.
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
## 2020-01-01 ENCOUNTER — OFFICE VISIT (OUTPATIENT)
Dept: CARDIOLOGY CLINIC | Age: 85
End: 2020-01-01
Payer: MEDICARE

## 2020-01-01 ENCOUNTER — VIRTUAL VISIT (OUTPATIENT)
Dept: INTERNAL MEDICINE CLINIC | Age: 85
End: 2020-01-01
Payer: MEDICARE

## 2020-01-01 DIAGNOSIS — Z00.00 MEDICARE ANNUAL WELLNESS VISIT, SUBSEQUENT: ICD-10-CM

## 2020-01-01 DIAGNOSIS — M54.50 ACUTE MIDLINE LOW BACK PAIN WITHOUT SCIATICA: ICD-10-CM

## 2020-01-01 DIAGNOSIS — E73.9 LACTOSE INTOLERANCE: ICD-10-CM

## 2020-01-01 DIAGNOSIS — Z13.1 SCREENING FOR DIABETES MELLITUS: ICD-10-CM

## 2020-01-01 DIAGNOSIS — I49.5 SSS (SICK SINUS SYNDROME) (HCC): ICD-10-CM

## 2020-01-01 DIAGNOSIS — Z13.6 SCREENING FOR ISCHEMIC HEART DISEASE: ICD-10-CM

## 2020-01-01 DIAGNOSIS — Z13.31 SCREENING FOR DEPRESSION: ICD-10-CM

## 2020-01-01 DIAGNOSIS — Z95.0 CARDIAC PACEMAKER IN SITU: Primary | ICD-10-CM

## 2020-01-01 DIAGNOSIS — I44.2 CHB (COMPLETE HEART BLOCK) (HCC): ICD-10-CM

## 2020-01-01 PROCEDURE — 93294 REM INTERROG EVL PM/LDLS PM: CPT | Performed by: INTERNAL MEDICINE

## 2020-01-01 PROCEDURE — G8536 NO DOC ELDER MAL SCRN: HCPCS | Performed by: INTERNAL MEDICINE

## 2020-01-01 PROCEDURE — 93296 REM INTERROG EVL PM/IDS: CPT | Performed by: INTERNAL MEDICINE

## 2020-01-01 PROCEDURE — 1101F PT FALLS ASSESS-DOCD LE1/YR: CPT | Performed by: INTERNAL MEDICINE

## 2020-01-01 PROCEDURE — G0439 PPPS, SUBSEQ VISIT: HCPCS | Performed by: INTERNAL MEDICINE

## 2020-01-01 PROCEDURE — G8432 DEP SCR NOT DOC, RNG: HCPCS | Performed by: INTERNAL MEDICINE

## 2020-01-01 PROCEDURE — G8419 CALC BMI OUT NRM PARAM NOF/U: HCPCS | Performed by: INTERNAL MEDICINE

## 2020-01-01 PROCEDURE — G8427 DOCREV CUR MEDS BY ELIG CLIN: HCPCS | Performed by: INTERNAL MEDICINE

## 2020-01-01 RX ORDER — VIT A/VIT C/VIT E/ZINC/COPPER 4296-226
1 CAPSULE ORAL DAILY
Qty: 90 CAP | Refills: 3 | Status: SHIPPED | OUTPATIENT
Start: 2020-01-01

## 2020-01-01 RX ORDER — FACIAL-BODY WIPES
10 EACH TOPICAL
Status: CANCELLED | OUTPATIENT
Start: 2020-01-01

## 2020-01-01 RX ORDER — FUROSEMIDE 20 MG/1
20 TABLET ORAL DAILY
Qty: 90 TAB | Refills: 3 | Status: SHIPPED | OUTPATIENT
Start: 2020-01-01

## 2020-01-01 RX ORDER — BRINZOLAMIDE 10 MG/ML
1 SUSPENSION/ DROPS OPHTHALMIC 2 TIMES DAILY
Qty: 15 ML | Refills: 3 | Status: SHIPPED | OUTPATIENT
Start: 2020-01-01 | End: 2020-01-01 | Stop reason: SDUPTHER

## 2020-01-01 RX ORDER — CHOLECALCIFEROL (VITAMIN D3) 125 MCG
1 CAPSULE ORAL
Qty: 90 TAB | Refills: 3 | Status: SHIPPED | OUTPATIENT
Start: 2020-01-01 | End: 2020-01-01 | Stop reason: SDUPTHER

## 2020-01-01 RX ORDER — MULTIVITAMIN WITH IRON
1 TABLET ORAL DAILY
Qty: 30 TAB | Refills: 11 | Status: CANCELLED | OUTPATIENT
Start: 2020-01-01

## 2020-01-01 RX ORDER — OMEPRAZOLE 20 MG/1
20 CAPSULE, DELAYED RELEASE ORAL
Qty: 60 CAP | Refills: 0 | Status: SHIPPED | OUTPATIENT
Start: 2020-01-01 | End: 2020-01-01 | Stop reason: SDUPTHER

## 2020-01-01 RX ORDER — ACETAMINOPHEN 500 MG
500 TABLET ORAL
Qty: 60 TAB | Refills: 0 | Status: CANCELLED | OUTPATIENT
Start: 2020-01-01

## 2020-01-01 RX ORDER — BISMUTH SUBSALICYLATE 262 MG
1 TABLET,CHEWABLE ORAL DAILY
COMMUNITY

## 2020-01-01 RX ORDER — BRINZOLAMIDE 10 MG/ML
1 SUSPENSION/ DROPS OPHTHALMIC 2 TIMES DAILY
Qty: 15 ML | Refills: 3 | Status: SHIPPED | OUTPATIENT
Start: 2020-01-01

## 2020-01-01 RX ORDER — SODIUM BICARBONATE 650 MG/1
650 TABLET ORAL 3 TIMES DAILY
Qty: 270 TAB | Refills: 3 | Status: SHIPPED | OUTPATIENT
Start: 2020-01-01

## 2020-01-01 RX ORDER — CARVEDILOL 3.12 MG/1
3.12 TABLET ORAL 2 TIMES DAILY WITH MEALS
Qty: 180 TAB | Refills: 3 | Status: SHIPPED | OUTPATIENT
Start: 2020-01-01 | End: 2020-01-01 | Stop reason: SDUPTHER

## 2020-01-01 RX ORDER — LORAZEPAM 0.5 MG/1
0.5 TABLET ORAL
Qty: 30 TAB | Refills: 0 | Status: SHIPPED | OUTPATIENT
Start: 2020-01-01 | End: 2021-01-01 | Stop reason: ALTCHOICE

## 2020-01-01 RX ORDER — FUROSEMIDE 20 MG/1
20 TABLET ORAL DAILY
Qty: 90 TAB | Refills: 3 | Status: SHIPPED | OUTPATIENT
Start: 2020-01-01 | End: 2020-01-01 | Stop reason: SDUPTHER

## 2020-01-01 RX ORDER — OMEPRAZOLE 20 MG/1
20 CAPSULE, DELAYED RELEASE ORAL
Qty: 60 CAP | Refills: 5 | Status: SHIPPED | OUTPATIENT
Start: 2020-01-01 | End: 2021-01-01

## 2020-01-01 RX ORDER — CHOLECALCIFEROL (VITAMIN D3) 125 MCG
1 CAPSULE ORAL
Qty: 90 TAB | Refills: 3 | Status: SHIPPED | OUTPATIENT
Start: 2020-01-01

## 2020-01-01 RX ORDER — CARVEDILOL 3.12 MG/1
3.12 TABLET ORAL 2 TIMES DAILY WITH MEALS
Qty: 180 TAB | Refills: 3 | Status: SHIPPED | OUTPATIENT
Start: 2020-01-01

## 2020-01-01 RX ORDER — TRAMADOL HYDROCHLORIDE 50 MG/1
50 TABLET ORAL
Qty: 20 TAB | Refills: 0 | Status: CANCELLED | OUTPATIENT
Start: 2020-01-01 | End: 2020-01-01

## 2020-01-01 RX ORDER — GUAIFENESIN 100 MG/5ML
81 LIQUID (ML) ORAL DAILY
Qty: 90 TAB | Refills: 3 | Status: SHIPPED | OUTPATIENT
Start: 2020-01-01

## 2020-01-01 RX ORDER — LIDOCAINE 50 MG/G
PATCH TOPICAL
Qty: 7 EACH | Refills: 0 | Status: CANCELLED | OUTPATIENT
Start: 2020-01-01

## 2020-01-01 RX ORDER — VIT A/VIT C/VIT E/ZINC/COPPER 4296-226
1 CAPSULE ORAL DAILY
Qty: 90 CAP | Refills: 3 | Status: SHIPPED | OUTPATIENT
Start: 2020-01-01 | End: 2020-01-01 | Stop reason: SDUPTHER

## 2020-01-10 ENCOUNTER — OFFICE VISIT (OUTPATIENT)
Dept: INTERNAL MEDICINE CLINIC | Age: 85
End: 2020-01-10

## 2020-01-10 VITALS
HEIGHT: 67 IN | OXYGEN SATURATION: 98 % | DIASTOLIC BLOOD PRESSURE: 58 MMHG | RESPIRATION RATE: 16 BRPM | HEART RATE: 62 BPM | WEIGHT: 165.8 LBS | SYSTOLIC BLOOD PRESSURE: 122 MMHG | TEMPERATURE: 97.7 F | BODY MASS INDEX: 26.02 KG/M2

## 2020-01-10 DIAGNOSIS — N18.30 CKD (CHRONIC KIDNEY DISEASE) STAGE 3, GFR 30-59 ML/MIN (HCC): ICD-10-CM

## 2020-01-10 DIAGNOSIS — K92.1 MELENA: Primary | ICD-10-CM

## 2020-01-10 DIAGNOSIS — D50.0 ANEMIA, BLOOD LOSS: ICD-10-CM

## 2020-01-10 LAB
A-G RATIO,AGRAT: 1.5 RATIO
ALBUMIN SERPL-MCNC: 4 G/DL (ref 3.9–5.4)
ALP SERPL-CCNC: 100 U/L (ref 38–126)
ALT SERPL-CCNC: 23 U/L (ref 0–50)
ANION GAP SERPL CALC-SCNC: 15 MMOL/L
AST SERPL W P-5'-P-CCNC: 29 U/L (ref 14–36)
BILIRUB SERPL-MCNC: 0.5 MG/DL (ref 0.2–1.3)
BUN SERPL-MCNC: 80 MG/DL (ref 9–20)
BUN/CREATININE RATIO,BUCR: 26 RATIO
CALCIUM SERPL-MCNC: 9.3 MG/DL (ref 8.4–10.2)
CHLORIDE SERPL-SCNC: 112 MMOL/L (ref 98–107)
CO2 SERPL-SCNC: 18 MMOL/L (ref 22–32)
CREAT SERPL-MCNC: 3.1 MG/DL (ref 0.8–1.5)
GLOBULIN,GLOB: 2.7
GLUCOSE SERPL-MCNC: 124 MG/DL (ref 75–110)
POTASSIUM SERPL-SCNC: 5.2 MMOL/L (ref 3.6–5)
PROT SERPL-MCNC: 6.7 G/DL (ref 6.3–8.2)
SODIUM SERPL-SCNC: 145 MMOL/L (ref 137–145)
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
## 2020-01-10 NOTE — PROGRESS NOTES
Reviewed record in preparation for visit and have obtained necessary documentation. Identified pt with two pt identifiers(name and ). Chief Complaint   Patient presents with    Hypertension    Cholesterol Problem    Diabetes    Melena        Coordination of Care Questionnaire:  :     1) Have you been to an emergency room, urgent care clinic since your last visit? Yes [facility]     Hospitalized since your last visit? No               2) Have you seen or consulted any other health care providers outside of [address] [place] [address] since your last visit?   No

## 2020-01-11 LAB
BASOPHILS # BLD AUTO: 0.1 X10E3/UL (ref 0–0.2)
BASOPHILS NFR BLD AUTO: 1 %
EOSINOPHIL # BLD AUTO: 0.4 X10E3/UL (ref 0–0.4)
EOSINOPHIL NFR BLD AUTO: 6 %
ERYTHROCYTE [DISTWIDTH] IN BLOOD BY AUTOMATED COUNT: 13 % (ref 11.6–15.4)
HCT VFR BLD AUTO: 29.6 % (ref 37.5–51)
HGB BLD-MCNC: 10.1 G/DL (ref 13–17.7)
IMM GRANULOCYTES # BLD AUTO: 0 X10E3/UL (ref 0–0.1)
IMM GRANULOCYTES NFR BLD AUTO: 0 %
IRON SATN MFR SERPL: 20 % (ref 15–55)
IRON SERPL-MCNC: 62 UG/DL (ref 38–169)
LYMPHOCYTES # BLD AUTO: 2.2 X10E3/UL (ref 0.7–3.1)
LYMPHOCYTES NFR BLD AUTO: 29 %
MCH RBC QN AUTO: 36.5 PG (ref 26.6–33)
MCHC RBC AUTO-ENTMCNC: 34.1 G/DL (ref 31.5–35.7)
MCV RBC AUTO: 107 FL (ref 79–97)
MONOCYTES # BLD AUTO: 0.5 X10E3/UL (ref 0.1–0.9)
MONOCYTES NFR BLD AUTO: 7 %
NEUTROPHILS # BLD AUTO: 4.2 X10E3/UL (ref 1.4–7)
NEUTROPHILS NFR BLD AUTO: 57 %
PLATELET # BLD AUTO: 230 X10E3/UL (ref 150–450)
RBC # BLD AUTO: 2.77 X10E6/UL (ref 4.14–5.8)
TIBC SERPL-MCNC: 315 UG/DL (ref 250–450)
UIBC SERPL-MCNC: 253 UG/DL (ref 111–343)
WBC # BLD AUTO: 7.4 X10E3/UL (ref 3.4–10.8)

## 2020-02-12 ENCOUNTER — OFFICE VISIT (OUTPATIENT)
Dept: CARDIOLOGY CLINIC | Age: 85
End: 2020-02-12

## 2020-02-12 DIAGNOSIS — Z95.0 CARDIAC PACEMAKER IN SITU: Primary | ICD-10-CM

## 2020-02-12 DIAGNOSIS — I44.2 CHB (COMPLETE HEART BLOCK) (HCC): ICD-10-CM

## 2020-03-18 RX ORDER — AMOXICILLIN 875 MG/1
875 TABLET, FILM COATED ORAL 2 TIMES DAILY
Qty: 20 TAB | Refills: 0 | Status: SHIPPED | OUTPATIENT
Start: 2020-03-18 | End: 2020-03-28

## 2020-03-25 ENCOUNTER — APPOINTMENT (OUTPATIENT)
Dept: CT IMAGING | Age: 85
DRG: 378 | End: 2020-03-25
Attending: EMERGENCY MEDICINE
Payer: MEDICARE

## 2020-03-25 ENCOUNTER — ANESTHESIA EVENT (OUTPATIENT)
Dept: SURGERY | Age: 85
DRG: 378 | End: 2020-03-25
Payer: MEDICARE

## 2020-03-25 ENCOUNTER — APPOINTMENT (OUTPATIENT)
Dept: GENERAL RADIOLOGY | Age: 85
DRG: 378 | End: 2020-03-25
Attending: EMERGENCY MEDICINE
Payer: MEDICARE

## 2020-03-25 ENCOUNTER — HOSPITAL ENCOUNTER (INPATIENT)
Age: 85
LOS: 2 days | Discharge: HOME HEALTH | DRG: 378 | End: 2020-03-27
Attending: EMERGENCY MEDICINE | Admitting: HOSPITALIST
Payer: MEDICARE

## 2020-03-25 DIAGNOSIS — S09.90XA ACUTE HEAD INJURY, INITIAL ENCOUNTER: ICD-10-CM

## 2020-03-25 DIAGNOSIS — K92.2 ACUTE UPPER GI BLEED: Primary | ICD-10-CM

## 2020-03-25 DIAGNOSIS — D62 ACUTE BLOOD LOSS ANEMIA: ICD-10-CM
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
## 2020-03-25 LAB
ALBUMIN SERPL-MCNC: 2.7 G/DL (ref 3.5–5)
ALBUMIN/GLOB SERPL: 0.9 {RATIO} (ref 1.1–2.2)
ALP SERPL-CCNC: 93 U/L (ref 45–117)
ALT SERPL-CCNC: 14 U/L (ref 12–78)
ANION GAP SERPL CALC-SCNC: 7 MMOL/L (ref 5–15)
APTT PPP: 23.1 SEC (ref 22.1–32)
AST SERPL-CCNC: 22 U/L (ref 15–37)
ATRIAL RATE: 82 BPM
BASOPHILS # BLD: 0 K/UL (ref 0–0.1)
BASOPHILS NFR BLD: 0 % (ref 0–1)
BILIRUB SERPL-MCNC: 0.3 MG/DL (ref 0.2–1)
BUN SERPL-MCNC: 65 MG/DL (ref 6–20)
BUN/CREAT SERPL: 23 (ref 12–20)
CALCIUM SERPL-MCNC: 7.7 MG/DL (ref 8.5–10.1)
CALCULATED R AXIS, ECG10: -75 DEGREES
CALCULATED T AXIS, ECG11: 105 DEGREES
CHLORIDE SERPL-SCNC: 115 MMOL/L (ref 97–108)
CK SERPL-CCNC: 69 U/L (ref 39–308)
CO2 SERPL-SCNC: 22 MMOL/L (ref 21–32)
CREAT SERPL-MCNC: 2.85 MG/DL (ref 0.7–1.3)
DIAGNOSIS, 93000: NORMAL
DIFFERENTIAL METHOD BLD: ABNORMAL
EOSINOPHIL # BLD: 0.4 K/UL (ref 0–0.4)
EOSINOPHIL NFR BLD: 4 % (ref 0–7)
ERYTHROCYTE [DISTWIDTH] IN BLOOD BY AUTOMATED COUNT: 14.7 % (ref 11.5–14.5)
GLOBULIN SER CALC-MCNC: 2.9 G/DL (ref 2–4)
GLUCOSE SERPL-MCNC: 175 MG/DL (ref 65–100)
HCT VFR BLD AUTO: 23.4 % (ref 36.6–50.3)
HGB BLD-MCNC: 7.4 G/DL (ref 12.1–17)
IMM GRANULOCYTES # BLD AUTO: 0.1 K/UL (ref 0–0.04)
IMM GRANULOCYTES NFR BLD AUTO: 1 % (ref 0–0.5)
INR PPP: 1.1 (ref 0.9–1.1)
LACTATE SERPL-SCNC: 1.3 MMOL/L (ref 0.4–2)
LYMPHOCYTES # BLD: 2.2 K/UL (ref 0.8–3.5)
LYMPHOCYTES NFR BLD: 24 % (ref 12–49)
MCH RBC QN AUTO: 35.1 PG (ref 26–34)
MCHC RBC AUTO-ENTMCNC: 31.6 G/DL (ref 30–36.5)
MCV RBC AUTO: 110.9 FL (ref 80–99)
MONOCYTES # BLD: 0.5 K/UL (ref 0–1)
MONOCYTES NFR BLD: 6 % (ref 5–13)
NEUTS SEG # BLD: 5.8 K/UL (ref 1.8–8)
NEUTS SEG NFR BLD: 65 % (ref 32–75)
NRBC # BLD: 0 K/UL (ref 0–0.01)
NRBC BLD-RTO: 0 PER 100 WBC
PLATELET # BLD AUTO: 254 K/UL (ref 150–400)
PMV BLD AUTO: 11 FL (ref 8.9–12.9)
POTASSIUM SERPL-SCNC: 4.9 MMOL/L (ref 3.5–5.1)
PROT SERPL-MCNC: 5.6 G/DL (ref 6.4–8.2)
PROTHROMBIN TIME: 11.7 SEC (ref 9–11.1)
Q-T INTERVAL, ECG07: 466 MS
QRS DURATION, ECG06: 152 MS
QTC CALCULATION (BEZET), ECG08: 520 MS
RBC # BLD AUTO: 2.11 M/UL (ref 4.1–5.7)
RBC MORPH BLD: ABNORMAL
SODIUM SERPL-SCNC: 144 MMOL/L (ref 136–145)
THERAPEUTIC RANGE,PTTT: NORMAL SECS (ref 58–77)
TROPONIN I SERPL-MCNC: <0.05 NG/ML
VENTRICULAR RATE, ECG03: 75 BPM
WBC # BLD AUTO: 9 K/UL (ref 4.1–11.1)

## 2020-03-25 PROCEDURE — 71045 X-RAY EXAM CHEST 1 VIEW: CPT

## 2020-03-25 PROCEDURE — 30233N1 TRANSFUSION OF NONAUTOLOGOUS RED BLOOD CELLS INTO PERIPHERAL VEIN, PERCUTANEOUS APPROACH: ICD-10-PCS | Performed by: HOSPITALIST

## 2020-03-25 PROCEDURE — 85730 THROMBOPLASTIN TIME PARTIAL: CPT

## 2020-03-25 PROCEDURE — 74011250636 HC RX REV CODE- 250/636: Performed by: HOSPITALIST

## 2020-03-25 PROCEDURE — 84484 ASSAY OF TROPONIN QUANT: CPT

## 2020-03-25 PROCEDURE — 99285 EMERGENCY DEPT VISIT HI MDM: CPT

## 2020-03-25 PROCEDURE — 86920 COMPATIBILITY TEST SPIN: CPT

## 2020-03-25 PROCEDURE — 86921 COMPATIBILITY TEST INCUBATE: CPT

## 2020-03-25 PROCEDURE — 82550 ASSAY OF CK (CPK): CPT

## 2020-03-25 PROCEDURE — 80053 COMPREHEN METABOLIC PANEL: CPT

## 2020-03-25 PROCEDURE — 0W3P8ZZ CONTROL BLEEDING IN GASTROINTESTINAL TRACT, VIA NATURAL OR ARTIFICIAL OPENING ENDOSCOPIC: ICD-10-PCS | Performed by: INTERNAL MEDICINE

## 2020-03-25 PROCEDURE — 65660000000 HC RM CCU STEPDOWN

## 2020-03-25 PROCEDURE — 86870 RBC ANTIBODY IDENTIFICATION: CPT

## 2020-03-25 PROCEDURE — 74011000250 HC RX REV CODE- 250: Performed by: HOSPITALIST

## 2020-03-25 PROCEDURE — 85610 PROTHROMBIN TIME: CPT

## 2020-03-25 PROCEDURE — 70450 CT HEAD/BRAIN W/O DYE: CPT

## 2020-03-25 PROCEDURE — C9113 INJ PANTOPRAZOLE SODIUM, VIA: HCPCS | Performed by: HOSPITALIST

## 2020-03-25 PROCEDURE — 36430 TRANSFUSION BLD/BLD COMPNT: CPT

## 2020-03-25 PROCEDURE — 93005 ELECTROCARDIOGRAM TRACING: CPT

## 2020-03-25 PROCEDURE — C9113 INJ PANTOPRAZOLE SODIUM, VIA: HCPCS | Performed by: EMERGENCY MEDICINE

## 2020-03-25 PROCEDURE — 74176 CT ABD & PELVIS W/O CONTRAST: CPT

## 2020-03-25 PROCEDURE — 85025 COMPLETE CBC W/AUTO DIFF WBC: CPT

## 2020-03-25 PROCEDURE — 74011250637 HC RX REV CODE- 250/637: Performed by: HOSPITALIST

## 2020-03-25 PROCEDURE — 83605 ASSAY OF LACTIC ACID: CPT

## 2020-03-25 PROCEDURE — 74011250636 HC RX REV CODE- 250/636: Performed by: EMERGENCY MEDICINE

## 2020-03-25 PROCEDURE — 86900 BLOOD TYPING SEROLOGIC ABO: CPT

## 2020-03-25 PROCEDURE — 36415 COLL VENOUS BLD VENIPUNCTURE: CPT

## 2020-03-25 PROCEDURE — 96374 THER/PROPH/DIAG INJ IV PUSH: CPT

## 2020-03-25 PROCEDURE — P9016 RBC LEUKOCYTES REDUCED: HCPCS

## 2020-03-25 PROCEDURE — 96375 TX/PRO/DX INJ NEW DRUG ADDON: CPT

## 2020-03-25 PROCEDURE — 86922 COMPATIBILITY TEST ANTIGLOB: CPT

## 2020-03-25 PROCEDURE — 94762 N-INVAS EAR/PLS OXIMTRY CONT: CPT

## 2020-03-25 RX ORDER — SODIUM CHLORIDE 9 MG/ML
250 INJECTION, SOLUTION INTRAVENOUS AS NEEDED
Status: DISCONTINUED | OUTPATIENT
Start: 2020-03-25 | End: 2020-03-27 | Stop reason: HOSPADM

## 2020-03-25 RX ORDER — ONDANSETRON 2 MG/ML
4 INJECTION INTRAMUSCULAR; INTRAVENOUS
Status: DISCONTINUED | OUTPATIENT
Start: 2020-03-25 | End: 2020-03-25 | Stop reason: SDUPTHER

## 2020-03-25 RX ORDER — SODIUM CHLORIDE 9 MG/ML
75 INJECTION, SOLUTION INTRAVENOUS CONTINUOUS
Status: DISPENSED | OUTPATIENT
Start: 2020-03-25 | End: 2020-03-26

## 2020-03-25 RX ORDER — FUROSEMIDE 20 MG/1
20 TABLET ORAL DAILY
Status: DISCONTINUED | OUTPATIENT
Start: 2020-03-26 | End: 2020-03-27 | Stop reason: HOSPADM

## 2020-03-25 RX ORDER — SODIUM CHLORIDE 9 MG/ML
250 INJECTION, SOLUTION INTRAVENOUS AS NEEDED
Status: DISCONTINUED | OUTPATIENT
Start: 2020-03-25 | End: 2020-03-26

## 2020-03-25 RX ORDER — ONDANSETRON 2 MG/ML
INJECTION INTRAMUSCULAR; INTRAVENOUS
Status: DISPENSED
Start: 2020-03-25 | End: 2020-03-25

## 2020-03-25 RX ORDER — ONDANSETRON 2 MG/ML
4 INJECTION INTRAMUSCULAR; INTRAVENOUS
Status: DISCONTINUED | OUTPATIENT
Start: 2020-03-25 | End: 2020-03-27 | Stop reason: HOSPADM

## 2020-03-25 RX ORDER — LATANOPROST 50 UG/ML
1 SOLUTION/ DROPS OPHTHALMIC
Status: DISCONTINUED | OUTPATIENT
Start: 2020-03-25 | End: 2020-03-27 | Stop reason: HOSPADM

## 2020-03-25 RX ORDER — LIDOCAINE 4 G/100G
1 PATCH TOPICAL DAILY
Status: DISCONTINUED | OUTPATIENT
Start: 2020-03-26 | End: 2020-03-27 | Stop reason: HOSPADM

## 2020-03-25 RX ORDER — PANTOPRAZOLE SODIUM 40 MG/10ML
40 INJECTION, POWDER, LYOPHILIZED, FOR SOLUTION INTRAVENOUS
Status: COMPLETED | OUTPATIENT
Start: 2020-03-25 | End: 2020-03-25

## 2020-03-25 RX ORDER — SODIUM BICARBONATE 650 MG/1
650 TABLET ORAL EVERY EVENING
Status: DISCONTINUED | OUTPATIENT
Start: 2020-03-26 | End: 2020-03-27 | Stop reason: HOSPADM

## 2020-03-25 RX ORDER — SODIUM CHLORIDE 0.9 % (FLUSH) 0.9 %
5-40 SYRINGE (ML) INJECTION EVERY 8 HOURS
Status: DISCONTINUED | OUTPATIENT
Start: 2020-03-25 | End: 2020-03-26

## 2020-03-25 RX ORDER — SODIUM CHLORIDE 0.9 % (FLUSH) 0.9 %
5-40 SYRINGE (ML) INJECTION EVERY 8 HOURS
Status: DISCONTINUED | OUTPATIENT
Start: 2020-03-25 | End: 2020-03-26 | Stop reason: SDUPTHER

## 2020-03-25 RX ORDER — SODIUM CHLORIDE 0.9 % (FLUSH) 0.9 %
5-40 SYRINGE (ML) INJECTION EVERY 8 HOURS
Status: DISCONTINUED | OUTPATIENT
Start: 2020-03-25 | End: 2020-03-27 | Stop reason: HOSPADM

## 2020-03-25 RX ORDER — SODIUM CHLORIDE 0.9 % (FLUSH) 0.9 %
5-40 SYRINGE (ML) INJECTION AS NEEDED
Status: DISCONTINUED | OUTPATIENT
Start: 2020-03-25 | End: 2020-03-26

## 2020-03-25 RX ORDER — SUCRALFATE 1 G/1
1 TABLET ORAL 4 TIMES DAILY
Status: DISCONTINUED | OUTPATIENT
Start: 2020-03-25 | End: 2020-03-26

## 2020-03-25 RX ORDER — CARVEDILOL 6.25 MG/1
3.12 TABLET ORAL 2 TIMES DAILY WITH MEALS
Status: DISCONTINUED | OUTPATIENT
Start: 2020-03-25 | End: 2020-03-27 | Stop reason: HOSPADM

## 2020-03-25 RX ORDER — DORZOLAMIDE HCL 20 MG/ML
1 SOLUTION/ DROPS OPHTHALMIC 2 TIMES DAILY
Status: DISCONTINUED | OUTPATIENT
Start: 2020-03-25 | End: 2020-03-27 | Stop reason: HOSPADM

## 2020-03-25 RX ORDER — ONDANSETRON 2 MG/ML
4 INJECTION INTRAMUSCULAR; INTRAVENOUS
Status: COMPLETED | OUTPATIENT
Start: 2020-03-25 | End: 2020-03-25

## 2020-03-25 RX ORDER — SODIUM CHLORIDE 0.9 % (FLUSH) 0.9 %
5-40 SYRINGE (ML) INJECTION AS NEEDED
Status: DISCONTINUED | OUTPATIENT
Start: 2020-03-25 | End: 2020-03-27 | Stop reason: HOSPADM

## 2020-03-25 RX ORDER — SODIUM CHLORIDE 0.9 % (FLUSH) 0.9 %
5-40 SYRINGE (ML) INJECTION AS NEEDED
Status: DISCONTINUED | OUTPATIENT
Start: 2020-03-25 | End: 2020-03-26 | Stop reason: HOSPADM

## 2020-03-25 RX ADMIN — SUCRALFATE 1 G: 1 TABLET ORAL at 22:39

## 2020-03-25 RX ADMIN — ONDANSETRON 4 MG: 2 INJECTION, SOLUTION INTRAMUSCULAR; INTRAVENOUS at 06:01

## 2020-03-25 RX ADMIN — SODIUM CHLORIDE 40 MG: 9 INJECTION, SOLUTION INTRAMUSCULAR; INTRAVENOUS; SUBCUTANEOUS at 09:03

## 2020-03-25 RX ADMIN — Medication 10 ML: at 22:41

## 2020-03-25 RX ADMIN — DORZOLAMIDE HYDROCHLORIDE 1 DROP: 20 SOLUTION/ DROPS OPHTHALMIC at 22:40

## 2020-03-25 RX ADMIN — Medication 10 ML: at 15:50

## 2020-03-25 RX ADMIN — SODIUM CHLORIDE 40 MG: 9 INJECTION, SOLUTION INTRAMUSCULAR; INTRAVENOUS; SUBCUTANEOUS at 22:39

## 2020-03-25 RX ADMIN — SODIUM CHLORIDE 500 ML: 900 INJECTION, SOLUTION INTRAVENOUS at 06:05

## 2020-03-25 RX ADMIN — CARVEDILOL 3.12 MG: 6.25 TABLET, FILM COATED ORAL at 22:39

## 2020-03-25 RX ADMIN — Medication 10 ML: at 05:43

## 2020-03-25 RX ADMIN — Medication 10 ML: at 22:42

## 2020-03-25 RX ADMIN — Medication 10 ML: at 09:11

## 2020-03-25 RX ADMIN — SODIUM CHLORIDE 500 ML: 900 INJECTION, SOLUTION INTRAVENOUS at 05:38

## 2020-03-25 RX ADMIN — LATANOPROST 1 DROP: 50 SOLUTION OPHTHALMIC at 22:40

## 2020-03-25 RX ADMIN — PANTOPRAZOLE SODIUM 40 MG: 40 INJECTION, POWDER, FOR SOLUTION INTRAVENOUS at 05:36

## 2020-03-25 RX ADMIN — Medication 10 ML: at 14:45

## 2020-03-25 NOTE — ED TRIAGE NOTES
Pt presents to ED with EMS for ground level fall at nursing home. Pt stated he woke up to ambulate to the restroom became weak and could not walk anymore and was unable to make it to the bed to sit back down. No LOC. Pt states he struck his head on a bedside table but is not complaining of pain. Only injury is a small skin tear on L elbow. Pt has been evaluated for a GI bleed the last 4 days at the nursing home. EMS also states that en route to ER the patient had a blood tinged emesis. Upon arrival pt ambulated to ER bed from stretcher and is A&Ox4. Pt c/o lower quadrant abdominal pain at this time.

## 2020-03-25 NOTE — H&P
[address] HISTORY AND PHYSICAL Name:  Dena Kyle 
MR#:  [id number] :  [id number] ACCOUNT #:  [de-identified] ADMIT DATE:  2020 PRIMARY CARE PHYSICIAN:  Dr. Mauro Dan. PRESENTING COMPLAINT:  
 
  coffee ground emesis. HISTORY OF PRESENTING ILLNESS:  
 
 The patient is a 69-year-old  male who has previous history significant for coronary artery disease, hypertension, diabetes, chronic kidney disease, history of GI bleed in the past secondary to AVMs and gastritis presented to the emergency room from [facility]. The patient tells me that he has been noticing melanotic stools for the last 4 days, but his blood pressure and his blood work has been fine. Last night he woke up and was walking when he had a syncopal episode and was brought to the emergency room. According to the ER physician, he had 2 episodes of coffee-ground emesis in ER. He was given 1 L of fluids and Protonix and blood transfusion has been ordered. The patient denies having any abdominal pain, chest pain or shortness of breath. He denies having any fever or chills. In the emergency room, his hemoglobin was 7.4. The patient has no other complaints . PAST MEDICAL HISTORY:  
 
1. Gastritis and gastric AVMs. 2.  History of diverticulosis. 3.  Acute coronary artery disease status post CABG. 4.  History of pacemaker insertion. 5.  Hypertension. 6.  Chronic kidney disease, stage IV. 7.  Type 2 diabetes. 8.  Glaucoma. 9.  History of syncope. 10.  History of pacemaker insertion. SOCIOECONOMIC HISTORY:  
 
 The patient does not smoke. He drinks socially. FAMILY HISTORY:  Significant for father having stroke. CODE STATUS:  Do not resuscitate. ALLERGIES:  INCLUDE LATEX AND SHELLFISH.  
 
MEDICATIONS PRIOR TO ADMISSION:   
 
Tylenol 325 mg 2 tablets every 4 hours as needed, amoxicillin 1 tablet b.i.d. for 10 days, aspirin 81 mg daily, Azopt ophthalmic suspension 1 drop b.i.d., Coreg 3.125 mg b.i.d., Lasix 20 mg daily, Xalatan eye drops every night, Lidoderm patch every 12 hours, multivitamin, omeprazole 20 mg daily and senna along with sodium bicarbonate 650 mg every evening, Carafate 1 g 4 times daily. REVIEW OF SYSTEMS:  Negative except as mentioned in the history of presenting illness. All systems were reviewed, no other positive finding was noted. PHYSICAL EXAMINATION: 
GENERAL:  The patient is a 80year-old gentleman, not in any acute distress. VITAL SIGNS:  Temperature 98, blood pressure 160/57, pulse 67, respiratory rate is 13, saturation 97%. HEENT:  Examination reveals pupils are equally reactive to light and accommodation. NECK:  Supple. There is no lymphadenopathy or JVD. CHEST:  Clear. No wheezing. No crackles. CARDIOVASCULAR SYSTEM:  S1 and S2, regular. No murmur. No S3. 
ABDOMEN:  Does not reveal any tenderness. No guarding. No rigidity. Bowel sounds are active. EXTREMITIES:  No pedal edema. CNS:  Examination reveals the patient is alert and oriented. He has normal strength and normal reflexes. Plantars are downgoing. Cranial nerves are normal. 
 
LABORATORY DATA:  Labs done in ER reveal CBC with a hemoglobin of 7.4, white count is 9, hematocrit is 23.4, MCV is 110.9, platelet count is 953,389. His last hemoglobin and hematocrit on 01/10/2020 was 10.1 and 39.6. Chemistries show sodium 144, potassium 4.9, chloride 115, bicarb is 22, gap of 7, glucose of 175, BUN is 65, creatinine is 2.85. Bilirubin is 0.3. AST is 22, ALT is 14, alk phos 93. Troponin is less than 0.05, alk phos is 69. INR is 1.1. EKG showed paced rhythm with PVCs. CT head shows no acute finding, it shows mild chronic microvascular ischemic changes with moderate degree of cerebral atrophy. CT abdomen and pelvis without contrast showed small bilateral pleural effusion, no acute intraperitoneal process.  
 
ASSESSMENT AND PLAN:   
 
 The patient is a 80-year-old gentleman who has previous history significant for gastrointestinal bleed along with chronic kidney disease, hypertension, and gastritis is being admitted with: 
 
1. Gastrointestinal bleed, melanotic stool with coffee-ground emesis suggestive of upper gastrointestinal bleed. He had EGD done in 2019, at that time it showed a mild friable gastritis and arteriovenous malformations. The patient will be kept NPO. and will be started on proton pump inhibitors. 2.  Consult Gastroenterology. 3.  Acute blood loss anemia due to upper gastrointestinal bleed. The patient is hemodynamically stable. One unit of blood has been ordered by emergency room. Keep Hemoglobin > 7. 
4.  Previous history significant for extensive diverticulosis especially in the sigmoid colon. The patient denies having any significant pain. 5.  History of chronic kidney disease. The patient will be monitored. His creatinine is stable. 6.  History of hypertension. Blood pressure medications will be continued. 7.  Hold aspirin at this time. 8.  History of glaucoma. We will continue eye drops. 9.  We will check serial hemoglobin and hematocrit. 10.  Deep venous thrombosis prophylaxis with sequential compression dressings. MD ATUL [email or web address] 
D:  03/25/2020 7:19 
T:  03/25/2020 10:15 JOB #:  [id number]
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
## 2020-03-25 NOTE — PROGRESS NOTES
Pt admitted to  2136 from ed accompanied by transport. Pt alert and oriented. VSS. Denies c/o of pain. Skin verified w/ Tedra Fell LPN. Pt oriented to  and nursing routine. Fall prevention and hand hygiene reviewed. MD orders and plan of care reviewed. White board updated. Daughter / Kevin Adams contacted and updated. Belongings accounted for. Await PRBC for transfusion.
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
## 2020-03-25 NOTE — PROGRESS NOTES
Problem: Falls - Risk of 
Goal: *Absence of Falls Description: Document Ann-Marie Dontae Fall Risk and appropriate interventions in the flowsheet. Outcome: Progressing Towards Goal 
Note: Fall Risk Interventions: 
  
 
  
 
  
 
  
 
  
 
 
  
Problem: Upper and Lower GI Bleed: Day 1 Goal: Diagnostic Test/Procedures Outcome: Progressing Towards Goal 
Goal: Nutrition/Diet Outcome: Progressing Towards Goal 
  
Problem: Upper and Lower GI Bleed: Day 3 Goal: Off Pathway (Use only if patient is Off Pathway) Outcome: Progressing Towards Goal

## 2020-03-25 NOTE — PROGRESS NOTES
HP dictated Admitted for Coffee [place] emesis/ melena 
 
NPO. Transfuse one unit ordered GI consulted PPI BID

## 2020-03-25 NOTE — PROGRESS NOTES
Belongings dropped off by daughter and collected from volunteer at front.  Glasses cell phone  pants shirt hearing aid and batteries  added to belongings list.

## 2020-03-26 ENCOUNTER — ANESTHESIA (OUTPATIENT)
Dept: SURGERY | Age: 85
DRG: 378 | End: 2020-03-26
Payer: MEDICARE

## 2020-03-26 LAB
ALBUMIN SERPL-MCNC: 2.4 G/DL (ref 3.5–5)
ALBUMIN/GLOB SERPL: 0.9 {RATIO} (ref 1.1–2.2)
ALP SERPL-CCNC: 68 U/L (ref 45–117)
ALT SERPL-CCNC: 12 U/L (ref 12–78)
ANION GAP SERPL CALC-SCNC: 8 MMOL/L (ref 5–15)
AST SERPL-CCNC: 20 U/L (ref 15–37)
BILIRUB SERPL-MCNC: 0.4 MG/DL (ref 0.2–1)
BUN SERPL-MCNC: 64 MG/DL (ref 6–20)
BUN/CREAT SERPL: 23 (ref 12–20)
CALCIUM SERPL-MCNC: 7.8 MG/DL (ref 8.5–10.1)
CHLORIDE SERPL-SCNC: 123 MMOL/L (ref 97–108)
CO2 SERPL-SCNC: 18 MMOL/L (ref 21–32)
CREAT SERPL-MCNC: 2.8 MG/DL (ref 0.7–1.3)
ERYTHROCYTE [DISTWIDTH] IN BLOOD BY AUTOMATED COUNT: 20.7 % (ref 11.5–14.5)
GLOBULIN SER CALC-MCNC: 2.6 G/DL (ref 2–4)
GLUCOSE SERPL-MCNC: 123 MG/DL (ref 65–100)
HCT VFR BLD AUTO: 22.3 % (ref 36.6–50.3)
HGB BLD-MCNC: 7 G/DL (ref 12.1–17)
MCH RBC QN AUTO: 33.3 PG (ref 26–34)
MCHC RBC AUTO-ENTMCNC: 31.4 G/DL (ref 30–36.5)
MCV RBC AUTO: 106.2 FL (ref 80–99)
NRBC # BLD: 0.02 K/UL (ref 0–0.01)
NRBC BLD-RTO: 0.2 PER 100 WBC
PLATELET # BLD AUTO: 201 K/UL (ref 150–400)
PMV BLD AUTO: 11.4 FL (ref 8.9–12.9)
POTASSIUM SERPL-SCNC: 4.6 MMOL/L (ref 3.5–5.1)
PROT SERPL-MCNC: 5 G/DL (ref 6.4–8.2)
RBC # BLD AUTO: 2.1 M/UL (ref 4.1–5.7)
SODIUM SERPL-SCNC: 149 MMOL/L (ref 136–145)
WBC # BLD AUTO: 9 K/UL (ref 4.1–11.1)

## 2020-03-26 PROCEDURE — 74011250637 HC RX REV CODE- 250/637: Performed by: INTERNAL MEDICINE

## 2020-03-26 PROCEDURE — C9113 INJ PANTOPRAZOLE SODIUM, VIA: HCPCS | Performed by: INTERNAL MEDICINE

## 2020-03-26 PROCEDURE — 76210000020 HC REC RM PH II FIRST 0.5 HR: Performed by: INTERNAL MEDICINE

## 2020-03-26 PROCEDURE — 76060000031 HC ANESTHESIA FIRST 0.5 HR: Performed by: INTERNAL MEDICINE

## 2020-03-26 PROCEDURE — 74011250636 HC RX REV CODE- 250/636: Performed by: NURSE ANESTHETIST, CERTIFIED REGISTERED

## 2020-03-26 PROCEDURE — 36430 TRANSFUSION BLD/BLD COMPNT: CPT

## 2020-03-26 PROCEDURE — 74011000250 HC RX REV CODE- 250: Performed by: NURSE ANESTHETIST, CERTIFIED REGISTERED

## 2020-03-26 PROCEDURE — C9113 INJ PANTOPRAZOLE SODIUM, VIA: HCPCS | Performed by: HOSPITALIST

## 2020-03-26 PROCEDURE — 74011250636 HC RX REV CODE- 250/636: Performed by: HOSPITALIST

## 2020-03-26 PROCEDURE — 76040000019: Performed by: INTERNAL MEDICINE

## 2020-03-26 PROCEDURE — 74011250637 HC RX REV CODE- 250/637: Performed by: HOSPITALIST

## 2020-03-26 PROCEDURE — 77030040361 HC SLV COMPR DVT MDII -B: Performed by: INTERNAL MEDICINE

## 2020-03-26 PROCEDURE — 77030004566 HC CATH ANGI DX TORCON COOK -B: Performed by: INTERNAL MEDICINE

## 2020-03-26 PROCEDURE — 65660000000 HC RM CCU STEPDOWN

## 2020-03-26 PROCEDURE — 74011000250 HC RX REV CODE- 250: Performed by: HOSPITALIST

## 2020-03-26 PROCEDURE — 94760 N-INVAS EAR/PLS OXIMETRY 1: CPT

## 2020-03-26 PROCEDURE — 36415 COLL VENOUS BLD VENIPUNCTURE: CPT

## 2020-03-26 PROCEDURE — 76010000154 HC OR TIME FIRST 0.5 HR: Performed by: INTERNAL MEDICINE

## 2020-03-26 PROCEDURE — 74011250636 HC RX REV CODE- 250/636: Performed by: INTERNAL MEDICINE

## 2020-03-26 PROCEDURE — 76210000063 HC OR PH I REC FIRST 0.5 HR: Performed by: INTERNAL MEDICINE

## 2020-03-26 PROCEDURE — 80053 COMPREHEN METABOLIC PANEL: CPT

## 2020-03-26 PROCEDURE — P9016 RBC LEUKOCYTES REDUCED: HCPCS

## 2020-03-26 PROCEDURE — 74011000250 HC RX REV CODE- 250: Performed by: INTERNAL MEDICINE

## 2020-03-26 PROCEDURE — 85027 COMPLETE CBC AUTOMATED: CPT

## 2020-03-26 RX ORDER — GLYCOPYRROLATE 0.2 MG/ML
INJECTION INTRAMUSCULAR; INTRAVENOUS AS NEEDED
Status: DISCONTINUED | OUTPATIENT
Start: 2020-03-26 | End: 2020-03-26 | Stop reason: HOSPADM

## 2020-03-26 RX ORDER — SODIUM CHLORIDE, SODIUM LACTATE, POTASSIUM CHLORIDE, CALCIUM CHLORIDE 600; 310; 30; 20 MG/100ML; MG/100ML; MG/100ML; MG/100ML
25 INJECTION, SOLUTION INTRAVENOUS CONTINUOUS
Status: DISCONTINUED | OUTPATIENT
Start: 2020-03-26 | End: 2020-03-26 | Stop reason: HOSPADM

## 2020-03-26 RX ORDER — SODIUM CHLORIDE 9 MG/ML
250 INJECTION, SOLUTION INTRAVENOUS AS NEEDED
Status: DISCONTINUED | OUTPATIENT
Start: 2020-03-26 | End: 2020-03-26

## 2020-03-26 RX ORDER — FENTANYL CITRATE 50 UG/ML
25 INJECTION, SOLUTION INTRAMUSCULAR; INTRAVENOUS
Status: DISCONTINUED | OUTPATIENT
Start: 2020-03-26 | End: 2020-03-26 | Stop reason: HOSPADM

## 2020-03-26 RX ORDER — SODIUM CHLORIDE 0.9 % (FLUSH) 0.9 %
5-40 SYRINGE (ML) INJECTION AS NEEDED
Status: DISCONTINUED | OUTPATIENT
Start: 2020-03-26 | End: 2020-03-26 | Stop reason: HOSPADM

## 2020-03-26 RX ORDER — SODIUM CHLORIDE 9 MG/ML
INJECTION, SOLUTION INTRAVENOUS
Status: DISCONTINUED | OUTPATIENT
Start: 2020-03-26 | End: 2020-03-26 | Stop reason: HOSPADM

## 2020-03-26 RX ORDER — HYDROMORPHONE HYDROCHLORIDE 1 MG/ML
0.2 INJECTION, SOLUTION INTRAMUSCULAR; INTRAVENOUS; SUBCUTANEOUS
Status: DISCONTINUED | OUTPATIENT
Start: 2020-03-26 | End: 2020-03-26 | Stop reason: HOSPADM

## 2020-03-26 RX ORDER — SODIUM CHLORIDE 0.9 % (FLUSH) 0.9 %
5-40 SYRINGE (ML) INJECTION EVERY 8 HOURS
Status: DISCONTINUED | OUTPATIENT
Start: 2020-03-26 | End: 2020-03-26 | Stop reason: HOSPADM

## 2020-03-26 RX ORDER — EPINEPHRINE 0.1 MG/ML
1 INJECTION INTRACARDIAC; INTRAVENOUS
Status: DISCONTINUED | OUTPATIENT
Start: 2020-03-26 | End: 2020-03-26 | Stop reason: HOSPADM

## 2020-03-26 RX ORDER — LIDOCAINE HYDROCHLORIDE 20 MG/ML
INJECTION, SOLUTION EPIDURAL; INFILTRATION; INTRACAUDAL; PERINEURAL AS NEEDED
Status: DISCONTINUED | OUTPATIENT
Start: 2020-03-26 | End: 2020-03-26 | Stop reason: HOSPADM

## 2020-03-26 RX ORDER — ATROPINE SULFATE 0.1 MG/ML
0.5 INJECTION INTRAVENOUS
Status: DISCONTINUED | OUTPATIENT
Start: 2020-03-26 | End: 2020-03-26 | Stop reason: HOSPADM

## 2020-03-26 RX ORDER — DEXTROMETHORPHAN/PSEUDOEPHED 2.5-7.5/.8
1.2 DROPS ORAL
Status: DISCONTINUED | OUTPATIENT
Start: 2020-03-26 | End: 2020-03-26 | Stop reason: HOSPADM

## 2020-03-26 RX ORDER — FLUMAZENIL 0.1 MG/ML
0.2 INJECTION INTRAVENOUS
Status: DISCONTINUED | OUTPATIENT
Start: 2020-03-26 | End: 2020-03-26 | Stop reason: HOSPADM

## 2020-03-26 RX ORDER — SUCRALFATE 1 G/1
1 TABLET ORAL 4 TIMES DAILY
Status: DISCONTINUED | OUTPATIENT
Start: 2020-03-26 | End: 2020-03-27 | Stop reason: HOSPADM

## 2020-03-26 RX ORDER — MIDAZOLAM HYDROCHLORIDE 1 MG/ML
.25-5 INJECTION, SOLUTION INTRAMUSCULAR; INTRAVENOUS
Status: DISCONTINUED | OUTPATIENT
Start: 2020-03-26 | End: 2020-03-26 | Stop reason: HOSPADM

## 2020-03-26 RX ORDER — NALOXONE HYDROCHLORIDE 0.4 MG/ML
0.4 INJECTION, SOLUTION INTRAMUSCULAR; INTRAVENOUS; SUBCUTANEOUS
Status: DISCONTINUED | OUTPATIENT
Start: 2020-03-26 | End: 2020-03-26 | Stop reason: HOSPADM

## 2020-03-26 RX ORDER — ONDANSETRON 2 MG/ML
4 INJECTION INTRAMUSCULAR; INTRAVENOUS AS NEEDED
Status: DISCONTINUED | OUTPATIENT
Start: 2020-03-26 | End: 2020-03-26 | Stop reason: HOSPADM

## 2020-03-26 RX ORDER — PROPOFOL 10 MG/ML
INJECTION, EMULSION INTRAVENOUS AS NEEDED
Status: DISCONTINUED | OUTPATIENT
Start: 2020-03-26 | End: 2020-03-26 | Stop reason: HOSPADM

## 2020-03-26 RX ORDER — LIDOCAINE HYDROCHLORIDE 10 MG/ML
0.1 INJECTION, SOLUTION EPIDURAL; INFILTRATION; INTRACAUDAL; PERINEURAL AS NEEDED
Status: DISCONTINUED | OUTPATIENT
Start: 2020-03-26 | End: 2020-03-26 | Stop reason: HOSPADM

## 2020-03-26 RX ADMIN — Medication 5 ML: at 14:00

## 2020-03-26 RX ADMIN — Medication 10 ML: at 05:43

## 2020-03-26 RX ADMIN — SUCRALFATE 1 G: 1 TABLET ORAL at 13:23

## 2020-03-26 RX ADMIN — SODIUM CHLORIDE: 900 INJECTION, SOLUTION INTRAVENOUS at 07:50

## 2020-03-26 RX ADMIN — PROPOFOL 10 MG: 10 INJECTION, EMULSION INTRAVENOUS at 08:16

## 2020-03-26 RX ADMIN — PROPOFOL 10 MG: 10 INJECTION, EMULSION INTRAVENOUS at 08:13

## 2020-03-26 RX ADMIN — FUROSEMIDE 20 MG: 20 TABLET ORAL at 11:27

## 2020-03-26 RX ADMIN — Medication 10 ML: at 22:37

## 2020-03-26 RX ADMIN — CARVEDILOL 3.12 MG: 6.25 TABLET, FILM COATED ORAL at 18:47

## 2020-03-26 RX ADMIN — PROPOFOL 30 MG: 10 INJECTION, EMULSION INTRAVENOUS at 08:07

## 2020-03-26 RX ADMIN — SUCRALFATE 1 G: 1 TABLET ORAL at 22:31

## 2020-03-26 RX ADMIN — DORZOLAMIDE HYDROCHLORIDE 1 DROP: 20 SOLUTION/ DROPS OPHTHALMIC at 11:28

## 2020-03-26 RX ADMIN — CARVEDILOL 3.12 MG: 6.25 TABLET, FILM COATED ORAL at 11:27

## 2020-03-26 RX ADMIN — PROPOFOL 10 MG: 10 INJECTION, EMULSION INTRAVENOUS at 08:19

## 2020-03-26 RX ADMIN — Medication 10 ML: at 05:42

## 2020-03-26 RX ADMIN — SODIUM CHLORIDE 40 MG: 9 INJECTION, SOLUTION INTRAMUSCULAR; INTRAVENOUS; SUBCUTANEOUS at 11:26

## 2020-03-26 RX ADMIN — SODIUM CHLORIDE 40 MG: 9 INJECTION, SOLUTION INTRAMUSCULAR; INTRAVENOUS; SUBCUTANEOUS at 22:31

## 2020-03-26 RX ADMIN — LIDOCAINE HYDROCHLORIDE 40 MG: 20 INJECTION, SOLUTION EPIDURAL; INFILTRATION; INTRACAUDAL; PERINEURAL at 08:07

## 2020-03-26 RX ADMIN — SUCRALFATE 1 G: 1 TABLET ORAL at 18:47

## 2020-03-26 RX ADMIN — DORZOLAMIDE HYDROCHLORIDE 1 DROP: 20 SOLUTION/ DROPS OPHTHALMIC at 19:43

## 2020-03-26 RX ADMIN — SUCRALFATE 1 G: 1 TABLET ORAL at 10:50

## 2020-03-26 RX ADMIN — GLYCOPYRROLATE 0.1 MG: 0.2 INJECTION, SOLUTION INTRAMUSCULAR; INTRAVENOUS at 08:04

## 2020-03-26 RX ADMIN — LATANOPROST 1 DROP: 50 SOLUTION OPHTHALMIC at 22:32

## 2020-03-26 RX ADMIN — PROPOFOL 10 MG: 10 INJECTION, EMULSION INTRAVENOUS at 08:10

## 2020-03-26 RX ADMIN — SODIUM BICARBONATE 650 MG: 650 TABLET ORAL at 18:47

## 2020-03-26 NOTE — PROGRESS NOTES
Problem: Falls - Risk of 
Goal: *Absence of Falls Description: Document Toshia Ware Fall Risk and appropriate interventions in the flowsheet. Outcome: Progressing Towards Goal 
Note: Fall Risk Interventions: 
Mobility Interventions: Bed/chair exit alarm, Patient to call before getting OOB, PT Consult for mobility concerns, PT Consult for assist device competence Medication Interventions: Evaluate medications/consider consulting pharmacy, Patient to call before getting OOB, Teach patient to arise slowly History of Falls Interventions: Assess for delayed presentation/identification of injury for 48 hrs (comment for end date), Bed/chair exit alarm Problem: Patient Education: Go to Patient Education Activity Goal: Patient/Family Education Outcome: Progressing Towards Goal 
  
Problem: Patient Education: Go to Patient Education Activity Goal: Patient/Family Education Outcome: Progressing Towards Goal 
  
Problem: Upper and Lower GI Bleed: Day 1 Goal: Off Pathway (Use only if patient is Off Pathway) Outcome: Progressing Towards Goal 
Goal: Activity/Safety Outcome: Progressing Towards Goal 
Goal: Consults, if ordered Outcome: Progressing Towards Goal 
Goal: Diagnostic Test/Procedures Outcome: Progressing Towards Goal 
Goal: Nutrition/Diet Outcome: Progressing Towards Goal 
Goal: Discharge Planning Outcome: Progressing Towards Goal 
Goal: Medications Outcome: Progressing Towards Goal 
Goal: Respiratory Outcome: Progressing Towards Goal 
Goal: Treatments/Interventions/Procedures Outcome: Progressing Towards Goal 
Goal: Psychosocial 
Outcome: Progressing Towards Goal 
Goal: *Optimal pain control at patient's stated goal 
Outcome: Progressing Towards Goal 
Goal: *Hemodynamically stable Outcome: Progressing Towards Goal 
Goal: *Demonstrates progressive activity Outcome: Progressing Towards Goal 
  
Problem: Upper and Lower GI Bleed:  Discharge Outcomes Goal: *Hemodynamically stable Outcome: Progressing Towards Goal 
Goal: *Lungs clear or at baseline Outcome: Progressing Towards Goal 
Goal: *Pain is controlled to three or less Outcome: Progressing Towards Goal 
  
Problem: Falls - Risk of 
Goal: *Absence of Falls Description: Document Phill Willie Fall Risk and appropriate interventions in the flowsheet. Outcome: Progressing Towards Goal 
Note: Fall Risk Interventions: 
Mobility Interventions: Bed/chair exit alarm, Patient to call before getting OOB, PT Consult for mobility concerns, PT Consult for assist device competence Medication Interventions: Evaluate medications/consider consulting pharmacy, Patient to call before getting OOB, Teach patient to arise slowly History of Falls Interventions: Assess for delayed presentation/identification of injury for 48 hrs (comment for end date), Bed/chair exit alarm Problem: Patient Education: Go to Patient Education Activity Goal: Patient/Family Education Outcome: Progressing Towards Goal

## 2020-03-26 NOTE — ROUTINE PROCESS
Luis Enrique Fink 
4/23/1921 
[id number] Situation: 
Verbal report received from: [employer] Procedure: Procedure(s): ESOPHAGOGASTRODUODENOSCOPY (EGD) ENDOSCOPIC ARGON PLASMA COAGULATION Background: 
 
Preoperative diagnosis: MELENA/HX ARTERIAL VENOUS MALFORMATION Postoperative diagnosis: gastric avms :  Dr. Anyi Adler Assistant(s): Endoscopy RN-1: Dayron Lagos RN Specimens: * No specimens in log * H. Pylori  no Assessment: 
Intra-procedure medications Anesthesia gave intra-procedure sedation and medications, see anesthesia flow sheet yes Intravenous fluids: NS@ Brianna Goon Vital signs stable Abdominal assessment: round and soft Recommendation: 
Return to floor Family or Friend - dtr Permission to share finding with family or friend yes

## 2020-03-26 NOTE — ANESTHESIA PREPROCEDURE EVALUATION
Anesthetic History No history of anesthetic complications Review of Systems / Medical History Patient summary reviewed, nursing notes reviewed and pertinent labs reviewed Pulmonary Within defined limits Neuro/Psych Comments: Neuropathy Syncope Cardiovascular Hypertension: well controlled CHF: dyspnea on exertion Dysrhythmias Pacemaker, CAD, cardiac stents, CABG and hyperlipidemia Exercise tolerance: <4 METS Comments: TTE (11/20/18): Moderate MR, EF=55-60% Complete Heart Block V-paced GI/Hepatic/Renal 
  
GERD: well controlled Renal disease: CRI and ARF 
PUD Comments: MELENA Rectal Bleeding CRI, Stage IV Endo/Other Diabetes: well controlled, type 2 Anemia Comments: Pernicious Anemia Other Findings Comments: Glaucoma GI bleed Physical Exam 
 
Airway Mallampati: III 
TM Distance: 4 - 6 cm Neck ROM: decreased range of motion Mouth opening: Normal 
 
 Cardiovascular Regular rate and rhythm,  S1 and S2 normal,  no murmur, click, rub, or gallop Rhythm: regular Rate: normal 
 
 
 
 Dental 
 
Dentition: Poor dentition and Caps/crowns Comments: Multiple missing teeth, moderate decay, one loose lower incisor. Pulmonary Breath sounds clear to auscultation Abdominal 
GI exam deferred Other Findings Anesthetic Plan ASA: 4 Anesthesia type: total IV anesthesia Induction: Intravenous Anesthetic plan and risks discussed with: Patient

## 2020-03-26 NOTE — PROCEDURES
NAME:  French Khan  
:   1921 MRN:   [id number] Date/Time:  3/26/2020 8:30 AM 
 
Esophagogastroduodenoscopy (EGD) Procedure Note Procedure: Esophagogastroduodenoscopy with APC (Argon plasm coagulation) for control of bleeding Indication:       Melena/hematochezia, Anemia due to chronic gastrointestinal blood loss Pre-operative Diagnosis: see indication above Post-operative Diagnosis: see findings below : Zana Del Rio MD 
Referring Provider:   LEX Kovacs MD; Ayala Pablo MD 
  
Exam: Airway: clear, no airway problems anticipated Heart: RRR, without gallops or rubs Lungs: clear bilaterally without wheezes, crackles, or rhonchi Abdomen: soft, nontender, nondistended, bowel sounds present Mental Status: awake, alert and oriented to person, place and time  
  
Anethesia/Sedation:  MAC anesthesia Propofol 70mg IV Procedure [address] 
After informed consent was obtained for the procedure, with all risks and benefits of procedure explained the patient was taken to the endoscopy suite and placed in the left lateral decubitus position.  Following sequential administration of sedation as per above, the [id number] gastroscope was inserted into the mouth and advanced under direct vision to second portion of the duodenum.  A careful inspection was made as the gastroscope was withdrawn, including a retroflexed view of the proximal stomach; findings and interventions are described below.                                                                                                                                                                  
Findings:   
-Normal esophageal mucosa -Diffuse mild friable gastritis with minimal diffuse nodularity throughout stomach 
-Two previously placed Hemoclips noted in proximal stomach with apparent shallow associated erosion adjacent to clips but with no associated bleeding; this was ablated with APC device -Multiple (7) non-bleeding 2-4mm gastric arteriovenous malformation (AVMs) thoughout proximal and mid stomach; ablated with APC device 
-Normal duodenum  
  
Therapies:  APC of stomach  
Specimens: None. EBL:  None.        
Complications:   None; patient tolerated the procedure well. 
  
Impression:   
-Normal esophageal mucosa -Diffuse mild friable gastritis with minimal diffuse nodularity throughout stomach 
-Two previously placed Hemoclips noted in proximal stomach with apparent shallow associated erosion adjacent to clips but with no associated bleeding; this was ablated with APC device 
-Multiple (7) non-bleeding 2-4mm gastric arteriovenous malformation (AVMs) thoughout proximal and mid stomach; ablated with APC device 
-Normal duodenum  
  
Recommendations: 
-Continue acid suppression with PPI given PO 
-Provide 10d course of Carafate QID tablet 
-Follow symptoms. ,  
-Resume diet 
-Resume ASA in 3 days Anselmo Osuna MD

## 2020-03-26 NOTE — PROGRESS NOTES
JOSEFINA:  
1) Return back to [facility]  
2) Pt will need 2ND IM letter at time of discharge 3) Pt will need [facility] transportation at time of discharge Reason for Admission:   22 GI bleed RUR Score: MODERATE    
          
PCP: First and Last name:  Dr. Brian Smith Name of Practice:  
 Are you a current patient: Yes/No: Yes Approximate date of last visit: A few weeks ago Do you (patient/family) have any concerns for transition/discharge? None identified at this time Plan for utilizing home health: [facility] have their own RN's who will be coming in. Current Advanced Directive/Advance Care Plan: On file Transition of Care Plan:        
Pt is a 80year old,  male, admitted with a GI bleed. Pt was off the floor when CM attempted to meet with him. CM spoke with pt's dtr who states pt is a resident at [place] [address]. Pt has a walker and is independent for the most part. Pt does have his medications delivered and meals provided through the RN's at [facility]. Pt has been to the SNF portion of [facility] in the past and his family wishes for him not to return. Pt's wife is currently at the SNF portion on hospice services and is nearing end of life. Despite her also being in the SNF portion family would like him to return to the [facility] where he will be quarantined for 2 weeks, per dtr pt is agreeable to due to his high risk of PNA (this has happened in the past when he went to the SNF portion). Pt's family states no problems affording or accessing medications and pt's dtr drives pt to appts as needed. Pt's dtr is requesting ambulance transportation be arranged at time of discharged as they are not able to pick him due to them being very immunocompromised and quarantined. Dtr understands [facility] has their own transportation but would prefer pt to be on stretcher back due to his weakness. PANDA attempted to contact [facility] at [facility], no answer, waiting on return phone call. Care Management Interventions PCP Verified by CM: Yes Mode of Transport at Discharge: BLS Transition of Care Consult (CM Consult): Discharge Planning Physical Therapy Consult: No 
Occupational Therapy Consult: No 
Speech Therapy Consult: No 
Current Support Network: Assisted Living Confirm Follow Up Transport: Family The Patient and/or Patient Representative was Provided with a Choice of Provider and Agrees with the Discharge Plan?: Yes Freedom of Choice List was Provided with Basic Dialogue that Supports the Patient's Individualized Plan of Care/Goals, Treatment Preferences and Shares the Quality Data Associated with the Providers?: Yes Discharge Location Discharge Placement: Home with home health Mable Holstein, MSW, PANDA [facility] [phone]

## 2020-03-26 NOTE — PROGRESS NOTES
I reviewed pertinent labs and imaging, and discussed /agreed on the plan of care with Dr. Daryl Saldaña. Hospitalist Progress Note NAME: Dre Mary  
:  1921 MRN:  [id number] Assessment / Plan: 
EGD this AM 
 
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 No acute intraperitoneal process. CKD IV 
· Cr 2.80; baseline ~2.50 · Monitor · Avoid nephrotoxins · Continue sodium bicarb PO 
 
HTN 
· Continue coreg and lasix Glaucoma · Continue gtts 25.0 - 29.9 Overweight / Body mass index is 26.33 kg/m². Code status: DNR Prophylaxis: SCD's Recommended Disposition: LTC Subjective: Chief Complaint / Reason for Physician Visit Patient resting with eyes closed. Recently returned to floor from EGD. No new complaints. NAD. Discussed with RN events overnight. Review of Systems: 
Symptom Y/N Comments  Symptom Y/N Comments Fever/Chills n   Chest Pain n   
Poor Appetite n   Edema Cough    Abdominal Pain Sputum    Joint Pain SOB/HAMILTON n   Pruritis/Rash Nausea/vomit n   Tolerating PT/OT Diarrhea n   Tolerating Diet y Constipation n   Other Could NOT obtain due to:   
 
Objective: VITALS:  
Last 24hrs VS reviewed since prior progress note. Most recent are: 
Patient Vitals for the past 24 hrs: 
 Temp Pulse Resp BP SpO2  
20 1047 98.3 °F (36.8 °C) 66 18 168/79 99 % 20 0945 98.3 °F (36.8 °C) 70 18 148/68 97 % 20 0918 98.3 °F (36.8 °C) 67 18 158/79 97 % 20 0852  70 19 144/66 98 % 20 0847  72 17  98 % 03/26/20 0841  71 16 145/64 100 % 03/26/20 0836  72 16 138/66 100 % 03/26/20 0831  79 16 135/49 99 % 03/26/20 0745  65 15 162/64 98 % 03/26/20 0740 98.6 °F (37 °C) 64 15 159/61 96 % 03/26/20 0715 98.6 °F (37 °C) 64 17 151/52 98 % 03/26/20 0712 98.4 °F (36.9 °C) 64 16 157/50 99 % 03/26/20 0702 98.5 °F (36.9 °C) 65 16 152/53 97 % 03/26/20 0307 97.8 °F (36.6 °C) 63 16 160/74 98 % 03/26/20 0010 98.3 °F (36.8 °C) 75 16 153/58 99 % 03/25/20 1835 97.6 °F (36.4 °C)  16 165/62 100 % 03/25/20 1815 97.6 °F (36.4 °C)  16 160/65 97 % 03/25/20 1754 97.6 °F (36.4 °C)  16 165/65 99 % 03/25/20 1654 98.9 °F (37.2 °C) 73 16 165/68 100 % 03/25/20 1643 98.9 °F (37.2 °C) 73 16 165/68 100 % 03/25/20 1624 98 °F (36.7 °C) 69 18 159/56 98 % 03/25/20 1609 98.1 °F (36.7 °C) 67 18 157/59 99 % 03/25/20 1540 98.3 °F (36.8 °C) 67 18 152/56 98 % 03/25/20 1515 98.3 °F (36.8 °C) 70 16 141/50 97 % 03/25/20 1421 97.1 °F (36.2 °C) 69 19 106/52 95 % Intake/Output Summary (Last 24 hours) at 3/26/2020 1220 Last data filed at 3/26/2020 5260 Gross per 24 hour Intake 687.5 ml Output 500 ml Net 187.5 ml PHYSICAL EXAM: 
General: Pleasant elderly  male. NAD   
EENT:  EOMI. Anicteric sclerae. MMM Resp:  CTA bilaterally, no wheezing or rales. No accessory muscle use CV:  Regular rate rhythm,  No edema GI:  Soft, Non distended, Non tender.  +Bowel sounds Neurologic:  Alert and oriented X 3, normal speech, Psych:   Good insight. Not anxious nor agitated Skin:  No rashes. No jaundice. +Pallor Reviewed most current lab test results and cultures  YES Reviewed most current radiology test results   YES Review and summation of old records today    NO Reviewed patient's current orders and MAR    YES 
PMH/SH reviewed - no change compared to H&P 
________________________________________________________________________ Care Plan discussed with: 
  Comments Patient x Family RN x   
Care Manager Consultant Multidiciplinary team rounds were held today with , nursing, pharmacist and clinical coordinator. Patient's plan of care was discussed; medications were reviewed and discharge planning was addressed. ________________________________________________________________________ Total NON critical care TIME:  25   Minutes Total CRITICAL CARE TIME Spent:   Minutes non procedure based Comments >50% of visit spent in counseling and coordination of care    
________________________________________________________________________ Justyna Valencia NP Procedures: see electronic medical records for all procedures/Xrays and details which were not copied into this note but were reviewed prior to creation of Plan. LABS: 
I reviewed today's most current labs and imaging studies. Pertinent labs include: 
Recent Labs  
  03/26/20 0318 03/25/20 
0529 WBC 9.0 9.0 HGB 7.0* 7.4* HCT 22.3* 23.4*  
 254 Recent Labs  
  03/26/20 0318 03/25/20 
6401 * 144  
K 4.6 4.9  
* 115* CO2 18* 22 * 175* BUN 64* 65* CREA 2.80* 2.85* CA 7.8* 7.7* ALB 2.4* 2.7* TBILI 0.4 0.3 SGOT 20 22 ALT 12 14 INR  --  1.1 Signed: Justyna Valencia NP

## 2020-03-26 NOTE — ANESTHESIA POSTPROCEDURE EVALUATION
Post-Anesthesia Evaluation and Assessment Patient: Allyssa Berry MRN: [id number]  SSN: [id number] YOB: 1921  Age: 80 y.o. Sex: male I have evaluated the patient and they are stable and ready for discharge from the PACU. Cardiovascular Function/Vital Signs Visit Vitals /64 Pulse 72 Temp 37 °C (98.6 °F) Resp 17 Ht 5' 7\" (1.702 m) Wt 76.2 kg (168 lb 1.6 oz) SpO2 98% BMI 26.33 kg/m² Patient is status post MAC anesthesia for Procedure(s): ESOPHAGOGASTRODUODENOSCOPY (EGD) ENDOSCOPIC ARGON PLASMA COAGULATION. Nausea/Vomiting: None Postoperative hydration reviewed and adequate. Pain: 
Pain Scale 1: Numeric (0 - 10) (03/26/20 0836) Pain Intensity 1: 0 (03/26/20 0836) Managed Neurological Status: At baseline Mental Status, Level of Consciousness: Alert and  oriented to person, place, and time Pulmonary Status:  
O2 Device: Room air (03/26/20 0841) Adequate oxygenation and airway patent Complications related to anesthesia: None Post-anesthesia assessment completed. No concerns Signed By: Nicole Gutiérrez MD   
 March 26, 2020

## 2020-03-26 NOTE — PROGRESS NOTES
Transported to EGD at 0720 and returned [phone]. Report received at bedside. Blood still transfusing. Pt stable.

## 2020-03-26 NOTE — PROGRESS NOTES
Bedside and verbal shift change report given to Cesar Camacho (oncoming nurse) by Vilma Macias RN (offgoing nurse). Report included the following information SBAR, Kardex, Procedure Summary, Intake/Output, Cardiac Rhythm paced and Quality Measures. Patient to have EGD for GI bleed. Hgb 7.0 this AM, 1 unit of PRBC initiated prior to OR transfer. Patient afebrile and vitals stable with no complaints of pain.

## 2020-03-26 NOTE — PROGRESS NOTES
Tele box left upstairs with his nurse, Sheyla Knows. Hearing aids and glasses left at bedside. ..TRANSFER - IN REPORT: 
 
Verbal report received from Peyton(name) on Dertucker Dinh  being received from 2136(unit) for routine progression of care Report consisted of patients Situation, Background, Assessment and  
Recommendations(SBAR). Information from the following report(s) SBAR, Kardex, MAR and Accordion was reviewed with the receiving nurse. Opportunity for questions and clarification was provided. Assessment completed upon patients arrival to unit and care assumed.

## 2020-03-26 NOTE — PROGRESS NOTES
Bedside and Verbal shift change report given to Eliane Bamberger (oncoming nurse) Jailene Strauss (offgoing nurse). Report included the following information SBAR, Kardex,  Intake/Output, Recent Results and Med Rec Status.

## 2020-03-27 VITALS
HEART RATE: 68 BPM | TEMPERATURE: 32 F | SYSTOLIC BLOOD PRESSURE: 147 MMHG | WEIGHT: 168.1 LBS | BODY MASS INDEX: 26.38 KG/M2 | OXYGEN SATURATION: 99 % | RESPIRATION RATE: 18 BRPM | DIASTOLIC BLOOD PRESSURE: 67 MMHG | HEIGHT: 67 IN

## 2020-03-27 PROBLEM — K57.90 DIVERTICULOSIS: Status: ACTIVE | Noted: 2020-03-27

## 2020-03-27 PROBLEM — D62 ACUTE BLOOD LOSS ANEMIA: Status: ACTIVE | Noted: 2018-07-03

## 2020-03-27 LAB
ABO + RH BLD: NORMAL
ANION GAP SERPL CALC-SCNC: 9 MMOL/L (ref 5–15)
BLD PROD TYP BPU: NORMAL
BLD PROD TYP BPU: NORMAL
BLOOD BANK CMNT PATIENT-IMP: NORMAL
BLOOD GROUP ANTIBODIES SERPL: NORMAL
BLOOD GROUP ANTIBODIES SERPL: NORMAL
BPU ID: NORMAL
BPU ID: NORMAL
BUN SERPL-MCNC: 61 MG/DL (ref 6–20)
BUN/CREAT SERPL: 21 (ref 12–20)
CALCIUM SERPL-MCNC: 7.9 MG/DL (ref 8.5–10.1)
CHLORIDE SERPL-SCNC: 121 MMOL/L (ref 97–108)
CO2 SERPL-SCNC: 20 MMOL/L (ref 21–32)
CREAT SERPL-MCNC: 2.9 MG/DL (ref 0.7–1.3)
CROSSMATCH RESULT,%XM: NORMAL
CROSSMATCH RESULT,%XM: NORMAL
ERYTHROCYTE [DISTWIDTH] IN BLOOD BY AUTOMATED COUNT: 20 % (ref 11.5–14.5)
GLUCOSE SERPL-MCNC: 123 MG/DL (ref 65–100)
HCT VFR BLD AUTO: 25.6 % (ref 36.6–50.3)
HGB BLD-MCNC: 8.2 G/DL (ref 12.1–17)
MCH RBC QN AUTO: 33.6 PG (ref 26–34)
MCHC RBC AUTO-ENTMCNC: 32 G/DL (ref 30–36.5)
MCV RBC AUTO: 104.9 FL (ref 80–99)
NRBC # BLD: 0 K/UL (ref 0–0.01)
NRBC BLD-RTO: 0 PER 100 WBC
PLATELET # BLD AUTO: 201 K/UL (ref 150–400)
PMV BLD AUTO: 11.3 FL (ref 8.9–12.9)
POTASSIUM SERPL-SCNC: 4.2 MMOL/L (ref 3.5–5.1)
RBC # BLD AUTO: 2.44 M/UL (ref 4.1–5.7)
SODIUM SERPL-SCNC: 150 MMOL/L (ref 136–145)
SPECIMEN EXP DATE BLD: NORMAL
STATUS OF UNIT,%ST: NORMAL
STATUS OF UNIT,%ST: NORMAL
UNIT DIVISION, %UDIV: 0
UNIT DIVISION, %UDIV: 0
WBC # BLD AUTO: 7.8 K/UL (ref 4.1–11.1)

## 2020-03-27 PROCEDURE — 74011000250 HC RX REV CODE- 250: Performed by: INTERNAL MEDICINE

## 2020-03-27 PROCEDURE — 80048 BASIC METABOLIC PNL TOTAL CA: CPT

## 2020-03-27 PROCEDURE — 74011250637 HC RX REV CODE- 250/637: Performed by: INTERNAL MEDICINE

## 2020-03-27 PROCEDURE — 97535 SELF CARE MNGMENT TRAINING: CPT

## 2020-03-27 PROCEDURE — 85027 COMPLETE CBC AUTOMATED: CPT

## 2020-03-27 PROCEDURE — 97163 PT EVAL HIGH COMPLEX 45 MIN: CPT

## 2020-03-27 PROCEDURE — 74011250636 HC RX REV CODE- 250/636: Performed by: INTERNAL MEDICINE

## 2020-03-27 PROCEDURE — 97165 OT EVAL LOW COMPLEX 30 MIN: CPT

## 2020-03-27 PROCEDURE — 74011000258 HC RX REV CODE- 258: Performed by: NURSE PRACTITIONER

## 2020-03-27 PROCEDURE — 97116 GAIT TRAINING THERAPY: CPT

## 2020-03-27 PROCEDURE — 36415 COLL VENOUS BLD VENIPUNCTURE: CPT

## 2020-03-27 PROCEDURE — C9113 INJ PANTOPRAZOLE SODIUM, VIA: HCPCS | Performed by: INTERNAL MEDICINE

## 2020-03-27 RX ORDER — SUCRALFATE 1 G/1
1 TABLET ORAL 4 TIMES DAILY
Qty: 56 TAB | Refills: 0 | Status: SHIPPED | OUTPATIENT
Start: 2020-03-27 | End: 2020-04-10

## 2020-03-27 RX ORDER — DEXTROSE MONOHYDRATE AND SODIUM CHLORIDE 5; .45 G/100ML; G/100ML
75 INJECTION, SOLUTION INTRAVENOUS CONTINUOUS
Status: DISCONTINUED | OUTPATIENT
Start: 2020-03-27 | End: 2020-03-27 | Stop reason: HOSPADM

## 2020-03-27 RX ADMIN — SUCRALFATE 1 G: 1 TABLET ORAL at 09:31

## 2020-03-27 RX ADMIN — DORZOLAMIDE HYDROCHLORIDE 1 DROP: 20 SOLUTION/ DROPS OPHTHALMIC at 09:32

## 2020-03-27 RX ADMIN — Medication 10 ML: at 05:49

## 2020-03-27 RX ADMIN — DEXTROSE MONOHYDRATE AND SODIUM CHLORIDE 75 ML/HR: 5; .45 INJECTION, SOLUTION INTRAVENOUS at 09:30

## 2020-03-27 RX ADMIN — FUROSEMIDE 20 MG: 20 TABLET ORAL at 09:31

## 2020-03-27 RX ADMIN — CARVEDILOL 3.12 MG: 6.25 TABLET, FILM COATED ORAL at 09:31

## 2020-03-27 RX ADMIN — SODIUM CHLORIDE 40 MG: 9 INJECTION, SOLUTION INTRAMUSCULAR; INTRAVENOUS; SUBCUTANEOUS at 09:31
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
## 2020-03-27 NOTE — PROGRESS NOTES
JOSEFINA:  
1) Return to [facility] via [facility] transportation 12:20 PM- CM informed by PT/OT pt is cleared to go back to the [facility]. Tami Simms and pt's family aware. RN please call report to [phone]. No further CM needs identified. 11:32 AM- CM spoke with Sherie Hinds at [facility], they are having RN staff review pt's clinicals to ensure going back to the senior care is safe rather than the SNF portion. Pt and pt's family are very adamant about going to the senior care rather than SNF, Tami Simms and Francis Patricio aware. CM spoke with attending regarding PT/OT evals, CM placed with verbal consent. 10:37 AM- D/C order noted. CM spoke with pt's dtr, Medicare pt has received, reviewed, and signed 2nd IM letter informing them of their right to appeal the discharge. Signed copy has been placed on pt bedside chart. CM has been attempting to contact [facility] admissions, no answer, no return phone calls. Pt's dtr states she has spoken with Tami Simms this morning and updated her. CM faxed discharge summary to [facility], waiting on return phone call from [facility] admission. Care Management Interventions PCP Verified by CM: Yes Mode of Transport at Discharge: BLS Transition of Care Consult (CM Consult): Discharge Planning MyChart Signup: No 
Discharge Durable Medical Equipment: No 
Physical Therapy Consult: No 
Occupational Therapy Consult: No 
Speech Therapy Consult: No 
Current Support Network: Assisted Living Confirm Follow Up Transport: Family The Patient and/or Patient Representative was Provided with a Choice of Provider and Agrees with the Discharge Plan?: Yes Freedom of Choice List was Provided with Basic Dialogue that Supports the Patient's Individualized Plan of Care/Goals, Treatment Preferences and Shares the Quality Data Associated with the Providers?: Yes Discharge Location Discharge Placement: Other:(Home with home health RN's through [facility] ) SAVAGE Greene, PANDA  
 [facility] [phone]

## 2020-03-27 NOTE — DISCHARGE INSTRUCTIONS
HOSPITALIST DISCHARGE INSTRUCTIONS    NAME: Maria Ines Dillon   :  1921   MRN:  [id number]     Date/Time:  3/27/2020 9:34 AM    ADMIT DATE: 3/25/2020   DISCHARGE DATE: 3/27/2020     Attending Physician: Darian Sim NP    DISCHARGE DIAGNOSIS:  [address] Problems    Diagnosis Date Noted    Diverticulosis 2020    GI bleed 2018    Acute blood loss anemia 2018    Chronic kidney disease, stage IV (severe) ([facility] [address] 2017    Glaucoma 2017    Essential hypertension, benign 2012     Medications: Per above medication reconciliation. Pain Management: per above medications    Recommended diet: Regular Diet    Recommended activity: Activity as tolerated    Wound care: None    Indwelling devices:  None    Supplemental Oxygen: None    Code status: DNR        Outside physician follow up: Follow-up Information     Follow up With Specialties Details Why [address] Danis Weston MD Internal Medicine   [address]  [facility]  [phone]      Sandra Chilel MD Gastroenterology Schedule an appointment as soon as possible for a visit  [address]  [phone]              Information obtained by :  I understand that if any problems occur once I am at home I am to contact my physician. I understand and acknowledge receipt of the instructions indicated above.                                                                                                                                            Physician's or R.N.'s Signature                                                                  Date/Time                                                                                                                                              Patient or Repres

## 2020-03-27 NOTE — PROGRESS NOTES
Orders received, chart reviewed and patient evaluated by physical therapy. Pending progression with skilled acute physical therapy, recommend: 
Physical therapy at least 2 days/week in the home . His VSS in supine, sitting and following prolonged standing activities. He reported no light headedness and was able to ambulate with sba using a RW with no LOB. Independent with supine to sit and sit to stand transfers. Recommend with nursing patient to complete as able in order to maintain strength, endurance and independence: OOB to chair 3x/day with sba and ambulating with RW. Thank you for your assistance. Full evaluation to follow.   
 
Rylan Gaston, PT

## 2020-03-27 NOTE — PROGRESS NOTES
Bedside and Verbal shift change report given to [address]) by Peyton (offgoing nurse).  Report included the following information SBAR, Kardex, OR Summary, Procedure Summary, Intake/Output,  Recent Results and Med Rec Status.

## 2020-03-27 NOTE — PROGRESS NOTES
Bedside and Verbal shift change report given to [address]) by Peyton (offgoing nurse).  Report included the following information SBAR, Kardex, OR Summary, Procedure Summary, Intake/Output,  Recent Results and Med Rec Status.

## 2020-03-27 NOTE — DISCHARGE SUMMARY
Hospitalist Discharge Summary Patient ID: Daniel Nunez 
[id number] 
89 y.o. 
4/23/1921 
3/25/2020 PCP on record: Olga Hernandez MD 
 
Admit date: 3/25/2020 Discharge date and time: 3/27/2020 DISCHARGE DIAGNOSIS: 
 
Active Hospital Problems Diagnosis Date Noted  Diverticulosis 03/27/2020  GI bleed 07/03/2018  Acute blood loss anemia 07/03/2018  Chronic kidney disease, stage IV (severe) ([address] 12/06/2017  Glaucoma 12/06/2017  Essential hypertension, benign 04/16/2012 CONSULTATIONS: 
IP CONSULT TO GASTROENTEROLOGY 
IP CONSULT TO GASTROENTEROLOGY 
IP CONSULT TO HOSPITALIST Excerpted HPI from H&P of Amira Calloway MD: \"The patient is a 61-year-old  male who has previous history significant for coronary artery disease, hypertension, diabetes, chronic kidney disease, history of GI bleed in the past secondary to AVMs and gastritis presented to the emergency room from [facility]. The patient tells me that he has been noticing melanotic stools for the last 4 days, but his blood pressure and his blood work has been fine. Last night he woke up and was walking when he had a syncopal episode and was brought to the emergency room. According to the ER physician, he had 2 episodes of coffee-ground emesis in ER. He was given 1 L of fluids and Protonix and blood transfusion has been ordered. The patient denies having any abdominal pain, chest pain or shortness of breath. He denies having any fever or chills. In the emergency room, his hemoglobin was 7.4. The patient has no other complaints. \" 
______________________________________________________________________ DISCHARGE SUMMARY/HOSPITAL COURSE:  for full details see H&P, daily progress notes, labs, consult notes. Roselia Hercules y.o. Cindi Areas admitted to [facility] on 3/25/2020 and treated for the following medical complaints:  
 
GI bleed, POA Acute blood loss anemia, POA  
 Hx of extensive diverticulosis especially in the sigmoid colon · Appreciate GI input · S/p EGD yesterday:  Post procedure diagnosis- gastric AV malformation and anemia d/t chronic blood loss · Hgb 8.2; s/p one unit PRBC, received additional unit during colonoscopy · Continue PPI · Continue 14 day course of Carafate QID · Resume ASA in 3 days · CT abd/pel:  Small bilateral pleural effusions. No acute intraperitoneal process. 
  
CKD IV 
· Cr 2.90; baseline ~2.50 · Monitor · Avoid nephrotoxins · Continue sodium bicarb PO 
· Follow-up with nephrology OP 
  
HTN 
· Continue coreg and lasix  
  
Glaucoma · Continue gtts Patient's plan of care has been reviewed with them. Patient and/or family have verbally conveyed their understanding and agreement of the patient's signs, symptoms, diagnosis, treatment and prognosis and additionally agree to follow up as recommended or return to [facility] should their condition change prior to follow-up. Discharge instructions have also been provided to the patient with some educational information regarding their diagnosis as well a list of reasons why they would want to return to the office prior to their follow-up appointment should their condition change. [facility] to avoid frequent ED visits. [facility] can treat; pains, sprains, cuts, wounds, high fevers, upper respiratory infections and much more. There medical team is equipped with all the tools necessary to provide advanced medical care in the comfort of you home, workplace, or location of need. The medical team consists of doctors, nurse practitioners, and EMTs. [facility] is available 7 days per week 9 am to 9 pm.   Request care by calling [phone] or by going online at [employer] [facility] 
_______________________________________________________________________ Patient seen and examined by me on discharge day. Pertinent Findings: Gen:    Not in distress Chest: Clear lungs CVS:   Regular rhythm. No edema Abd:  Soft, not distended, not tender Neuro:  Alert, oriented 
_______________________________________________________________________ DISCHARGE MEDICATIONS:  
Current Discharge Medication List  
  
CONTINUE these medications which have CHANGED Details  
sucralfate (CARAFATE) 1 gram tablet Take 1 Tab by mouth four (4) times daily for 14 days. Indications: medication treatment for healing stomach ulcer 
Qty: 56 Tab, Refills: 0 CONTINUE these medications which have NOT CHANGED Details  
amoxicillin (AMOXIL) 875 mg tablet Take 1 Tab by mouth two (2) times a day for 10 days. Qty: 20 Tab, Refills: 0  
  
!! acetaminophen (TYLENOL) 325 mg tablet Take 2 Tabs by mouth every four (4) hours as needed for Pain. Qty: 20 Tab, Refills: 0  
  
lidocaine (LIDODERM) 5 % Apply patch to the affected area for 12 hours a day and remove for 12 hours a day. Qty: 7 Each, Refills: 1  
  
!! acetaminophen (TYLENOL) 500 mg tablet Take 1 Tab by mouth every six (6) hours as needed for Pain. Qty: 60 Tab, Refills: 0  
  
omeprazole (PRILOSEC) 20 mg capsule Take 1 Cap by mouth ACB/HS. Qty: 60 Cap, Refills: 0  
  
carvedilol (COREG) 3.125 mg tablet Take 1 Tab by mouth two (2) times daily (with meals). Qty: 180 Tab, Refills: 3  
  
aspirin 81 mg chewable tablet Take 81 mg by mouth daily. May continue in 3 days   
  
multivitamin with iron tablet Take 1 Tab by mouth daily. bisacodyl (DULCOLAX) 10 mg suppository Insert 10 mg into rectum daily. lactase (LACTAID) 3,000 unit tablet Take 1 Tab by mouth three (3) times daily (with meals). Qty: 90 Tab, Refills: 3 Associated Diagnoses: Lactose intolerance  
  
furosemide (LASIX) 20 mg tablet Take 20 mg by mouth daily. pollen extracts (PROSTAT PO) Take 30 mL by mouth two (2) times a day. senna-docusate (SENNA WITH DOCUSATE SODIUM) 8.6-50 mg per tablet Take 1 Tab by mouth daily. sodium bicarbonate 650 mg tablet Take 650 mg by mouth every evening. Crush one tablet and mix with cranberry juice after evening meal  
  
brinzolamide (AZOPT) 1 % ophthalmic suspension Administer 1 Drop to both eyes two (2) times a day. Qty: 15 mL, Refills: 3  
  
vit A/vit C/vit E/zinc/copper (ICAPS AREDS PO) Take 1 Cap by mouth daily. latanoprost (XALATAN) 0.005 % ophthalmic solution Administer 1 Drop to both eyes nightly. !! - Potential duplicate medications found. Please discuss with provider. Patient Follow Up Instructions: Activity: Activity as tolerated Diet: Regular Diet Wound Care: None needed Follow-up with PCP in 1 week. Follow-up Information Follow up With Specialties Details Why Contact Info Saravanan Koroma MD Internal Medicine   [address] 
[phone] Sandra Chilel MD Gastroenterology Schedule an appointment as soon as possible for a visit  [address] [place] [address] [phone] 
[phone] 
  
  
 
________________________________________________________________ Risk of deterioration: Low 
 
Condition at Discharge:  Stable 
__________________________________________________________________ Disposition Home with family, no needs 
 
____________________________________________________________________ Code Status: DNR/DNI 
___________________________________________________________________ Total time in minutes spent coordinating this discharge (includes going over instructions, follow-up, prescriptions, and preparing report for sign off to her PCP) :  31 minutes Signed: 
Darian Sim NP

## 2020-03-27 NOTE — PROGRESS NOTES
GI Progress Note Gladys Cody NAME:Armen Jerome TSX:[id number]/[id number] OKV:[id number] Yg Jaffe MD  PCP: Jyoti De Leon MD 
Date/Time:  3/27/2020 9:13 AM  
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  
· BID PPI x 2 wks, then 1 PO QAM 
· Carafate 1g Table PO QID x 10d total 
· Consider for Procrit as OP · Recheck H/H in 1 wk and determine timing of repeat ablative therapies · Advance diet Consider for d/c hme today. Will sign off Subjective:  
Discussed with RN events overnight. Feels well. No bleeding, Tolerated clears Complaint Y/N Description Abdominal Pain n Hematemesis n Hematochezia n Melena n   
Constipation n   
Diarrhea n Dyspepsia n Dysphagia n   
Jaundiced n   
Nausea/vomiting n Review of Systems: 
Symptom Y/N Comments  Symptom Y/N Comments Fever/Chills n   Chest Pain n   
Cough n   Headaches n Sputum n   Joint Pain n   
SOB/HAMILTON n   Pruritis/Rash n Tolerating Diet y CLD  Other Could NOT obtain due to:   
 
Objective: VITALS:  
Last 24hrs VS reviewed since prior progress note. Most recent are: 
Visit Vitals /67 Pulse 67 Temp 97.9 °F (36.6 °C) Resp 18 Ht 5' 7\" (1.702 m) Wt 76.2 kg (168 lb 1.6 oz) SpO2 98% BMI 26.33 kg/m² Intake/Output Summary (Last 24 hours) at 3/27/2020 3816 Last data filed at 3/26/2020 2243 Gross per 24 hour Intake 3070 ml Output 700 ml Net 2370 ml PHYSICAL EXAM: 
General: WD, WN. Alert, cooperative, no acute distress   
HEENT: NC, Atraumatic. PERRL. Anicteric sclerae. Lungs:  CTA Bilaterally. No Wheezing/Rhonchi/Rales. Heart:  Regular  rhythm,  /Rub/Gallops Abdomen: Soft, Non distended, Non tender.  +BS Extremities: No c/c/e Neurologic:  CN 2-12 gi, A/O X 3. No acute neurological distress Psych:   Good insight. Not anxious nor agitated. Lab and Radiology Data Reviewed: (see below) Medications Reviewed: (see below) PMH/SH reviewed - no change compared to H&P 
________________________________________________________________________ Total time spent with patient: 15 minutes ________________________________________________________________________ Care Plan discussed with: 
Patient y Family RN y  
           Consultant:    
 
Audra Lennox, MD  
 
Procedures: see electronic medical records for all procedures/Xrays and details which were not copied into this note but were reviewed prior to creation of Plan. LABS: 
Recent Labs  
  03/27/20 0310 03/26/20 
6303 WBC 7.8 9.0 HGB 8.2* 7.0*  
HCT 25.6* 22.3*  
 201 Recent Labs  
  03/27/20 0310 03/26/20 
4326 03/25/20 
5105 * 149* 144  
K 4.2 4.6 4.9  
* 123* 115* CO2 20* 18* 22 BUN 61* 64* 65* CREA 2.90* 2.80* 2.85* * 123* 175* CA 7.9* 7.8* 7.7* Recent Labs  
  03/26/20 0318 03/25/20 
2990 SGOT 20 22 AP 68 93  
TP 5.0* 5.6* ALB 2.4* 2.7*  
GLOB 2.6 2.9 Recent Labs  
  03/25/20 
5664 INR 1.1 PTP 11.7* APTT 23.1 No results for input(s): FE, TIBC, PSAT, FERR in the last 72 hours. No results found for: FOL, RBCF No results for input(s): PH, PCO2, PO2 in the last 72 hours. Recent Labs  
  03/25/20 
9626 CPK 69 Lab Results Component Value Date/Time  Color YELLOW/STRAW 11/27/2019 06:49 PM  
 Appearance CLEAR 11/27/2019 06:49 PM  
 Specific gravity 1.014 11/27/2019 06:49 PM  
 Specific gravity 1.020 07/19/2019 11:33 AM  
 pH (UA) 6.0 11/27/2019 06:49 PM  
 Protein TRACE (A) 11/27/2019 06:49 PM  
 Glucose NEGATIVE  11/27/2019 06:49 PM  
 Ketone NEGATIVE  11/27/2019 06:49 PM  
 Bilirubin NEGATIVE  11/27/2019 06:49 PM  
 Urobilinogen 0.2 11/27/2019 06:49 PM  
 Nitrites NEGATIVE  11/27/2019 06:49 PM  
 Leukocyte Esterase NEGATIVE  11/27/2019 06:49 PM  
 Epithelial cells FEW 11/27/2019 06:49 PM  
 Bacteria NEGATIVE  11/27/2019 06:49 PM  
 WBC 0-4 11/27/2019 06:49 PM  
 RBC 0-5 11/27/2019 06:49 PM  
 
 
MEDICATIONS: 
Current Facility-Administered Medications Medication Dose Route Frequency  dextrose 5 % - 0.45% NaCl infusion  75 mL/hr IntraVENous CONTINUOUS  
 sucralfate (CARAFATE) tablet 1 g  1 g Oral QID  sodium chloride (NS) flush 5-40 mL  5-40 mL IntraVENous Q8H  
 sodium chloride (NS) flush 5-40 mL  5-40 mL IntraVENous PRN  
 0.9% sodium chloride infusion 250 mL  250 mL IntraVENous PRN  
 dorzolamide (TRUSOPT) 2 % ophthalmic solution 1 Drop  1 Drop Both Eyes BID  carvediloL (COREG) tablet 3.125 mg  3.125 mg Oral BID WITH MEALS  furosemide (LASIX) tablet 20 mg  20 mg Oral DAILY  latanoprost (XALATAN) 0.005 % ophthalmic solution 1 Drop  1 Drop Both Eyes QHS  lidocaine 4 % patch 1 Patch  1 Patch TransDERmal DAILY  sodium bicarbonate tablet 650 mg  650 mg Oral QPM  
 pantoprazole (PROTONIX) 40 mg in 0.9% sodium chloride 10 mL injection  40 mg IntraVENous Q12H  
 ondansetron (ZOFRAN) injection 4 mg  4 mg IntraVENous Q6H PRN

## 2020-03-27 NOTE — PROGRESS NOTES
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
## 2020-03-27 NOTE — PROGRESS NOTES
Problem: Falls - Risk of 
Goal: *Absence of Falls Description: Document Aleja Steele Fall Risk and appropriate interventions in the flowsheet. Outcome: Progressing Towards Goal 
Note: Fall Risk Interventions: 
Mobility Interventions: Bed/chair exit alarm, Patient to call before getting OOB, PT Consult for mobility concerns, PT Consult for assist device competence Medication Interventions: Evaluate medications/consider consulting pharmacy, Patient to call before getting OOB, Teach patient to arise slowly History of Falls Interventions: Assess for delayed presentation/identification of injury for 48 hrs (comment for end date), Bed/chair exit alarm Problem: Patient Education: Go to Patient Education Activity Goal: Patient/Family Education Outcome: Progressing Towards Goal 
  
Problem: Patient Education: Go to Patient Education Activity Goal: Patient/Family Education Outcome: Progressing Towards Goal 
  
Problem: Upper and Lower GI Bleed: Day 1 Goal: Off Pathway (Use only if patient is Off Pathway) Outcome: Progressing Towards Goal 
Goal: Activity/Safety Outcome: Progressing Towards Goal 
Goal: Consults, if ordered Outcome: Progressing Towards Goal 
Goal: Diagnostic Test/Procedures Outcome: Progressing Towards Goal 
Goal: Nutrition/Diet Outcome: Progressing Towards Goal 
Goal: Discharge Planning Outcome: Progressing Towards Goal 
Goal: Medications Outcome: Progressing Towards Goal 
Goal: Respiratory Outcome: Progressing Towards Goal 
Goal: Treatments/Interventions/Procedures Outcome: Progressing Towards Goal 
Goal: Psychosocial 
Outcome: Progressing Towards Goal 
Goal: *Optimal pain control at patient's stated goal 
Outcome: Progressing Towards Goal 
Goal: *Hemodynamically stable Outcome: Progressing Towards Goal 
Goal: *Demonstrates progressive activity Outcome: Progressing Towards Goal 
  
Problem: Upper and Lower GI Bleed: Day 2 
 Goal: Off Pathway (Use only if patient is Off Pathway) Outcome: Progressing Towards Goal 
Goal: Activity/Safety Outcome: Progressing Towards Goal 
Goal: Consults, if ordered Outcome: Progressing Towards Goal 
Goal: Diagnostic Test/Procedures Outcome: Progressing Towards Goal 
Goal: Nutrition/Diet Outcome: Progressing Towards Goal 
Goal: Discharge Planning Outcome: Progressing Towards Goal 
Goal: Medications Outcome: Progressing Towards Goal 
Goal: Respiratory Outcome: Progressing Towards Goal 
Goal: Treatments/Interventions/Procedures Outcome: Progressing Towards Goal 
Goal: Psychosocial 
Outcome: Progressing Towards Goal 
Goal: *Optimal pain control at patient's stated goal 
Outcome: Progressing Towards Goal 
Goal: *Hemodynamically stable Outcome: Progressing Towards Goal 
Goal: *Tolerating diet Outcome: Progressing Towards Goal 
Goal: *Demonstrates progressive activity Outcome: Progressing Towards Goal 
  
Problem: Upper and Lower GI Bleed: Day 3 Goal: Off Pathway (Use only if patient is Off Pathway) Outcome: Progressing Towards Goal 
Goal: Activity/Safety Outcome: Progressing Towards Goal 
Goal: Diagnostic Test/Procedures Outcome: Progressing Towards Goal 
Goal: Nutrition/Diet Outcome: Progressing Towards Goal 
Goal: Discharge Planning Outcome: Progressing Towards Goal 
Goal: Medications Outcome: Progressing Towards Goal 
Goal: Treatments/Interventions/Procedures Outcome: Progressing Towards Goal 
Goal: Psychosocial 
Outcome: Progressing Towards Goal 
  
Problem: Upper and Lower GI Bleed:  Discharge Outcomes Goal: *Hemodynamically stable Outcome: Progressing Towards Goal 
Goal: *Lungs clear or at baseline Outcome: Progressing Towards Goal 
Goal: *Demonstrates independent activity or return to baseline Outcome: Progressing Towards Goal 
Goal: *Pain is controlled to three or less Outcome: Progressing Towards Goal 
Goal: *Verbalizes understanding and describes prescribed diet Outcome: Progressing Towards Goal 
Goal: *Tolerating diet Outcome: Progressing Towards Goal 
Goal: *Verbalizes name, dosage, time, side effects, and number of days to continue medications Outcome: Progressing Towards Goal 
Goal: *Anxiety reduced or absent Outcome: Progressing Towards Goal 
Goal: *Understands and describes signs and symptoms to report to providers(Stroke Metric) Outcome: Progressing Towards Goal 
Goal: *Describes follow-up/return visits to physicians Outcome: Progressing Towards Goal 
Goal: *Describes available resources and support systems Outcome: Progressing Towards Goal 
  
Problem: Falls - Risk of 
Goal: *Absence of Falls Description: Document Aleja Steele Fall Risk and appropriate interventions in the flowsheet. Outcome: Progressing Towards Goal 
Note: Fall Risk Interventions: 
Mobility Interventions: Bed/chair exit alarm, Patient to call before getting OOB, PT Consult for mobility concerns, PT Consult for assist device competence Medication Interventions: Evaluate medications/consider consulting pharmacy, Patient to call before getting OOB, Teach patient to arise slowly History of Falls Interventions: Assess for delayed presentation/identification of injury for 48 hrs (comment for end date), Bed/chair exit alarm Problem: Patient Education: Go to Patient Education Activity Goal: Patient/Family Education Outcome: Progressing Towards Goal

## 2020-03-27 NOTE — PROGRESS NOTES
Bedside and verbal shift change report given to Areli Max (oncoming nurse) by Amador Larkin RN (offgoing nurse). Report included the following information SBAR, Kardex, Procedure Summary, Intake/Output, Cardiac Rhythm paced and Quality Measures. Continued care post EGD. No significant events overnight.

## 2020-03-28 ENCOUNTER — PATIENT OUTREACH (OUTPATIENT)
Dept: INTERNAL MEDICINE CLINIC | Age: 85
End: 2020-03-28

## 2020-03-28 NOTE — PROGRESS NOTES
Patient contacted regarding recent discharge and COVID-19 risk Care Transition Nurse/ Ambulatory Care Manager contacted the family by telephone to perform post discharge assessment. Verified name and  with family as identifiers. Patient has following risk factors of: immunocompromised. CTN/ACM reviewed discharge instructions, medical action plan and red flags related to discharge diagnosis. Reviewed and educated them on any new and changed medications related to discharge diagnosis. Advised obtaining a 90-day supply of all daily and as-needed medications. Education provided regarding infection prevention, and signs and symptoms of COVID-19 and when to seek medical attention with family who verbalized understanding. Discussed exposure protocols and quarantine from [address] you at higher risk for severe illness  and given an opportunity for questions and concerns. The family agrees to contact the COVID-19 hotline [phone] or PCP office for questions related to their healthcare. CTN/ACM provided contact information for future reference. From CDC: Are you at higher risk for severe illness?  Wash your hands often.  Avoid close contact (6 feet, which is about two arm lengths) with people who are sick.  Put distance between yourself and other people if COVID-19 is spreading in your community.  Clean and disinfect frequently touched surfaces.  Avoid all cruise travel and non-essential air travel.  Call your healthcare professional if you have concerns about COVID-19 and your underlying condition or if you are sick. For more information on steps you can take to protect yourself, see CDC's How to Protect Yourself

## 2020-03-30 ENCOUNTER — TELEPHONE (OUTPATIENT)
Dept: INTERNAL MEDICINE CLINIC | Age: 85
End: 2020-03-30

## 2020-03-30 NOTE — TELEPHONE ENCOUNTER
Jeff Lezama with [facility] would like a clarification about Mr Brenda Spar diet. Should he continue his clear liquid diet or can he return to a normal diet?

## 2020-04-13 ENCOUNTER — PATIENT OUTREACH (OUTPATIENT)
Dept: INTERNAL MEDICINE CLINIC | Age: 85
End: 2020-04-13

## 2020-04-16 ENCOUNTER — HOSPITAL ENCOUNTER (OUTPATIENT)
Dept: INFUSION THERAPY | Age: 85
Discharge: HOME | End: 2020-04-16

## 2020-04-22 ENCOUNTER — VIRTUAL VISIT (OUTPATIENT)
Dept: INTERNAL MEDICINE CLINIC | Age: 85
End: 2020-04-22

## 2020-04-22 VITALS
DIASTOLIC BLOOD PRESSURE: 69 MMHG | RESPIRATION RATE: 18 BRPM | HEART RATE: 66 BPM | BODY MASS INDEX: 26.33 KG/M2 | SYSTOLIC BLOOD PRESSURE: 159 MMHG | OXYGEN SATURATION: 98 % | TEMPERATURE: 97.1 F | HEIGHT: 67 IN

## 2020-04-22 DIAGNOSIS — I10 ESSENTIAL HYPERTENSION, BENIGN: Primary | ICD-10-CM

## 2020-04-22 DIAGNOSIS — N18.4 CHRONIC KIDNEY DISEASE, STAGE IV (SEVERE) (HCC): ICD-10-CM

## 2020-04-22 DIAGNOSIS — K92.1 MELENA: ICD-10-CM

## 2020-04-22 DIAGNOSIS — Z95.0 CARDIAC PACEMAKER IN SITU: ICD-10-CM

## 2020-04-22 DIAGNOSIS — E11.21 TYPE 2 DIABETES MELLITUS WITH NEPHROPATHY (HCC): ICD-10-CM

## 2020-04-22 DIAGNOSIS — E11.9 TYPE 2 DIABETES MELLITUS WITHOUT COMPLICATION, WITHOUT LONG-TERM CURRENT USE OF INSULIN (HCC): ICD-10-CM

## 2020-04-22 DIAGNOSIS — I25.10 ASCVD (ARTERIOSCLEROTIC CARDIOVASCULAR DISEASE): ICD-10-CM

## 2020-04-22 RX ORDER — TRAMADOL HYDROCHLORIDE 50 MG/1
50 TABLET ORAL
COMMUNITY
End: 2020-01-01
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
## 2020-04-22 NOTE — PROGRESS NOTES
Kartik Márquez presents today at the clinic for Chief Complaint Patient presents with  Diabetes  
  follow up  Hypertension  
  follow up  Chronic Kidney Disease  
  follow up Wt Readings from Last 3 Encounters:  
03/25/20 168 lb 1.6 oz (76.2 kg) 01/10/20 165 lb 12.8 oz (75.2 kg)  
11/19/19 169 lb 12.8 oz (77 kg) Temp Readings from Last 3 Encounters:  
04/22/20 97.1 °F (36.2 °C) (Oral) 03/27/20 (!) 32 °F (0 °C)  
01/10/20 97.7 °F (36.5 °C) (Oral) BP Readings from Last 3 Encounters:  
04/22/20 159/69  
03/27/20 147/67  
01/10/20 122/58 Pulse Readings from Last 3 Encounters:  
04/22/20 66  
03/27/20 68  
01/10/20 62 Health Maintenance Due Topic  Foot Exam Q1   
 Eye Exam Retinal or Dilated  DTaP/Tdap/Td series (1 - Tdap)  Shingrix Vaccine Age 50> (1 of 2)  GLAUCOMA SCREENING Q2Y  Pneumococcal 65+ years (1 of 1 - PPSV23)  Medicare Yearly Exam   
 
 
 
Learning Assessment: 
:  
 
Learning Assessment 9/7/2017 2/25/2014 PRIMARY LEARNER Patient Patient HIGHEST LEVEL OF EDUCATION - PRIMARY LEARNER  - GRADUATED HIGH SCHOOL OR GED  
BARRIERS PRIMARY LEARNER - HEARING  
CO-LEARNER CAREGIVER - Yes [facility] HIGHEST LEVEL OF EDUCATION - GRADUATED HIGH SCHOOL OR GED  
BARRIERS CO-LEARNER - HEARING  
PRIMARY LANGUAGE ENGLISH ENGLISH  
PRIMARY LANGUAGE CO-LEARNER - ENGLISH  NEED - No  
LEARNER PREFERENCE PRIMARY READING OTHER (COMMENT) LEARNER PREFERENCE CO-LEARNER - OTHER (COMMENT) LEARNING SPECIAL TOPICS - DNR papers ANSWERED BY self patient RELATIONSHIP SELF SELF Depression Screening: 
:  
 
3 most recent PHQ Screens 11/19/2019 Little interest or pleasure in doing things Not at all Feeling down, depressed, irritable, or hopeless Not at all Total Score PHQ 2 0 Fall Risk Assessment: 
:  
 
Fall Risk Assessment, last 12 mths 11/19/2019 Able to walk? Yes Fall in past 12 months? -  
 Fall with injury? -  
Number of falls in past 12 months - Fall Risk Score -  
 
 
Abuse Screening: 
:  
 
Abuse Screening Questionnaire 7/19/2019 4/23/2019 9/18/2018 9/7/2017 Do you ever feel afraid of your partner? N N N N Are you in a relationship with someone who physically or mentally threatens you? N N N N Is it safe for you to go home?  Arias Sena

## 2020-04-23 ENCOUNTER — APPOINTMENT (OUTPATIENT)
Dept: INFUSION THERAPY | Age: 85
End: 2020-04-23

## 2020-05-20 ENCOUNTER — VIRTUAL VISIT (OUTPATIENT)
Dept: CARDIOLOGY CLINIC | Age: 85
End: 2020-05-20

## 2020-05-20 VITALS — HEIGHT: 67 IN | WEIGHT: 170 LBS | BODY MASS INDEX: 26.68 KG/M2

## 2020-05-20 DIAGNOSIS — I25.10 ASCVD (ARTERIOSCLEROTIC CARDIOVASCULAR DISEASE): Primary | ICD-10-CM

## 2020-05-20 DIAGNOSIS — I10 ESSENTIAL HYPERTENSION, BENIGN: ICD-10-CM

## 2020-05-20 DIAGNOSIS — Z95.1 POSTSURGICAL AORTOCORONARY BYPASS STATUS: ICD-10-CM

## 2020-05-20 DIAGNOSIS — Z95.0 CARDIAC PACEMAKER IN SITU: ICD-10-CM

## 2020-05-20 DIAGNOSIS — E78.2 MIXED HYPERLIPIDEMIA: ICD-10-CM
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
## 2020-06-26 ENCOUNTER — VIRTUAL VISIT (OUTPATIENT)
Dept: CARDIOLOGY CLINIC | Age: 85
End: 2020-06-26

## 2020-06-26 DIAGNOSIS — Z95.0 CARDIAC PACEMAKER IN SITU: ICD-10-CM

## 2020-06-26 DIAGNOSIS — I49.5 SSS (SICK SINUS SYNDROME) (HCC): ICD-10-CM

## 2020-06-26 DIAGNOSIS — I10 ESSENTIAL HYPERTENSION, BENIGN: ICD-10-CM

## 2020-06-26 DIAGNOSIS — I44.2 CHB (COMPLETE HEART BLOCK) (HCC): Primary | ICD-10-CM
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
## 2020-08-28 NOTE — TELEPHONE ENCOUNTER
PCP: Ann-Marie Oviedo MD    Last appt: Visit date not found  Future Appointments   Date Time Provider Cristian Alvarez   10/22/2020 10:15 AM Ann-Marie Oviedo MD [facility]       Requested Prescriptions     Pending Prescriptions Disp Refills    lactase (Lactaid) 3,000 unit tablet 90 Tab 3     Sig: Take 1 Tab by mouth three (3) times daily (with meals).

## 2020-10-26 NOTE — PATIENT INSTRUCTIONS
Medicare Wellness Visit, Male The best way to live healthy is to have a lifestyle where you eat a well-balanced diet, exercise regularly, limit alcohol use, and quit all forms of tobacco/nicotine, if applicable. Regular preventive services are another way to keep healthy. Preventive services (vaccines, screening tests, monitoring & exams) can help personalize your care plan, which helps you manage your own care. Screening tests can find health problems at the earliest stages, when they are easiest to treat. [facility] follows the current, evidence-based guidelines published by the [facility] Naveen Karlie ([facility]) when recommending preventive services for our patients. Because we follow these guidelines, sometimes recommendations change over time as research supports it. (For example, a prostate screening blood test is no longer routinely recommended for men with no symptoms). Of course, you and your doctor may decide to screen more often for some diseases, based on your risk and co-morbidities (chronic disease you are already diagnosed with). Preventive services for you include: - Medicare offers their members a free annual wellness visit, which is time for you and your primary care provider to discuss and plan for your preventive service needs. Take advantage of this benefit every year! 
-All adults over age 72 should receive the recommended pneumonia vaccines. Current USPSTF guidelines recommend a series of two vaccines for the best pneumonia protection.  
-All adults should have a flu vaccine yearly and tetanus vaccine every 10 years. 
-All adults age 48 and older should receive the shingles vaccines (series of two vaccines).       
-All adults age 38-68 who are overweight should have a diabetes screening test once every three years.  
-Other screening tests & preventive services for persons with diabetes include: an eye exam to screen for diabetic retinopathy, a kidney function test, a foot exam, and stricter control over your cholesterol.  
-Cardiovascular screening for adults with routine risk involves an electrocardiogram (ECG) at intervals determined by the provider.  
-Colorectal cancer screening should be done for adults age 54-65 with no increased risk factors for colorectal cancer. There are a number of acceptable methods of screening for this type of cancer. Each test has its own benefits and drawbacks. Discuss with your provider what is most appropriate for you during your annual wellness visit. The different tests include: colonoscopy (considered the best screening method), a fecal occult blood test, a fecal DNA test, and sigmoidoscopy. 
-All adults [place] [facility] should be screened once for Hepatitis C. 
-An Abdominal Aortic Aneurysm (AAA) Screening is recommended for men age 73-68 who has ever smoked in their lifetime. Here is a list of your current Health Maintenance items (your personalized list of preventive services) with a due date: 
Health Maintenance Due Topic Date Due  
 Diabetic Foot Care  04/23/1931  Eye Exam  04/23/1931  
 DTaP/Tdap/Td  (1 - Tdap) 04/23/1942  Shingles Vaccine (1 of 2) 04/23/1971  Glaucoma Screening   04/23/1986  Pneumococcal Vaccine (1 of 1 - PPSV23) 04/23/1986  Pneumococcal Vaccine 65+ years at Risk (1 of 2 - PCV13) 04/23/1986 Charisse Vaughn Annual Well Visit  07/24/2018  Albumin Urine Test  07/19/2020  Yearly Flu Vaccine (1) 09/01/2020

## 2020-10-26 NOTE — PROGRESS NOTES
Gary Armando presents virtually today for Chief Complaint Patient presents with  Hypertension  
  follow up  GERD  
  follow up  Chronic Kidney Disease  
  follow up  Diabetes  
  follow up Wt Readings from Last 3 Encounters:  
05/20/20 170 lb (77.1 kg) 03/25/20 168 lb 1.6 oz (76.2 kg) 01/10/20 165 lb 12.8 oz (75.2 kg) Temp Readings from Last 3 Encounters:  
04/22/20 97.1 °F (36.2 °C) (Oral) 03/27/20 (!) 32 °F (0 °C)  
01/10/20 97.7 °F (36.5 °C) (Oral) BP Readings from Last 3 Encounters:  
04/22/20 159/69  
03/27/20 147/67  
01/10/20 122/58 Pulse Readings from Last 3 Encounters:  
04/22/20 66  
03/27/20 68  
01/10/20 62 Health Maintenance Due Topic  Foot Exam Q1   
 Eye Exam Retinal or Dilated  DTaP/Tdap/Td series (1 - Tdap)  Shingrix Vaccine Age 50> (1 of 2)  GLAUCOMA SCREENING Q2Y  Pneumococcal 65+ years (1 of 1 - PPSV23)  Pneumococcal 65+ yrs at Risk Vaccine (1 of 2 - PCV13)  Medicare Yearly Exam   
 MICROALBUMIN Q1   
 Flu Vaccine (1) Learning Assessment: 
:  
 
Learning Assessment 9/7/2017 2/25/2014 PRIMARY LEARNER Patient Patient HIGHEST LEVEL OF EDUCATION - PRIMARY LEARNER  - GRADUATED HIGH SCHOOL OR GED  
BARRIERS PRIMARY LEARNER - HEARING  
CO-LEARNER CAREGIVER - Yes [facility] HIGHEST LEVEL OF EDUCATION - GRADUATED HIGH SCHOOL OR GED  
BARRIERS CO-LEARNER - HEARING  
PRIMARY LANGUAGE ENGLISH ENGLISH  
PRIMARY LANGUAGE CO-LEARNER - ENGLISH  NEED - No  
LEARNER PREFERENCE PRIMARY READING OTHER (COMMENT) LEARNER PREFERENCE CO-LEARNER - OTHER (COMMENT) LEARNING SPECIAL TOPICS - DNR papers ANSWERED BY self patient RELATIONSHIP SELF SELF Depression Screening: 
:  
 
3 most recent PHQ Screens 11/19/2019 Little interest or pleasure in doing things Not at all Feeling down, depressed, irritable, or hopeless Not at all Total Score PHQ 2 0  
 
 
 Fall Risk Assessment: 
:  
 
Fall Risk Assessment, last 12 mths 11/19/2019 Able to walk? Yes Fall in past 12 months? -  
Fall with injury? -  
Number of falls in past 12 months - Fall Risk Score -  
 
 
Abuse Screening: 
:  
 
Abuse Screening Questionnaire 7/19/2019 4/23/2019 9/18/2018 9/7/2017 Do you ever feel afraid of your partner? N N N N Are you in a relationship with someone who physically or mentally threatens you? N N N N Is it safe for you to go home? Jennifer Alvarez Coordination of Care Questionnaire: 
:  
 
1. Have you been to the ER, urgent care clinic since your last visit? Hospitalized since your last visit? no 
 
2. Have you seen or consulted any other health care providers outside of the [address] [place] [address] since your last visit? Include any pap smears or colon screening.  no
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
## 2021-01-01 ENCOUNTER — OFFICE VISIT (OUTPATIENT)
Dept: CARDIOLOGY CLINIC | Age: 86
End: 2021-01-01
Payer: MEDICARE

## 2021-01-01 ENCOUNTER — HOSPITAL ENCOUNTER (INPATIENT)
Age: 86
LOS: 2 days | DRG: 085 | End: 2021-08-06
Attending: EMERGENCY MEDICINE | Admitting: INTERNAL MEDICINE
Payer: MEDICARE

## 2021-01-01 ENCOUNTER — TELEPHONE (OUTPATIENT)
Dept: CARDIOLOGY CLINIC | Age: 86
End: 2021-01-01

## 2021-01-01 ENCOUNTER — APPOINTMENT (OUTPATIENT)
Dept: CT IMAGING | Age: 86
DRG: 085 | End: 2021-01-01
Attending: EMERGENCY MEDICINE
Payer: MEDICARE

## 2021-01-01 ENCOUNTER — APPOINTMENT (OUTPATIENT)
Dept: CT IMAGING | Age: 86
DRG: 085 | End: 2021-01-01
Attending: PSYCHIATRY & NEUROLOGY
Payer: MEDICARE

## 2021-01-01 ENCOUNTER — APPOINTMENT (OUTPATIENT)
Dept: CT IMAGING | Age: 86
DRG: 085 | End: 2021-01-01
Attending: INTERNAL MEDICINE
Payer: MEDICARE

## 2021-01-01 ENCOUNTER — ANCILLARY PROCEDURE (OUTPATIENT)
Dept: CARDIOLOGY CLINIC | Age: 86
End: 2021-01-01
Payer: MEDICARE

## 2021-01-01 ENCOUNTER — OFFICE VISIT (OUTPATIENT)
Dept: INTERNAL MEDICINE CLINIC | Age: 86
End: 2021-01-01
Payer: MEDICARE

## 2021-01-01 ENCOUNTER — TELEPHONE (OUTPATIENT)
Dept: INTERNAL MEDICINE CLINIC | Age: 86
End: 2021-01-01

## 2021-01-01 ENCOUNTER — APPOINTMENT (OUTPATIENT)
Dept: GENERAL RADIOLOGY | Age: 86
DRG: 085 | End: 2021-01-01
Attending: EMERGENCY MEDICINE
Payer: MEDICARE

## 2021-01-01 VITALS
DIASTOLIC BLOOD PRESSURE: 80 MMHG | WEIGHT: 169 LBS | HEART RATE: 60 BPM | RESPIRATION RATE: 16 BRPM | OXYGEN SATURATION: 98 % | BODY MASS INDEX: 25.03 KG/M2 | SYSTOLIC BLOOD PRESSURE: 140 MMHG | HEIGHT: 69 IN

## 2021-01-01 VITALS
RESPIRATION RATE: 16 BRPM | WEIGHT: 171.7 LBS | BODY MASS INDEX: 26.95 KG/M2 | OXYGEN SATURATION: 94 % | HEART RATE: 68 BPM | SYSTOLIC BLOOD PRESSURE: 170 MMHG | DIASTOLIC BLOOD PRESSURE: 80 MMHG | HEIGHT: 67 IN

## 2021-01-01 VITALS
RESPIRATION RATE: 18 BRPM | DIASTOLIC BLOOD PRESSURE: 84 MMHG | SYSTOLIC BLOOD PRESSURE: 138 MMHG | HEART RATE: 61 BPM | OXYGEN SATURATION: 97 % | WEIGHT: 165 LBS | HEIGHT: 69 IN | BODY MASS INDEX: 24.44 KG/M2

## 2021-01-01 VITALS
HEIGHT: 68 IN | BODY MASS INDEX: 24.71 KG/M2 | DIASTOLIC BLOOD PRESSURE: 59 MMHG | OXYGEN SATURATION: 95 % | SYSTOLIC BLOOD PRESSURE: 141 MMHG | WEIGHT: 163 LBS | TEMPERATURE: 94.7 F

## 2021-01-01 VITALS
RESPIRATION RATE: 18 BRPM | HEART RATE: 61 BPM | OXYGEN SATURATION: 99 % | BODY MASS INDEX: 24.5 KG/M2 | SYSTOLIC BLOOD PRESSURE: 162 MMHG | DIASTOLIC BLOOD PRESSURE: 88 MMHG | WEIGHT: 165.4 LBS | HEIGHT: 69 IN

## 2021-01-01 VITALS
BODY MASS INDEX: 26.84 KG/M2 | DIASTOLIC BLOOD PRESSURE: 80 MMHG | SYSTOLIC BLOOD PRESSURE: 170 MMHG | HEIGHT: 67 IN | WEIGHT: 171 LBS

## 2021-01-01 DIAGNOSIS — E78.2 MIXED HYPERLIPIDEMIA: ICD-10-CM

## 2021-01-01 DIAGNOSIS — I25.10 ATHEROSCLEROSIS OF NATIVE CORONARY ARTERY OF NATIVE HEART WITHOUT ANGINA PECTORIS: ICD-10-CM

## 2021-01-01 DIAGNOSIS — Z95.0 CARDIAC PACEMAKER IN SITU: ICD-10-CM

## 2021-01-01 DIAGNOSIS — Z95.1 POSTSURGICAL AORTOCORONARY BYPASS STATUS: ICD-10-CM

## 2021-01-01 DIAGNOSIS — I49.5 SSS (SICK SINUS SYNDROME) (HCC): ICD-10-CM

## 2021-01-01 DIAGNOSIS — R06.02 SOB (SHORTNESS OF BREATH): ICD-10-CM

## 2021-01-01 DIAGNOSIS — I44.2 CHB (COMPLETE HEART BLOCK) (HCC): ICD-10-CM

## 2021-01-01 DIAGNOSIS — I49.5 SINOATRIAL NODE DYSFUNCTION (HCC): ICD-10-CM

## 2021-01-01 DIAGNOSIS — I25.10 ATHEROSCLEROSIS OF NATIVE CORONARY ARTERY OF NATIVE HEART WITHOUT ANGINA PECTORIS: Primary | ICD-10-CM

## 2021-01-01 DIAGNOSIS — S06.5XAA SUBDURAL HEMATOMA: Primary | ICD-10-CM

## 2021-01-01 DIAGNOSIS — I10 ESSENTIAL HYPERTENSION, BENIGN: ICD-10-CM

## 2021-01-01 DIAGNOSIS — N18.4 CHRONIC KIDNEY DISEASE, STAGE IV (SEVERE) (HCC): ICD-10-CM

## 2021-01-01 DIAGNOSIS — M54.50 MIDLINE LOW BACK PAIN WITHOUT SCIATICA, UNSPECIFIED CHRONICITY: ICD-10-CM

## 2021-01-01 DIAGNOSIS — I34.0 NON-RHEUMATIC MITRAL REGURGITATION: ICD-10-CM

## 2021-01-01 DIAGNOSIS — Z95.0 CARDIAC PACEMAKER IN SITU: Primary | ICD-10-CM

## 2021-01-01 DIAGNOSIS — E11.21 TYPE 2 DIABETES MELLITUS WITH NEPHROPATHY (HCC): ICD-10-CM
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
## 2021-01-01 DIAGNOSIS — R06.09 DOE (DYSPNEA ON EXERTION): ICD-10-CM

## 2021-01-01 DIAGNOSIS — S06.5XAA BILATERAL SUBDURAL HEMATOMAS: ICD-10-CM

## 2021-01-01 DIAGNOSIS — I10 ESSENTIAL HYPERTENSION, BENIGN: Chronic | ICD-10-CM
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
## 2021-01-01 LAB
ALBUMIN SERPL-MCNC: 3.2 G/DL (ref 3.5–5)
ALBUMIN/GLOB SERPL: 0.8 {RATIO} (ref 1.1–2.2)
ALP SERPL-CCNC: 143 U/L (ref 45–117)
ALT SERPL-CCNC: 17 U/L (ref 12–78)
ANION GAP SERPL CALC-SCNC: 6 MMOL/L (ref 5–15)
ANION GAP SERPL CALC-SCNC: 9 MMOL/L (ref 5–15)
AST SERPL-CCNC: 22 U/L (ref 15–37)
ATRIAL RATE: 69 BPM
BASE DEFICIT BLD-SCNC: 0.7 MMOL/L
BASOPHILS # BLD: 0 K/UL (ref 0–0.1)
BASOPHILS NFR BLD: 0 % (ref 0–1)
BILIRUB SERPL-MCNC: 0.7 MG/DL (ref 0.2–1)
BUN SERPL-MCNC: 34 MG/DL (ref 10–36)
BUN SERPL-MCNC: 36 MG/DL (ref 6–20)
BUN SERPL-MCNC: 41 MG/DL (ref 6–20)
BUN/CREAT SERPL: 12 (ref 10–24)
BUN/CREAT SERPL: 17 (ref 12–20)
BUN/CREAT SERPL: 19 (ref 12–20)
CA-I BLD-MCNC: 1.2 MMOL/L (ref 1.12–1.32)
CALCIUM SERPL-MCNC: 8.5 MG/DL (ref 8.5–10.1)
CALCIUM SERPL-MCNC: 8.6 MG/DL (ref 8.5–10.1)
CALCIUM SERPL-MCNC: 9 MG/DL (ref 8.6–10.2)
CALCULATED P AXIS, ECG09: 4 DEGREES
CALCULATED R AXIS, ECG10: -67 DEGREES
CALCULATED T AXIS, ECG11: 94 DEGREES
CHLORIDE BLD-SCNC: 109 MMOL/L (ref 100–108)
CHLORIDE SERPL-SCNC: 111 MMOL/L (ref 96–106)
CHLORIDE SERPL-SCNC: 111 MMOL/L (ref 97–108)
CHLORIDE SERPL-SCNC: 113 MMOL/L (ref 97–108)
CO2 BLD-SCNC: 25 MMOL/L (ref 19–24)
CO2 SERPL-SCNC: 22 MMOL/L (ref 20–29)
CO2 SERPL-SCNC: 22 MMOL/L (ref 21–32)
CO2 SERPL-SCNC: 25 MMOL/L (ref 21–32)
CREAT SERPL-MCNC: 2.12 MG/DL (ref 0.7–1.3)
CREAT SERPL-MCNC: 2.16 MG/DL (ref 0.7–1.3)
CREAT SERPL-MCNC: 2.79 MG/DL (ref 0.76–1.27)
CREAT UR-MCNC: 2.2 MG/DL (ref 0.6–1.3)
DIAGNOSIS, 93000: NORMAL
DIFFERENTIAL METHOD BLD: ABNORMAL
ECHO AO ASC DIAM: 3.52 CM
ECHO AO ROOT DIAM: 2.92 CM
ECHO AV AREA PEAK VELOCITY: 1.7 CM2
ECHO AV AREA/BSA PEAK VELOCITY: 0.9 CM2/M2
ECHO AV PEAK GRADIENT: 6.54 MMHG
ECHO AV PEAK VELOCITY: 127.91 CM/S
ECHO EST RA PRESSURE: 3 MMHG
ECHO LA AREA 4C: 25.53 CM2
ECHO LA MAJOR AXIS: 4.38 CM
ECHO LA MINOR AXIS: 2.32 CM
ECHO LA VOL 2C: 108.44 ML (ref 18–58)
ECHO LA VOL 4C: 80.57 ML (ref 18–58)
ECHO LA VOL BP: 98.26 ML (ref 18–58)
ECHO LA VOL/BSA BIPLANE: 51.94 ML/M2 (ref 16–28)
ECHO LA VOLUME INDEX A2C: 57.32 ML/M2 (ref 16–28)
ECHO LA VOLUME INDEX A4C: 42.59 ML/M2 (ref 16–28)
ECHO LV E' LATERAL VELOCITY: 9.88 CM/S
ECHO LV E' SEPTAL VELOCITY: 6.61 CM/S
ECHO LV EDV A2C: 87.29 ML
ECHO LV EDV A4C: 53.7 ML
ECHO LV EDV BP: 68.53 ML (ref 67–155)
ECHO LV EDV INDEX A4C: 28.4 ML/M2
ECHO LV EDV INDEX BP: 36.2 ML/M2
ECHO LV EDV NDEX A2C: 46.1 ML/M2
ECHO LV EJECTION FRACTION A2C: 47 PERCENT
ECHO LV EJECTION FRACTION A4C: 48 PERCENT
ECHO LV EJECTION FRACTION BIPLANE: 46.8 PERCENT (ref 55–100)
ECHO LV ESV A2C: 46.53 ML
ECHO LV ESV A4C: 27.86 ML
ECHO LV ESV BP: 36.46 ML (ref 22–58)
ECHO LV ESV INDEX A2C: 24.6 ML/M2
ECHO LV ESV INDEX A4C: 14.7 ML/M2
ECHO LV ESV INDEX BP: 19.3 ML/M2
ECHO LV INTERNAL DIMENSION DIASTOLIC: 4.55 CM (ref 4.2–5.9)
ECHO LV INTERNAL DIMENSION SYSTOLIC: 3.28 CM
ECHO LV IVSD: 1.58 CM (ref 0.6–1)
ECHO LV MASS 2D: 303.5 G (ref 88–224)
ECHO LV MASS INDEX 2D: 160.5 G/M2 (ref 49–115)
ECHO LV POSTERIOR WALL DIASTOLIC: 1.58 CM (ref 0.6–1)
ECHO LVOT DIAM: 2.01 CM
ECHO LVOT PEAK GRADIENT: 1.88 MMHG
ECHO LVOT PEAK VELOCITY: 68.57 CM/S
ECHO LVOT SV: 60.8 ML
ECHO LVOT VTI: 19.19 CM
ECHO MV A VELOCITY: 71.99 CM/S
ECHO MV E DECELERATION TIME (DT): 199.71 MS
ECHO MV E VELOCITY: 98.15 CM/S
ECHO MV E/A RATIO: 1.36
ECHO MV E/E' LATERAL: 9.93
ECHO MV E/E' RATIO (AVERAGED): 12.39
ECHO MV E/E' SEPTAL: 14.85
ECHO MV EROA PISA: 0.24 CM2
ECHO MV REGURGITANT RADIUS PISA: 0.93 CM
ECHO MV REGURGITANT VOLUME: 46.11 ML
ECHO MV REGURGITANT VTIA: 189.02 CM
ECHO RA AREA 4C: 24.28 CM2
ECHO RIGHT VENTRICULAR SYSTOLIC PRESSURE (RVSP): 63.24 MMHG
ECHO RV TAPSE: 1.06 CM (ref 1.5–2)
ECHO TV REGURGITANT MAX VELOCITY: 388.07 CM/S
ECHO TV REGURGITANT PEAK GRADIENT: 60.24 MMHG
EOSINOPHIL # BLD: 0.6 K/UL (ref 0–0.4)
EOSINOPHIL NFR BLD: 9 % (ref 0–7)
ERYTHROCYTE [DISTWIDTH] IN BLOOD BY AUTOMATED COUNT: 17.8 % (ref 11.5–14.5)
ERYTHROCYTE [DISTWIDTH] IN BLOOD BY AUTOMATED COUNT: 17.9 % (ref 11.5–14.5)
GLOBULIN SER CALC-MCNC: 3.8 G/DL (ref 2–4)
GLUCOSE BLD STRIP.AUTO-MCNC: 159 MG/DL (ref 65–117)
GLUCOSE BLD STRIP.AUTO-MCNC: 167 MG/DL (ref 74–106)
GLUCOSE BLD STRIP.AUTO-MCNC: 199 MG/DL (ref 65–117)
GLUCOSE SERPL-MCNC: 102 MG/DL (ref 65–99)
GLUCOSE SERPL-MCNC: 117 MG/DL (ref 65–100)
GLUCOSE SERPL-MCNC: 180 MG/DL (ref 65–100)
HCO3 BLDA-SCNC: 24 MMOL/L
HCT VFR BLD AUTO: 30.1 % (ref 36.6–50.3)
HCT VFR BLD AUTO: 30.1 % (ref 36.6–50.3)
HGB BLD-MCNC: 9.1 G/DL (ref 12.1–17)
HGB BLD-MCNC: 9.1 G/DL (ref 12.1–17)
IMM GRANULOCYTES # BLD AUTO: 0 K/UL (ref 0–0.04)
IMM GRANULOCYTES NFR BLD AUTO: 0 % (ref 0–0.5)
INR PPP: 1.2 (ref 0.9–1.1)
INTERPRETATION: NORMAL
LA VOL DISK BP: 94.25 ML (ref 18–58)
LACTATE BLD-SCNC: 1.2 MMOL/L (ref 0.4–2)
LVOT MG: 1.09 MMHG
LYMPHOCYTES # BLD: 1.6 K/UL (ref 0.8–3.5)
LYMPHOCYTES NFR BLD: 23 % (ref 12–49)
MCH RBC QN AUTO: 30.5 PG (ref 26–34)
MCH RBC QN AUTO: 31.1 PG (ref 26–34)
MCHC RBC AUTO-ENTMCNC: 30.2 G/DL (ref 30–36.5)
MCHC RBC AUTO-ENTMCNC: 30.2 G/DL (ref 30–36.5)
MCV RBC AUTO: 101 FL (ref 80–99)
MCV RBC AUTO: 102.7 FL (ref 80–99)
MONOCYTES # BLD: 0.7 K/UL (ref 0–1)
MONOCYTES NFR BLD: 10 % (ref 5–13)
MR PISA PV: 534.94 CM/S
NEUTS SEG # BLD: 4.1 K/UL (ref 1.8–8)
NEUTS SEG NFR BLD: 58 % (ref 32–75)
NRBC # BLD: 0 K/UL (ref 0–0.01)
NRBC # BLD: 0 K/UL (ref 0–0.01)
NRBC BLD-RTO: 0 PER 100 WBC
NRBC BLD-RTO: 0 PER 100 WBC
NT-PROBNP SERPL-MCNC: 1730 PG/ML (ref 0–486)
P-R INTERVAL, ECG05: 224 MS
PCO2 BLDV: 39.6 MMHG (ref 41–51)
PH BLDV: 7.39 [PH] (ref 7.32–7.42)
PLATELET # BLD AUTO: 234 K/UL (ref 150–400)
PLATELET # BLD AUTO: 235 K/UL (ref 150–400)
PMV BLD AUTO: 11.2 FL (ref 8.9–12.9)
PMV BLD AUTO: 11.3 FL (ref 8.9–12.9)
PO2 BLDV: 16 MMHG (ref 25–40)
POTASSIUM BLD-SCNC: 4.6 MMOL/L (ref 3.5–5.5)
POTASSIUM SERPL-SCNC: 4 MMOL/L (ref 3.5–5.1)
POTASSIUM SERPL-SCNC: 4.4 MMOL/L (ref 3.5–5.1)
POTASSIUM SERPL-SCNC: 5.4 MMOL/L (ref 3.5–5.2)
PROT SERPL-MCNC: 7 G/DL (ref 6.4–8.2)
PROTHROMBIN TIME: 12.3 SEC (ref 9–11.1)
Q-T INTERVAL, ECG07: 480 MS
QRS DURATION, ECG06: 194 MS
QTC CALCULATION (BEZET), ECG08: 514 MS
RBC # BLD AUTO: 2.93 M/UL (ref 4.1–5.7)
RBC # BLD AUTO: 2.98 M/UL (ref 4.1–5.7)
SERVICE CMNT-IMP: ABNORMAL
SERVICE CMNT-IMP: ABNORMAL
SODIUM BLD-SCNC: 144 MMOL/L (ref 136–145)
SODIUM SERPL-SCNC: 142 MMOL/L (ref 136–145)
SODIUM SERPL-SCNC: 144 MMOL/L (ref 136–145)
SODIUM SERPL-SCNC: 147 MMOL/L (ref 134–144)
SPECIMEN SITE: ABNORMAL
TROPONIN I SERPL-MCNC: <0.05 NG/ML
VENTRICULAR RATE, ECG03: 69 BPM
WBC # BLD AUTO: 7.1 K/UL (ref 4.1–11.1)
WBC # BLD AUTO: 8.4 K/UL (ref 4.1–11.1)

## 2021-01-01 PROCEDURE — 74011000258 HC RX REV CODE- 258: Performed by: INTERNAL MEDICINE

## 2021-01-01 PROCEDURE — 85025 COMPLETE CBC W/AUTO DIFF WBC: CPT

## 2021-01-01 PROCEDURE — 71045 X-RAY EXAM CHEST 1 VIEW: CPT

## 2021-01-01 PROCEDURE — 70450 CT HEAD/BRAIN W/O DYE: CPT

## 2021-01-01 PROCEDURE — 93306 TTE W/DOPPLER COMPLETE: CPT | Performed by: INTERNAL MEDICINE

## 2021-01-01 PROCEDURE — G8427 DOCREV CUR MEDS BY ELIG CLIN: HCPCS | Performed by: INTERNAL MEDICINE

## 2021-01-01 PROCEDURE — 74011250636 HC RX REV CODE- 250/636: Performed by: INTERNAL MEDICINE

## 2021-01-01 PROCEDURE — 74011250636 HC RX REV CODE- 250/636: Performed by: EMERGENCY MEDICINE

## 2021-01-01 PROCEDURE — 92526 ORAL FUNCTION THERAPY: CPT

## 2021-01-01 PROCEDURE — G8536 NO DOC ELDER MAL SCRN: HCPCS | Performed by: INTERNAL MEDICINE

## 2021-01-01 PROCEDURE — 80053 COMPREHEN METABOLIC PANEL: CPT

## 2021-01-01 PROCEDURE — 1101F PT FALLS ASSESS-DOCD LE1/YR: CPT | Performed by: INTERNAL MEDICINE

## 2021-01-01 PROCEDURE — 74011250637 HC RX REV CODE- 250/637: Performed by: NURSE PRACTITIONER

## 2021-01-01 PROCEDURE — 92610 EVALUATE SWALLOWING FUNCTION: CPT

## 2021-01-01 PROCEDURE — 99214 OFFICE O/P EST MOD 30 MIN: CPT | Performed by: INTERNAL MEDICINE

## 2021-01-01 PROCEDURE — 82962 GLUCOSE BLOOD TEST: CPT

## 2021-01-01 PROCEDURE — 93005 ELECTROCARDIOGRAM TRACING: CPT

## 2021-01-01 PROCEDURE — 65660000000 HC RM CCU STEPDOWN

## 2021-01-01 PROCEDURE — 97165 OT EVAL LOW COMPLEX 30 MIN: CPT

## 2021-01-01 PROCEDURE — 80048 BASIC METABOLIC PNL TOTAL CA: CPT

## 2021-01-01 PROCEDURE — 99223 1ST HOSP IP/OBS HIGH 75: CPT | Performed by: PSYCHIATRY & NEUROLOGY

## 2021-01-01 PROCEDURE — 94760 N-INVAS EAR/PLS OXIMETRY 1: CPT

## 2021-01-01 PROCEDURE — 74011250637 HC RX REV CODE- 250/637: Performed by: INTERNAL MEDICINE

## 2021-01-01 PROCEDURE — 97116 GAIT TRAINING THERAPY: CPT

## 2021-01-01 PROCEDURE — 70490 CT SOFT TISSUE NECK W/O DYE: CPT

## 2021-01-01 PROCEDURE — 99233 SBSQ HOSP IP/OBS HIGH 50: CPT | Performed by: PSYCHIATRY & NEUROLOGY

## 2021-01-01 PROCEDURE — G8432 DEP SCR NOT DOC, RNG: HCPCS | Performed by: INTERNAL MEDICINE

## 2021-01-01 PROCEDURE — 36415 COLL VENOUS BLD VENIPUNCTURE: CPT

## 2021-01-01 PROCEDURE — 85610 PROTHROMBIN TIME: CPT

## 2021-01-01 PROCEDURE — 82803 BLOOD GASES ANY COMBINATION: CPT

## 2021-01-01 PROCEDURE — 93294 REM INTERROG EVL PM/LDLS PM: CPT | Performed by: INTERNAL MEDICINE

## 2021-01-01 PROCEDURE — 99285 EMERGENCY DEPT VISIT HI MDM: CPT

## 2021-01-01 PROCEDURE — G8420 CALC BMI NORM PARAMETERS: HCPCS | Performed by: INTERNAL MEDICINE

## 2021-01-01 PROCEDURE — G8510 SCR DEP NEG, NO PLAN REQD: HCPCS | Performed by: INTERNAL MEDICINE

## 2021-01-01 PROCEDURE — 99213 OFFICE O/P EST LOW 20 MIN: CPT | Performed by: INTERNAL MEDICINE

## 2021-01-01 PROCEDURE — 97535 SELF CARE MNGMENT TRAINING: CPT

## 2021-01-01 PROCEDURE — 93000 ELECTROCARDIOGRAM COMPLETE: CPT | Performed by: INTERNAL MEDICINE

## 2021-01-01 PROCEDURE — 84484 ASSAY OF TROPONIN QUANT: CPT

## 2021-01-01 PROCEDURE — 74011000250 HC RX REV CODE- 250: Performed by: INTERNAL MEDICINE

## 2021-01-01 PROCEDURE — 2709999900 HC NON-CHARGEABLE SUPPLY

## 2021-01-01 PROCEDURE — 93296 REM INTERROG EVL PM/IDS: CPT | Performed by: INTERNAL MEDICINE

## 2021-01-01 PROCEDURE — 85027 COMPLETE CBC AUTOMATED: CPT

## 2021-01-01 PROCEDURE — G8419 CALC BMI OUT NRM PARAM NOF/U: HCPCS | Performed by: INTERNAL MEDICINE

## 2021-01-01 PROCEDURE — 96374 THER/PROPH/DIAG INJ IV PUSH: CPT

## 2021-01-01 PROCEDURE — 97162 PT EVAL MOD COMPLEX 30 MIN: CPT

## 2021-01-01 RX ORDER — DEXTROSE MONOHYDRATE AND SODIUM CHLORIDE 5; .9 G/100ML; G/100ML
75 INJECTION, SOLUTION INTRAVENOUS CONTINUOUS
Status: DISCONTINUED | OUTPATIENT
Start: 2021-01-01 | End: 2021-08-07 | Stop reason: HOSPADM

## 2021-01-01 RX ORDER — SODIUM BICARBONATE 650 MG/1
650 TABLET ORAL 2 TIMES DAILY
Status: DISCONTINUED | OUTPATIENT
Start: 2021-01-01 | End: 2021-08-07 | Stop reason: HOSPADM

## 2021-01-01 RX ORDER — POLYETHYLENE GLYCOL 3350 17 G/17G
17 POWDER, FOR SOLUTION ORAL DAILY PRN
Status: DISCONTINUED | OUTPATIENT
Start: 2021-01-01 | End: 2021-08-07 | Stop reason: HOSPADM

## 2021-01-01 RX ORDER — SCOLOPAMINE TRANSDERMAL SYSTEM 1 MG/1
1 PATCH, EXTENDED RELEASE TRANSDERMAL
Status: DISCONTINUED | OUTPATIENT
Start: 2021-01-01 | End: 2021-08-07 | Stop reason: HOSPADM

## 2021-01-01 RX ORDER — MORPHINE SULFATE 2 MG/ML
1 INJECTION, SOLUTION INTRAMUSCULAR; INTRAVENOUS
Status: DISCONTINUED | OUTPATIENT
Start: 2021-01-01 | End: 2021-08-07 | Stop reason: HOSPADM

## 2021-01-01 RX ORDER — CARVEDILOL 3.12 MG/1
3.12 TABLET ORAL 2 TIMES DAILY WITH MEALS
Status: DISCONTINUED | OUTPATIENT
Start: 2021-01-01 | End: 2021-08-07 | Stop reason: HOSPADM

## 2021-01-01 RX ORDER — SODIUM CHLORIDE 0.9 % (FLUSH) 0.9 %
5-40 SYRINGE (ML) INJECTION EVERY 8 HOURS
Status: DISCONTINUED | OUTPATIENT
Start: 2021-01-01 | End: 2021-08-07 | Stop reason: HOSPADM

## 2021-01-01 RX ORDER — ACETAMINOPHEN 325 MG/1
325 TABLET ORAL
Status: DISCONTINUED | OUTPATIENT
Start: 2021-01-01 | End: 2021-08-07 | Stop reason: HOSPADM

## 2021-01-01 RX ORDER — SIMETHICONE 125 MG
125 CAPSULE ORAL
COMMUNITY

## 2021-01-01 RX ORDER — LATANOPROST 50 UG/ML
1 SOLUTION/ DROPS OPHTHALMIC
Status: DISCONTINUED | OUTPATIENT
Start: 2021-01-01 | End: 2021-08-07 | Stop reason: HOSPADM

## 2021-01-01 RX ORDER — SODIUM CHLORIDE 0.9 % (FLUSH) 0.9 %
5-40 SYRINGE (ML) INJECTION AS NEEDED
Status: DISCONTINUED | OUTPATIENT
Start: 2021-01-01 | End: 2021-08-07 | Stop reason: HOSPADM

## 2021-01-01 RX ORDER — ONDANSETRON 2 MG/ML
4 INJECTION INTRAMUSCULAR; INTRAVENOUS
Status: DISCONTINUED | OUTPATIENT
Start: 2021-01-01 | End: 2021-08-07 | Stop reason: HOSPADM

## 2021-01-01 RX ORDER — TRAMADOL HYDROCHLORIDE 50 MG/1
50 TABLET ORAL AS NEEDED
Status: DISCONTINUED | OUTPATIENT
Start: 2021-01-01 | End: 2021-08-07 | Stop reason: HOSPADM

## 2021-01-01 RX ORDER — HYDRALAZINE HYDROCHLORIDE 20 MG/ML
20 INJECTION INTRAMUSCULAR; INTRAVENOUS EVERY 6 HOURS
Status: DISCONTINUED | OUTPATIENT
Start: 2021-01-01 | End: 2021-08-07 | Stop reason: HOSPADM

## 2021-01-01 RX ORDER — HYDRALAZINE HYDROCHLORIDE 20 MG/ML
20 INJECTION INTRAMUSCULAR; INTRAVENOUS
Status: DISCONTINUED | OUTPATIENT
Start: 2021-01-01 | End: 2021-01-01

## 2021-01-01 RX ORDER — ONDANSETRON 4 MG/1
4 TABLET, ORALLY DISINTEGRATING ORAL
Status: DISCONTINUED | OUTPATIENT
Start: 2021-01-01 | End: 2021-08-07 | Stop reason: HOSPADM

## 2021-01-01 RX ORDER — THERA TABS 400 MCG
1 TAB ORAL DAILY
Status: DISCONTINUED | OUTPATIENT
Start: 2021-01-01 | End: 2021-08-07 | Stop reason: HOSPADM

## 2021-01-01 RX ORDER — ACETAMINOPHEN 650 MG/1
650 SUPPOSITORY RECTAL
Status: DISCONTINUED | OUTPATIENT
Start: 2021-01-01 | End: 2021-08-07 | Stop reason: HOSPADM

## 2021-01-01 RX ORDER — PANTOPRAZOLE SODIUM 40 MG/1
40 TABLET, DELAYED RELEASE ORAL
Status: DISCONTINUED | OUTPATIENT
Start: 2021-01-01 | End: 2021-08-07 | Stop reason: HOSPADM

## 2021-01-01 RX ORDER — TRAMADOL HYDROCHLORIDE 50 MG/1
50 TABLET ORAL AS NEEDED
COMMUNITY
Start: 2021-01-01

## 2021-01-01 RX ORDER — OMEPRAZOLE 20 MG/1
CAPSULE, DELAYED RELEASE ORAL
Qty: 180 CAP | Refills: 3 | Status: SHIPPED | OUTPATIENT
Start: 2021-01-01

## 2021-01-01 RX ORDER — CHOLECALCIFEROL (VITAMIN D3) 125 MCG
1 CAPSULE ORAL
Status: DISCONTINUED | OUTPATIENT
Start: 2021-01-01 | End: 2021-08-07 | Stop reason: HOSPADM

## 2021-01-01 RX ORDER — SIMETHICONE 80 MG
80 TABLET,CHEWABLE ORAL
Status: DISCONTINUED | OUTPATIENT
Start: 2021-01-01 | End: 2021-08-07 | Stop reason: HOSPADM

## 2021-01-01 RX ORDER — FUROSEMIDE 10 MG/ML
40 INJECTION INTRAMUSCULAR; INTRAVENOUS
Status: COMPLETED | OUTPATIENT
Start: 2021-01-01 | End: 2021-01-01

## 2021-01-01 RX ORDER — DORZOLAMIDE HCL 20 MG/ML
1 SOLUTION/ DROPS OPHTHALMIC 2 TIMES DAILY
Status: DISCONTINUED | OUTPATIENT
Start: 2021-01-01 | End: 2021-08-07 | Stop reason: HOSPADM

## 2021-01-01 RX ORDER — FUROSEMIDE 40 MG/1
20 TABLET ORAL DAILY
Status: DISCONTINUED | OUTPATIENT
Start: 2021-01-01 | End: 2021-08-07 | Stop reason: HOSPADM

## 2021-01-01 RX ADMIN — HYDRALAZINE HYDROCHLORIDE 20 MG: 20 INJECTION INTRAMUSCULAR; INTRAVENOUS at 05:47

## 2021-01-01 RX ADMIN — HYDRALAZINE HYDROCHLORIDE 20 MG: 20 INJECTION INTRAMUSCULAR; INTRAVENOUS at 23:51

## 2021-01-01 RX ADMIN — DORZOLAMIDE HYDROCHLORIDE 1 DROP: 20 SOLUTION/ DROPS OPHTHALMIC at 23:04

## 2021-01-01 RX ADMIN — Medication 10 ML: at 05:49

## 2021-01-01 RX ADMIN — DEXTROSE AND SODIUM CHLORIDE 75 ML/HR: 5; 900 INJECTION, SOLUTION INTRAVENOUS at 18:00

## 2021-01-01 RX ADMIN — Medication 10 ML: at 21:14

## 2021-01-01 RX ADMIN — FUROSEMIDE 40 MG: 10 INJECTION, SOLUTION INTRAMUSCULAR; INTRAVENOUS at 19:32

## 2021-01-01 RX ADMIN — HYDRALAZINE HYDROCHLORIDE 20 MG: 20 INJECTION INTRAMUSCULAR; INTRAVENOUS at 16:49

## 2021-01-01 RX ADMIN — Medication 10 ML: at 21:54

## 2021-01-01 RX ADMIN — LATANOPROST 1 DROP: 50 SOLUTION OPHTHALMIC at 23:04

## 2021-01-01 RX ADMIN — ACETAMINOPHEN 650 MG: 650 SUPPOSITORY RECTAL at 18:00

## 2021-01-01 RX ADMIN — DEXTROSE AND SODIUM CHLORIDE 75 ML/HR: 5; 900 INJECTION, SOLUTION INTRAVENOUS at 12:56

## 2021-01-01 RX ADMIN — Medication 10 ML: at 06:48

## 2021-01-01 RX ADMIN — Medication 10 ML: at 16:49

## 2021-01-01 RX ADMIN — Medication 10 ML: at 13:43

## 2021-01-01 RX ADMIN — LATANOPROST 1 DROP: 50 SOLUTION OPHTHALMIC at 21:44

## 2021-02-15 NOTE — TELEPHONE ENCOUNTER
Patient had 2nd covid vaccine and has been having SOB and feels like fluid is building up.   Please call patient daughter     [phone] Brenden Souza)    Wants to see if possible to do Virtual visit      Thanks  Dania Royal

## 2021-02-15 NOTE — TELEPHONE ENCOUNTER
Spoke with patients daughter, Zac Buchanan , on [place]. Patient received second covid vaccine on 2/5/2021. Patient started with sob  - with and without exertion- yesterday. Patient told Zac Buchanan he feels like his lungs are filling up with fluid. He has also developed a cough. Advised that I do not believe that this was related to the second covid injection. Advised the patient really should be seen. Zac Buchanan agreed  Please call Zac Buchanan to schedule an appt for patient. If we can squeeze him in this week, that would be great.

## 2021-02-16 PROBLEM — Z45.010 PACEMAKER GENERATOR END OF LIFE: Status: RESOLVED | Noted: 2019-04-29 | Resolved: 2021-01-01

## 2021-02-16 NOTE — PATIENT INSTRUCTIONS
Increase your furosemide (LASIX) to 2 tablets daily for the next 3 days. Then go back to 1 tablet a day.

## 2021-02-16 NOTE — LETTER
2/16/2021 Patient: Joel Burns  
YOB: 1921 Date of Visit: 2/16/2021 Gonzalo Brand MD 
[facility] [address] [address] [employer] Dear Gonzalo Brand MD, Thank you for referring Mr. Mindy Fernandes to [address] [place] [address] for evaluation. My notes for this consultation are attached. If you have questions, please do not hesitate to call me. I look forward to following your patient along with you. Sincerely, Rolando Allen MD

## 2021-02-16 NOTE — PROGRESS NOTES
Chief Complaint Patient presents with  Follow-up  Shortness of Breath  
  after 2nd covid vaccine  Coronary Artery Disease  Cholesterol Problem  Valvular Heart Disease 1. Have you been to the ER, urgent care clinic since your last visit? No  Hospitalized since your last visit? No 
 
2. Have you seen or consulted any other health care providers outside of the [address] [place] [address] since your last visit? No Include any pap smears or colon screening. No 
 
Patient C/O increase SOB and coughing more since his covid vaccine. Noted also abdomen swelling and blood from rectum with hemorrhoids.

## 2021-02-16 NOTE — PROGRESS NOTES
Nilsa Peña, [facility] Subjective/HPI:  
 
Wilver Lemons is a 80 y.o. male is here for routine f/u. He has a PMHx of CAD s/p CABG, SSS s/p PPM, HTN, HLD, DM2 and CKD. He is here with complaints of shortness of breath with exertion. Recently received both doses of COVID vaccine and since had been more short of breath with exertion. Feels as though he is retaining fluid in his lungs. He reports orthopnea symptoms. Denies LE edema, weight gain. Has caregiver monitor his weight at home and has gained about 2-3 lbs since 10/2020. Caregiver is also concerned about diarrhea that has been ongoing for a few weeks. Started prior to his first COVID vaccine dose, and is still ongoing. Has not seen PCP for this and is overdue for GI visit. Review of Symptoms: 
 
Review of Systems Constitutional: Negative for chills, fever and weight loss. HENT: Negative for nosebleeds. Eyes: Negative for blurred vision and double vision. Respiratory: Positive for shortness of breath. Negative for cough and wheezing. Cardiovascular: Positive for orthopnea. Negative for chest pain, palpitations, leg swelling and PND. Gastrointestinal: Positive for diarrhea. Negative for abdominal pain, blood in stool, nausea and vomiting. Musculoskeletal: Negative for joint pain. Skin: Negative for rash. Neurological: Negative for dizziness, tingling and loss of consciousness. Endo/Heme/Allergies: Does not bruise/bleed easily. Physical Exam:   
 
General: Well developed, in no acute distress, cooperative and alert Heart:  reg rate and rhythm; normal S1/S2; no murmurs, no gallops or rubs. Respiratory: Clear bilaterally x 4, no wheezing or rales Extremities:  Normal cap refill, no cyanosis, atraumatic. No edema. Vascular: 2+ pulses symmetric in all extremities Vitals:  
 02/16/21 1102 02/16/21 1103 BP: (!) 160/80 (!) 170/80 Pulse: 68 Resp: 16 SpO2: 94% Weight: 171 lb 11.2 oz (77.9 kg) Height: 5' 7\" (1.702 m) ECG: V-paced rhythm Assessment: ICD-10-CM ICD-9-CM 1. Atherosclerosis of native coronary artery of native heart without angina pectoris  I25.10 414.01 AMB POC EKG ROUTINE W/ 12 LEADS, INTER & REP 2. Essential hypertension, benign  I10 401.1 AMB POC EKG ROUTINE W/ 12 LEADS, INTER & REP 3. Mixed hyperlipidemia  E78.2 272.2 AMB POC EKG ROUTINE W/ 12 LEADS, INTER & REP 4. Postsurgical aortocoronary bypass status  Z95.1 V45.81 AMB POC EKG ROUTINE W/ 12 LEADS, INTER & REP 5. Cardiac pacemaker in situ  Z95.0 V45.01 AMB POC EKG ROUTINE W/ 12 LEADS, INTER & REP Plan: 1. Atherosclerosis of native coronary artery of native heart without angina pectoris S/p DESx2 to the RCA and ERICA to the LAD in 6/2018.   
Echo in 11/2018 with LVEF 55-60% with mod dilated LA and moderate MR.   
Without anginal or anginal equivalent symptoms Continue present medications 2. Shortness of breath Will increase furosemide 40 mg daily x 3 days; resume 20 mg daily afterwards Check echo, obtain BMP in 1 week. 
  
3.   Essential hypertension, benign BP controlled. Continue anti-hypertensive therapy and low sodium diet 
  
4.  Mixed hyperlipidemia Continue low fat, low cholesterol diet 
  
5. Cardiac pacemaker in situ Continue with routine device interrogation with Dr. Steve Sterling 
  
6. Postsurgical aortocoronary bypass status S/p CABG with LIMA to LAD, VG to the D1 and OM and VG to RCA.   
Cath in 6/2018 with patent LIMA to LAD and VG to D1; VG to OM1 is chronically occluded; with s/p ERICA to VG to RCA. F/u with Dr. Fara Vallejo in 2 weeks Johnny Street NP [facility] 2/16/2021 Patient seen, examined by me personally. Plan discussed as detailed. Agree with note as outlined by  NP. I confirm findings in history and physical exam. No additional findings noted. Agree with plan as outlined above. No obvious volume overload. Will treat symptomatically.  
 
Jonatan Gatica MD

## 2021-02-17 NOTE — PROGRESS NOTES
Good afternoon Mr. Terry Maldonado, Your echocardiogram shows normal ejection fraction 50-55%. This is good news and suggests no concern for heart failure. Your valves are working appropriately. Let's continue your fluid pill for now, and we'll follow up as scheduled to check on your symptoms. Thanks, Marcos Saldivar, FNP-BC

## 2021-03-02 NOTE — PROGRESS NOTES
Subjective/HPI:  
 
Wayne Medrano is a 80 y.o. male is here for routine f/u. He has a PMHx of CAD s/p CABG, SSS s/p PPM, HTN, HLD, DM2 and CKD. Last visit, we obtained echocardiogram and increased diuretics for new SOB symptoms. Echo showed normal EF, no significant valvular pathology. Since increasing diuretics, pt reports some improvement in breathing, edema. Weight is down by 2 lbs. Review of Symptoms: 
 
Review of Systems Constitutional: Negative for chills, fever and weight loss. HENT: Negative for nosebleeds. Eyes: Negative for blurred vision and double vision. Respiratory: Positive for shortness of breath. Negative for cough and wheezing. Cardiovascular: Positive for orthopnea. Negative for chest pain, palpitations, leg swelling and PND. Gastrointestinal: Positive for diarrhea. Negative for abdominal pain, blood in stool, nausea and vomiting. Musculoskeletal: Negative for joint pain. Skin: Negative for rash. Neurological: Negative for dizziness, tingling and loss of consciousness. Endo/Heme/Allergies: Does not bruise/bleed easily. Physical Exam:   
 
General: Well developed, in no acute distress, cooperative and alert Heart:  reg rate and rhythm; normal S1/S2; no murmurs, no gallops or rubs. Respiratory: Clear bilaterally x 4, no wheezing or rales Extremities:  Normal cap refill, no cyanosis, atraumatic. No edema. Vascular: 2+ pulses symmetric in all extremities Vitals:  
 03/02/21 1024 BP: (!) 140/80 Pulse: 60 Resp: 16 SpO2: 98% Weight: 169 lb (76.7 kg) Height: 5' 8.5\" (1.74 m) Assessment: ICD-10-CM ICD-9-CM 1. Atherosclerosis of native coronary artery of native heart without angina pectoris  I25.10 414.01   
2. HAMILTON (dyspnea on exertion)  X28.59 243.95 METABOLIC PANEL, BASIC  
   NT-PRO BNP 3. Essential hypertension, benign  I10 401.1 4. Mixed hyperlipidemia  E78.2 272.2 5. Cardiac pacemaker in situ  Z95.0 V45.01   
6. Postsurgical aortocoronary bypass status  Z95.1 V45.81 Plan: 1. Atherosclerosis of native coronary artery of native heart without angina pectoris S/p DESx2 to the RCA and ERICA to the LAD in 6/2018.   
Echo in 11/2018 with LVEF 55-60% with mod dilated LA and moderate MR.   
Without anginal or anginal equivalent symptoms Continue present medications 2. Shortness of breath Echo done 2/2021 with preserved LVEF 50-55% with grade 1 DD, mod dil LA, mod dil RA, with mod-sev PHTN. Symptoms improved since increasing lasix. BMP pending. Reordered.  
  
3.   Essential hypertension, benign BP controlled. Continue anti-hypertensive therapy and low sodium diet 
  
4.  Mixed hyperlipidemia Continue low fat, low cholesterol diet 
  
5. Cardiac pacemaker in situ Continue with routine device interrogation with Dr. Sebastian Velasquez 
  
6. Postsurgical aortocoronary bypass status S/p CABG with LIMA to LAD, VG to the D1 and OM and VG to RCA.   
Cath in 6/2018 with patent LIMA to LAD and VG to D1; VG to OM1 is chronically occluded; with s/p ERICA to VG to RCA. F/u with me in 6-8 weeks. Continue current dose of lasix until labs reviewed.   
 
Pat Mendiola MD

## 2021-03-02 NOTE — PROGRESS NOTES
Chief Complaint Patient presents with  Follow-up  Results Echocardiogram  
 Shortness of Breath  Valvular Heart Disease  Cholesterol Problem 1. Have you been to the ER, urgent care clinic since your last visit? No Hospitalized since your last visit? No 
 
2. Have you seen or consulted any other health care providers outside of the [address] [place] [address] since your last visit? No  Include any pap smears or colon screening. No 
 
Patient C/O SOB , fatigue ,dizziness lying down poor appetite and sore vein on right side of temple. Questions if Hemp would be helpful with his neuropathy.

## 2021-03-02 NOTE — LETTER
3/4/2021 Patient: Nabeel Alberts  
YOB: 1921 Date of Visit: 3/2/2021 Star Cruz MD 
[address] [address] [address] [employer] Dear Star Cruz MD, Thank you for referring Mr. Neil Lewis to [facility] for evaluation. My notes for this consultation are attached. If you have questions, please do not hesitate to call me. I look forward to following your patient along with you. Sincerely, Luis Armando Conteh MD

## 2021-03-04 NOTE — PROGRESS NOTES
Sylvain Odell,    Please call the patient and inform that kidney function is stable. BNP is elevated, but at his baseline. For now, continue same diuretics.     Thanks  [employer]

## 2021-03-04 NOTE — TELEPHONE ENCOUNTER
Spoke with patients daughter. Verified with two patient identifiers. Advised inform that kidney function is stable. BNP is elevated, but at his baseline. For now, continue same diuretics. Patients daughter verbalized understanding.

## 2021-04-19 NOTE — TELEPHONE ENCOUNTER
Please call patient he has broken a tooth.       Does he need to stop blood thinners or asiprin if so when and resume    Please send to     [facility] [address]     Thanks  Dea Malone

## 2021-04-19 NOTE — TELEPHONE ENCOUNTER
Fax sent to Dr. Rigoberto Whaley with JOVANNY Yanez's recommendation regarding blood thinners. Spoke with Isha Hinds and informed her of fax sent so she could go ahead and schedule with Dr. Rigoberto Whaley. No further questions at this time.
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
## 2021-06-21 NOTE — PROGRESS NOTES
1. Have you been to the ER, urgent care clinic since your last visit? Hospitalized since your last visit? No    2. Have you seen or consulted any other health care providers outside of the [address] [place] [address] since your last visit? Include any pap smears or colon screening.  No

## 2021-06-22 NOTE — PROGRESS NOTES
Subjective/HPI:  
 
Claudia Mullen is a 80 y.o. male is here for routine f/u. He has a PMHx of ASHD, SSS s/p pacemaker. HTN, hyperlipidemia/ just celebrated his 89th birthday few weeks ago. Capo Alexei last week due to loss of balance and leg weakness. Seen by pcp. Uses walker. X-rays normal. Has back pain from fall. Denies any chest pain. SOB unchanged. PCP Provider Bo Pierre MD 
 
Past Medical History:  
Diagnosis Date  Abdominal pain 12/6/2017  Acute gastric ulcer with hemorrhage 7/5/2018  Arteriosclerotic coronary artery disease 12/6/2017  Atrioventricular block, complete (HCC)  CAD (coronary artery disease)  Chronic kidney disease, stage IV (severe) ([address] 12/6/2017  CKD (chronic kidney disease) stage 3, GFR 30-59 ml/min ([facility]) 9/7/2017  Diabetes mellitus ([address] 12/6/2017  Dizziness and giddiness  Fatigue 12/6/2017  GERD (gastroesophageal reflux disease)  GERD (gastroesophageal reflux disease) 9/7/2017  Glaucoma 12/6/2017  Hematuria 12/6/2017  Hyperlipidemia 12/6/2017  Hypertension  Mixed hyperlipidemia  Neuropathy 12/6/2017  Other acute and subacute form of ischemic heart disease  Pernicious anemia 12/6/2017  Sinoatrial node dysfunction (HCC)  Syncope and collapse Kenny Contrerass 12/6/2017  Thrush of mouth and esophagus ([address] 12/6/2017  Type 2 diabetes mellitus without complication, without long-term current use of insulin ([address] 9/7/2017 Allergies Allergen Reactions  Latex, Natural Rubber Other (comments)  Shellfish Derived Other (comments) Crab meat Outpatient Encounter Medications as of 6/22/2021 Medication Sig Dispense Refill  traMADoL (ULTRAM) 50 mg tablet Take 50 mg by mouth as needed.  simethicone (Gas-X) 125 mg capsule Take 125 mg by mouth four (4) times daily as needed for Flatulence.     
 omeprazole (PRILOSEC) 20 mg capsule TAKE ONE CAPSULE BY MOUTH BEFORE BREAKFAST AND AT BEDTIME 180 Cap 3  
 multivitamin (ONE A DAY) tablet Take 1 Tab by mouth daily.  sennosides/docusate sodium (SENNA-S PO) Take  by mouth daily as needed.  lactase (Lactaid) 3,000 unit tablet Take 1 Tab by mouth three (3) times daily (with meals). 90 Tab 3  
 vit A,C,E-zinc-copper (ICaps AREDS) cap capsule Take 1 Cap by mouth daily. 90 Cap 3  furosemide (Lasix) 20 mg tablet Take 1 Tab by mouth daily. 90 Tab 3  carvediloL (COREG) 3.125 mg tablet Take 1 Tab by mouth two (2) times daily (with meals). 180 Tab 3  
 sodium bicarbonate 650 mg tablet Take 1 Tab by mouth three (3) times daily. Crush one tablet and mix with cranberry juice after evening meal (Patient taking differently: Take 650 mg by mouth two (2) times a day. Crush one tablet and mix with cranberry juice after evening meal ) 270 Tab 3  
 aspirin 81 mg chewable tablet Take 1 Tab by mouth daily. 90 Tab 3  
 brinzolamide (Azopt) 1 % ophthalmic suspension Administer 1 Drop to both eyes two (2) times a day. 15 mL 3  
 acetaminophen (TYLENOL) 500 mg tablet Take 1 Tab by mouth every six (6) hours as needed for Pain. 60 Tab 0  
 latanoprost (XALATAN) 0.005 % ophthalmic solution Administer 1 Drop to both eyes nightly. No facility-administered encounter medications on file as of 6/22/2021. Review of Symptoms: 
 
Review of Systems Constitutional: Negative. Negative for chills and fever. HENT: Negative. Negative for hearing loss. Respiratory: Positive for shortness of breath. Negative for cough and hemoptysis. Cardiovascular: Negative. Negative for chest pain, palpitations, orthopnea, claudication, leg swelling and PND. Gastrointestinal: Negative. Negative for nausea and vomiting. Musculoskeletal: Positive for back pain and falls. Negative for myalgias. Skin: Negative. Negative for rash. Neurological: Negative. Negative for dizziness, loss of consciousness and headaches. Physical Exam: General: Well developed, in no acute distress, cooperative and alert HEENT: No carotid bruits, no JVD, trach is midline. Neck Supple, PEERL, EOM intact. Heart:  reg rate and rhythm; normal S1/S2; no murmurs, no gallops or rubs. Respiratory: Clear bilaterally x 4, no wheezing or rales Abdomen:   Soft, non-tender, no distention, no masses. + BS. Extremities:  Normal cap refill, no cyanosis, atraumatic. No edema. Neuro: A&Ox3, speech clear, gait stable. Skin: Skin color is normal. No rashes or lesions. Non diaphoretic Vascular: 2+ pulses symmetric in all extremities Visit Vitals BP (!) 162/88 (BP 1 Location: Right upper arm, BP Patient Position: Sitting, BP Cuff Size: Large adult) Pulse 61 Resp 18 Ht 5' 8.5\" (1.74 m) Wt 165 lb 6.4 oz (75 kg) SpO2 99% BMI 24.78 kg/m² ECG: paced rythm Cardiology Labs: 
 
Lab Results Component Value Date/Time Cholesterol, total 143 07/19/2019 11:32 AM  
 HDL Cholesterol 42 07/19/2019 11:32 AM  
 LDL, calculated 85 07/19/2019 11:32 AM  
 LDL, calculated 121 (H) 06/05/2018 04:08 AM  
 VLDL, calculated 16 07/19/2019 11:32 AM  
 CHOL/HDL Ratio 4.5 06/05/2018 04:08 AM  
 
 
Lab Results Component Value Date/Time Hemoglobin A1c 5.2 07/19/2019 11:34 AM  
 Hemoglobin A1c (POC) 6.2 (A) 04/25/2018 11:30 AM  
 
 
Lab Results Component Value Date/Time Sodium 147 (H) 03/02/2021 11:17 AM  
 Potassium 5.4 (H) 03/02/2021 11:17 AM  
 Chloride 111 (H) 03/02/2021 11:17 AM  
 CO2 22 03/02/2021 11:17 AM  
 Glucose 102 (H) 03/02/2021 11:17 AM  
 BUN 34 03/02/2021 11:17 AM  
 Creatinine 2.79 (H) 03/02/2021 11:17 AM  
 BUN/Creatinine ratio 12 03/02/2021 11:17 AM  
 GFR est AA 21 (L) 03/02/2021 11:17 AM  
 GFR est non-AA 18 (L) 03/02/2021 11:17 AM  
 Calcium 9.0 03/02/2021 11:17 AM  
 Anion gap 9 03/27/2020 03:10 AM  
 Bilirubin, total 0.4 03/26/2020 03:18 AM  
 ALT (SGPT) 12 03/26/2020 03:18 AM  
 Alk.  phosphatase 68 03/26/2020 03:18 AM  
 Protein, total 5.0 (L) 03/26/2020 03:18 AM  
 Albumin 2.4 (L) 03/26/2020 03:18 AM  
 Globulin 2.6 03/26/2020 03:18 AM  
 A-G Ratio 0.9 (L) 03/26/2020 03:18 AM  
 
 
 
 Assessment: ICD-10-CM ICD-9-CM 1. Atherosclerosis of native coronary artery of native heart without angina pectoris  I25.10 414.01 AMB POC EKG ROUTINE W/ 12 LEADS, INTER & REP 2. Non-rheumatic mitral regurgitation  I34.0 424.0 3. Mixed hyperlipidemia  E78.2 272.2 4. Sinoatrial node dysfunction (HCC)  I49.5 427.81   
5. Essential hypertension, benign  I10 401.1 Plan: 1. Atherosclerosis of native coronary artery of native heart without angina pectoris Stable. No angina. 
- AMB POC EKG ROUTINE W/ 12 LEADS, INTER & REP 2. Non-rheumatic mitral regurgitation Moderate MR by previous ago. Given his advanced age, will pursue conservative  medical management. 3. Mixed hyperlipidemia Not on statin therapy due to age. No added benefit. 4. Sinoatrial node dysfunction ([address] Pacemaker followed by device clinic. 5. Essential hypertension, benign Elevated today due to pain. Continue current therapy. F/u in 6 months or as necessary. Discussed with daughter.   
 
 
Blanca Koenig MD

## 2021-06-22 NOTE — PROGRESS NOTES
1. Have you been to the ER, urgent care clinic since your last visit? Hospitalized since your last visit? No 
 
2. Have you seen or consulted any other health care providers outside of the [address] [place] [address] since your last visit? Include any pap smears or colon screening. No  
 
 
 
Chief Complaint Patient presents with  Coronary Artery Disease C/O  SOB, Dizziness(unable to get on table to obtain orthostatics)

## 2021-07-21 NOTE — TELEPHONE ENCOUNTER
Patient daughter calling stating the nurse Ila Pickard (private nurse) advised her that her father is retaining fluid. His ankles are swollen and from Friday through Tuesday he has gained 5 pounds. Also he has been in his room due to his knee pain and has been eating [employer] mac & cheese. The nurse Ila Pickard wanted to know if his Lasix could be increased? He is still doing physical therapy for his knee twice a week.

## 2021-08-03 NOTE — TELEPHONE ENCOUNTER
Patients daughter Lilly Louis calling stating her father is having issue with breathing at night and wanted to know if Dr. Stacy Griffiths would order oxygen for him. Just for accasional use.     Elaine King [phone]

## 2021-08-04 PROBLEM — R29.6 FREQUENT FALLS: Status: ACTIVE | Noted: 2021-01-01

## 2021-08-04 PROBLEM — S06.5XAA BILATERAL SUBDURAL HEMATOMAS: Status: ACTIVE | Noted: 2021-01-01

## 2021-08-04 NOTE — ED NOTES
Bedside and Verbal shift change report given to Diane RN (oncoming nurse) by [facility] (offgoing nurse). Report included the following information SBAR, ED Summary, Intake/Output, MAR and Recent Results.
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
## 2021-08-05 NOTE — PROGRESS NOTES
1250: Pt did not pass speech therapy evaluation, remains NPO. Pt BP elevated 180/80's. Pt also complaining of pain to right shoulder 5/10. Paged MD Ramirez to see about switching PO meds to IV to help pain and BP control.
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
## 2021-08-05 NOTE — ED NOTES
Patient is being transferred to [address], Room # 4314. Report given to Maximo Lee on Arina Gupta for routine progression of care. Report consisted of the following information SBAR, ED Summary, Intake/Output and MAR. Patient transferred to receiving unit by: transport (RN or tech name). Outstanding consults needed: No     Next labs due: No     The following personal items will be sent with the patient during transfer to the floor: All valuables:    Cardiac monitoring ordered: No     The following CURRENT information was reported to the receiving RN:    Code status: DNR at time of transfer    Last set of vital signs:  Vital Signs  Level of Consciousness: Alert (0) (08/04/21 1935)  Temp: 97.7 °F (36.5 °C) (08/04/21 1935)  Temp Source: Oral (08/04/21 1935)  Pulse (Heart Rate): 69 (08/04/21 1935)  Resp Rate: 22 (08/04/21 1935)  BP: (!) 170/97 (08/04/21 1935)  MAP (Monitor): 119 (08/04/21 1935)  MAP (Calculated): 121 (08/04/21 1935)  BP 1 Location: Left upper arm (08/04/21 1605)  BP 1 Method: Automatic (08/04/21 1605)  MEWS Score: 2 (08/04/21 1935)         Oxygen Therapy  O2 Sat (%): 94 % (08/04/21 1605)  Pulse via Oximetry: 69 beats per minute (08/04/21 1935)  O2 Device: None (Room air) (08/04/21 1605)      Last pain assessment:         Wounds: No     Urinary catheter: voiding  Is there a covarrubias order: No     LDAs:       Peripheral IV 08/04/21 Right Antecubital (Active)         Opportunity for questions and clarification was provided.     Tony Osorio RN

## 2021-08-05 NOTE — PROGRESS NOTES
Transition of Care Plan:    RUR:  16%  Disposition:  [address] with hospice or HH and pvt cgs  Follow up appointments:  PCP  DME needed:  Pt has a rollator. Transportation at Discharge:  [address] [place] [address] or means to access home:    n/a    IM Medicare Letter:  Needed at d/c  Is patient a BCPI-A Bundle: If yes, was Bundle Letter given?:   n/a  Caregiver Contact:Kristyn Mccollum [phone]  Discharge Caregiver contacted prior to discharge? CM will contact cg prior to d/c.    Reason for Admission:   Bilateral Subdural Hematoma & Frequent Falls                     RUR Score:    16%           Plan for utilizing home health:   tbd       PCP: First and Last name:  Carmella Curry MD     Name of Practice:    Are you a current patient: Yes/No: yes   Approximate date of last visit:  May   Can you participate in a virtual visit with your PCP: prefers in office                    Current Advanced Directive/Advance Care Plan: DNR    Claire James (ACP) Conversation      Date of Conversation: 8/5/21  Conducted with: Patient with [address] Decision Maker:     Click here to complete [address] including selection of the Healthcare Decision Maker Relationship (ie \"Primary\")      Content/Action Overview: Has ACP document(s) on file - reflects the patient's care preferences  Reviewed DNR/DNI and patient confirms current DNR status - completed forms on file (place new order if needed)         Length of Voluntary ACP Conversation in minutes:  <16 minutes (Non-Billable)    Cody Fuller                                        Transition of Care Plan:       Federico Thomas with Hospice or [facility]?    3:25 p.m.  CM called and spoke with pt's daughter to discuss d/c plan. CM contacted attending physician to help with planning. Palliative was recommended.  CM attempted to explain; however, daughter wanted to speak with attending physician regarding prognosis. CM contacted attending physician again for assistance. CM met with pt at bedside to introduce self/role, verify demographics, insurance and PCP. CM also discussed d/c plan. Pt is a 79 yo, ,  male who was admitted to [facility] on 8/4/21 with a dx of Bilateral Subdural hematoma and Frequent Falls. Pt sees his PCP \"not very often. \"  Pt's daughter orders his medications from a pharmacy off of [address] that delivers to his home; however, pt unsure of the name. Pt has resided at [facility] for the past 20 years. He stated he does not want to return to the healthcare side but that he wants to stay in independent living. Pt needs asst with his ADLs/IADLs. Pt has a caregiver two times a week. Pt has a supportive daughter. Pt uses a rollator. Pt does not drive. Pt reported having a hx of HH but he does not recall the company. Pt has been in SNF at [facility] Pt denied a hx of IPR. S can transport at d/c. CM will continue to assess for d/c needs. Care Management Interventions  PCP Verified by CM:  Yes (Pt sees Dr. Broderick Huang.)  Palliative Care Criteria Met (RRAT>21 & CHF Dx)?: No  Mode of Transport at Discharge: S  Transition of Care Consult (CM Consult): Discharge Planning  Discharge Durable Medical Equipment: No (Pt has a rollator.)  Physical Therapy Consult: Yes  Occupational Therapy Consult: Yes  Speech Therapy Consult: Yes  Current Support Network: Assisted Living ([facility]  Confirm Follow Up Transport: Family  The Patient and/or Patient Representative was Provided with a Choice of Provider and Agrees with the Discharge Plan?: Yes  Name of the Patient Representative Who was Provided with a Choice of Provider and Agrees with the Discharge Plan: Sd Tay  Discharge Location  Discharge Placement: Assisted Living ([facility]    Miguel Angel Marrufo,   Care Management, [address]
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
## 2021-08-05 NOTE — PROGRESS NOTES
Hospitalist Progress Note    NAME: Yulia Her   :  1921   MRN:  [id number]       Assessment / Plan:  Acute bilateral hematoma POA likely chronic patient with frequent falls POA  Neurology evaluation  Mixed acute and chronic subdural hematoma  Optimize BP target   Patient also has seen  speech currently appeared to be n.p.o. No surgical intervention  H&H remained stable  Troponin evaluation negative  Patient currently bed  PT evaluation  Repeat CT in AM  Essential hypertension continue low-dose Coreg    Chronic kidney disease stage III stable    Extremely hard of hearing    Follow-up with further recommendations neurology seen the patient. Also patient is having a lot of secretions not able to hold that risk for aspiration. Currently being kept n.p.o. Patient is a DO NOT RESUSCITATE will recommend palliative care evaluation for goals of care. Body mass index is 24.78 kg/m². Estimated discharge date: 2021  Barriers: Medical stability    Code status: DNR  Prophylaxis: SCDs  Recommended Disposition: Return to [facility]     Subjective:     Chief Complaint / Reason for Physician Visit  . Discussed with RN events overnight. Patient seen and examined at bedside verbalizing appropriately. Patient have an extensive conversation. Nevertheless appear to be very hard of hearing as well. Review of Systems:  Symptom Y/N Comments  Symptom Y/N Comments   Fever/Chills n   Chest Pain n    Poor Appetite n   Edema     Cough n   Abdominal Pain     Sputum n   Joint Pain     SOB/HAMILTON n   Pruritis/Rash     Nausea/vomit nn   Tolerating PT/OT     Diarrhea n   Tolerating Diet     Constipation n   Other       Could NOT obtain due to:      Objective:     VITALS:   Last 24hrs VS reviewed since prior progress note.  Most recent are:  Patient Vitals for the past 24 hrs:   Temp Pulse Resp BP SpO2   21 1134 97.3 °F (36.3 °C) 67 16 (!) 187/84 95 %   21 0827 97.4 °F (36.3 °C) 69 16 (!) 189/87 96 %   08/05/21 0300 97.3 °F (36.3 °C) 73 16 (!) 195/94 96 %   08/04/21 2211 97.4 °F (36.3 °C) 68 16 (!) 193/98 92 %   08/04/21 1935 97.7 °F (36.5 °C) 69 22 (!) 170/97    08/04/21 1645  69 18 (!) 179/94    08/04/21 1630  69 16 (!) 173/91    08/04/21 1615  70 16 (!) 175/85    08/04/21 1608    (!) 171/85    08/04/21 1605 97.6 °F (36.4 °C) 70 14 (!) 171/85 94 %       Intake/Output Summary (Last 24 hours) at 8/5/2021 1514  Last data filed at 8/5/2021 0910  Gross per 24 hour   Intake    Output 500 ml   Net -500 ml        I had a face to face encounter and independently examined this patient on 8/5/2021, as outlined below:  PHYSICAL EXAM:  General: WD, WN. Alert, cooperative, no acute distress    EENT:  EOMI. Anicteric sclerae. MMM  Resp:  CTA bilaterally, no wheezing or rales. No accessory muscle use  CV:  Regular  rhythm,  No edema  GI:  Soft, Non distended, Non tender. +Bowel sounds  Neurologic:  Alert and oriented X 3, normal speech,   Psych:   Good insight. Not anxious nor agitated  Skin:  No rashes. No jaundice    Reviewed most current lab test results and cultures  YES  Reviewed most current radiology test results   YES  Review and summation of old records today    NO  Reviewed patient's current orders and MAR    YES  PMH/SH reviewed - no change compared to H&P  ________________________________________________________________________  Care Plan discussed with:    Comments   Patient x    Family      RN     Care Manager     Consultant                        Multidiciplinary team rounds were held today with , nursing, pharmacist and clinical coordinator. Patient's plan of care was discussed; medications were reviewed and discharge planning was addressed.      ________________________________________________________________________  Total NON critical care TIME:    Total CRITICAL CARE TIME Spent:   Minutes non procedure based      Comments   >50% of visit spent in counseling and coordination of care     ________________________________________________________________________  Chelsey Hernandez MD     Procedures: see electronic medical records for all procedures/Xrays and details which were not copied into this note but were reviewed prior to creation of Plan. LABS:  I reviewed today's most current labs and imaging studies.   Pertinent labs include:  Recent Labs     08/04/21  1559   WBC 7.1   HGB 9.1*   HCT 30.1*        Recent Labs     08/04/21  1559      K 4.4   *   CO2 25   *   BUN 36*   CREA 2.16*   CA 8.6   ALB 3.2*   TBILI 0.7   ALT 17   INR 1.2*       Signed: Chelsey Hernandez MD

## 2021-08-05 NOTE — PROGRESS NOTES
End of Shift Note    Bedside shift change report given to Renato RN (oncoming nurse) by Sim Renteria RN (offgoing nurse).   Report included the following information     Shift worked:  nights   Shift summary and any significant changes:     suction for secretions, has hard time swallowing them, PO meds held for this reason     Concerns for physician to address:     Zone phone for oncoming shift:   [phone]     Patient Information  Idris Guzman  80 y.o.  8/4/2021  3:40 PM by Cheyenne Cole MD. Idris Guzman was admitted from [place] [address] List  Patient Active Problem List    Diagnosis Date Noted    Frequent falls 08/04/2021    Bilateral subdural hematomas ([address] 08/04/2021    Diverticulosis 03/27/2020    Melena 09/25/2019    Type 2 diabetes mellitus with diabetic neuropathy ([address] 04/19/2019    Rectal bleeding 01/14/2019    S/P PTCA (percutaneous transluminal coronary angioplasty) 01/14/2019    Non-rheumatic mitral regurgitation 11/15/2018    Acute gastric ulcer with hemorrhage 07/05/2018    GI bleed 07/03/2018    Acute blood loss anemia 07/03/2018    S/P cardiac cath 06/04/2018    Type 2 diabetes mellitus with nephropathy ([address] 01/23/2018    Fatigue 12/06/2017    Chronic kidney disease, stage IV (severe) ([address] 12/06/2017    ASCVD (arteriosclerotic cardiovascular disease) 12/06/2017    Glaucoma 12/06/2017    Neuropathy 12/06/2017    Pernicious anemia 12/06/2017    Hematuria 12/06/2017    Abdominal pain 12/06/2017    Type 2 diabetes mellitus without complication, without long-term current use of insulin ([address] 09/07/2017    GERD (gastroesophageal reflux disease) 09/07/2017    Sinoatrial node dysfunction ([address] 04/23/2012    Essential hypertension, benign 04/16/2012    Postsurgical aortocoronary bypass status 04/16/2012    Mixed hyperlipidemia 04/16/2012    Cardiac pacemaker in situ 04/16/2012     Past Medical History:   Diagnosis Date    Abdominal pain 12/6/2017    Acute gastric ulcer with hemorrhage 7/5/2018    Arteriosclerotic coronary artery disease 12/6/2017    Atrioventricular block, complete (HCC)     CAD (coronary artery disease)     Chronic kidney disease, stage IV (severe) [facility] 12/6/2017    CKD (chronic kidney disease) stage 3, GFR 30-59 ml/min ([facility]) 9/7/2017    Diabetes mellitus ([facility] 12/6/2017    Dizziness and giddiness     Fatigue 12/6/2017    GERD (gastroesophageal reflux disease)     GERD (gastroesophageal reflux disease) 9/7/2017    Glaucoma 12/6/2017    Hematuria 12/6/2017    Hyperlipidemia 12/6/2017    Hypertension     Mixed hyperlipidemia     Neuropathy 12/6/2017    Other acute and subacute form of ischemic heart disease     Pernicious anemia 12/6/2017    Sinoatrial node dysfunction ([facility])     Syncope and collapse     Thrush 12/6/2017    Thrush of mouth and esophagus ([facility] 12/6/2017    Type 2 diabetes mellitus without complication, without long-term current use of insulin ([facility] 9/7/2017       Core Measures:  CVA: N  CHF: N  PNA: N    Activity:  Activity Level: Bed Rest  Number times ambulated in hallways past shift: 0  Number of times OOB to chair past shift: 0  Cardiac:   Cardiac Monitoring: N       Access:   Current line(s): PIV  Central Line? N  PICC LINE? N    Genitourinary:   Urinary status: Continent  Urinary Catheter? N    Respiratory:   O2 Device: None (Room air)  Chronic home O2 use?: N  Incentive spirometer at bedside: N       GI:     Current diet:  ADULT DIET Regular  Passing flatus: Y  Tolerating current diet: Y       Pain Management:   Patient states pain is manageable on current regimen: N    Skin:  Son Score: 21  Interventions: turning and repositioning    Patient Safety:  Fall Score:  Total Score: 1  Interventions: bed low, wheels lock     @Rollbelt  @dexterity to release roll belt  No ( must document dexterity  here by stating Yes or No here, otherwise this is a restraint and must follow restraint documentation policy.)    DVT prophylaxis:  DVT prophylaxis Med- N  DVT prophylaxis SCD or STEPHAN- SCD    Wounds: (If Applicable)  Wounds- N    Active Consults:  None    Length of Stay:  Expected LOS: - - -  Actual LOS: 1  Discharge Plan:

## 2021-08-05 NOTE — PROGRESS NOTES
End of Shift Note    Bedside shift change report given to Yarelis Tyler, RN (oncoming nurse) by Karla Hurtado RN, RN (offgoing nurse).   Report included the following information     Shift worked:  7a-7p   Shift summary and any significant changes:    [place]in for secretions, has hard time swallowing them, NPO to food and meds, failed STAND test by SLP,   pt is continent, but requested to use the external urine catheter  AxO x4, Needs consult for palliative care, repeat head CT, and repeat SLP eval.     Concerns for physician to address:  none   Zone phone for oncoming shift:   [phone]     Patient Information  Angie Gutierrez  80 y.o.  8/4/2021  3:40 PM by Ravinder Zaidi MD. Angie Gutierrez was admitted from [address]  Patient Active Problem List    Diagnosis Date Noted    Frequent falls 08/04/2021    Bilateral subdural hematomas ([address] 08/04/2021    Diverticulosis 03/27/2020    Melena 09/25/2019    Type 2 diabetes mellitus with diabetic neuropathy ([address] 04/19/2019    Rectal bleeding 01/14/2019    S/P PTCA (percutaneous transluminal coronary angioplasty) 01/14/2019    Non-rheumatic mitral regurgitation 11/15/2018    Acute gastric ulcer with hemorrhage 07/05/2018    GI bleed 07/03/2018    Acute blood loss anemia 07/03/2018    S/P cardiac cath 06/04/2018    Type 2 diabetes mellitus with nephropathy ([address] 01/23/2018    Fatigue 12/06/2017    Chronic kidney disease, stage IV (severe) ([address] 12/06/2017    ASCVD (arteriosclerotic cardiovascular disease) 12/06/2017    Glaucoma 12/06/2017    Neuropathy 12/06/2017    Pernicious anemia 12/06/2017    Hematuria 12/06/2017    Abdominal pain 12/06/2017    Type 2 diabetes mellitus without complication, without long-term current use of insulin ([address] 09/07/2017    GERD (gastroesophageal reflux disease) 09/07/2017    Sinoatrial node dysfunction ([address] 04/23/2012    Essential hypertension, benign 04/16/2012    Postsurgical aortocoronary bypass status 04/16/2012    Mixed hyperlipidemia 04/16/2012    Cardiac pacemaker in situ 04/16/2012     Past Medical History:   Diagnosis Date    Abdominal pain 12/6/2017    Acute gastric ulcer with hemorrhage 7/5/2018    Arteriosclerotic coronary artery disease 12/6/2017    Atrioventricular block, complete (HCC)     CAD (coronary artery disease)     Chronic kidney disease, stage IV (severe) ([address] 12/6/2017    CKD (chronic kidney disease) stage 3, GFR 30-59 ml/min ([facility]) 9/7/2017    Diabetes mellitus ([address] 12/6/2017    Dizziness and giddiness     Fatigue 12/6/2017    GERD (gastroesophageal reflux disease)     GERD (gastroesophageal reflux disease) 9/7/2017    Glaucoma 12/6/2017    Hematuria 12/6/2017    Hyperlipidemia 12/6/2017    Hypertension     Mixed hyperlipidemia     Neuropathy 12/6/2017    Other acute and subacute form of ischemic heart disease     Pernicious anemia 12/6/2017    Sinoatrial node dysfunction (HCC)     Syncope and collapse     Thrush 12/6/2017    Thrush of mouth and esophagus [facility] 12/6/2017    Type 2 diabetes mellitus without complication, without long-term current use of insulin ([facility] 9/7/2017       Core Measures:  CVA: N  CHF: N  PNA: N    Activity:  Activity Level: Chair, Up with Assistance  Number times ambulated in hallways past shift: 0  Number of times OOB to chair past shift: 1  Cardiac:   Cardiac Monitoring: N       Access:   Current line(s): PIV  Central Line? N  PICC LINE? N    Genitourinary:   Urinary status: Continent  Urinary Catheter? External    Respiratory:   O2 Device: None (Room air)  Chronic home O2 use?: N  Incentive spirometer at bedside: N       GI:  Last Bowel Movement Date: 08/04/21 (very small)  Current diet:  DIET NPO  Passing flatus: Y  Tolerating current diet: Y       Pain Management:   Patient states pain is manageable on current regimen: Yes    Skin:  Son Score: 21  Interventions: turning and repositioning    Patient Safety:  Fall Score:  Total Score: 1  Interventions: bed low, wheels lock     @Rollbelt  @dexterity to release roll belt  No ( must document dexterity  here by stating Yes or No here, otherwise this is a restraint and must follow restraint documentation policy.)    DVT prophylaxis:  DVT prophylaxis Med- N  DVT prophylaxis SCD or STEPHAN- SCD    Wounds: (If Applicable)  Wounds- N    Active Consults:  IP CONSULT TO NEUROLOGY  IP CONSULT TO PALLIATIVE CARE - PROVIDER    Length of Stay:  Expected LOS: 3d 7h  Actual LOS: 1  Discharge Plan:
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
## 2021-08-06 NOTE — PROGRESS NOTES
Problem: Falls - Risk of  Goal: *Absence of Falls  Description: Document Imer Coffey Fall Risk and appropriate interventions in the flowsheet. Outcome: Progressing Towards Goal  Note: Fall Risk Interventions:  Mobility Interventions: Patient to call before getting OOB, PT Consult for mobility concerns, PT Consult for assist device competence, OT consult for ADLs, Communicate number of staff needed for ambulation/transfer, Bed/chair exit alarm    Medication Interventions: Bed/chair exit alarm, Evaluate medications/consider consulting pharmacy, Patient to call before getting OOB, Teach patient to arise slowly    Elimination Interventions: Bed/chair exit alarm, Call light in reach, Patient to call for help with toileting needs, Stay With Me (per policy), Toileting schedule/hourly rounds    History of Falls Interventions: Bed/chair exit alarm, Door open when patient unattended, Evaluate medications/consider consulting pharmacy         Problem: Pressure Injury - Risk of  Goal: *Prevention of pressure injury  Description: Document Son Scale and appropriate interventions in the flowsheet. Outcome: Progressing Towards Goal  Note: Pressure Injury Interventions:     Moisture Interventions: Absorbent underpads, Internal/External urinary devices, Limit adult briefs, Maintain skin hydration (lotion/cream), Minimize layers, Check for incontinence Q2 hours and as needed    Activity Interventions: Increase time out of bed, Pressure redistribution bed/mattress(bed type), PT/OT evaluation    Mobility Interventions: HOB 30 degrees or less, Pressure redistribution bed/mattress (bed type), PT/OT evaluation, Turn and reposition approx.  every two hours(pillow and wedges)    Nutrition Interventions: Document food/fluid/supplement intake    Friction and Shear Interventions: HOB 30 degrees or less, Lift sheet, Minimize layers

## 2021-08-06 NOTE — PROGRESS NOTES
Transition of Care Plan:     RUR:  18%  Disposition:  [address] with hospice or HH and pvt cgs (Palliative will follow up with pt/daughter on Monday)  Follow up appointments:  PCP  DME needed:  Pt has a rollator. Transportation at Discharge:  [address] [place] [address] or means to access home:    n/a    IM Medicare Letter:  Needed at d/c  Is patient a BCPI-A Bundle: If yes, was Bundle Letter given?:   n/a  Caregiver Contact:Kristyn Mccollum [phone]  Discharge Caregiver contacted prior to discharge? CM will contact cg prior to d/c.    CM reviewed pt's chart. Attending physician spoke with pt's daughter and answered all questions. Palliative is following and will consult on Monday. CM will continue to assess for d/c needs.     Vianney Oconnor,   Care Management, [facility]  [phone]

## 2021-08-06 NOTE — PROGRESS NOTES
Received notification from bedside RN about patient with regards to: complained of choking sensation with oral secretions, frequent spitting and clearing his throat  VS: /74, HR 75, RR 18, O2 sat 95% on RA    Intervention given: Scopolamine patch ordered

## 2021-08-06 NOTE — CONSULTS
Palliative Medicine      Consult received; per my discussion with [facility], he has already spoken with dtr today, hold off on consult until Monday.

## 2021-08-06 NOTE — PROGRESS NOTES
Occupational Therapy note:    Chart reviewed and discussed with nursing. Patient currently hypothermic and not appropriate for OT tx session. Will defer and continue to follow.     Melinda Giang, OTR/L

## 2021-08-06 NOTE — PROGRESS NOTES
End of Shift Note    Bedside shift change report given to Manoj Guerrero, RN (oncoming nurse) by Stephanie Ashley RN, RN (offgoing nurse). Report included the following information     Shift worked:  7p-7a   Shift summary and any significant changes:    Basin for secretions, has hard time swallowing them, NPO to food and meds, failed STAND test by SLP,   pt is continent, but requested to use the external urine catheter  AxO x4, Needs consult for palliative care, repeat head CT, and repeat SLP eval.     Concerns for physician to address:  Constant clearing of throat exaggerated breathing, scopalamine ordered by NP, 02 stable.    Zone phone for oncoming shift:   [phone]     Patient Information  Chelo Noel  80 y.o.  8/4/2021  3:40 PM by Mihaela Green MD. Chelo Noel was admitted from [address]  Patient Active Problem List    Diagnosis Date Noted    Frequent falls 08/04/2021    Bilateral subdural hematomas ([address] 08/04/2021    Diverticulosis 03/27/2020    Melena 09/25/2019    Type 2 diabetes mellitus with diabetic neuropathy ([address] 04/19/2019    Rectal bleeding 01/14/2019    S/P PTCA (percutaneous transluminal coronary angioplasty) 01/14/2019    Non-rheumatic mitral regurgitation 11/15/2018    Acute gastric ulcer with hemorrhage 07/05/2018    GI bleed 07/03/2018    Acute blood loss anemia 07/03/2018    S/P cardiac cath 06/04/2018    Type 2 diabetes mellitus with nephropathy ([address] 01/23/2018    Fatigue 12/06/2017    Chronic kidney disease, stage IV (severe) ([address] 12/06/2017    ASCVD (arteriosclerotic cardiovascular disease) 12/06/2017    Glaucoma 12/06/2017    Neuropathy 12/06/2017    Pernicious anemia 12/06/2017    Hematuria 12/06/2017    Abdominal pain 12/06/2017    Type 2 diabetes mellitus without complication, without long-term current use of insulin ([address] 09/07/2017    GERD (gastroesophageal reflux disease) 09/07/2017    Sinoatrial node dysfunction ([address] 04/23/2012    Essential hypertension, benign 04/16/2012    Postsurgical aortocoronary bypass status 04/16/2012    Mixed hyperlipidemia 04/16/2012    Cardiac pacemaker in situ 04/16/2012     Past Medical History:   Diagnosis Date    Abdominal pain 12/6/2017    Acute gastric ulcer with hemorrhage 7/5/2018    Arteriosclerotic coronary artery disease 12/6/2017    Atrioventricular block, complete (HCC)     CAD (coronary artery disease)     Chronic kidney disease, stage IV (severe) ([address] 12/6/2017    CKD (chronic kidney disease) stage 3, GFR 30-59 ml/min ([facility]) 9/7/2017    Diabetes mellitus ([address] 12/6/2017    Dizziness and giddiness     Fatigue 12/6/2017    GERD (gastroesophageal reflux disease)     GERD (gastroesophageal reflux disease) 9/7/2017    Glaucoma 12/6/2017    Hematuria 12/6/2017    Hyperlipidemia 12/6/2017    Hypertension     Mixed hyperlipidemia     Neuropathy 12/6/2017    Other acute and subacute form of ischemic heart disease     Pernicious anemia 12/6/2017    Sinoatrial node dysfunction (HCC)     Syncope and collapse     Thrush 12/6/2017    Thrush of mouth and esophagus ([address] 12/6/2017    Type 2 diabetes mellitus without complication, without long-term current use of insulin ([facility] 9/7/2017       Core Measures:  CVA: N  CHF: N  PNA: N    Activity:  Activity Level: Up with Assistance  Number times ambulated in hallways past shift: 0  Number of times OOB to chair past shift: 1  Cardiac:   Cardiac Monitoring: N       Access:   Current line(s): PIV  Central Line? N  PICC LINE? N    Genitourinary:   Urinary status: Continent  Urinary Catheter?  External    Respiratory:   O2 Device: None (Room air)  Chronic home O2 use?: N  Incentive spirometer at bedside: N       GI:  Last Bowel Movement Date:  (unknown )  Current diet:  DIET NPO  Passing flatus: Y  Tolerating current diet: Y       Pain Management:   Patient states pain is manageable on current regimen: Yes    Skin:  Son Score: 19  Interventions: turning and repositioning    Patient Safety:  Fall Score:  Total Score: 4  Interventions: bed low, wheels lock  High Fall Risk: Yes  @Rollbelt  @dexterity to release roll belt  No ( must document dexterity  here by stating Yes or No here, otherwise this is a restraint and must follow restraint documentation policy.)    DVT prophylaxis:  DVT prophylaxis Med- N  DVT prophylaxis SCD or STEPHAN- SCD    Wounds: (If Applicable)  Wounds- N    Active Consults:  IP CONSULT TO NEUROLOGY  IP CONSULT TO PALLIATIVE CARE - PROVIDER    Length of Stay:  Expected LOS: 3d 7h  Actual LOS: 2  Discharge Plan:

## 2021-08-06 NOTE — PROGRESS NOTES
Hospitalist Progress Note    NAME: Gerhardt Card   :  1921   MRN:  [id number]       Assessment / Plan:  Acute bilateral hematoma POA likely chronic patient with frequent falls POA  -Neurology evaluation is appreciated   -Mixed acute and chronic subdural hematoma  -Optimize BP target   - Patient also has seen  speech currently appeared to be n.p.o due to dysphagia.  -No surgical intervention  -H&H remained stable  -Troponin evaluation negative  -PT/OT evaluation  -Repeat CT this AM with no changes from before  -Will get ct neck soft tissues to r/o obstruction    Essential hypertension,   -on low-dose Coreg, on hold due to dysphagia    Chronic kidney disease stage III, stable    CAD s/p CABG in the past    PAF s/p pacemaker    Extremely hard of hearing    Follow-up with further recommendations neurology seen the patient. Also patient is having a lot of secretions not able to hold that risk for aspiration. Currently being kept n.p.o. Patient is a DO NOT RESUSCITATE will recommend palliative care evaluation for goals of care. Body mass index is 24.78 kg/m². Estimated discharge date: 2021  Barriers: Medical stability, work-up    Code status: DNR  Prophylaxis: SCDs  Recommended Disposition: Return to [facility]     Subjective:     Patient was seen and examined. No acute events overnight. Discussed with RN overnight events. All patient's questions were answered. \"I can not eat\"    Review of Systems:  Symptom Y/N Comments  Symptom Y/N Comments   Fever/Chills n   Chest Pain n    Poor Appetite    Edema     Cough n   Abdominal Pain n    Sputum    Joint Pain     SOB/HAMILTON n   Pruritis/Rash     Nausea/vomit n   Tolerating PT/OT     Diarrhea    Tolerating Diet n    Constipation    Other       Could NOT obtain due to:          Objective:     VITALS:   Last 24hrs VS reviewed since prior progress note.  Most recent are:  Patient Vitals for the past 24 hrs:   Temp Pulse Resp BP SpO2 08/06/21 0838 97.6 °F (36.4 °C) 70 20 (!) 145/58 98 %   08/06/21 0234 97.6 °F (36.4 °C) 76 20 119/77 97 %   08/05/21 2300 97.5 °F (36.4 °C) 75 18 139/74 95 %   08/05/21 1955 97.5 °F (36.4 °C) 78 18 (!) 145/67 95 %   08/05/21 1804  77  (!) 126/57    08/05/21 1547 97.4 °F (36.3 °C) 69 17 (!) 173/78 96 %     No intake or output data in the 24 hours ending 08/06/21 1230     I had a face to face encounter and independently examined this patient on 8/6/2021, as outlined below:  PHYSICAL EXAM:  General: WD, WN. Alert, cooperative, no acute distress    EENT:  EOMI. Anicteric sclerae. MMM  Resp:  CTA bilaterally, no wheezing or rales. No accessory muscle use  CV:  Regular  rhythm,  No edema  GI:  Soft, Non distended, Non tender. +Bowel sounds  Neurologic:  Alert and oriented X 3, normal speech,   Psych:   Good insight. Not anxious nor agitated  Skin:  No rashes. No jaundice    Reviewed most current lab test results and cultures  YES  Reviewed most current radiology test results   YES  Review and summation of old records today    NO  Reviewed patient's current orders and MAR    YES  PMH/ reviewed - no change compared to H&P  ________________________________________________________________________  Care Plan discussed with:    Comments   Patient x    Family  x    RN x    Care Manager     Consultant                        Multidiciplinary team rounds were held today with , nursing, pharmacist and clinical coordinator. Patient's plan of care was discussed; medications were reviewed and discharge planning was addressed.      ________________________________________________________________________  Total NON critical care TIME:36 min    Total CRITICAL CARE TIME Spent:   Minutes non procedure based      Comments   >50% of visit spent in counseling and coordination of care     ________________________________________________________________________  Sarah Flores MD     Procedures: see electronic medical records for all procedures/Xrays and details which were not copied into this note but were reviewed prior to creation of Plan. LABS:  I reviewed today's most current labs and imaging studies.   Pertinent labs include:  Recent Labs     08/06/21  0016 08/04/21  1559   WBC 8.4 7.1   HGB 9.1* 9.1*   HCT 30.1* 30.1*    234     Recent Labs     08/06/21  0016 08/04/21  1559    142   K 4.0 4.4   * 111*   CO2 22 25   * 180*   BUN 41* 36*   CREA 2.12* 2.16*   CA 8.5 8.6   ALB  --  3.2*   TBILI  --  0.7   ALT  --  17   INR  --  1.2*       Signed: Terrie Merino MD

## 2021-08-06 NOTE — PROGRESS NOTES
Spiritual Care Assessment/Progress Note  [facility]      NAME: Cherise Norwood      MRN: [id number]  AGE: 80 y.o.  SEX: male  Buddhist Affiliation: Non Orthodoxy   Language: English     8/6/2021     Total Time (in minutes): 20     Spiritual Assessment begun in [facility] through conversation with:         [x]Patient        [] Family    [] Friend(s)        Reason for Consult: Palliative Care, Initial/Spiritual Assessment     Spiritual beliefs: (Please include comment if needed)     [x] Identifies with a jamie tradition: Restorationism        [] Supported by a jamie community:            [] Claims no spiritual orientation:           [] Seeking spiritual identity:                [] Adheres to an individual form of spirituality:           [] Not able to assess:                           Identified resources for coping:      [] Prayer                               [] Music                  [] Guided Imagery     [x] Family/friends                 [] Pet visits     [] Devotional reading                         [] Unknown     [] Other:                                               Interventions offered during this visit: (See comments for more details)    Patient Interventions: Affirmation of emotions/emotional suffering, Initial visit, Initial/Spiritual assessment, patient floor, Life review/legacy, Catharsis/review of pertinent events in supportive environment           Plan of Care:     [] Support spiritual and/or cultural needs    [] Support AMD and/or advance care planning process      [] Support grieving process   [] Coordinate Rites and/or Rituals    [] Coordination with community clergy   [] No spiritual needs identified at this time   [] Detailed Plan of Care below (See Comments)  [] Make referral to Music Therapy  [] Make referral to Pet Therapy     [] Make referral to Addiction services  [] Make referral to [facility]  [] Make referral to Spiritual Care Partner  [] No future visits requested        [x] Follow up upon further referrals     Comments:     Initial Palliative Care Assessment visit for Mr. Fatoumata Dobbs in 3111. Reviewed the chart note before visit. Patient was alert, no family was present. According to him, he had a hard time to have breakfast and being frustrated with it. Patient expressed his wish to hear what is going on with him. He expressed his current concerns. According to the chart note, he has lived over 20 years in [facility], has a supportive daughter.  reassured him of seeing a doctor and hearing the explanation about his current symptoms. While talking, Palliative Care team Dara Strauss came in,  gave them srini and space to have medical consult. Advised of  availability.       [id number] [facility] Maritza Paredes., M.S., Th.M.  Spiritual Care Provider   Paging Service [phone])

## 2021-08-06 NOTE — PROGRESS NOTES
Problem: Dysphagia (Adult)  Goal: *Acute Goals and Plan of Care (Insert Text)  Description: Speech Pathology Goals  Initiated 8/5/2021    1. Patient will participate in swallow re-evaluation within 7 days. Outcome: Not Progressing Towards Goal   SPEECH LANGUAGE PATHOLOGY DYSPHAGIA TREATMENT  Patient: Fantasma Dugan (89 y.o. male)  Date: 8/6/2021  Diagnosis: Bilateral subdural hematomas ([address] [S06.5X9A]  Frequent falls [R29.6] <principal problem not specified>       Precautions:  Fall, DNR    ASSESSMENT:  The patient is a 79 yo male who has severe pharyngeal dysphagia and an absent swallow. When given ice, he attempted swallows but none palpated. He coughed and expectorated. Did not give any more po due to absent swallow. He has mild R facial weakness, L tongue deviation and R palatal weakness. Appears as if a brainstem stroke but MRI does not show any CVA. There are B SDHs acute and chronic. sats were 97% but there was some inhalatory stridor. His speech is mod dysarthric and vocal quality is harsh. Volume is adequate. He is using a slow rate to improve intelligibility but sometimes not intelligible. PLAN:  Recommendations and Planned Interventions:  Recommend NPO continue. Consideration for PEG vs comfort. Patient continues to benefit from skilled intervention to address the above impairments. Continue treatment per established plan of care. Discharge Recommendations:  None     SUBJECTIVE:   Patient stated he wanted to get better and swallow and live.    OBJECTIVE:   Cognitive and Communication Status:  Neurologic State: Alert  Orientation Level: Oriented X4  Cognition: Appropriate decision making, Appropriate for age attention/concentration, Appropriate safety awareness  Perception: Appears intact  Perseveration: No perseveration noted  Safety/Judgement: Awareness of environment, Lack of insight into deficits  Dysphagia Treatment:  Oral Assessment:  Oral Assessment  Labial: Right droop  Lingual: Left deviation  Velum:  (weak on the R)  Mandible: No impairment  P.O. Trials:     Vocal quality prior to P.O.: Hoarse;Hyperfunction  Consistency Presented: Ice chips  How Presented: Spoon;SLP-fed/presented     Bolus Acceptance: No impairment  Bolus Formation/Control: No impairment     Propulsion: No impairment  Oral Residue: None  Initiation of Swallow: Absent  Laryngeal Elevation: Absent (suspect bobbing)  Aspiration Signs/Symptoms: Strong cough        Cues for Modifications: None       Oral Phase Severity: No impairment  Pharyngeal Phase Severity : Severe                 Pain:  Pain Scale 1: Numeric (0 - 10)  Pain Intensity 1: 0       After treatment:   Patient left in no apparent distress in bed    COMMUNICATION/EDUCATION:   Patient was educated regarding his deficit(s) of dysphagia. He indicated he was getting a barium swallow test but this is not needed. The patient's plan of care including recommendations, planned interventions, and recommended diet changes were discussed with: Registered nurse.      Moreno Thacker SLP  Time Calculation: 9 mins
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
## 2021-08-06 NOTE — PROGRESS NOTES
1330: Patient's rectal temp is 94.4. Notified Dr. Laurel Thapa and received order for jojo hugger. 1400: Jojo hugger placed on patient. 1520: Bedside and Verbal shift change report given to Kunal (oncoming nurse) by Demond Ceballos (offgoing nurse). Report included the following information SBAR.

## 2021-08-07 NOTE — PROGRESS NOTES
1930 Tech recognized Pt was unresponsive, tech notified RN  RN provided assessment to confirm Pt passing and notified NP and nursing sup            Made contact with Pt decision maker dtr [facility] of passing of Pt  Marladrew Alter was relieved, Pt passed peacefully    Marlibby Alter provided further direction for care of     [address] for transport of   [phone] ([address])      [facility]   (JYCEZCG FDeonna)            Notified [place]  Provided demographics of Pt and Family info      2130     [facility] notified  -  not eligible for services    Left Message for pastoral services    [phone] [facility] visited the     18    Transported  to [address] [place] [address] by jaky

## 2021-08-07 NOTE — PROGRESS NOTES
responded to the death notification of Mr. Fatoumata Dobbs in 3111.  provided calm presence with dignity, no family was present.  debriefed with bedside RN Brandon Rush, shared about memory about patient. According to him, he has one daughter, she lives in [place], no plan for coming to say good bye to her father. Re[facility]le was called for decedent care.  Made sure with RN Brandon Rush for paging  when the family needs grief support request.     Franklin Verdugo M.Div., M.S., Th.M.  [facility] Provider   Paging Service [phone] ([phone])

## 2021-08-07 NOTE — PROGRESS NOTES
I was called to see Mr. Madeline Becker due to patient's death. On my arrival, He was lying in bed; there was no response to verbal or painful stimuli; pupils were fixed and dilated; there was no pulse, no respirations and no heart sounds.   Cherise Norwood was pronounced dead at 7:30 PM.      Isaiah Peters NP  7:30 PM, 8/6/2021

## 2021-09-02 NOTE — DISCHARGE SUMMARY
Death summary:    Briefly, patient is 8 years old  man who was admitted to the hospital with bilateral subdural hematoma. He had a history of CKD stage IV, hypertension. CT scan on admission revealed bilateral mixed density subdural hematomas for which aspirin was held. Neurology and palliative care were consulted, patient's daughter and medical power of  decided to proceed with comfort care measurements only. Patient passed away on the night of August 6, 2021.

## 2024-02-09 NOTE — PROGRESS NOTES
Patient seen for AMBERLY to evaluate MV. Moderate MR. Per daughter very SOB. sats 100% on RA. No clinical CHF. Hg 8.2. ? Symptoms secondary to anemia v/s CAD. Discussed with Dr. Christianson Headings who will f/u for anemia. Stress test to r/o ischemia.
[place]

## (undated) DEVICE — FORCEPS BX L160CM DIA8MM GRSP DISECT CUP TIP NONLOCKING ROT

## (undated) DEVICE — NEONATAL-ADULT SPO2 SENSOR: Brand: NELLCOR

## (undated) DEVICE — Device

## (undated) DEVICE — BIPOLAR ELECTROHEMOSTASIS CATHETER: Brand: GOLD PROBE

## (undated) DEVICE — SET ADMIN 16ML TBNG L100IN 2 Y INJ SITE IV PIGGY BK DISP

## (undated) DEVICE — BLOCK BITE ENDOSCP AD 21 MM W/ DIL BLU LF DISP

## (undated) DEVICE — NEEDLE HYPO 18GA L1.5IN PNK S STL HUB POLYPR SHLD REG BVL

## (undated) DEVICE — KENDALL RADIOLUCENT FOAM MONITORING ELECTRODE -RECTANGULAR SHAPE: Brand: KENDALL

## (undated) DEVICE — SYR 10ML LUER LOK 1/5ML GRAD --

## (undated) DEVICE — BAG BELONG PT PERS CLEAR HANDL

## (undated) DEVICE — BLOCK BITE STD GRN ORAL AD W/O STRP SLD PLAS DISP BITEGARD

## (undated) DEVICE — AIRLIFE™  ADULT CUSHION NASAL CANNULA WITH 7 FOOT (2.1 M) CRUSH-RESISTANT OXYGEN TUBING, AND U/CONNECT-IT ADAPTER: Brand: AIRLIFE™

## (undated) DEVICE — MEDI-VAC YANK SUCT HNDL W/TPRD BULBOUS TIP: Brand: CARDINAL HEALTH

## (undated) DEVICE — CATH IV AUTOGRD BC PNK 20GA 25 -- INSYTE

## (undated) DEVICE — Z DISCONTINUED NO SUB IDED SET EXTN W/ 4 W STPCOCK M SPIN LOK 36IN

## (undated) DEVICE — SUT SLK 0 30IN SH BLK --

## (undated) DEVICE — REM POLYHESIVE ADULT PATIENT RETURN ELECTRODE: Brand: VALLEYLAB

## (undated) DEVICE — SYRINGE MED 20ML STD CLR PLAS LUERLOCK TIP N CTRL DISP

## (undated) DEVICE — Device: Brand: ENDO SMARTCAP

## (undated) DEVICE — SYR BULB 60ML IRRIGATION -- CONVERT TO ITEM 116413

## (undated) DEVICE — CLIP INT L235CM WRK CHAN DIA2.8MM OPN 11MM LCK MECHANISM MR

## (undated) DEVICE — BASIN EMSIS 16OZ GRAPHITE PLAS KID SHP MOLD GRAD FOR ORAL

## (undated) DEVICE — SOLIDIFIER MEDC 1200ML -- CONVERT TO 356117

## (undated) DEVICE — TOWEL 4 PLY TISS 19X30 SUE WHT

## (undated) DEVICE — Z DISCONTINUED PER MEDLINE LINE GAS SAMPLING O2/CO2 LNG AD 13 FT NSL W/ TBNG FILTERLINE

## (undated) DEVICE — GARMENT,MEDLINE,DVT,INT,CALF,MED, GEN2: Brand: MEDLINE

## (undated) DEVICE — BW-412T DISP COMBO CLEANING BRUSH: Brand: SINGLE USE COMBINATION CLEANING BRUSH

## (undated) DEVICE — SUTURE MCRYL SZ 4 0 L18IN ABSRB VLT PS 1 L24MM 3 8 CIR REV Y682H

## (undated) DEVICE — KIT,1200CC CANISTER,3/16"X6' TUBING: Brand: MEDLINE INDUSTRIES, INC.

## (undated) DEVICE — SUTURE COAT VCRL PC 5 PRECIS COSM CONVENTIONAL CUT PRIM 3 8 J823H

## (undated) DEVICE — ZIP 8I SURGICAL SKIN CLOSURE DEVICE: Brand: ZIP 8I SURGICAL SKIN CLOSURE DEVICE

## (undated) DEVICE — MEDI-VAC NON-CONDUCTIVE SUCTION TUBING: Brand: CARDINAL HEALTH

## (undated) DEVICE — TORCON NB, ADVANTAGE CATHETER: Brand: TORCON NB

## (undated) DEVICE — AIRLIFE™ U/CONNECT-IT OXYGEN TUBING 7 FEET (2.1 M) CRUSH-RESISTANT OXYGEN TUBING, VINYL TIPPED: Brand: AIRLIFE™

## (undated) DEVICE — 3M™ IOBAN™ 2 ANTIMICROBIAL INCISE DRAPE 6640EZ: Brand: IOBAN™ 2

## (undated) DEVICE — COVADERM: Brand: DEROYAL

## (undated) DEVICE — DRAPE STRL ANGIO W/ 2 FLD COLLCTN PCH 86X135 218X343 CM 2

## (undated) DEVICE — ROCKER SWITCH PENCIL HOLSTER: Brand: VALLEYLAB

## (undated) DEVICE — BITEBLOCK ENDOSCP 60FR MAXI WHT POLYETH STURDY W/ VELC WVN

## (undated) DEVICE — STRAP,POSITIONING,KNEE/BODY,FOAM,4X60": Brand: MEDLINE

## (undated) DEVICE — SYR 3ML LL TIP 1/10ML GRAD --

## (undated) DEVICE — 1200 GUARD II KIT W/5MM TUBE W/O VAC TUBE: Brand: GUARDIAN

## (undated) DEVICE — CUFF ADULT 1 PC 1 VINYL DISP --

## (undated) DEVICE — CATH IV AUTOGRD BC BLU 22GA 25 -- INSYTE

## (undated) DEVICE — IRRIGATION KT PIST SYR 60ML -- CONVERT TO ITEM 116415

## (undated) DEVICE — MEDI-TRACE CADENCE ADULT, DEFIBRILLATION ELECTRODE -RTS  (10 PR/PK) - PHYSIO-CONTROL: Brand: MEDI-TRACE CADENCE

## (undated) DEVICE — PACEMAKER PACK: Brand: MEDLINE INDUSTRIES, INC.

## (undated) DEVICE — Device: Brand: MEDICAL ACTION INDUSTRIES

## (undated) DEVICE — 3M™ IOBAN™ 2 ANTIMICROBIAL INCISE DRAPE 6650EZ: Brand: IOBAN™ 2

## (undated) DEVICE — SUTURE VCRL SZ 2-0 L36IN ABSRB UD L40MM CT 1/2 CIR J957H

## (undated) DEVICE — 3M™ TEGADERM™ TRANSPARENT FILM DRESSING FRAME STYLE, 1626W, 4 IN X 4-3/4 IN (10 CM X 12 CM), 50/CT 4CT/CASE: Brand: 3M™ TEGADERM™

## (undated) DEVICE — SUTURE ABSORBABLE BRAIDED 2-0 CT-1 27 IN UD VICRYL J259H

## (undated) DEVICE — INFECTION CONTROL KIT SYS

## (undated) DEVICE — CANN NASAL O2 CAPNOGRAPHY AD -- FILTERLINE

## (undated) DEVICE — ENDO CARRY-ON PROCEDURE KIT INCLUDES ENZYMATIC SPONGE, GAUZE, BIOHAZARD LABEL, TRAY, LUBRICANT, DIRTY SCOPE LABEL, WATER LABEL, TRAY, DRAWSTRING PAD, AND DEFENDO 4-PIECE KIT.: Brand: ENDO CARRY-ON PROCEDURE KIT

## (undated) DEVICE — PLASMABLADE PS200-040 4.0: Brand: PLASMABLADE™

## (undated) DEVICE — PREP CHLORAPREP 10.5 ML ORG --

## (undated) DEVICE — CONTAINER SPEC 20 ML LID NEUT BUFF FORMALIN 10 % POLYPR STS

## (undated) DEVICE — BAG SPEC BIOHZRD 10 X 10 IN --

## (undated) DEVICE — LIMB HOLDER, WRIST/ANKLE: Brand: DEROYAL

## (undated) DEVICE — SOLIDIFIER FLUID 3000 CC ABSORB

## (undated) DEVICE — KENDALL RADIOLUCENT FOAM MONITORING ELECTRODE RECTANGULAR SHAPE: Brand: KENDALL